# Patient Record
Sex: MALE | Race: WHITE | NOT HISPANIC OR LATINO | URBAN - METROPOLITAN AREA
[De-identification: names, ages, dates, MRNs, and addresses within clinical notes are randomized per-mention and may not be internally consistent; named-entity substitution may affect disease eponyms.]

---

## 2019-10-07 PROBLEM — Z00.00 ENCOUNTER FOR PREVENTIVE HEALTH EXAMINATION: Status: ACTIVE | Noted: 2019-10-07

## 2019-10-10 ENCOUNTER — OUTPATIENT (OUTPATIENT)
Dept: OUTPATIENT SERVICES | Facility: HOSPITAL | Age: 64
LOS: 1 days | Discharge: HOME | End: 2019-10-10
Payer: COMMERCIAL

## 2019-10-10 DIAGNOSIS — C34.90 MALIGNANT NEOPLASM OF UNSPECIFIED PART OF UNSPECIFIED BRONCHUS OR LUNG: ICD-10-CM

## 2019-10-15 ENCOUNTER — APPOINTMENT (OUTPATIENT)
Dept: HEMATOLOGY ONCOLOGY | Facility: CLINIC | Age: 64
End: 2019-10-15

## 2019-10-15 ENCOUNTER — APPOINTMENT (OUTPATIENT)
Dept: HEMATOLOGY ONCOLOGY | Facility: CLINIC | Age: 64
End: 2019-10-15
Payer: COMMERCIAL

## 2019-10-15 VITALS
SYSTOLIC BLOOD PRESSURE: 124 MMHG | HEIGHT: 72 IN | HEART RATE: 78 BPM | OXYGEN SATURATION: 98 % | TEMPERATURE: 98.2 F | WEIGHT: 203.93 LBS | BODY MASS INDEX: 27.62 KG/M2 | DIASTOLIC BLOOD PRESSURE: 76 MMHG | RESPIRATION RATE: 16 BRPM

## 2019-10-15 DIAGNOSIS — Z80.42 FAMILY HISTORY OF MALIGNANT NEOPLASM OF PROSTATE: ICD-10-CM

## 2019-10-15 DIAGNOSIS — Z80.3 FAMILY HISTORY OF MALIGNANT NEOPLASM OF BREAST: ICD-10-CM

## 2019-10-15 PROCEDURE — 99205 OFFICE O/P NEW HI 60 MIN: CPT

## 2019-10-16 ENCOUNTER — FORM ENCOUNTER (OUTPATIENT)
Age: 64
End: 2019-10-16

## 2019-10-17 ENCOUNTER — APPOINTMENT (OUTPATIENT)
Dept: NUCLEAR MEDICINE | Facility: IMAGING CENTER | Age: 64
End: 2019-10-17
Payer: COMMERCIAL

## 2019-10-17 ENCOUNTER — OUTPATIENT (OUTPATIENT)
Dept: OUTPATIENT SERVICES | Facility: HOSPITAL | Age: 64
LOS: 1 days | End: 2019-10-17

## 2019-10-17 DIAGNOSIS — C34.12 MALIGNANT NEOPLASM OF UPPER LOBE, LEFT BRONCHUS OR LUNG: ICD-10-CM

## 2019-10-17 PROCEDURE — 78815 PET IMAGE W/CT SKULL-THIGH: CPT | Mod: 26,PS

## 2019-10-19 ENCOUNTER — FORM ENCOUNTER (OUTPATIENT)
Age: 64
End: 2019-10-19

## 2019-10-20 ENCOUNTER — APPOINTMENT (OUTPATIENT)
Dept: MRI IMAGING | Facility: CLINIC | Age: 64
End: 2019-10-20
Payer: COMMERCIAL

## 2019-10-20 ENCOUNTER — OUTPATIENT (OUTPATIENT)
Dept: OUTPATIENT SERVICES | Facility: HOSPITAL | Age: 64
LOS: 1 days | End: 2019-10-20
Payer: COMMERCIAL

## 2019-10-20 DIAGNOSIS — Z00.8 ENCOUNTER FOR OTHER GENERAL EXAMINATION: ICD-10-CM

## 2019-10-20 PROCEDURE — A9585: CPT

## 2019-10-20 PROCEDURE — 70553 MRI BRAIN STEM W/O & W/DYE: CPT | Mod: 26

## 2019-10-20 PROCEDURE — 70553 MRI BRAIN STEM W/O & W/DYE: CPT

## 2019-10-22 ENCOUNTER — APPOINTMENT (OUTPATIENT)
Dept: HEMATOLOGY ONCOLOGY | Facility: CLINIC | Age: 64
End: 2019-10-22
Payer: COMMERCIAL

## 2019-10-22 VITALS
DIASTOLIC BLOOD PRESSURE: 80 MMHG | TEMPERATURE: 98.1 F | SYSTOLIC BLOOD PRESSURE: 138 MMHG | OXYGEN SATURATION: 98 % | WEIGHT: 205.03 LBS | BODY MASS INDEX: 27.81 KG/M2 | RESPIRATION RATE: 18 BRPM | HEART RATE: 67 BPM

## 2019-10-22 PROCEDURE — 99215 OFFICE O/P EST HI 40 MIN: CPT

## 2019-10-25 ENCOUNTER — LABORATORY RESULT (OUTPATIENT)
Age: 64
End: 2019-10-25

## 2019-10-25 ENCOUNTER — APPOINTMENT (OUTPATIENT)
Dept: INFUSION THERAPY | Facility: HOSPITAL | Age: 64
End: 2019-10-25

## 2019-10-25 ENCOUNTER — OTHER (OUTPATIENT)
Age: 64
End: 2019-10-25

## 2019-10-25 PROCEDURE — 93010 ELECTROCARDIOGRAM REPORT: CPT | Mod: 76

## 2019-10-28 DIAGNOSIS — Z51.11 ENCOUNTER FOR ANTINEOPLASTIC CHEMOTHERAPY: ICD-10-CM

## 2019-10-31 ENCOUNTER — APPOINTMENT (OUTPATIENT)
Dept: CARDIOLOGY | Facility: CLINIC | Age: 64
End: 2019-10-31
Payer: COMMERCIAL

## 2019-10-31 ENCOUNTER — TRANSCRIPTION ENCOUNTER (OUTPATIENT)
Age: 64
End: 2019-10-31

## 2019-10-31 VITALS — HEART RATE: 70 BPM | DIASTOLIC BLOOD PRESSURE: 66 MMHG | SYSTOLIC BLOOD PRESSURE: 128 MMHG

## 2019-10-31 PROCEDURE — 99204 OFFICE O/P NEW MOD 45 MIN: CPT

## 2019-10-31 NOTE — REVIEW OF SYSTEMS
[Fever] : no fever [Chills] : no chills [Shortness Of Breath] : no shortness of breath [Chest Pain] : no chest pain [Palpitations] : no palpitations [Cough] : no cough [Abdominal Pain] : no abdominal pain [Urinary Frequency] : no change in urinary frequency [Joint Pain] : no joint pain [Skin: A Rash] : no rash: [Dizziness] : no dizziness [Anxiety] : no anxiety

## 2019-10-31 NOTE — PHYSICAL EXAM
[General Appearance - Well Developed] : well developed [Normal Appearance] : normal appearance [Well Groomed] : well groomed [General Appearance - Well Nourished] : well nourished [No Deformities] : no deformities [General Appearance - In No Acute Distress] : no acute distress [Normal Conjunctiva] : the conjunctiva exhibited no abnormalities [Eyelids - No Xanthelasma] : the eyelids demonstrated no xanthelasmas [Normal Oral Mucosa] : normal oral mucosa [No Oral Pallor] : no oral pallor [No Oral Cyanosis] : no oral cyanosis [Normal Jugular Venous A Waves Present] : normal jugular venous A waves present [Normal Jugular Venous V Waves Present] : normal jugular venous V waves present [No Jugular Venous Martinez A Waves] : no jugular venous martinez A waves [Heart Rate And Rhythm] : heart rate and rhythm were normal [Heart Sounds] : normal S1 and S2 [Murmurs] : no murmurs present [Respiration, Rhythm And Depth] : normal respiratory rhythm and effort [Exaggerated Use Of Accessory Muscles For Inspiration] : no accessory muscle use [Auscultation Breath Sounds / Voice Sounds] : lungs were clear to auscultation bilaterally [Abdomen Soft] : soft [Abdomen Tenderness] : non-tender [Abdomen Mass (___ Cm)] : no abdominal mass palpated [Abnormal Walk] : normal gait [Gait - Sufficient For Exercise Testing] : the gait was sufficient for exercise testing [Nail Clubbing] : no clubbing of the fingernails [Cyanosis, Localized] : no localized cyanosis [Petechial Hemorrhages (___cm)] : no petechial hemorrhages [Skin Color & Pigmentation] : normal skin color and pigmentation [] : no rash [No Venous Stasis] : no venous stasis [Skin Lesions] : no skin lesions [No Skin Ulcers] : no skin ulcer [No Xanthoma] : no  xanthoma was observed [Oriented To Time, Place, And Person] : oriented to person, place, and time [Affect] : the affect was normal [Mood] : the mood was normal [No Anxiety] : not feeling anxious

## 2019-10-31 NOTE — HISTORY OF PRESENT ILLNESS
[FreeTextEntry1] : 64M with CAD s/p CABG 2008, CAD s/p PCI x 4 (3/20/19), smoker, HLD, metastatic lung Ca s/p chemo, now on immunotherapy who presents for new patient cardiac eval after moving from [place]. Previous cardiologist Dr. Patricio Bethea. \par \par Patient underwent bypass surgery in 2008.  Had felt well until March of 2019 where he noted worsening CP. Subsequently underwent cardiac cath and received a total of 4 stents to his LAD and Cx. Continued chest pain thereafter, CT chest revealed DAGO mass, bx revealing SCLC, stage 4 with bone and brain mets. S/p chemo with dramatic response, in remission as of June 2019, maintained on immunotherapy. He currently feels well without chest pain, shortness of breath, lightheadedness, palpitations. Tolerating dual antiplatelet therapy without bleeding complications.  He had previously been on enalapril but was stopped secondary to borderline hypotension while undergoing chemo. \par \par Former smoker. Retired . Accompanied by wife who works in medical billing.\par \par ECG: SR with RBBB, nonspecific ST-T wave changes\par TTE 3/2019: Normal LV function, paradoxical septal motion, moderate AS, mild MR, mod TR, mild DD\par Cath 3/2019: 80% pLAD s/p GORGE x2 ([facility]NER[facility]), 85% mCx s/p GORGE  x2 ([facility] [place])\par \par Carvedilol 3.125mg BID, Zetia 10mg daily, Asa 81mg, Atorvastatin 40mg, Clopidogrel 75mg

## 2019-11-04 ENCOUNTER — OUTPATIENT (OUTPATIENT)
Dept: OUTPATIENT SERVICES | Facility: HOSPITAL | Age: 64
LOS: 1 days | Discharge: HOME | End: 2019-11-04

## 2019-11-04 DIAGNOSIS — C34.90 MALIGNANT NEOPLASM OF UNSPECIFIED PART OF UNSPECIFIED BRONCHUS OR LUNG: ICD-10-CM

## 2019-11-15 ENCOUNTER — LABORATORY RESULT (OUTPATIENT)
Age: 64
End: 2019-11-15

## 2019-11-15 ENCOUNTER — APPOINTMENT (OUTPATIENT)
Dept: HEMATOLOGY ONCOLOGY | Facility: CLINIC | Age: 64
End: 2019-11-15
Payer: COMMERCIAL

## 2019-11-15 ENCOUNTER — APPOINTMENT (OUTPATIENT)
Dept: INFUSION THERAPY | Facility: HOSPITAL | Age: 64
End: 2019-11-15

## 2019-11-15 VITALS
TEMPERATURE: 98.2 F | OXYGEN SATURATION: 99 % | HEART RATE: 79 BPM | WEIGHT: 207.23 LBS | DIASTOLIC BLOOD PRESSURE: 70 MMHG | BODY MASS INDEX: 28.11 KG/M2 | RESPIRATION RATE: 14 BRPM | SYSTOLIC BLOOD PRESSURE: 130 MMHG

## 2019-11-15 PROCEDURE — 99213 OFFICE O/P EST LOW 20 MIN: CPT

## 2019-11-18 DIAGNOSIS — Z51.11 ENCOUNTER FOR ANTINEOPLASTIC CHEMOTHERAPY: ICD-10-CM

## 2019-11-30 ENCOUNTER — OUTPATIENT (OUTPATIENT)
Dept: OUTPATIENT SERVICES | Facility: HOSPITAL | Age: 64
LOS: 1 days | Discharge: HOME | End: 2019-11-30

## 2019-11-30 DIAGNOSIS — C34.90 MALIGNANT NEOPLASM OF UNSPECIFIED PART OF UNSPECIFIED BRONCHUS OR LUNG: ICD-10-CM

## 2019-12-06 ENCOUNTER — LABORATORY RESULT (OUTPATIENT)
Age: 64
End: 2019-12-06

## 2019-12-06 ENCOUNTER — RESULT REVIEW (OUTPATIENT)
Age: 64
End: 2019-12-06

## 2019-12-06 ENCOUNTER — APPOINTMENT (OUTPATIENT)
Dept: INFUSION THERAPY | Facility: HOSPITAL | Age: 64
End: 2019-12-06

## 2019-12-06 ENCOUNTER — APPOINTMENT (OUTPATIENT)
Dept: HEMATOLOGY ONCOLOGY | Facility: CLINIC | Age: 64
End: 2019-12-06
Payer: COMMERCIAL

## 2019-12-06 LAB
BASOPHILS # BLD AUTO: 0 K/UL — SIGNIFICANT CHANGE UP (ref 0–0.2)
BASOPHILS NFR BLD AUTO: 0.5 % — SIGNIFICANT CHANGE UP (ref 0–2)
EOSINOPHIL # BLD AUTO: 0.3 K/UL — SIGNIFICANT CHANGE UP (ref 0–0.5)
EOSINOPHIL NFR BLD AUTO: 4.5 % — SIGNIFICANT CHANGE UP (ref 0–6)
HCT VFR BLD CALC: 42.7 % — SIGNIFICANT CHANGE UP (ref 39–50)
HGB BLD-MCNC: 15.2 G/DL — SIGNIFICANT CHANGE UP (ref 13–17)
LYMPHOCYTES # BLD AUTO: 1.9 K/UL — SIGNIFICANT CHANGE UP (ref 1–3.3)
LYMPHOCYTES # BLD AUTO: 27.8 % — SIGNIFICANT CHANGE UP (ref 13–44)
MCHC RBC-ENTMCNC: 31.8 PG — SIGNIFICANT CHANGE UP (ref 27–34)
MCHC RBC-ENTMCNC: 35.6 G/DL — SIGNIFICANT CHANGE UP (ref 32–36)
MCV RBC AUTO: 89.6 FL — SIGNIFICANT CHANGE UP (ref 80–100)
MONOCYTES # BLD AUTO: 0.7 K/UL — SIGNIFICANT CHANGE UP (ref 0–0.9)
MONOCYTES NFR BLD AUTO: 10.8 % — SIGNIFICANT CHANGE UP (ref 2–14)
NEUTROPHILS # BLD AUTO: 3.9 K/UL — SIGNIFICANT CHANGE UP (ref 1.8–7.4)
NEUTROPHILS NFR BLD AUTO: 56.5 % — SIGNIFICANT CHANGE UP (ref 43–77)
PLATELET # BLD AUTO: 162 K/UL — SIGNIFICANT CHANGE UP (ref 150–400)
RBC # BLD: 4.76 M/UL — SIGNIFICANT CHANGE UP (ref 4.2–5.8)
RBC # FLD: 12.1 % — SIGNIFICANT CHANGE UP (ref 10.3–14.5)
WBC # BLD: 6.9 K/UL — SIGNIFICANT CHANGE UP (ref 3.8–10.5)
WBC # FLD AUTO: 6.9 K/UL — SIGNIFICANT CHANGE UP (ref 3.8–10.5)

## 2019-12-06 PROCEDURE — 99213 OFFICE O/P EST LOW 20 MIN: CPT

## 2019-12-09 DIAGNOSIS — Z51.11 ENCOUNTER FOR ANTINEOPLASTIC CHEMOTHERAPY: ICD-10-CM

## 2019-12-09 DIAGNOSIS — R11.2 NAUSEA WITH VOMITING, UNSPECIFIED: ICD-10-CM

## 2019-12-27 ENCOUNTER — LABORATORY RESULT (OUTPATIENT)
Age: 64
End: 2019-12-27

## 2019-12-27 ENCOUNTER — APPOINTMENT (OUTPATIENT)
Dept: HEMATOLOGY ONCOLOGY | Facility: CLINIC | Age: 64
End: 2019-12-27
Payer: COMMERCIAL

## 2019-12-27 ENCOUNTER — RESULT REVIEW (OUTPATIENT)
Age: 64
End: 2019-12-27

## 2019-12-27 ENCOUNTER — APPOINTMENT (OUTPATIENT)
Dept: INFUSION THERAPY | Facility: HOSPITAL | Age: 64
End: 2019-12-27

## 2019-12-27 LAB
BASOPHILS # BLD AUTO: 0 K/UL — SIGNIFICANT CHANGE UP (ref 0–0.2)
BASOPHILS NFR BLD AUTO: 0.7 % — SIGNIFICANT CHANGE UP (ref 0–2)
EOSINOPHIL # BLD AUTO: 0.2 K/UL — SIGNIFICANT CHANGE UP (ref 0–0.5)
EOSINOPHIL NFR BLD AUTO: 2.6 % — SIGNIFICANT CHANGE UP (ref 0–6)
HCT VFR BLD CALC: 39.8 % — SIGNIFICANT CHANGE UP (ref 39–50)
HGB BLD-MCNC: 13.9 G/DL — SIGNIFICANT CHANGE UP (ref 13–17)
LYMPHOCYTES # BLD AUTO: 1.8 K/UL — SIGNIFICANT CHANGE UP (ref 1–3.3)
LYMPHOCYTES # BLD AUTO: 28.2 % — SIGNIFICANT CHANGE UP (ref 13–44)
MCHC RBC-ENTMCNC: 31.2 PG — SIGNIFICANT CHANGE UP (ref 27–34)
MCHC RBC-ENTMCNC: 34.8 G/DL — SIGNIFICANT CHANGE UP (ref 32–36)
MCV RBC AUTO: 89.5 FL — SIGNIFICANT CHANGE UP (ref 80–100)
MONOCYTES # BLD AUTO: 0.9 K/UL — SIGNIFICANT CHANGE UP (ref 0–0.9)
MONOCYTES NFR BLD AUTO: 14 % — SIGNIFICANT CHANGE UP (ref 2–14)
NEUTROPHILS # BLD AUTO: 3.4 K/UL — SIGNIFICANT CHANGE UP (ref 1.8–7.4)
NEUTROPHILS NFR BLD AUTO: 54.5 % — SIGNIFICANT CHANGE UP (ref 43–77)
PLATELET # BLD AUTO: 161 K/UL — SIGNIFICANT CHANGE UP (ref 150–400)
RBC # BLD: 4.44 M/UL — SIGNIFICANT CHANGE UP (ref 4.2–5.8)
RBC # FLD: 11.9 % — SIGNIFICANT CHANGE UP (ref 10.3–14.5)
WBC # BLD: 6.2 K/UL — SIGNIFICANT CHANGE UP (ref 3.8–10.5)
WBC # FLD AUTO: 6.2 K/UL — SIGNIFICANT CHANGE UP (ref 3.8–10.5)

## 2019-12-27 PROCEDURE — 99213 OFFICE O/P EST LOW 20 MIN: CPT

## 2020-01-13 ENCOUNTER — OUTPATIENT (OUTPATIENT)
Dept: OUTPATIENT SERVICES | Facility: HOSPITAL | Age: 65
LOS: 1 days | Discharge: HOME | End: 2020-01-13

## 2020-01-13 DIAGNOSIS — C34.90 MALIGNANT NEOPLASM OF UNSPECIFIED PART OF UNSPECIFIED BRONCHUS OR LUNG: ICD-10-CM

## 2020-01-15 ENCOUNTER — OUTPATIENT (OUTPATIENT)
Dept: OUTPATIENT SERVICES | Facility: HOSPITAL | Age: 65
LOS: 1 days | End: 2020-01-15
Payer: COMMERCIAL

## 2020-01-15 ENCOUNTER — APPOINTMENT (OUTPATIENT)
Dept: NUCLEAR MEDICINE | Facility: IMAGING CENTER | Age: 65
End: 2020-01-15

## 2020-01-15 DIAGNOSIS — C34.12 MALIGNANT NEOPLASM OF UPPER LOBE, LEFT BRONCHUS OR LUNG: ICD-10-CM

## 2020-01-15 PROCEDURE — 78815 PET IMAGE W/CT SKULL-THIGH: CPT | Mod: 26,PS

## 2020-01-15 PROCEDURE — A9552: CPT

## 2020-01-15 PROCEDURE — 78815 PET IMAGE W/CT SKULL-THIGH: CPT

## 2020-01-17 ENCOUNTER — APPOINTMENT (OUTPATIENT)
Dept: HEMATOLOGY ONCOLOGY | Facility: CLINIC | Age: 65
End: 2020-01-17
Payer: COMMERCIAL

## 2020-01-17 ENCOUNTER — LABORATORY RESULT (OUTPATIENT)
Age: 65
End: 2020-01-17

## 2020-01-17 ENCOUNTER — RESULT REVIEW (OUTPATIENT)
Age: 65
End: 2020-01-17

## 2020-01-17 ENCOUNTER — APPOINTMENT (OUTPATIENT)
Dept: INFUSION THERAPY | Facility: HOSPITAL | Age: 65
End: 2020-01-17

## 2020-01-17 VITALS
HEART RATE: 67 BPM | TEMPERATURE: 97.7 F | WEIGHT: 210.32 LBS | BODY MASS INDEX: 28.52 KG/M2 | OXYGEN SATURATION: 97 % | SYSTOLIC BLOOD PRESSURE: 132 MMHG | DIASTOLIC BLOOD PRESSURE: 74 MMHG | RESPIRATION RATE: 14 BRPM

## 2020-01-17 LAB
BASOPHILS # BLD AUTO: 0.1 K/UL — SIGNIFICANT CHANGE UP (ref 0–0.2)
BASOPHILS NFR BLD AUTO: 1.2 % — SIGNIFICANT CHANGE UP (ref 0–2)
EOSINOPHIL # BLD AUTO: 0.2 K/UL — SIGNIFICANT CHANGE UP (ref 0–0.5)
EOSINOPHIL NFR BLD AUTO: 3 % — SIGNIFICANT CHANGE UP (ref 0–6)
HCT VFR BLD CALC: 40.2 % — SIGNIFICANT CHANGE UP (ref 39–50)
HGB BLD-MCNC: 13.8 G/DL — SIGNIFICANT CHANGE UP (ref 13–17)
LYMPHOCYTES # BLD AUTO: 1.6 K/UL — SIGNIFICANT CHANGE UP (ref 1–3.3)
LYMPHOCYTES # BLD AUTO: 27.4 % — SIGNIFICANT CHANGE UP (ref 13–44)
MCHC RBC-ENTMCNC: 30.7 PG — SIGNIFICANT CHANGE UP (ref 27–34)
MCHC RBC-ENTMCNC: 34.2 G/DL — SIGNIFICANT CHANGE UP (ref 32–36)
MCV RBC AUTO: 89.7 FL — SIGNIFICANT CHANGE UP (ref 80–100)
MONOCYTES # BLD AUTO: 0.7 K/UL — SIGNIFICANT CHANGE UP (ref 0–0.9)
MONOCYTES NFR BLD AUTO: 12.4 % — SIGNIFICANT CHANGE UP (ref 2–14)
NEUTROPHILS # BLD AUTO: 3.2 K/UL — SIGNIFICANT CHANGE UP (ref 1.8–7.4)
NEUTROPHILS NFR BLD AUTO: 56 % — SIGNIFICANT CHANGE UP (ref 43–77)
PLATELET # BLD AUTO: 185 K/UL — SIGNIFICANT CHANGE UP (ref 150–400)
RBC # BLD: 4.49 M/UL — SIGNIFICANT CHANGE UP (ref 4.2–5.8)
RBC # FLD: 11.9 % — SIGNIFICANT CHANGE UP (ref 10.3–14.5)
WBC # BLD: 5.8 K/UL — SIGNIFICANT CHANGE UP (ref 3.8–10.5)
WBC # FLD AUTO: 5.8 K/UL — SIGNIFICANT CHANGE UP (ref 3.8–10.5)

## 2020-01-17 PROCEDURE — 99214 OFFICE O/P EST MOD 30 MIN: CPT

## 2020-01-21 DIAGNOSIS — Z51.11 ENCOUNTER FOR ANTINEOPLASTIC CHEMOTHERAPY: ICD-10-CM

## 2020-02-07 ENCOUNTER — APPOINTMENT (OUTPATIENT)
Dept: INFUSION THERAPY | Facility: HOSPITAL | Age: 65
End: 2020-02-07

## 2020-02-07 ENCOUNTER — LABORATORY RESULT (OUTPATIENT)
Age: 65
End: 2020-02-07

## 2020-02-07 ENCOUNTER — RESULT REVIEW (OUTPATIENT)
Age: 65
End: 2020-02-07

## 2020-02-07 LAB
BASOPHILS # BLD AUTO: 0 K/UL — SIGNIFICANT CHANGE UP (ref 0–0.2)
BASOPHILS NFR BLD AUTO: 0.6 % — SIGNIFICANT CHANGE UP (ref 0–2)
EOSINOPHIL # BLD AUTO: 0.2 K/UL — SIGNIFICANT CHANGE UP (ref 0–0.5)
EOSINOPHIL NFR BLD AUTO: 2.7 % — SIGNIFICANT CHANGE UP (ref 0–6)
HCT VFR BLD CALC: 41.7 % — SIGNIFICANT CHANGE UP (ref 39–50)
HGB BLD-MCNC: 14.4 G/DL — SIGNIFICANT CHANGE UP (ref 13–17)
LYMPHOCYTES # BLD AUTO: 2 K/UL — SIGNIFICANT CHANGE UP (ref 1–3.3)
LYMPHOCYTES # BLD AUTO: 30.8 % — SIGNIFICANT CHANGE UP (ref 13–44)
MCHC RBC-ENTMCNC: 31.1 PG — SIGNIFICANT CHANGE UP (ref 27–34)
MCHC RBC-ENTMCNC: 34.6 G/DL — SIGNIFICANT CHANGE UP (ref 32–36)
MCV RBC AUTO: 89.9 FL — SIGNIFICANT CHANGE UP (ref 80–100)
MONOCYTES # BLD AUTO: 0.8 K/UL — SIGNIFICANT CHANGE UP (ref 0–0.9)
MONOCYTES NFR BLD AUTO: 11.8 % — SIGNIFICANT CHANGE UP (ref 2–14)
NEUTROPHILS # BLD AUTO: 3.5 K/UL — SIGNIFICANT CHANGE UP (ref 1.8–7.4)
NEUTROPHILS NFR BLD AUTO: 54.1 % — SIGNIFICANT CHANGE UP (ref 43–77)
PLATELET # BLD AUTO: 154 K/UL — SIGNIFICANT CHANGE UP (ref 150–400)
RBC # BLD: 4.63 M/UL — SIGNIFICANT CHANGE UP (ref 4.2–5.8)
RBC # FLD: 12 % — SIGNIFICANT CHANGE UP (ref 10.3–14.5)
WBC # BLD: 6.4 K/UL — SIGNIFICANT CHANGE UP (ref 3.8–10.5)
WBC # FLD AUTO: 6.4 K/UL — SIGNIFICANT CHANGE UP (ref 3.8–10.5)

## 2020-02-19 ENCOUNTER — APPOINTMENT (OUTPATIENT)
Dept: CT IMAGING | Facility: IMAGING CENTER | Age: 65
End: 2020-02-19
Payer: COMMERCIAL

## 2020-02-19 ENCOUNTER — OUTPATIENT (OUTPATIENT)
Dept: OUTPATIENT SERVICES | Facility: HOSPITAL | Age: 65
LOS: 1 days | Discharge: HOME | End: 2020-02-19

## 2020-02-19 ENCOUNTER — OUTPATIENT (OUTPATIENT)
Dept: OUTPATIENT SERVICES | Facility: HOSPITAL | Age: 65
LOS: 1 days | End: 2020-02-19
Payer: COMMERCIAL

## 2020-02-19 DIAGNOSIS — C34.12 MALIGNANT NEOPLASM OF UPPER LOBE, LEFT BRONCHUS OR LUNG: ICD-10-CM

## 2020-02-19 DIAGNOSIS — C34.90 MALIGNANT NEOPLASM OF UNSPECIFIED PART OF UNSPECIFIED BRONCHUS OR LUNG: ICD-10-CM

## 2020-02-19 PROCEDURE — 74177 CT ABD & PELVIS W/CONTRAST: CPT

## 2020-02-19 PROCEDURE — 74177 CT ABD & PELVIS W/CONTRAST: CPT | Mod: 26

## 2020-02-19 PROCEDURE — 82565 ASSAY OF CREATININE: CPT

## 2020-02-21 ENCOUNTER — APPOINTMENT (OUTPATIENT)
Dept: HEMATOLOGY ONCOLOGY | Facility: CLINIC | Age: 65
End: 2020-02-21
Payer: COMMERCIAL

## 2020-02-21 ENCOUNTER — RESULT REVIEW (OUTPATIENT)
Age: 65
End: 2020-02-21

## 2020-02-21 LAB
BASOPHILS # BLD AUTO: 0.1 K/UL — SIGNIFICANT CHANGE UP (ref 0–0.2)
BASOPHILS NFR BLD AUTO: 1.1 % — SIGNIFICANT CHANGE UP (ref 0–2)
EOSINOPHIL # BLD AUTO: 0.2 K/UL — SIGNIFICANT CHANGE UP (ref 0–0.5)
EOSINOPHIL NFR BLD AUTO: 2.4 % — SIGNIFICANT CHANGE UP (ref 0–6)
HCT VFR BLD CALC: 41.2 % — SIGNIFICANT CHANGE UP (ref 39–50)
HGB BLD-MCNC: 14.5 G/DL — SIGNIFICANT CHANGE UP (ref 13–17)
LYMPHOCYTES # BLD AUTO: 2.2 K/UL — SIGNIFICANT CHANGE UP (ref 1–3.3)
LYMPHOCYTES # BLD AUTO: 33.5 % — SIGNIFICANT CHANGE UP (ref 13–44)
MCHC RBC-ENTMCNC: 31.6 PG — SIGNIFICANT CHANGE UP (ref 27–34)
MCHC RBC-ENTMCNC: 35.1 G/DL — SIGNIFICANT CHANGE UP (ref 32–36)
MCV RBC AUTO: 90 FL — SIGNIFICANT CHANGE UP (ref 80–100)
MONOCYTES # BLD AUTO: 0.7 K/UL — SIGNIFICANT CHANGE UP (ref 0–0.9)
MONOCYTES NFR BLD AUTO: 11.4 % — SIGNIFICANT CHANGE UP (ref 2–14)
NEUTROPHILS # BLD AUTO: 3.4 K/UL — SIGNIFICANT CHANGE UP (ref 1.8–7.4)
NEUTROPHILS NFR BLD AUTO: 51.5 % — SIGNIFICANT CHANGE UP (ref 43–77)
PLATELET # BLD AUTO: 180 K/UL — SIGNIFICANT CHANGE UP (ref 150–400)
RBC # BLD: 4.58 M/UL — SIGNIFICANT CHANGE UP (ref 4.2–5.8)
RBC # FLD: 11.8 % — SIGNIFICANT CHANGE UP (ref 10.3–14.5)
WBC # BLD: 6.6 K/UL — SIGNIFICANT CHANGE UP (ref 3.8–10.5)
WBC # FLD AUTO: 6.6 K/UL — SIGNIFICANT CHANGE UP (ref 3.8–10.5)

## 2020-02-21 PROCEDURE — 99214 OFFICE O/P EST MOD 30 MIN: CPT

## 2020-02-27 ENCOUNTER — APPOINTMENT (OUTPATIENT)
Dept: CARDIOLOGY | Facility: CLINIC | Age: 65
End: 2020-02-27
Payer: COMMERCIAL

## 2020-02-27 VITALS — DIASTOLIC BLOOD PRESSURE: 57 MMHG | SYSTOLIC BLOOD PRESSURE: 132 MMHG | HEART RATE: 64 BPM

## 2020-02-27 PROCEDURE — 99214 OFFICE O/P EST MOD 30 MIN: CPT

## 2020-02-27 NOTE — DISCUSSION/SUMMARY
[FreeTextEntry1] : 64M with CAD s/p CABG and subsequent PCI, mod AS, metastatic lung Ca, HLD\par \par 1. CAD:  Currently feeling well without symptoms. Preserved LV function on last echo\par \par -Continue aspirin and plavix until at least March 2020. Stop plavix 3/20/20\par -Continue high dose statin, beta blocker\par -Repeat echo \par \par 2. Mod AS:  No gradients given on echo report from [place]. Check echo\par \par 3. HLD: Cont high dose statin. Lipids from 11/19 excellent\par \par 4. Onc: Continue regular follow up, on immunotherapy.  \par \par RV 4 months\par Echo\par Stop Plavix 3/20/20

## 2020-02-27 NOTE — PHYSICAL EXAM
[Normal Appearance] : normal appearance [General Appearance - Well Developed] : well developed [General Appearance - Well Nourished] : well nourished [Well Groomed] : well groomed [General Appearance - In No Acute Distress] : no acute distress [No Deformities] : no deformities [Normal Conjunctiva] : the conjunctiva exhibited no abnormalities [Normal Oral Mucosa] : normal oral mucosa [Eyelids - No Xanthelasma] : the eyelids demonstrated no xanthelasmas [No Oral Pallor] : no oral pallor [Normal Jugular Venous V Waves Present] : normal jugular venous V waves present [Normal Jugular Venous A Waves Present] : normal jugular venous A waves present [No Oral Cyanosis] : no oral cyanosis [Respiration, Rhythm And Depth] : normal respiratory rhythm and effort [No Jugular Venous Martinez A Waves] : no jugular venous martinez A waves [Auscultation Breath Sounds / Voice Sounds] : lungs were clear to auscultation bilaterally [Exaggerated Use Of Accessory Muscles For Inspiration] : no accessory muscle use [Heart Rate And Rhythm] : heart rate and rhythm were normal [Abdomen Soft] : soft [Murmurs] : no murmurs present [Heart Sounds] : normal S1 and S2 [Abdomen Tenderness] : non-tender [Gait - Sufficient For Exercise Testing] : the gait was sufficient for exercise testing [Abdomen Mass (___ Cm)] : no abdominal mass palpated [Abnormal Walk] : normal gait [Cyanosis, Localized] : no localized cyanosis [Nail Clubbing] : no clubbing of the fingernails [Petechial Hemorrhages (___cm)] : no petechial hemorrhages [Skin Color & Pigmentation] : normal skin color and pigmentation [No Venous Stasis] : no venous stasis [] : no rash [No Skin Ulcers] : no skin ulcer [Skin Lesions] : no skin lesions [No Xanthoma] : no  xanthoma was observed [Oriented To Time, Place, And Person] : oriented to person, place, and time [No Anxiety] : not feeling anxious [Mood] : the mood was normal [Affect] : the affect was normal

## 2020-02-27 NOTE — REVIEW OF SYSTEMS
[Fever] : no fever [Chills] : no chills [Chest Pain] : no chest pain [Shortness Of Breath] : no shortness of breath [Palpitations] : no palpitations [Cough] : no cough [Urinary Frequency] : no change in urinary frequency [Abdominal Pain] : no abdominal pain [Skin: A Rash] : no rash: [Joint Pain] : no joint pain [Anxiety] : no anxiety [Dizziness] : no dizziness

## 2020-02-27 NOTE — HISTORY OF PRESENT ILLNESS
[FreeTextEntry1] : 64M with CAD s/p CABG 2008, CAD s/p PCI x 4 (3/20/19), moderate AS, smoker, HLD, metastatic lung Ca s/p chemo, now on immunotherapy who presents for cardiac follow up.\par \par Feels well without CP, SOB, dizziness, lightheadedness, syncope\par Asking about coming off of plavix\par No bleeding\par Stable labs as checked by heme\par He had previously been on enalapril but was stopped secondary to borderline hypotension while undergoing chemo. \par \par HISTORY:\par \par Patient recently moved from [place]. Previous cardiologist Dr. Patricio Bethea. \par \par Patient underwent bypass surgery in 2008.  Had felt well until March of 2019 where he noted worsening CP. Subsequently underwent cardiac cath and received a total of 4 stents to his LAD and Cx. Continued chest pain thereafter, CT chest revealed DAGO mass, bx revealing SCLC, stage 4 with bone and brain mets. S/p chemo with dramatic response, in remission as of June 2019, maintained on immunotherapy. \par \par Former smoker. Retired . Accompanied by wife who works in medical billing.\par \par ECG: SR with RBBB, nonspecific ST-T wave changes\par TTE 3/2019: Normal LV function, paradoxical septal motion, moderate AS, mild MR, mod TR, mild DD\par Cath 3/2019: 80% pLAD s/p GORGE x2 ([facility]), 85% mCx s/p GORGE  x2 ([facility] [place])\par \par Carvedilol 3.125mg BID, Zetia 10mg daily, Asa 81mg, Atorvastatin 40mg, Clopidogrel 75mg

## 2020-02-28 ENCOUNTER — APPOINTMENT (OUTPATIENT)
Dept: INFUSION THERAPY | Facility: HOSPITAL | Age: 65
End: 2020-02-28

## 2020-02-28 ENCOUNTER — RESULT REVIEW (OUTPATIENT)
Age: 65
End: 2020-02-28

## 2020-02-28 ENCOUNTER — LABORATORY RESULT (OUTPATIENT)
Age: 65
End: 2020-02-28

## 2020-02-28 ENCOUNTER — APPOINTMENT (OUTPATIENT)
Dept: HEMATOLOGY ONCOLOGY | Facility: CLINIC | Age: 65
End: 2020-02-28
Payer: COMMERCIAL

## 2020-02-28 VITALS
BODY MASS INDEX: 28.7 KG/M2 | DIASTOLIC BLOOD PRESSURE: 77 MMHG | HEART RATE: 60 BPM | TEMPERATURE: 97.7 F | OXYGEN SATURATION: 99 % | RESPIRATION RATE: 18 BRPM | SYSTOLIC BLOOD PRESSURE: 154 MMHG | WEIGHT: 211.64 LBS

## 2020-02-28 LAB
BASOPHILS # BLD AUTO: 0 K/UL — SIGNIFICANT CHANGE UP (ref 0–0.2)
BASOPHILS NFR BLD AUTO: 0.6 % — SIGNIFICANT CHANGE UP (ref 0–2)
EOSINOPHIL # BLD AUTO: 0.2 K/UL — SIGNIFICANT CHANGE UP (ref 0–0.5)
EOSINOPHIL NFR BLD AUTO: 2.4 % — SIGNIFICANT CHANGE UP (ref 0–6)
HCT VFR BLD CALC: 40.3 % — SIGNIFICANT CHANGE UP (ref 39–50)
HGB BLD-MCNC: 14.2 G/DL — SIGNIFICANT CHANGE UP (ref 13–17)
LYMPHOCYTES # BLD AUTO: 1.8 K/UL — SIGNIFICANT CHANGE UP (ref 1–3.3)
LYMPHOCYTES # BLD AUTO: 28 % — SIGNIFICANT CHANGE UP (ref 13–44)
MCHC RBC-ENTMCNC: 31.8 PG — SIGNIFICANT CHANGE UP (ref 27–34)
MCHC RBC-ENTMCNC: 35.4 G/DL — SIGNIFICANT CHANGE UP (ref 32–36)
MCV RBC AUTO: 89.9 FL — SIGNIFICANT CHANGE UP (ref 80–100)
MONOCYTES # BLD AUTO: 0.7 K/UL — SIGNIFICANT CHANGE UP (ref 0–0.9)
MONOCYTES NFR BLD AUTO: 11.2 % — SIGNIFICANT CHANGE UP (ref 2–14)
NEUTROPHILS # BLD AUTO: 3.8 K/UL — SIGNIFICANT CHANGE UP (ref 1.8–7.4)
NEUTROPHILS NFR BLD AUTO: 57.8 % — SIGNIFICANT CHANGE UP (ref 43–77)
PLATELET # BLD AUTO: 165 K/UL — SIGNIFICANT CHANGE UP (ref 150–400)
RBC # BLD: 4.48 M/UL — SIGNIFICANT CHANGE UP (ref 4.2–5.8)
RBC # FLD: 11.8 % — SIGNIFICANT CHANGE UP (ref 10.3–14.5)
WBC # BLD: 6.6 K/UL — SIGNIFICANT CHANGE UP (ref 3.8–10.5)
WBC # FLD AUTO: 6.6 K/UL — SIGNIFICANT CHANGE UP (ref 3.8–10.5)

## 2020-02-28 PROCEDURE — 99213 OFFICE O/P EST LOW 20 MIN: CPT

## 2020-03-02 DIAGNOSIS — Z51.11 ENCOUNTER FOR ANTINEOPLASTIC CHEMOTHERAPY: ICD-10-CM

## 2020-03-02 LAB
ALBUMIN SERPL ELPH-MCNC: 4.4 G/DL
ALP BLD-CCNC: 83 U/L
ALT SERPL-CCNC: 17 U/L
ANION GAP SERPL CALC-SCNC: 11 MMOL/L
AST SERPL-CCNC: 15 U/L
BILIRUB SERPL-MCNC: 0.3 MG/DL
BUN SERPL-MCNC: 13 MG/DL
CALCIUM SERPL-MCNC: 9.8 MG/DL
CHLORIDE SERPL-SCNC: 105 MMOL/L
CO2 SERPL-SCNC: 26 MMOL/L
CREAT SERPL-MCNC: 0.74 MG/DL
GLUCOSE SERPL-MCNC: 107 MG/DL
LDH SERPL-CCNC: 147 U/L
MAGNESIUM SERPL-MCNC: 2.1 MG/DL
POTASSIUM SERPL-SCNC: 4.6 MMOL/L
PROT SERPL-MCNC: 6.7 G/DL
SODIUM SERPL-SCNC: 142 MMOL/L

## 2020-03-04 ENCOUNTER — FORM ENCOUNTER (OUTPATIENT)
Age: 65
End: 2020-03-04

## 2020-03-05 ENCOUNTER — APPOINTMENT (OUTPATIENT)
Dept: MRI IMAGING | Facility: IMAGING CENTER | Age: 65
End: 2020-03-05
Payer: COMMERCIAL

## 2020-03-05 ENCOUNTER — OUTPATIENT (OUTPATIENT)
Dept: OUTPATIENT SERVICES | Facility: HOSPITAL | Age: 65
LOS: 1 days | End: 2020-03-05
Payer: COMMERCIAL

## 2020-03-05 DIAGNOSIS — C34.12 MALIGNANT NEOPLASM OF UPPER LOBE, LEFT BRONCHUS OR LUNG: ICD-10-CM

## 2020-03-05 PROCEDURE — A9585: CPT

## 2020-03-05 PROCEDURE — 70553 MRI BRAIN STEM W/O & W/DYE: CPT

## 2020-03-05 PROCEDURE — 70553 MRI BRAIN STEM W/O & W/DYE: CPT | Mod: 26

## 2020-03-13 ENCOUNTER — APPOINTMENT (OUTPATIENT)
Dept: GASTROENTEROLOGY | Facility: CLINIC | Age: 65
End: 2020-03-13

## 2020-03-20 ENCOUNTER — RESULT REVIEW (OUTPATIENT)
Age: 65
End: 2020-03-20

## 2020-03-20 ENCOUNTER — LABORATORY RESULT (OUTPATIENT)
Age: 65
End: 2020-03-20

## 2020-03-20 ENCOUNTER — APPOINTMENT (OUTPATIENT)
Dept: INFUSION THERAPY | Facility: HOSPITAL | Age: 65
End: 2020-03-20

## 2020-03-20 LAB
BASOPHILS # BLD AUTO: 0.02 K/UL — SIGNIFICANT CHANGE UP (ref 0–0.2)
BASOPHILS NFR BLD AUTO: 0.4 % — SIGNIFICANT CHANGE UP (ref 0–2)
EOSINOPHIL # BLD AUTO: 0.14 K/UL — SIGNIFICANT CHANGE UP (ref 0–0.5)
EOSINOPHIL NFR BLD AUTO: 2.5 % — SIGNIFICANT CHANGE UP (ref 0–6)
HCT VFR BLD CALC: 40.8 % — SIGNIFICANT CHANGE UP (ref 39–50)
HGB BLD-MCNC: 13.8 G/DL — SIGNIFICANT CHANGE UP (ref 13–17)
IMM GRANULOCYTES NFR BLD AUTO: 0.7 % — SIGNIFICANT CHANGE UP (ref 0–1.5)
LYMPHOCYTES # BLD AUTO: 1.74 K/UL — SIGNIFICANT CHANGE UP (ref 1–3.3)
LYMPHOCYTES # BLD AUTO: 31.1 % — SIGNIFICANT CHANGE UP (ref 13–44)
MCHC RBC-ENTMCNC: 30.2 PG — SIGNIFICANT CHANGE UP (ref 27–34)
MCHC RBC-ENTMCNC: 33.8 GM/DL — SIGNIFICANT CHANGE UP (ref 32–36)
MCV RBC AUTO: 89.3 FL — SIGNIFICANT CHANGE UP (ref 80–100)
MONOCYTES # BLD AUTO: 0.66 K/UL — SIGNIFICANT CHANGE UP (ref 0–0.9)
MONOCYTES NFR BLD AUTO: 11.8 % — SIGNIFICANT CHANGE UP (ref 2–14)
NEUTROPHILS # BLD AUTO: 3 K/UL — SIGNIFICANT CHANGE UP (ref 1.8–7.4)
NEUTROPHILS NFR BLD AUTO: 53.5 % — SIGNIFICANT CHANGE UP (ref 43–77)
NRBC # BLD: 0 /100 WBCS — SIGNIFICANT CHANGE UP (ref 0–0)
PLATELET # BLD AUTO: 159 K/UL — SIGNIFICANT CHANGE UP (ref 150–400)
RBC # BLD: 4.57 M/UL — SIGNIFICANT CHANGE UP (ref 4.2–5.8)
RBC # FLD: 12.1 % — SIGNIFICANT CHANGE UP (ref 10.3–14.5)
WBC # BLD: 5.6 K/UL — SIGNIFICANT CHANGE UP (ref 3.8–10.5)
WBC # FLD AUTO: 5.6 K/UL — SIGNIFICANT CHANGE UP (ref 3.8–10.5)

## 2020-04-06 ENCOUNTER — OUTPATIENT (OUTPATIENT)
Dept: OUTPATIENT SERVICES | Facility: HOSPITAL | Age: 65
LOS: 1 days | Discharge: HOME | End: 2020-04-06

## 2020-04-06 DIAGNOSIS — C34.90 MALIGNANT NEOPLASM OF UNSPECIFIED PART OF UNSPECIFIED BRONCHUS OR LUNG: ICD-10-CM

## 2020-04-08 ENCOUNTER — RESULT REVIEW (OUTPATIENT)
Age: 65
End: 2020-04-08

## 2020-04-08 ENCOUNTER — OUTPATIENT (OUTPATIENT)
Dept: OUTPATIENT SERVICES | Facility: HOSPITAL | Age: 65
LOS: 1 days | End: 2020-04-08
Payer: COMMERCIAL

## 2020-04-08 ENCOUNTER — APPOINTMENT (OUTPATIENT)
Dept: CT IMAGING | Facility: IMAGING CENTER | Age: 65
End: 2020-04-08
Payer: COMMERCIAL

## 2020-04-08 DIAGNOSIS — C34.12 MALIGNANT NEOPLASM OF UPPER LOBE, LEFT BRONCHUS OR LUNG: ICD-10-CM

## 2020-04-08 PROCEDURE — 71260 CT THORAX DX C+: CPT

## 2020-04-08 PROCEDURE — 71260 CT THORAX DX C+: CPT | Mod: 26

## 2020-04-10 ENCOUNTER — LABORATORY RESULT (OUTPATIENT)
Age: 65
End: 2020-04-10

## 2020-04-10 ENCOUNTER — RESULT REVIEW (OUTPATIENT)
Age: 65
End: 2020-04-10

## 2020-04-10 ENCOUNTER — APPOINTMENT (OUTPATIENT)
Dept: HEMATOLOGY ONCOLOGY | Facility: CLINIC | Age: 65
End: 2020-04-10
Payer: COMMERCIAL

## 2020-04-10 ENCOUNTER — APPOINTMENT (OUTPATIENT)
Dept: INFUSION THERAPY | Facility: HOSPITAL | Age: 65
End: 2020-04-10

## 2020-04-10 LAB
BASOPHILS # BLD AUTO: 0.01 K/UL — SIGNIFICANT CHANGE UP (ref 0–0.2)
BASOPHILS NFR BLD AUTO: 0.2 % — SIGNIFICANT CHANGE UP (ref 0–2)
EOSINOPHIL # BLD AUTO: 0.12 K/UL — SIGNIFICANT CHANGE UP (ref 0–0.5)
EOSINOPHIL NFR BLD AUTO: 2 % — SIGNIFICANT CHANGE UP (ref 0–6)
HCT VFR BLD CALC: 40.4 % — SIGNIFICANT CHANGE UP (ref 39–50)
HGB BLD-MCNC: 13.9 G/DL — SIGNIFICANT CHANGE UP (ref 13–17)
IMM GRANULOCYTES NFR BLD AUTO: 0.3 % — SIGNIFICANT CHANGE UP (ref 0–1.5)
LYMPHOCYTES # BLD AUTO: 1.68 K/UL — SIGNIFICANT CHANGE UP (ref 1–3.3)
LYMPHOCYTES # BLD AUTO: 27.6 % — SIGNIFICANT CHANGE UP (ref 13–44)
MCHC RBC-ENTMCNC: 30.2 PG — SIGNIFICANT CHANGE UP (ref 27–34)
MCHC RBC-ENTMCNC: 34.4 GM/DL — SIGNIFICANT CHANGE UP (ref 32–36)
MCV RBC AUTO: 87.8 FL — SIGNIFICANT CHANGE UP (ref 80–100)
MONOCYTES # BLD AUTO: 0.75 K/UL — SIGNIFICANT CHANGE UP (ref 0–0.9)
MONOCYTES NFR BLD AUTO: 12.3 % — SIGNIFICANT CHANGE UP (ref 2–14)
NEUTROPHILS # BLD AUTO: 3.51 K/UL — SIGNIFICANT CHANGE UP (ref 1.8–7.4)
NEUTROPHILS NFR BLD AUTO: 57.6 % — SIGNIFICANT CHANGE UP (ref 43–77)
NRBC # BLD: 0 /100 WBCS — SIGNIFICANT CHANGE UP (ref 0–0)
PLATELET # BLD AUTO: 152 K/UL — SIGNIFICANT CHANGE UP (ref 150–400)
RBC # BLD: 4.6 M/UL — SIGNIFICANT CHANGE UP (ref 4.2–5.8)
RBC # FLD: 12.4 % — SIGNIFICANT CHANGE UP (ref 10.3–14.5)
WBC # BLD: 6.09 K/UL — SIGNIFICANT CHANGE UP (ref 3.8–10.5)
WBC # FLD AUTO: 6.09 K/UL — SIGNIFICANT CHANGE UP (ref 3.8–10.5)

## 2020-04-10 PROCEDURE — 99213 OFFICE O/P EST LOW 20 MIN: CPT

## 2020-04-13 DIAGNOSIS — Z51.11 ENCOUNTER FOR ANTINEOPLASTIC CHEMOTHERAPY: ICD-10-CM

## 2020-05-01 ENCOUNTER — APPOINTMENT (OUTPATIENT)
Dept: INFUSION THERAPY | Facility: HOSPITAL | Age: 65
End: 2020-05-01

## 2020-05-01 ENCOUNTER — LABORATORY RESULT (OUTPATIENT)
Age: 65
End: 2020-05-01

## 2020-05-01 ENCOUNTER — APPOINTMENT (OUTPATIENT)
Dept: HEMATOLOGY ONCOLOGY | Facility: CLINIC | Age: 65
End: 2020-05-01
Payer: COMMERCIAL

## 2020-05-01 ENCOUNTER — RESULT REVIEW (OUTPATIENT)
Age: 65
End: 2020-05-01

## 2020-05-01 LAB
BASOPHILS # BLD AUTO: 0.01 K/UL — SIGNIFICANT CHANGE UP (ref 0–0.2)
BASOPHILS NFR BLD AUTO: 0.2 % — SIGNIFICANT CHANGE UP (ref 0–2)
EOSINOPHIL # BLD AUTO: 0.1 K/UL — SIGNIFICANT CHANGE UP (ref 0–0.5)
EOSINOPHIL NFR BLD AUTO: 1.7 % — SIGNIFICANT CHANGE UP (ref 0–6)
HCT VFR BLD CALC: 39.8 % — SIGNIFICANT CHANGE UP (ref 39–50)
HGB BLD-MCNC: 14.1 G/DL — SIGNIFICANT CHANGE UP (ref 13–17)
IMM GRANULOCYTES NFR BLD AUTO: 0.3 % — SIGNIFICANT CHANGE UP (ref 0–1.5)
LYMPHOCYTES # BLD AUTO: 1.82 K/UL — SIGNIFICANT CHANGE UP (ref 1–3.3)
LYMPHOCYTES # BLD AUTO: 31.2 % — SIGNIFICANT CHANGE UP (ref 13–44)
MCHC RBC-ENTMCNC: 30.5 PG — SIGNIFICANT CHANGE UP (ref 27–34)
MCHC RBC-ENTMCNC: 35.4 GM/DL — SIGNIFICANT CHANGE UP (ref 32–36)
MCV RBC AUTO: 86 FL — SIGNIFICANT CHANGE UP (ref 80–100)
MONOCYTES # BLD AUTO: 0.71 K/UL — SIGNIFICANT CHANGE UP (ref 0–0.9)
MONOCYTES NFR BLD AUTO: 12.2 % — SIGNIFICANT CHANGE UP (ref 2–14)
NEUTROPHILS # BLD AUTO: 3.17 K/UL — SIGNIFICANT CHANGE UP (ref 1.8–7.4)
NEUTROPHILS NFR BLD AUTO: 54.4 % — SIGNIFICANT CHANGE UP (ref 43–77)
NRBC # BLD: 0 /100 WBCS — SIGNIFICANT CHANGE UP (ref 0–0)
PLATELET # BLD AUTO: 152 K/UL — SIGNIFICANT CHANGE UP (ref 150–400)
RBC # BLD: 4.63 M/UL — SIGNIFICANT CHANGE UP (ref 4.2–5.8)
RBC # FLD: 12.4 % — SIGNIFICANT CHANGE UP (ref 10.3–14.5)
WBC # BLD: 5.83 K/UL — SIGNIFICANT CHANGE UP (ref 3.8–10.5)
WBC # FLD AUTO: 5.83 K/UL — SIGNIFICANT CHANGE UP (ref 3.8–10.5)

## 2020-05-01 PROCEDURE — 99213 OFFICE O/P EST LOW 20 MIN: CPT

## 2020-05-14 ENCOUNTER — APPOINTMENT (OUTPATIENT)
Dept: INTERNAL MEDICINE | Facility: CLINIC | Age: 65
End: 2020-05-14
Payer: COMMERCIAL

## 2020-05-14 PROCEDURE — 93306 TTE W/DOPPLER COMPLETE: CPT

## 2020-05-14 PROCEDURE — 93880 EXTRACRANIAL BILAT STUDY: CPT

## 2020-05-19 ENCOUNTER — OUTPATIENT (OUTPATIENT)
Dept: OUTPATIENT SERVICES | Facility: HOSPITAL | Age: 65
LOS: 1 days | Discharge: HOME | End: 2020-05-19

## 2020-05-19 DIAGNOSIS — C34.90 MALIGNANT NEOPLASM OF UNSPECIFIED PART OF UNSPECIFIED BRONCHUS OR LUNG: ICD-10-CM

## 2020-05-22 ENCOUNTER — LABORATORY RESULT (OUTPATIENT)
Age: 65
End: 2020-05-22

## 2020-05-22 ENCOUNTER — RESULT REVIEW (OUTPATIENT)
Age: 65
End: 2020-05-22

## 2020-05-22 ENCOUNTER — APPOINTMENT (OUTPATIENT)
Dept: INFUSION THERAPY | Facility: HOSPITAL | Age: 65
End: 2020-05-22

## 2020-05-22 ENCOUNTER — APPOINTMENT (OUTPATIENT)
Dept: HEMATOLOGY ONCOLOGY | Facility: CLINIC | Age: 65
End: 2020-05-22
Payer: COMMERCIAL

## 2020-05-22 LAB
BASOPHILS # BLD AUTO: 0.01 K/UL — SIGNIFICANT CHANGE UP (ref 0–0.2)
BASOPHILS NFR BLD AUTO: 0.2 % — SIGNIFICANT CHANGE UP (ref 0–2)
EOSINOPHIL # BLD AUTO: 0.11 K/UL — SIGNIFICANT CHANGE UP (ref 0–0.5)
EOSINOPHIL NFR BLD AUTO: 1.9 % — SIGNIFICANT CHANGE UP (ref 0–6)
HCT VFR BLD CALC: 41.2 % — SIGNIFICANT CHANGE UP (ref 39–50)
HGB BLD-MCNC: 14.4 G/DL — SIGNIFICANT CHANGE UP (ref 13–17)
IMM GRANULOCYTES NFR BLD AUTO: 0.4 % — SIGNIFICANT CHANGE UP (ref 0–1.5)
LYMPHOCYTES # BLD AUTO: 1.81 K/UL — SIGNIFICANT CHANGE UP (ref 1–3.3)
LYMPHOCYTES # BLD AUTO: 31.9 % — SIGNIFICANT CHANGE UP (ref 13–44)
MCHC RBC-ENTMCNC: 30.1 PG — SIGNIFICANT CHANGE UP (ref 27–34)
MCHC RBC-ENTMCNC: 35 GM/DL — SIGNIFICANT CHANGE UP (ref 32–36)
MCV RBC AUTO: 86 FL — SIGNIFICANT CHANGE UP (ref 80–100)
MONOCYTES # BLD AUTO: 0.65 K/UL — SIGNIFICANT CHANGE UP (ref 0–0.9)
MONOCYTES NFR BLD AUTO: 11.4 % — SIGNIFICANT CHANGE UP (ref 2–14)
NEUTROPHILS # BLD AUTO: 3.08 K/UL — SIGNIFICANT CHANGE UP (ref 1.8–7.4)
NEUTROPHILS NFR BLD AUTO: 54.2 % — SIGNIFICANT CHANGE UP (ref 43–77)
NRBC # BLD: 0 /100 WBCS — SIGNIFICANT CHANGE UP (ref 0–0)
PLATELET # BLD AUTO: 152 K/UL — SIGNIFICANT CHANGE UP (ref 150–400)
RBC # BLD: 4.79 M/UL — SIGNIFICANT CHANGE UP (ref 4.2–5.8)
RBC # FLD: 12.4 % — SIGNIFICANT CHANGE UP (ref 10.3–14.5)
WBC # BLD: 5.68 K/UL — SIGNIFICANT CHANGE UP (ref 3.8–10.5)
WBC # FLD AUTO: 5.68 K/UL — SIGNIFICANT CHANGE UP (ref 3.8–10.5)

## 2020-05-22 PROCEDURE — 99213 OFFICE O/P EST LOW 20 MIN: CPT

## 2020-05-26 DIAGNOSIS — Z51.11 ENCOUNTER FOR ANTINEOPLASTIC CHEMOTHERAPY: ICD-10-CM

## 2020-06-12 ENCOUNTER — RESULT REVIEW (OUTPATIENT)
Age: 65
End: 2020-06-12

## 2020-06-12 ENCOUNTER — APPOINTMENT (OUTPATIENT)
Dept: INFUSION THERAPY | Facility: HOSPITAL | Age: 65
End: 2020-06-12

## 2020-06-12 ENCOUNTER — APPOINTMENT (OUTPATIENT)
Dept: HEMATOLOGY ONCOLOGY | Facility: CLINIC | Age: 65
End: 2020-06-12

## 2020-06-12 ENCOUNTER — LABORATORY RESULT (OUTPATIENT)
Age: 65
End: 2020-06-12

## 2020-06-12 LAB
BASOPHILS # BLD AUTO: 0.01 K/UL — SIGNIFICANT CHANGE UP (ref 0–0.2)
BASOPHILS NFR BLD AUTO: 0.2 % — SIGNIFICANT CHANGE UP (ref 0–2)
EOSINOPHIL # BLD AUTO: 0.08 K/UL — SIGNIFICANT CHANGE UP (ref 0–0.5)
EOSINOPHIL NFR BLD AUTO: 1.3 % — SIGNIFICANT CHANGE UP (ref 0–6)
HCT VFR BLD CALC: 40.6 % — SIGNIFICANT CHANGE UP (ref 39–50)
HGB BLD-MCNC: 14.1 G/DL — SIGNIFICANT CHANGE UP (ref 13–17)
IMM GRANULOCYTES NFR BLD AUTO: 0.3 % — SIGNIFICANT CHANGE UP (ref 0–1.5)
LYMPHOCYTES # BLD AUTO: 1.55 K/UL — SIGNIFICANT CHANGE UP (ref 1–3.3)
LYMPHOCYTES # BLD AUTO: 25.6 % — SIGNIFICANT CHANGE UP (ref 13–44)
MCHC RBC-ENTMCNC: 30.7 PG — SIGNIFICANT CHANGE UP (ref 27–34)
MCHC RBC-ENTMCNC: 34.7 GM/DL — SIGNIFICANT CHANGE UP (ref 32–36)
MCV RBC AUTO: 88.3 FL — SIGNIFICANT CHANGE UP (ref 80–100)
MONOCYTES # BLD AUTO: 0.61 K/UL — SIGNIFICANT CHANGE UP (ref 0–0.9)
MONOCYTES NFR BLD AUTO: 10.1 % — SIGNIFICANT CHANGE UP (ref 2–14)
NEUTROPHILS # BLD AUTO: 3.79 K/UL — SIGNIFICANT CHANGE UP (ref 1.8–7.4)
NEUTROPHILS NFR BLD AUTO: 62.5 % — SIGNIFICANT CHANGE UP (ref 43–77)
NRBC # BLD: 0 /100 WBCS — SIGNIFICANT CHANGE UP (ref 0–0)
PLATELET # BLD AUTO: 160 K/UL — SIGNIFICANT CHANGE UP (ref 150–400)
RBC # BLD: 4.6 M/UL — SIGNIFICANT CHANGE UP (ref 4.2–5.8)
RBC # FLD: 12.6 % — SIGNIFICANT CHANGE UP (ref 10.3–14.5)
WBC # BLD: 6.06 K/UL — SIGNIFICANT CHANGE UP (ref 3.8–10.5)
WBC # FLD AUTO: 6.06 K/UL — SIGNIFICANT CHANGE UP (ref 3.8–10.5)

## 2020-06-16 ENCOUNTER — RX RENEWAL (OUTPATIENT)
Age: 65
End: 2020-06-16

## 2020-06-25 ENCOUNTER — APPOINTMENT (OUTPATIENT)
Dept: NUCLEAR MEDICINE | Facility: IMAGING CENTER | Age: 65
End: 2020-06-25
Payer: COMMERCIAL

## 2020-06-25 ENCOUNTER — OUTPATIENT (OUTPATIENT)
Dept: OUTPATIENT SERVICES | Facility: HOSPITAL | Age: 65
LOS: 1 days | End: 2020-06-25
Payer: COMMERCIAL

## 2020-06-25 ENCOUNTER — RESULT REVIEW (OUTPATIENT)
Age: 65
End: 2020-06-25

## 2020-06-25 DIAGNOSIS — C34.12 MALIGNANT NEOPLASM OF UPPER LOBE, LEFT BRONCHUS OR LUNG: ICD-10-CM

## 2020-06-25 PROCEDURE — 78815 PET IMAGE W/CT SKULL-THIGH: CPT | Mod: 26,PS

## 2020-06-25 PROCEDURE — A9552: CPT

## 2020-06-25 PROCEDURE — 78815 PET IMAGE W/CT SKULL-THIGH: CPT

## 2020-06-26 ENCOUNTER — OUTPATIENT (OUTPATIENT)
Dept: OUTPATIENT SERVICES | Facility: HOSPITAL | Age: 65
LOS: 1 days | Discharge: HOME | End: 2020-06-26

## 2020-06-26 DIAGNOSIS — C34.90 MALIGNANT NEOPLASM OF UNSPECIFIED PART OF UNSPECIFIED BRONCHUS OR LUNG: ICD-10-CM

## 2020-06-29 ENCOUNTER — APPOINTMENT (OUTPATIENT)
Dept: INFUSION THERAPY | Facility: HOSPITAL | Age: 65
End: 2020-06-29

## 2020-07-01 LAB — SARS-COV-2 N GENE NPH QL NAA+PROBE: NOT DETECTED

## 2020-07-02 ENCOUNTER — LABORATORY RESULT (OUTPATIENT)
Age: 65
End: 2020-07-02

## 2020-07-02 ENCOUNTER — RESULT REVIEW (OUTPATIENT)
Age: 65
End: 2020-07-02

## 2020-07-02 ENCOUNTER — APPOINTMENT (OUTPATIENT)
Dept: INFUSION THERAPY | Facility: HOSPITAL | Age: 65
End: 2020-07-02

## 2020-07-02 ENCOUNTER — APPOINTMENT (OUTPATIENT)
Dept: HEMATOLOGY ONCOLOGY | Facility: CLINIC | Age: 65
End: 2020-07-02
Payer: COMMERCIAL

## 2020-07-02 VITALS
SYSTOLIC BLOOD PRESSURE: 130 MMHG | WEIGHT: 213.85 LBS | TEMPERATURE: 98 F | DIASTOLIC BLOOD PRESSURE: 70 MMHG | HEART RATE: 66 BPM | RESPIRATION RATE: 18 BRPM | BODY MASS INDEX: 29 KG/M2 | OXYGEN SATURATION: 98 %

## 2020-07-02 LAB
BASOPHILS # BLD AUTO: 0.01 K/UL — SIGNIFICANT CHANGE UP (ref 0–0.2)
BASOPHILS NFR BLD AUTO: 0.2 % — SIGNIFICANT CHANGE UP (ref 0–2)
EOSINOPHIL # BLD AUTO: 0.13 K/UL — SIGNIFICANT CHANGE UP (ref 0–0.5)
EOSINOPHIL NFR BLD AUTO: 2.1 % — SIGNIFICANT CHANGE UP (ref 0–6)
HCT VFR BLD CALC: 39.8 % — SIGNIFICANT CHANGE UP (ref 39–50)
HGB BLD-MCNC: 13.9 G/DL — SIGNIFICANT CHANGE UP (ref 13–17)
IMM GRANULOCYTES NFR BLD AUTO: 0.3 % — SIGNIFICANT CHANGE UP (ref 0–1.5)
LYMPHOCYTES # BLD AUTO: 1.91 K/UL — SIGNIFICANT CHANGE UP (ref 1–3.3)
LYMPHOCYTES # BLD AUTO: 31.2 % — SIGNIFICANT CHANGE UP (ref 13–44)
MCHC RBC-ENTMCNC: 30.7 PG — SIGNIFICANT CHANGE UP (ref 27–34)
MCHC RBC-ENTMCNC: 34.9 GM/DL — SIGNIFICANT CHANGE UP (ref 32–36)
MCV RBC AUTO: 87.9 FL — SIGNIFICANT CHANGE UP (ref 80–100)
MONOCYTES # BLD AUTO: 0.7 K/UL — SIGNIFICANT CHANGE UP (ref 0–0.9)
MONOCYTES NFR BLD AUTO: 11.4 % — SIGNIFICANT CHANGE UP (ref 2–14)
NEUTROPHILS # BLD AUTO: 3.36 K/UL — SIGNIFICANT CHANGE UP (ref 1.8–7.4)
NEUTROPHILS NFR BLD AUTO: 54.8 % — SIGNIFICANT CHANGE UP (ref 43–77)
NRBC # BLD: 0 /100 WBCS — SIGNIFICANT CHANGE UP (ref 0–0)
PLATELET # BLD AUTO: 156 K/UL — SIGNIFICANT CHANGE UP (ref 150–400)
RBC # BLD: 4.53 M/UL — SIGNIFICANT CHANGE UP (ref 4.2–5.8)
RBC # FLD: 12.4 % — SIGNIFICANT CHANGE UP (ref 10.3–14.5)
WBC # BLD: 6.13 K/UL — SIGNIFICANT CHANGE UP (ref 3.8–10.5)
WBC # FLD AUTO: 6.13 K/UL — SIGNIFICANT CHANGE UP (ref 3.8–10.5)

## 2020-07-02 PROCEDURE — 99214 OFFICE O/P EST MOD 30 MIN: CPT

## 2020-07-06 DIAGNOSIS — R11.2 NAUSEA WITH VOMITING, UNSPECIFIED: ICD-10-CM

## 2020-07-06 DIAGNOSIS — Z51.11 ENCOUNTER FOR ANTINEOPLASTIC CHEMOTHERAPY: ICD-10-CM

## 2020-07-13 ENCOUNTER — APPOINTMENT (OUTPATIENT)
Dept: CARDIOLOGY | Facility: CLINIC | Age: 65
End: 2020-07-13
Payer: COMMERCIAL

## 2020-07-13 ENCOUNTER — NON-APPOINTMENT (OUTPATIENT)
Age: 65
End: 2020-07-13

## 2020-07-13 VITALS — SYSTOLIC BLOOD PRESSURE: 132 MMHG | DIASTOLIC BLOOD PRESSURE: 70 MMHG

## 2020-07-13 VITALS — SYSTOLIC BLOOD PRESSURE: 150 MMHG | DIASTOLIC BLOOD PRESSURE: 80 MMHG

## 2020-07-13 VITALS — HEART RATE: 73 BPM | HEIGHT: 72 IN | WEIGHT: 213 LBS | BODY MASS INDEX: 28.85 KG/M2 | OXYGEN SATURATION: 94 %

## 2020-07-13 DIAGNOSIS — Z87.891 PERSONAL HISTORY OF NICOTINE DEPENDENCE: ICD-10-CM

## 2020-07-13 DIAGNOSIS — Z80.3 FAMILY HISTORY OF MALIGNANT NEOPLASM OF BREAST: ICD-10-CM

## 2020-07-13 DIAGNOSIS — Z82.49 FAMILY HISTORY OF ISCHEMIC HEART DISEASE AND OTHER DISEASES OF THE CIRCULATORY SYSTEM: ICD-10-CM

## 2020-07-13 PROCEDURE — 93000 ELECTROCARDIOGRAM COMPLETE: CPT

## 2020-07-13 PROCEDURE — 99215 OFFICE O/P EST HI 40 MIN: CPT

## 2020-07-13 RX ORDER — CARVEDILOL 3.12 MG/1
3.12 TABLET, FILM COATED ORAL TWICE DAILY
Qty: 180 | Refills: 1 | Status: DISCONTINUED | COMMUNITY
Start: 2020-06-16 | End: 2020-07-13

## 2020-07-13 RX ORDER — CLOPIDOGREL BISULFATE 75 MG/1
75 TABLET, FILM COATED ORAL DAILY
Qty: 90 | Refills: 1 | Status: DISCONTINUED | COMMUNITY
End: 2020-07-13

## 2020-07-13 RX ORDER — DIPHENOXYLATE HYDROCHLORIDE AND ATROPINE SULFATE 2.5; .025 MG/1; MG/1
2.5-0.025 TABLET ORAL
Qty: 30 | Refills: 3 | Status: DISCONTINUED | COMMUNITY
Start: 2020-02-18 | End: 2020-07-13

## 2020-07-13 RX ORDER — ATORVASTATIN CALCIUM 40 MG/1
40 TABLET, FILM COATED ORAL
Qty: 90 | Refills: 1 | Status: DISCONTINUED | COMMUNITY
Start: 2020-06-16 | End: 2020-07-13

## 2020-07-13 NOTE — PHYSICAL EXAM
[Normal Appearance] : normal appearance [General Appearance - Well Developed] : well developed [No Deformities] : no deformities [Well Groomed] : well groomed [General Appearance - Well Nourished] : well nourished [Normal Conjunctiva] : the conjunctiva exhibited no abnormalities [General Appearance - In No Acute Distress] : no acute distress [No Oral Pallor] : no oral pallor [Normal Oral Mucosa] : normal oral mucosa [Eyelids - No Xanthelasma] : the eyelids demonstrated no xanthelasmas [Normal Jugular Venous A Waves Present] : normal jugular venous A waves present [No Oral Cyanosis] : no oral cyanosis [Normal Jugular Venous V Waves Present] : normal jugular venous V waves present [No Jugular Venous Martinez A Waves] : no jugular venous martinez A waves [Heart Rate And Rhythm] : heart rate and rhythm were normal [Murmurs] : no murmurs present [Heart Sounds] : normal S1 and S2 [Respiration, Rhythm And Depth] : normal respiratory rhythm and effort [Auscultation Breath Sounds / Voice Sounds] : lungs were clear to auscultation bilaterally [Exaggerated Use Of Accessory Muscles For Inspiration] : no accessory muscle use [Abdomen Soft] : soft [Abdomen Tenderness] : non-tender [Abnormal Walk] : normal gait [Abdomen Mass (___ Cm)] : no abdominal mass palpated [Gait - Sufficient For Exercise Testing] : the gait was sufficient for exercise testing [Cyanosis, Localized] : no localized cyanosis [Petechial Hemorrhages (___cm)] : no petechial hemorrhages [Nail Clubbing] : no clubbing of the fingernails [Skin Color & Pigmentation] : normal skin color and pigmentation [] : no rash [No Venous Stasis] : no venous stasis [Skin Lesions] : no skin lesions [No Skin Ulcers] : no skin ulcer [Oriented To Time, Place, And Person] : oriented to person, place, and time [No Xanthoma] : no  xanthoma was observed [Affect] : the affect was normal [No Anxiety] : not feeling anxious [Mood] : the mood was normal

## 2020-07-13 NOTE — REASON FOR VISIT
[Initial Evaluation] : an initial evaluation of [CABG Follow-up] : bypass graft [Coronary Artery Disease] : coronary artery disease [Hyperlipidemia] : hyperlipidemia [Prior Myocardial Infarction] : a prior myocardial infarction

## 2020-07-18 NOTE — DISCUSSION/SUMMARY
[FreeTextEntry1] : In order to streamline medications I made some changes.  When he is finished with the present supply of carvedilol, I told him to start metoprolol succinate 25 mg at bedtime, I also added lisinopril 5 mg daily for its atherosclerotic effects besides other beneficial effects, and based on the blood pressure I will titrate the dose further.  Given his extensive vascular disease I would like his LDL cholesterol to be below 50.  When his present supply of atorvastatin is finished, I will switch him to rosuvastatin 20 mg at bedtime and then repeat lipid profile.  Ideally triglycerides should be less than 150. I told him to watch  carbohydrate intake and I  expect that to lower his triglycerides further.  There is no need for any other medication for triglycerides at this time.\par \par Once he is switched to the new medications I will repeat lipid profile in 6 weeks.\par \par I will try get copies of carotid Doppler meanwhile.

## 2020-07-18 NOTE — HISTORY OF PRESENT ILLNESS
[FreeTextEntry1] : 65-year-old [place] gentleman came for cardiology evaluation.  He used to be in [place] and had extensive cardiac background as noted below.  His primary care physician is Dr. Ken Saab.\par \par In 2007 he started getting chest pain and had acute MI.  At that time he had a bare-metal stent inserted.  Subsequently the stent closed and he  started getting chest pains again in March 2008.  At that time he had triple-vessel coronary bypass surgery with LIMA to LAD and DONTRELL to ramus intermedius and SVG graft to posterolateral branch of RCA.\par \par Subsequently he did well until March of 2019 when he started having chest pain again.  He then went to [facility] and subsequently he had 2 drug-eluting stents in the circumflex, 1 each in the ramus and LAD.\par \par 2 days later he had left-sided chest pain.  A CAT scan revealed presence of small cell lung cancer on the left side. it was  7 cm in diameter and had metastasized to liver and bone.  He had chemotherapy for 4 sessions followed by immunotherapy with marked improvement. He is now on immunotherapy every 3 weeks at [facility] in [place].\par \par When he walks at a regular pace he is able to walk about 10 blocks.  If he were to walk quickly he starts getting short of breath after 2 blocks.  If he climbs more than 10 steps he starts getting short of breath which is relieved by rest.  There is no wheezing or coughing.\par \par He used to smoke 1 pack of cigarettes per day for 30 years and quit 10 years ago.  He did not have a diagnosis of COPD.  There is no history of hypertension or diabetes.\par \par In the past, besides other medications,  he was on enalapril 2.5 mg daily.  While getting chemotherapy he was feeling lightheaded so Enalapril was discontinued. His wife wanted to know if he can go back on enalapril.\par \par He had an echocardiogram on May 14.  LVEF was estimated to be 65 to 70%.  Septal motion was consistent with prior cardiac surgery.  Right ventricular size and systolic function were normal.  There was mild mitral regurgitation and moderate pulmonary regurgitation.  Mild aortic root calcification was also present.\par \par He also had carotid Doppler.  Apparently there was mild left internal carotid artery stenosis.  The official results awaited

## 2020-07-18 NOTE — ASSESSMENT
[FreeTextEntry1] : Cardiac wise and hemodynamically he is stable.  He has extensive vascular disease as noted above.  He does not have any symptoms of intermittent claudication.  He sometimes gets  pain in the right knee on walking.  He has mild exertional dyspnea which could be from a combination of possible underlying COPD and and poor physical conditioning.  He does not have any symptoms of angina.  There is  no weight loss but in fact he has gained weight. PET/CT scan has been stable and essentially unchanged.  Echocardiogram shows excellent left ventricular ejection fraction in spite of previous infarct.\par \par Blood test done on 2 July showed hemoglobin of 13.9, triglycerides 160, total cholesterol 136 with HDL 48 and LDL 56.  Blood sugar was 91, BUN 17 and creatinine 0.76 with estimated GFR of 94.  TSH was 2.8 and COVID PCR test was negative on 29 June 2020.

## 2020-07-21 ENCOUNTER — APPOINTMENT (OUTPATIENT)
Dept: INFUSION THERAPY | Facility: HOSPITAL | Age: 65
End: 2020-07-21

## 2020-07-22 LAB — SARS-COV-2 N GENE NPH QL NAA+PROBE: NOT DETECTED

## 2020-07-24 ENCOUNTER — APPOINTMENT (OUTPATIENT)
Dept: INFUSION THERAPY | Facility: HOSPITAL | Age: 65
End: 2020-07-24

## 2020-07-24 ENCOUNTER — LABORATORY RESULT (OUTPATIENT)
Age: 65
End: 2020-07-24

## 2020-07-24 ENCOUNTER — RESULT REVIEW (OUTPATIENT)
Age: 65
End: 2020-07-24

## 2020-07-24 LAB
BASOPHILS # BLD AUTO: 0.01 K/UL — SIGNIFICANT CHANGE UP (ref 0–0.2)
BASOPHILS NFR BLD AUTO: 0.2 % — SIGNIFICANT CHANGE UP (ref 0–2)
EOSINOPHIL # BLD AUTO: 0.17 K/UL — SIGNIFICANT CHANGE UP (ref 0–0.5)
EOSINOPHIL NFR BLD AUTO: 2.6 % — SIGNIFICANT CHANGE UP (ref 0–6)
HCT VFR BLD CALC: 41.4 % — SIGNIFICANT CHANGE UP (ref 39–50)
HGB BLD-MCNC: 14.2 G/DL — SIGNIFICANT CHANGE UP (ref 13–17)
IMM GRANULOCYTES NFR BLD AUTO: 0.5 % — SIGNIFICANT CHANGE UP (ref 0–1.5)
LYMPHOCYTES # BLD AUTO: 1.91 K/UL — SIGNIFICANT CHANGE UP (ref 1–3.3)
LYMPHOCYTES # BLD AUTO: 29.7 % — SIGNIFICANT CHANGE UP (ref 13–44)
MCHC RBC-ENTMCNC: 30.9 PG — SIGNIFICANT CHANGE UP (ref 27–34)
MCHC RBC-ENTMCNC: 34.3 GM/DL — SIGNIFICANT CHANGE UP (ref 32–36)
MCV RBC AUTO: 90.2 FL — SIGNIFICANT CHANGE UP (ref 80–100)
MONOCYTES # BLD AUTO: 0.76 K/UL — SIGNIFICANT CHANGE UP (ref 0–0.9)
MONOCYTES NFR BLD AUTO: 11.8 % — SIGNIFICANT CHANGE UP (ref 2–14)
NEUTROPHILS # BLD AUTO: 3.55 K/UL — SIGNIFICANT CHANGE UP (ref 1.8–7.4)
NEUTROPHILS NFR BLD AUTO: 55.2 % — SIGNIFICANT CHANGE UP (ref 43–77)
NRBC # BLD: 0 /100 WBCS — SIGNIFICANT CHANGE UP (ref 0–0)
PLATELET # BLD AUTO: 155 K/UL — SIGNIFICANT CHANGE UP (ref 150–400)
RBC # BLD: 4.59 M/UL — SIGNIFICANT CHANGE UP (ref 4.2–5.8)
RBC # FLD: 12.3 % — SIGNIFICANT CHANGE UP (ref 10.3–14.5)
WBC # BLD: 6.43 K/UL — SIGNIFICANT CHANGE UP (ref 3.8–10.5)
WBC # FLD AUTO: 6.43 K/UL — SIGNIFICANT CHANGE UP (ref 3.8–10.5)

## 2020-07-30 ENCOUNTER — APPOINTMENT (OUTPATIENT)
Dept: CARDIOLOGY | Facility: CLINIC | Age: 65
End: 2020-07-30

## 2020-08-10 ENCOUNTER — OUTPATIENT (OUTPATIENT)
Dept: OUTPATIENT SERVICES | Facility: HOSPITAL | Age: 65
LOS: 1 days | Discharge: HOME | End: 2020-08-10

## 2020-08-10 DIAGNOSIS — C34.90 MALIGNANT NEOPLASM OF UNSPECIFIED PART OF UNSPECIFIED BRONCHUS OR LUNG: ICD-10-CM

## 2020-08-11 ENCOUNTER — APPOINTMENT (OUTPATIENT)
Dept: INFUSION THERAPY | Facility: HOSPITAL | Age: 65
End: 2020-08-11

## 2020-08-12 ENCOUNTER — APPOINTMENT (OUTPATIENT)
Dept: HEMATOLOGY ONCOLOGY | Facility: CLINIC | Age: 65
End: 2020-08-12

## 2020-08-12 LAB — SARS-COV-2 N GENE NPH QL NAA+PROBE: NOT DETECTED

## 2020-08-14 ENCOUNTER — RESULT REVIEW (OUTPATIENT)
Age: 65
End: 2020-08-14

## 2020-08-14 ENCOUNTER — LABORATORY RESULT (OUTPATIENT)
Age: 65
End: 2020-08-14

## 2020-08-14 ENCOUNTER — APPOINTMENT (OUTPATIENT)
Dept: HEMATOLOGY ONCOLOGY | Facility: CLINIC | Age: 65
End: 2020-08-14
Payer: COMMERCIAL

## 2020-08-14 ENCOUNTER — APPOINTMENT (OUTPATIENT)
Dept: INFUSION THERAPY | Facility: HOSPITAL | Age: 65
End: 2020-08-14

## 2020-08-14 VITALS
HEART RATE: 16 BPM | SYSTOLIC BLOOD PRESSURE: 135 MMHG | DIASTOLIC BLOOD PRESSURE: 80 MMHG | BODY MASS INDEX: 29.59 KG/M2 | HEIGHT: 71.65 IN | RESPIRATION RATE: 17 BRPM | TEMPERATURE: 98 F | WEIGHT: 216.05 LBS | OXYGEN SATURATION: 96 %

## 2020-08-14 LAB
BASOPHILS # BLD AUTO: 0.01 K/UL — SIGNIFICANT CHANGE UP (ref 0–0.2)
BASOPHILS NFR BLD AUTO: 0.1 % — SIGNIFICANT CHANGE UP (ref 0–2)
EOSINOPHIL # BLD AUTO: 0.14 K/UL — SIGNIFICANT CHANGE UP (ref 0–0.5)
EOSINOPHIL NFR BLD AUTO: 2 % — SIGNIFICANT CHANGE UP (ref 0–6)
HCT VFR BLD CALC: 42.1 % — SIGNIFICANT CHANGE UP (ref 39–50)
HGB BLD-MCNC: 14.4 G/DL — SIGNIFICANT CHANGE UP (ref 13–17)
IMM GRANULOCYTES NFR BLD AUTO: 0.4 % — SIGNIFICANT CHANGE UP (ref 0–1.5)
LYMPHOCYTES # BLD AUTO: 1.89 K/UL — SIGNIFICANT CHANGE UP (ref 1–3.3)
LYMPHOCYTES # BLD AUTO: 27.2 % — SIGNIFICANT CHANGE UP (ref 13–44)
MCHC RBC-ENTMCNC: 30.8 PG — SIGNIFICANT CHANGE UP (ref 27–34)
MCHC RBC-ENTMCNC: 34.2 GM/DL — SIGNIFICANT CHANGE UP (ref 32–36)
MCV RBC AUTO: 90 FL — SIGNIFICANT CHANGE UP (ref 80–100)
MONOCYTES # BLD AUTO: 0.72 K/UL — SIGNIFICANT CHANGE UP (ref 0–0.9)
MONOCYTES NFR BLD AUTO: 10.4 % — SIGNIFICANT CHANGE UP (ref 2–14)
NEUTROPHILS # BLD AUTO: 4.16 K/UL — SIGNIFICANT CHANGE UP (ref 1.8–7.4)
NEUTROPHILS NFR BLD AUTO: 59.9 % — SIGNIFICANT CHANGE UP (ref 43–77)
NRBC # BLD: 0 /100 WBCS — SIGNIFICANT CHANGE UP (ref 0–0)
PLATELET # BLD AUTO: 149 K/UL — LOW (ref 150–400)
RBC # BLD: 4.68 M/UL — SIGNIFICANT CHANGE UP (ref 4.2–5.8)
RBC # FLD: 12 % — SIGNIFICANT CHANGE UP (ref 10.3–14.5)
WBC # BLD: 6.95 K/UL — SIGNIFICANT CHANGE UP (ref 3.8–10.5)
WBC # FLD AUTO: 6.95 K/UL — SIGNIFICANT CHANGE UP (ref 3.8–10.5)

## 2020-08-14 PROCEDURE — 99214 OFFICE O/P EST MOD 30 MIN: CPT

## 2020-08-17 DIAGNOSIS — Z51.11 ENCOUNTER FOR ANTINEOPLASTIC CHEMOTHERAPY: ICD-10-CM

## 2020-08-17 DIAGNOSIS — R11.2 NAUSEA WITH VOMITING, UNSPECIFIED: ICD-10-CM

## 2020-08-24 ENCOUNTER — APPOINTMENT (OUTPATIENT)
Dept: CARDIOLOGY | Facility: CLINIC | Age: 65
End: 2020-08-24
Payer: COMMERCIAL

## 2020-08-24 VITALS — BODY MASS INDEX: 30.24 KG/M2 | HEIGHT: 71 IN | OXYGEN SATURATION: 97 % | WEIGHT: 216 LBS | HEART RATE: 71 BPM

## 2020-08-24 VITALS — DIASTOLIC BLOOD PRESSURE: 60 MMHG | SYSTOLIC BLOOD PRESSURE: 126 MMHG

## 2020-08-24 PROCEDURE — 99214 OFFICE O/P EST MOD 30 MIN: CPT

## 2020-08-24 NOTE — DISCUSSION/SUMMARY
[FreeTextEntry1] : For further cardiac protection, I increase the dose of metoprolol to 50 mg once a day.  No other changes were made at this time.  In subsequent visits if blood pressure permits I will increase the dose of lisinopril to 10 mg once a day.\par \par Echocardiogram done on 14 May 2020 showed left ventricular ejection fraction of 65 to 70% with septal motion consistent with prior open heart surgery.  Right ventricular size and systolic function are normal.  LA size was normal.  There is mild mitral regurgitation and moderate pulmonary regurgitation.  Mild aortic root calcification was noted.\par \par Carotid Doppler was done at the same time.  However that report has not been scanned into the EMR.  I will try and call their office and get copy of the report.

## 2020-08-24 NOTE — HISTORY OF PRESENT ILLNESS
[FreeTextEntry1] : He denies any chest pain, palpitation, shortness of breath or dizziness.  He has gained 10 pounds over the last 1 year.  He goes for a walk for hour to hour and a half and because a distance of about 2 miles without any symptoms.\par \par In May he had carotid Doppler done at [facility] on [address].  Apparently there was some blockage on the left side.  Results are not available currently.

## 2020-08-24 NOTE — ASSESSMENT
[FreeTextEntry1] : He is hemodynamically stable on the current regimen.  Blood test needs to be repeated to see the effect of rosuvastatin 20 mg at bedtime.

## 2020-08-24 NOTE — REASON FOR VISIT
[Follow-Up - Clinic] : a clinic follow-up of [Coronary Artery Disease] : coronary artery disease [Hypertension] : hypertension [CABG Follow-up] : bypass graft [FreeTextEntry1] : BPH, lung cancer with mets

## 2020-08-24 NOTE — PHYSICAL EXAM
[General Appearance - Well Developed] : well developed [Normal Appearance] : normal appearance [Well Groomed] : well groomed [General Appearance - Well Nourished] : well nourished [No Deformities] : no deformities [General Appearance - In No Acute Distress] : no acute distress [Normal Conjunctiva] : the conjunctiva exhibited no abnormalities [No Oral Pallor] : no oral pallor [Eyelids - No Xanthelasma] : the eyelids demonstrated no xanthelasmas [Normal Oral Mucosa] : normal oral mucosa [Normal Jugular Venous V Waves Present] : normal jugular venous V waves present [Normal Jugular Venous A Waves Present] : normal jugular venous A waves present [No Oral Cyanosis] : no oral cyanosis [No Jugular Venous Martinez A Waves] : no jugular venous martinez A waves [Respiration, Rhythm And Depth] : normal respiratory rhythm and effort [Auscultation Breath Sounds / Voice Sounds] : lungs were clear to auscultation bilaterally [Exaggerated Use Of Accessory Muscles For Inspiration] : no accessory muscle use [Heart Rate And Rhythm] : heart rate and rhythm were normal [Murmurs] : no murmurs present [Heart Sounds] : normal S1 and S2 [Abdomen Soft] : soft [Abdomen Tenderness] : non-tender [Abdomen Mass (___ Cm)] : no abdominal mass palpated [Abnormal Walk] : normal gait [Gait - Sufficient For Exercise Testing] : the gait was sufficient for exercise testing [Petechial Hemorrhages (___cm)] : no petechial hemorrhages [Nail Clubbing] : no clubbing of the fingernails [Cyanosis, Localized] : no localized cyanosis [Skin Color & Pigmentation] : normal skin color and pigmentation [No Venous Stasis] : no venous stasis [] : no ischemic changes [Skin Lesions] : no skin lesions [No Xanthoma] : no  xanthoma was observed [No Skin Ulcers] : no skin ulcer [Oriented To Time, Place, And Person] : oriented to person, place, and time [Mood] : the mood was normal [Affect] : the affect was normal [No Anxiety] : not feeling anxious [FreeTextEntry1] : Obese.

## 2020-09-01 ENCOUNTER — APPOINTMENT (OUTPATIENT)
Dept: NUCLEAR MEDICINE | Facility: IMAGING CENTER | Age: 65
End: 2020-09-01
Payer: COMMERCIAL

## 2020-09-01 ENCOUNTER — OUTPATIENT (OUTPATIENT)
Dept: OUTPATIENT SERVICES | Facility: HOSPITAL | Age: 65
LOS: 1 days | End: 2020-09-01
Payer: COMMERCIAL

## 2020-09-01 DIAGNOSIS — C34.12 MALIGNANT NEOPLASM OF UPPER LOBE, LEFT BRONCHUS OR LUNG: ICD-10-CM

## 2020-09-01 DIAGNOSIS — C79.51 SECONDARY MALIGNANT NEOPLASM OF BONE: ICD-10-CM

## 2020-09-01 PROCEDURE — 78816 PET IMAGE W/CT FULL BODY: CPT

## 2020-09-01 PROCEDURE — A9552: CPT

## 2020-09-01 PROCEDURE — 78816 PET IMAGE W/CT FULL BODY: CPT | Mod: 26,PS

## 2020-09-04 ENCOUNTER — RESULT REVIEW (OUTPATIENT)
Age: 65
End: 2020-09-04

## 2020-09-04 ENCOUNTER — APPOINTMENT (OUTPATIENT)
Dept: HEMATOLOGY ONCOLOGY | Facility: CLINIC | Age: 65
End: 2020-09-04

## 2020-09-04 ENCOUNTER — APPOINTMENT (OUTPATIENT)
Dept: INFUSION THERAPY | Facility: HOSPITAL | Age: 65
End: 2020-09-04

## 2020-09-04 ENCOUNTER — LABORATORY RESULT (OUTPATIENT)
Age: 65
End: 2020-09-04

## 2020-09-04 ENCOUNTER — APPOINTMENT (OUTPATIENT)
Dept: HEMATOLOGY ONCOLOGY | Facility: CLINIC | Age: 65
End: 2020-09-04
Payer: COMMERCIAL

## 2020-09-04 VITALS
HEART RATE: 66 BPM | BODY MASS INDEX: 29.83 KG/M2 | WEIGHT: 213.85 LBS | SYSTOLIC BLOOD PRESSURE: 125 MMHG | TEMPERATURE: 98 F | RESPIRATION RATE: 18 BRPM | OXYGEN SATURATION: 98 % | DIASTOLIC BLOOD PRESSURE: 80 MMHG

## 2020-09-04 LAB
BASOPHILS # BLD AUTO: 0.01 K/UL — SIGNIFICANT CHANGE UP (ref 0–0.2)
BASOPHILS NFR BLD AUTO: 0.2 % — SIGNIFICANT CHANGE UP (ref 0–2)
EOSINOPHIL # BLD AUTO: 0.16 K/UL — SIGNIFICANT CHANGE UP (ref 0–0.5)
EOSINOPHIL NFR BLD AUTO: 3 % — SIGNIFICANT CHANGE UP (ref 0–6)
HCT VFR BLD CALC: 40.6 % — SIGNIFICANT CHANGE UP (ref 39–50)
HGB BLD-MCNC: 14 G/DL — SIGNIFICANT CHANGE UP (ref 13–17)
IMM GRANULOCYTES NFR BLD AUTO: 0.4 % — SIGNIFICANT CHANGE UP (ref 0–1.5)
LYMPHOCYTES # BLD AUTO: 2.09 K/UL — SIGNIFICANT CHANGE UP (ref 1–3.3)
LYMPHOCYTES # BLD AUTO: 39.5 % — SIGNIFICANT CHANGE UP (ref 13–44)
MCHC RBC-ENTMCNC: 30.8 PG — SIGNIFICANT CHANGE UP (ref 27–34)
MCHC RBC-ENTMCNC: 34.5 GM/DL — SIGNIFICANT CHANGE UP (ref 32–36)
MCV RBC AUTO: 89.4 FL — SIGNIFICANT CHANGE UP (ref 80–100)
MONOCYTES # BLD AUTO: 0.56 K/UL — SIGNIFICANT CHANGE UP (ref 0–0.9)
MONOCYTES NFR BLD AUTO: 10.6 % — SIGNIFICANT CHANGE UP (ref 2–14)
NEUTROPHILS # BLD AUTO: 2.45 K/UL — SIGNIFICANT CHANGE UP (ref 1.8–7.4)
NEUTROPHILS NFR BLD AUTO: 46.3 % — SIGNIFICANT CHANGE UP (ref 43–77)
NRBC # BLD: 0 /100 WBCS — SIGNIFICANT CHANGE UP (ref 0–0)
PLATELET # BLD AUTO: 154 K/UL — SIGNIFICANT CHANGE UP (ref 150–400)
RBC # BLD: 4.54 M/UL — SIGNIFICANT CHANGE UP (ref 4.2–5.8)
RBC # FLD: 11.9 % — SIGNIFICANT CHANGE UP (ref 10.3–14.5)
WBC # BLD: 5.29 K/UL — SIGNIFICANT CHANGE UP (ref 3.8–10.5)
WBC # FLD AUTO: 5.29 K/UL — SIGNIFICANT CHANGE UP (ref 3.8–10.5)

## 2020-09-04 PROCEDURE — 99214 OFFICE O/P EST MOD 30 MIN: CPT

## 2020-09-17 ENCOUNTER — APPOINTMENT (OUTPATIENT)
Dept: CARDIOLOGY | Facility: CLINIC | Age: 65
End: 2020-09-17
Payer: COMMERCIAL

## 2020-09-17 PROCEDURE — 90662 IIV NO PRSV INCREASED AG IM: CPT

## 2020-09-17 PROCEDURE — G0008: CPT

## 2020-09-19 ENCOUNTER — OUTPATIENT (OUTPATIENT)
Dept: OUTPATIENT SERVICES | Facility: HOSPITAL | Age: 65
LOS: 1 days | Discharge: HOME | End: 2020-09-19

## 2020-09-19 DIAGNOSIS — C34.90 MALIGNANT NEOPLASM OF UNSPECIFIED PART OF UNSPECIFIED BRONCHUS OR LUNG: ICD-10-CM

## 2020-09-23 ENCOUNTER — APPOINTMENT (OUTPATIENT)
Dept: HEMATOLOGY ONCOLOGY | Facility: CLINIC | Age: 65
End: 2020-09-23

## 2020-09-25 ENCOUNTER — LABORATORY RESULT (OUTPATIENT)
Age: 65
End: 2020-09-25

## 2020-09-25 ENCOUNTER — APPOINTMENT (OUTPATIENT)
Dept: INFUSION THERAPY | Facility: HOSPITAL | Age: 65
End: 2020-09-25

## 2020-09-25 ENCOUNTER — APPOINTMENT (OUTPATIENT)
Dept: HEMATOLOGY ONCOLOGY | Facility: CLINIC | Age: 65
End: 2020-09-25
Payer: COMMERCIAL

## 2020-09-25 ENCOUNTER — RESULT REVIEW (OUTPATIENT)
Age: 65
End: 2020-09-25

## 2020-09-25 VITALS
RESPIRATION RATE: 14 BRPM | TEMPERATURE: 98 F | BODY MASS INDEX: 29.83 KG/M2 | SYSTOLIC BLOOD PRESSURE: 136 MMHG | OXYGEN SATURATION: 99 % | WEIGHT: 213.85 LBS | HEART RATE: 60 BPM | DIASTOLIC BLOOD PRESSURE: 77 MMHG

## 2020-09-25 LAB
BASOPHILS # BLD AUTO: 0.02 K/UL — SIGNIFICANT CHANGE UP (ref 0–0.2)
BASOPHILS NFR BLD AUTO: 0.3 % — SIGNIFICANT CHANGE UP (ref 0–2)
EOSINOPHIL # BLD AUTO: 0.15 K/UL — SIGNIFICANT CHANGE UP (ref 0–0.5)
EOSINOPHIL NFR BLD AUTO: 2.6 % — SIGNIFICANT CHANGE UP (ref 0–6)
HCT VFR BLD CALC: 40.6 % — SIGNIFICANT CHANGE UP (ref 39–50)
HGB BLD-MCNC: 14.2 G/DL — SIGNIFICANT CHANGE UP (ref 13–17)
IMM GRANULOCYTES NFR BLD AUTO: 0.3 % — SIGNIFICANT CHANGE UP (ref 0–1.5)
LYMPHOCYTES # BLD AUTO: 1.77 K/UL — SIGNIFICANT CHANGE UP (ref 1–3.3)
LYMPHOCYTES # BLD AUTO: 30.4 % — SIGNIFICANT CHANGE UP (ref 13–44)
MCHC RBC-ENTMCNC: 30.7 PG — SIGNIFICANT CHANGE UP (ref 27–34)
MCHC RBC-ENTMCNC: 35 G/DL — SIGNIFICANT CHANGE UP (ref 32–36)
MCV RBC AUTO: 87.9 FL — SIGNIFICANT CHANGE UP (ref 80–100)
MONOCYTES # BLD AUTO: 0.64 K/UL — SIGNIFICANT CHANGE UP (ref 0–0.9)
MONOCYTES NFR BLD AUTO: 11 % — SIGNIFICANT CHANGE UP (ref 2–14)
NEUTROPHILS # BLD AUTO: 3.23 K/UL — SIGNIFICANT CHANGE UP (ref 1.8–7.4)
NEUTROPHILS NFR BLD AUTO: 55.4 % — SIGNIFICANT CHANGE UP (ref 43–77)
NRBC # BLD: 0 /100 WBCS — SIGNIFICANT CHANGE UP (ref 0–0)
PLATELET # BLD AUTO: 142 K/UL — LOW (ref 150–400)
RBC # BLD: 4.62 M/UL — SIGNIFICANT CHANGE UP (ref 4.2–5.8)
RBC # FLD: 11.9 % — SIGNIFICANT CHANGE UP (ref 10.3–14.5)
WBC # BLD: 5.83 K/UL — SIGNIFICANT CHANGE UP (ref 3.8–10.5)
WBC # FLD AUTO: 5.83 K/UL — SIGNIFICANT CHANGE UP (ref 3.8–10.5)

## 2020-09-25 PROCEDURE — 99213 OFFICE O/P EST LOW 20 MIN: CPT

## 2020-09-28 DIAGNOSIS — Z51.11 ENCOUNTER FOR ANTINEOPLASTIC CHEMOTHERAPY: ICD-10-CM

## 2020-09-28 DIAGNOSIS — R11.2 NAUSEA WITH VOMITING, UNSPECIFIED: ICD-10-CM

## 2020-10-16 ENCOUNTER — RESULT REVIEW (OUTPATIENT)
Age: 65
End: 2020-10-16

## 2020-10-16 ENCOUNTER — LABORATORY RESULT (OUTPATIENT)
Age: 65
End: 2020-10-16

## 2020-10-16 ENCOUNTER — APPOINTMENT (OUTPATIENT)
Dept: INFUSION THERAPY | Facility: HOSPITAL | Age: 65
End: 2020-10-16

## 2020-10-16 LAB
BASOPHILS # BLD AUTO: 0.02 K/UL — SIGNIFICANT CHANGE UP (ref 0–0.2)
BASOPHILS NFR BLD AUTO: 0.3 % — SIGNIFICANT CHANGE UP (ref 0–2)
EOSINOPHIL # BLD AUTO: 0.22 K/UL — SIGNIFICANT CHANGE UP (ref 0–0.5)
EOSINOPHIL NFR BLD AUTO: 3.4 % — SIGNIFICANT CHANGE UP (ref 0–6)
HCT VFR BLD CALC: 40.4 % — SIGNIFICANT CHANGE UP (ref 39–50)
HGB BLD-MCNC: 14 G/DL — SIGNIFICANT CHANGE UP (ref 13–17)
IMM GRANULOCYTES NFR BLD AUTO: 0.5 % — SIGNIFICANT CHANGE UP (ref 0–1.5)
LYMPHOCYTES # BLD AUTO: 1.91 K/UL — SIGNIFICANT CHANGE UP (ref 1–3.3)
LYMPHOCYTES # BLD AUTO: 29.8 % — SIGNIFICANT CHANGE UP (ref 13–44)
MCHC RBC-ENTMCNC: 30.9 PG — SIGNIFICANT CHANGE UP (ref 27–34)
MCHC RBC-ENTMCNC: 34.7 G/DL — SIGNIFICANT CHANGE UP (ref 32–36)
MCV RBC AUTO: 89.2 FL — SIGNIFICANT CHANGE UP (ref 80–100)
MONOCYTES # BLD AUTO: 0.69 K/UL — SIGNIFICANT CHANGE UP (ref 0–0.9)
MONOCYTES NFR BLD AUTO: 10.7 % — SIGNIFICANT CHANGE UP (ref 2–14)
NEUTROPHILS # BLD AUTO: 3.55 K/UL — SIGNIFICANT CHANGE UP (ref 1.8–7.4)
NEUTROPHILS NFR BLD AUTO: 55.3 % — SIGNIFICANT CHANGE UP (ref 43–77)
NRBC # BLD: 0 /100 WBCS — SIGNIFICANT CHANGE UP (ref 0–0)
PLATELET # BLD AUTO: 157 K/UL — SIGNIFICANT CHANGE UP (ref 150–400)
RBC # BLD: 4.53 M/UL — SIGNIFICANT CHANGE UP (ref 4.2–5.8)
RBC # FLD: 12.3 % — SIGNIFICANT CHANGE UP (ref 10.3–14.5)
WBC # BLD: 6.42 K/UL — SIGNIFICANT CHANGE UP (ref 3.8–10.5)
WBC # FLD AUTO: 6.42 K/UL — SIGNIFICANT CHANGE UP (ref 3.8–10.5)

## 2020-10-28 ENCOUNTER — OUTPATIENT (OUTPATIENT)
Dept: OUTPATIENT SERVICES | Facility: HOSPITAL | Age: 65
LOS: 1 days | Discharge: HOME | End: 2020-10-28

## 2020-10-28 DIAGNOSIS — C34.90 MALIGNANT NEOPLASM OF UNSPECIFIED PART OF UNSPECIFIED BRONCHUS OR LUNG: ICD-10-CM

## 2020-11-04 ENCOUNTER — APPOINTMENT (OUTPATIENT)
Dept: HEMATOLOGY ONCOLOGY | Facility: CLINIC | Age: 65
End: 2020-11-04

## 2020-11-06 ENCOUNTER — APPOINTMENT (OUTPATIENT)
Dept: HEMATOLOGY ONCOLOGY | Facility: CLINIC | Age: 65
End: 2020-11-06
Payer: COMMERCIAL

## 2020-11-06 ENCOUNTER — LABORATORY RESULT (OUTPATIENT)
Age: 65
End: 2020-11-06

## 2020-11-06 ENCOUNTER — RESULT REVIEW (OUTPATIENT)
Age: 65
End: 2020-11-06

## 2020-11-06 ENCOUNTER — APPOINTMENT (OUTPATIENT)
Dept: INFUSION THERAPY | Facility: HOSPITAL | Age: 65
End: 2020-11-06

## 2020-11-06 VITALS
TEMPERATURE: 98 F | WEIGHT: 213.85 LBS | OXYGEN SATURATION: 99 % | RESPIRATION RATE: 14 BRPM | SYSTOLIC BLOOD PRESSURE: 144 MMHG | HEART RATE: 64 BPM | DIASTOLIC BLOOD PRESSURE: 82 MMHG | BODY MASS INDEX: 29.83 KG/M2

## 2020-11-06 DIAGNOSIS — Z51.11 ENCOUNTER FOR ANTINEOPLASTIC CHEMOTHERAPY: ICD-10-CM

## 2020-11-06 DIAGNOSIS — R11.2 NAUSEA WITH VOMITING, UNSPECIFIED: ICD-10-CM

## 2020-11-06 LAB
BASOPHILS # BLD AUTO: 0.02 K/UL — SIGNIFICANT CHANGE UP (ref 0–0.2)
BASOPHILS NFR BLD AUTO: 0.3 % — SIGNIFICANT CHANGE UP (ref 0–2)
EOSINOPHIL # BLD AUTO: 0.14 K/UL — SIGNIFICANT CHANGE UP (ref 0–0.5)
EOSINOPHIL NFR BLD AUTO: 2.2 % — SIGNIFICANT CHANGE UP (ref 0–6)
HCT VFR BLD CALC: 39.9 % — SIGNIFICANT CHANGE UP (ref 39–50)
HGB BLD-MCNC: 14 G/DL — SIGNIFICANT CHANGE UP (ref 13–17)
IMM GRANULOCYTES NFR BLD AUTO: 0.5 % — SIGNIFICANT CHANGE UP (ref 0–1.5)
LYMPHOCYTES # BLD AUTO: 1.98 K/UL — SIGNIFICANT CHANGE UP (ref 1–3.3)
LYMPHOCYTES # BLD AUTO: 30.9 % — SIGNIFICANT CHANGE UP (ref 13–44)
MCHC RBC-ENTMCNC: 30.6 PG — SIGNIFICANT CHANGE UP (ref 27–34)
MCHC RBC-ENTMCNC: 35.1 G/DL — SIGNIFICANT CHANGE UP (ref 32–36)
MCV RBC AUTO: 87.3 FL — SIGNIFICANT CHANGE UP (ref 80–100)
MONOCYTES # BLD AUTO: 0.68 K/UL — SIGNIFICANT CHANGE UP (ref 0–0.9)
MONOCYTES NFR BLD AUTO: 10.6 % — SIGNIFICANT CHANGE UP (ref 2–14)
NEUTROPHILS # BLD AUTO: 3.55 K/UL — SIGNIFICANT CHANGE UP (ref 1.8–7.4)
NEUTROPHILS NFR BLD AUTO: 55.5 % — SIGNIFICANT CHANGE UP (ref 43–77)
NRBC # BLD: 0 /100 WBCS — SIGNIFICANT CHANGE UP (ref 0–0)
PLATELET # BLD AUTO: 156 K/UL — SIGNIFICANT CHANGE UP (ref 150–400)
RBC # BLD: 4.57 M/UL — SIGNIFICANT CHANGE UP (ref 4.2–5.8)
RBC # FLD: 12.1 % — SIGNIFICANT CHANGE UP (ref 10.3–14.5)
WBC # BLD: 6.4 K/UL — SIGNIFICANT CHANGE UP (ref 3.8–10.5)
WBC # FLD AUTO: 6.4 K/UL — SIGNIFICANT CHANGE UP (ref 3.8–10.5)

## 2020-11-06 PROCEDURE — 99072 ADDL SUPL MATRL&STAF TM PHE: CPT

## 2020-11-06 PROCEDURE — 99213 OFFICE O/P EST LOW 20 MIN: CPT

## 2020-11-16 ENCOUNTER — NON-APPOINTMENT (OUTPATIENT)
Age: 65
End: 2020-11-16

## 2020-11-16 ENCOUNTER — APPOINTMENT (OUTPATIENT)
Dept: CARDIOLOGY | Facility: CLINIC | Age: 65
End: 2020-11-16
Payer: COMMERCIAL

## 2020-11-16 VITALS — DIASTOLIC BLOOD PRESSURE: 70 MMHG | SYSTOLIC BLOOD PRESSURE: 130 MMHG

## 2020-11-16 PROCEDURE — 93000 ELECTROCARDIOGRAM COMPLETE: CPT

## 2020-11-16 PROCEDURE — 99072 ADDL SUPL MATRL&STAF TM PHE: CPT

## 2020-11-16 PROCEDURE — 99214 OFFICE O/P EST MOD 30 MIN: CPT

## 2020-11-16 NOTE — PHYSICAL EXAM
[General Appearance - Well Developed] : well developed [Normal Appearance] : normal appearance [Well Groomed] : well groomed [General Appearance - Well Nourished] : well nourished [No Deformities] : no deformities [General Appearance - In No Acute Distress] : no acute distress [Normal Conjunctiva] : the conjunctiva exhibited no abnormalities [Eyelids - No Xanthelasma] : the eyelids demonstrated no xanthelasmas [Normal Oral Mucosa] : normal oral mucosa [No Oral Pallor] : no oral pallor [No Oral Cyanosis] : no oral cyanosis [Normal Jugular Venous A Waves Present] : normal jugular venous A waves present [Normal Jugular Venous V Waves Present] : normal jugular venous V waves present [No Jugular Venous Martinez A Waves] : no jugular venous martinez A waves [Respiration, Rhythm And Depth] : normal respiratory rhythm and effort [Exaggerated Use Of Accessory Muscles For Inspiration] : no accessory muscle use [Auscultation Breath Sounds / Voice Sounds] : lungs were clear to auscultation bilaterally [Heart Rate And Rhythm] : heart rate and rhythm were normal [Heart Sounds] : normal S1 and S2 [Murmurs] : no murmurs present [Abdomen Soft] : soft [Abdomen Tenderness] : non-tender [Abdomen Mass (___ Cm)] : no abdominal mass palpated [Abnormal Walk] : normal gait [Gait - Sufficient For Exercise Testing] : the gait was sufficient for exercise testing [Nail Clubbing] : no clubbing of the fingernails [Cyanosis, Localized] : no localized cyanosis [Petechial Hemorrhages (___cm)] : no petechial hemorrhages [Skin Color & Pigmentation] : normal skin color and pigmentation [] : no rash [No Venous Stasis] : no venous stasis [Skin Lesions] : no skin lesions [No Skin Ulcers] : no skin ulcer [No Xanthoma] : no  xanthoma was observed [Oriented To Time, Place, And Person] : oriented to person, place, and time [Affect] : the affect was normal [Mood] : the mood was normal [No Anxiety] : not feeling anxious [FreeTextEntry1] : Obese.

## 2020-11-16 NOTE — HISTORY OF PRESENT ILLNESS
[FreeTextEntry1] : He is doing well from cardiac point of view.  He denies any chest pain, palpitation, shortness of breath or dizziness.  Blood pressure for the most part is fairly well controlled.  He exercises regularly and walks 1 hour every day without any cardiac symptoms.\par \par On October 6 CBC showed hemoglobin of 14 and hematocrit of 39.9 with platelets 156.  PSA, TSH and CMP were normal.  Lipid profile in September showed triglycerides 125, total cholesterol 145, HDL 51 and LDL 69.\par

## 2020-11-16 NOTE — DISCUSSION/SUMMARY
[FreeTextEntry1] : he is doing well from cardiac point of view.  In order to lower LDL cholesterol below 50 I increase rosuvastatin to 40 mg at bedtime.  Liver and lipid profile will be repeated in 6 weeks.  I did not make any other changes.

## 2020-11-16 NOTE — REASON FOR VISIT
[Follow-Up - Clinic] : a clinic follow-up of [Coronary Artery Disease] : coronary artery disease [CABG Follow-up] : bypass graft [Hyperlipidemia] : hyperlipidemia [Hypertension] : hypertension [FreeTextEntry1] : PCI, lung cancer with mets in clinical remission on immunotherapy

## 2020-11-27 ENCOUNTER — RESULT REVIEW (OUTPATIENT)
Age: 65
End: 2020-11-27

## 2020-11-27 ENCOUNTER — LABORATORY RESULT (OUTPATIENT)
Age: 65
End: 2020-11-27

## 2020-11-27 ENCOUNTER — APPOINTMENT (OUTPATIENT)
Dept: INFUSION THERAPY | Facility: HOSPITAL | Age: 65
End: 2020-11-27

## 2020-11-27 LAB
BASOPHILS # BLD AUTO: 0.01 K/UL — SIGNIFICANT CHANGE UP (ref 0–0.2)
BASOPHILS NFR BLD AUTO: 0.2 % — SIGNIFICANT CHANGE UP (ref 0–2)
EOSINOPHIL # BLD AUTO: 0.15 K/UL — SIGNIFICANT CHANGE UP (ref 0–0.5)
EOSINOPHIL NFR BLD AUTO: 2.7 % — SIGNIFICANT CHANGE UP (ref 0–6)
HCT VFR BLD CALC: 40.2 % — SIGNIFICANT CHANGE UP (ref 39–50)
HGB BLD-MCNC: 13.8 G/DL — SIGNIFICANT CHANGE UP (ref 13–17)
IMM GRANULOCYTES NFR BLD AUTO: 0.7 % — SIGNIFICANT CHANGE UP (ref 0–1.5)
LYMPHOCYTES # BLD AUTO: 1.69 K/UL — SIGNIFICANT CHANGE UP (ref 1–3.3)
LYMPHOCYTES # BLD AUTO: 30.8 % — SIGNIFICANT CHANGE UP (ref 13–44)
MCHC RBC-ENTMCNC: 30.4 PG — SIGNIFICANT CHANGE UP (ref 27–34)
MCHC RBC-ENTMCNC: 34.3 G/DL — SIGNIFICANT CHANGE UP (ref 32–36)
MCV RBC AUTO: 88.5 FL — SIGNIFICANT CHANGE UP (ref 80–100)
MONOCYTES # BLD AUTO: 0.48 K/UL — SIGNIFICANT CHANGE UP (ref 0–0.9)
MONOCYTES NFR BLD AUTO: 8.8 % — SIGNIFICANT CHANGE UP (ref 2–14)
NEUTROPHILS # BLD AUTO: 3.11 K/UL — SIGNIFICANT CHANGE UP (ref 1.8–7.4)
NEUTROPHILS NFR BLD AUTO: 56.8 % — SIGNIFICANT CHANGE UP (ref 43–77)
NRBC # BLD: 0 /100 WBCS — SIGNIFICANT CHANGE UP (ref 0–0)
PLATELET # BLD AUTO: 150 K/UL — SIGNIFICANT CHANGE UP (ref 150–400)
RBC # BLD: 4.54 M/UL — SIGNIFICANT CHANGE UP (ref 4.2–5.8)
RBC # FLD: 12.1 % — SIGNIFICANT CHANGE UP (ref 10.3–14.5)
WBC # BLD: 5.48 K/UL — SIGNIFICANT CHANGE UP (ref 3.8–10.5)
WBC # FLD AUTO: 5.48 K/UL — SIGNIFICANT CHANGE UP (ref 3.8–10.5)

## 2020-12-01 ENCOUNTER — NON-APPOINTMENT (OUTPATIENT)
Age: 65
End: 2020-12-01

## 2020-12-03 ENCOUNTER — NON-APPOINTMENT (OUTPATIENT)
Age: 65
End: 2020-12-03

## 2020-12-04 ENCOUNTER — NON-APPOINTMENT (OUTPATIENT)
Age: 65
End: 2020-12-04

## 2020-12-14 ENCOUNTER — OUTPATIENT (OUTPATIENT)
Dept: OUTPATIENT SERVICES | Facility: HOSPITAL | Age: 65
LOS: 1 days | End: 2020-12-14
Payer: COMMERCIAL

## 2020-12-14 ENCOUNTER — APPOINTMENT (OUTPATIENT)
Dept: NUCLEAR MEDICINE | Facility: IMAGING CENTER | Age: 65
End: 2020-12-14
Payer: COMMERCIAL

## 2020-12-14 ENCOUNTER — APPOINTMENT (OUTPATIENT)
Dept: MRI IMAGING | Facility: IMAGING CENTER | Age: 65
End: 2020-12-14
Payer: COMMERCIAL

## 2020-12-14 ENCOUNTER — OUTPATIENT (OUTPATIENT)
Dept: OUTPATIENT SERVICES | Facility: HOSPITAL | Age: 65
LOS: 1 days | Discharge: HOME | End: 2020-12-14

## 2020-12-14 DIAGNOSIS — C34.90 MALIGNANT NEOPLASM OF UNSPECIFIED PART OF UNSPECIFIED BRONCHUS OR LUNG: ICD-10-CM

## 2020-12-14 DIAGNOSIS — Z00.8 ENCOUNTER FOR OTHER GENERAL EXAMINATION: ICD-10-CM

## 2020-12-14 DIAGNOSIS — C34.12 MALIGNANT NEOPLASM OF UPPER LOBE, LEFT BRONCHUS OR LUNG: ICD-10-CM

## 2020-12-14 PROCEDURE — 70553 MRI BRAIN STEM W/O & W/DYE: CPT | Mod: 26

## 2020-12-14 PROCEDURE — A9585: CPT

## 2020-12-14 PROCEDURE — A9552: CPT

## 2020-12-14 PROCEDURE — 78815 PET IMAGE W/CT SKULL-THIGH: CPT | Mod: 26,PS

## 2020-12-14 PROCEDURE — 78815 PET IMAGE W/CT SKULL-THIGH: CPT

## 2020-12-14 PROCEDURE — 70553 MRI BRAIN STEM W/O & W/DYE: CPT

## 2020-12-15 ENCOUNTER — APPOINTMENT (OUTPATIENT)
Dept: INFUSION THERAPY | Facility: HOSPITAL | Age: 65
End: 2020-12-15

## 2020-12-15 ENCOUNTER — LABORATORY RESULT (OUTPATIENT)
Age: 65
End: 2020-12-15

## 2020-12-16 ENCOUNTER — APPOINTMENT (OUTPATIENT)
Dept: HEMATOLOGY ONCOLOGY | Facility: CLINIC | Age: 65
End: 2020-12-16

## 2020-12-18 ENCOUNTER — APPOINTMENT (OUTPATIENT)
Dept: HEMATOLOGY ONCOLOGY | Facility: CLINIC | Age: 65
End: 2020-12-18
Payer: COMMERCIAL

## 2020-12-18 ENCOUNTER — RESULT REVIEW (OUTPATIENT)
Age: 65
End: 2020-12-18

## 2020-12-18 ENCOUNTER — APPOINTMENT (OUTPATIENT)
Dept: INFUSION THERAPY | Facility: HOSPITAL | Age: 65
End: 2020-12-18

## 2020-12-18 ENCOUNTER — LABORATORY RESULT (OUTPATIENT)
Age: 65
End: 2020-12-18

## 2020-12-18 VITALS
TEMPERATURE: 97 F | BODY MASS INDEX: 29.91 KG/M2 | RESPIRATION RATE: 16 BRPM | DIASTOLIC BLOOD PRESSURE: 77 MMHG | SYSTOLIC BLOOD PRESSURE: 144 MMHG | HEIGHT: 71.26 IN | WEIGHT: 216.05 LBS | HEART RATE: 59 BPM | OXYGEN SATURATION: 98 %

## 2020-12-18 LAB
BASOPHILS # BLD AUTO: 0.01 K/UL — SIGNIFICANT CHANGE UP (ref 0–0.2)
BASOPHILS NFR BLD AUTO: 0.2 % — SIGNIFICANT CHANGE UP (ref 0–2)
EOSINOPHIL # BLD AUTO: 0.15 K/UL — SIGNIFICANT CHANGE UP (ref 0–0.5)
EOSINOPHIL NFR BLD AUTO: 2.4 % — SIGNIFICANT CHANGE UP (ref 0–6)
HCT VFR BLD CALC: 41.8 % — SIGNIFICANT CHANGE UP (ref 39–50)
HGB BLD-MCNC: 14.2 G/DL — SIGNIFICANT CHANGE UP (ref 13–17)
IMM GRANULOCYTES NFR BLD AUTO: 0.3 % — SIGNIFICANT CHANGE UP (ref 0–1.5)
LYMPHOCYTES # BLD AUTO: 1.51 K/UL — SIGNIFICANT CHANGE UP (ref 1–3.3)
LYMPHOCYTES # BLD AUTO: 24 % — SIGNIFICANT CHANGE UP (ref 13–44)
MCHC RBC-ENTMCNC: 30.4 PG — SIGNIFICANT CHANGE UP (ref 27–34)
MCHC RBC-ENTMCNC: 34 G/DL — SIGNIFICANT CHANGE UP (ref 32–36)
MCV RBC AUTO: 89.5 FL — SIGNIFICANT CHANGE UP (ref 80–100)
MONOCYTES # BLD AUTO: 0.73 K/UL — SIGNIFICANT CHANGE UP (ref 0–0.9)
MONOCYTES NFR BLD AUTO: 11.6 % — SIGNIFICANT CHANGE UP (ref 2–14)
NEUTROPHILS # BLD AUTO: 3.87 K/UL — SIGNIFICANT CHANGE UP (ref 1.8–7.4)
NEUTROPHILS NFR BLD AUTO: 61.5 % — SIGNIFICANT CHANGE UP (ref 43–77)
NRBC # BLD: 0 /100 WBCS — SIGNIFICANT CHANGE UP (ref 0–0)
PLATELET # BLD AUTO: 159 K/UL — SIGNIFICANT CHANGE UP (ref 150–400)
RBC # BLD: 4.67 M/UL — SIGNIFICANT CHANGE UP (ref 4.2–5.8)
RBC # FLD: 12.2 % — SIGNIFICANT CHANGE UP (ref 10.3–14.5)
WBC # BLD: 6.29 K/UL — SIGNIFICANT CHANGE UP (ref 3.8–10.5)
WBC # FLD AUTO: 6.29 K/UL — SIGNIFICANT CHANGE UP (ref 3.8–10.5)

## 2020-12-18 PROCEDURE — 99214 OFFICE O/P EST MOD 30 MIN: CPT

## 2020-12-18 PROCEDURE — 99072 ADDL SUPL MATRL&STAF TM PHE: CPT

## 2020-12-21 DIAGNOSIS — Z51.11 ENCOUNTER FOR ANTINEOPLASTIC CHEMOTHERAPY: ICD-10-CM

## 2020-12-21 DIAGNOSIS — R11.2 NAUSEA WITH VOMITING, UNSPECIFIED: ICD-10-CM

## 2021-01-08 ENCOUNTER — APPOINTMENT (OUTPATIENT)
Dept: INFUSION THERAPY | Facility: HOSPITAL | Age: 66
End: 2021-01-08

## 2021-01-08 ENCOUNTER — LABORATORY RESULT (OUTPATIENT)
Age: 66
End: 2021-01-08

## 2021-01-08 ENCOUNTER — RESULT REVIEW (OUTPATIENT)
Age: 66
End: 2021-01-08

## 2021-01-08 LAB
BASOPHILS # BLD AUTO: 0.01 K/UL — SIGNIFICANT CHANGE UP (ref 0–0.2)
BASOPHILS NFR BLD AUTO: 0.2 % — SIGNIFICANT CHANGE UP (ref 0–2)
EOSINOPHIL # BLD AUTO: 0.15 K/UL — SIGNIFICANT CHANGE UP (ref 0–0.5)
EOSINOPHIL NFR BLD AUTO: 2.4 % — SIGNIFICANT CHANGE UP (ref 0–6)
HCT VFR BLD CALC: 40.1 % — SIGNIFICANT CHANGE UP (ref 39–50)
HGB BLD-MCNC: 14.2 G/DL — SIGNIFICANT CHANGE UP (ref 13–17)
IMM GRANULOCYTES NFR BLD AUTO: 0.3 % — SIGNIFICANT CHANGE UP (ref 0–1.5)
LYMPHOCYTES # BLD AUTO: 1.54 K/UL — SIGNIFICANT CHANGE UP (ref 1–3.3)
LYMPHOCYTES # BLD AUTO: 25.1 % — SIGNIFICANT CHANGE UP (ref 13–44)
MCHC RBC-ENTMCNC: 31.1 PG — SIGNIFICANT CHANGE UP (ref 27–34)
MCHC RBC-ENTMCNC: 35.4 G/DL — SIGNIFICANT CHANGE UP (ref 32–36)
MCV RBC AUTO: 87.7 FL — SIGNIFICANT CHANGE UP (ref 80–100)
MONOCYTES # BLD AUTO: 0.57 K/UL — SIGNIFICANT CHANGE UP (ref 0–0.9)
MONOCYTES NFR BLD AUTO: 9.3 % — SIGNIFICANT CHANGE UP (ref 2–14)
NEUTROPHILS # BLD AUTO: 3.84 K/UL — SIGNIFICANT CHANGE UP (ref 1.8–7.4)
NEUTROPHILS NFR BLD AUTO: 62.7 % — SIGNIFICANT CHANGE UP (ref 43–77)
NRBC # BLD: 0 /100 WBCS — SIGNIFICANT CHANGE UP (ref 0–0)
PLATELET # BLD AUTO: 138 K/UL — LOW (ref 150–400)
RBC # BLD: 4.57 M/UL — SIGNIFICANT CHANGE UP (ref 4.2–5.8)
RBC # FLD: 12 % — SIGNIFICANT CHANGE UP (ref 10.3–14.5)
WBC # BLD: 6.13 K/UL — SIGNIFICANT CHANGE UP (ref 3.8–10.5)
WBC # FLD AUTO: 6.13 K/UL — SIGNIFICANT CHANGE UP (ref 3.8–10.5)

## 2021-01-11 ENCOUNTER — NON-APPOINTMENT (OUTPATIENT)
Age: 66
End: 2021-01-11

## 2021-01-25 ENCOUNTER — OUTPATIENT (OUTPATIENT)
Dept: OUTPATIENT SERVICES | Facility: HOSPITAL | Age: 66
LOS: 1 days | Discharge: HOME | End: 2021-01-25

## 2021-01-25 DIAGNOSIS — C34.90 MALIGNANT NEOPLASM OF UNSPECIFIED PART OF UNSPECIFIED BRONCHUS OR LUNG: ICD-10-CM

## 2021-01-27 ENCOUNTER — APPOINTMENT (OUTPATIENT)
Dept: INFUSION THERAPY | Facility: HOSPITAL | Age: 66
End: 2021-01-27

## 2021-01-27 ENCOUNTER — LABORATORY RESULT (OUTPATIENT)
Age: 66
End: 2021-01-27

## 2021-01-29 ENCOUNTER — LABORATORY RESULT (OUTPATIENT)
Age: 66
End: 2021-01-29

## 2021-01-29 ENCOUNTER — RESULT REVIEW (OUTPATIENT)
Age: 66
End: 2021-01-29

## 2021-01-29 ENCOUNTER — APPOINTMENT (OUTPATIENT)
Dept: HEMATOLOGY ONCOLOGY | Facility: CLINIC | Age: 66
End: 2021-01-29
Payer: COMMERCIAL

## 2021-01-29 ENCOUNTER — APPOINTMENT (OUTPATIENT)
Dept: INFUSION THERAPY | Facility: HOSPITAL | Age: 66
End: 2021-01-29

## 2021-01-29 VITALS
DIASTOLIC BLOOD PRESSURE: 70 MMHG | OXYGEN SATURATION: 98 % | RESPIRATION RATE: 14 BRPM | SYSTOLIC BLOOD PRESSURE: 130 MMHG | HEART RATE: 62 BPM | WEIGHT: 216.05 LBS | TEMPERATURE: 98 F | BODY MASS INDEX: 29.91 KG/M2

## 2021-01-29 LAB
BASOPHILS # BLD AUTO: 0.02 K/UL — SIGNIFICANT CHANGE UP (ref 0–0.2)
BASOPHILS NFR BLD AUTO: 0.3 % — SIGNIFICANT CHANGE UP (ref 0–2)
EOSINOPHIL # BLD AUTO: 0.14 K/UL — SIGNIFICANT CHANGE UP (ref 0–0.5)
EOSINOPHIL NFR BLD AUTO: 2.3 % — SIGNIFICANT CHANGE UP (ref 0–6)
HCT VFR BLD CALC: 40.1 % — SIGNIFICANT CHANGE UP (ref 39–50)
HGB BLD-MCNC: 14.1 G/DL — SIGNIFICANT CHANGE UP (ref 13–17)
IMM GRANULOCYTES NFR BLD AUTO: 0.3 % — SIGNIFICANT CHANGE UP (ref 0–1.5)
LYMPHOCYTES # BLD AUTO: 1.25 K/UL — SIGNIFICANT CHANGE UP (ref 1–3.3)
LYMPHOCYTES # BLD AUTO: 20.3 % — SIGNIFICANT CHANGE UP (ref 13–44)
MCHC RBC-ENTMCNC: 30.4 PG — SIGNIFICANT CHANGE UP (ref 27–34)
MCHC RBC-ENTMCNC: 35.2 G/DL — SIGNIFICANT CHANGE UP (ref 32–36)
MCV RBC AUTO: 86.4 FL — SIGNIFICANT CHANGE UP (ref 80–100)
MONOCYTES # BLD AUTO: 0.66 K/UL — SIGNIFICANT CHANGE UP (ref 0–0.9)
MONOCYTES NFR BLD AUTO: 10.7 % — SIGNIFICANT CHANGE UP (ref 2–14)
NEUTROPHILS # BLD AUTO: 4.08 K/UL — SIGNIFICANT CHANGE UP (ref 1.8–7.4)
NEUTROPHILS NFR BLD AUTO: 66.1 % — SIGNIFICANT CHANGE UP (ref 43–77)
NRBC # BLD: 0 /100 WBCS — SIGNIFICANT CHANGE UP (ref 0–0)
PLATELET # BLD AUTO: 151 K/UL — SIGNIFICANT CHANGE UP (ref 150–400)
RBC # BLD: 4.64 M/UL — SIGNIFICANT CHANGE UP (ref 4.2–5.8)
RBC # FLD: 11.9 % — SIGNIFICANT CHANGE UP (ref 10.3–14.5)
WBC # BLD: 6.17 K/UL — SIGNIFICANT CHANGE UP (ref 3.8–10.5)
WBC # FLD AUTO: 6.17 K/UL — SIGNIFICANT CHANGE UP (ref 3.8–10.5)

## 2021-01-29 PROCEDURE — 99072 ADDL SUPL MATRL&STAF TM PHE: CPT

## 2021-01-29 PROCEDURE — 99213 OFFICE O/P EST LOW 20 MIN: CPT

## 2021-02-01 DIAGNOSIS — Z51.11 ENCOUNTER FOR ANTINEOPLASTIC CHEMOTHERAPY: ICD-10-CM

## 2021-02-01 DIAGNOSIS — R11.2 NAUSEA WITH VOMITING, UNSPECIFIED: ICD-10-CM

## 2021-02-08 ENCOUNTER — NON-APPOINTMENT (OUTPATIENT)
Age: 66
End: 2021-02-08

## 2021-02-08 ENCOUNTER — APPOINTMENT (OUTPATIENT)
Dept: CARDIOLOGY | Facility: CLINIC | Age: 66
End: 2021-02-08
Payer: COMMERCIAL

## 2021-02-08 VITALS — DIASTOLIC BLOOD PRESSURE: 60 MMHG | SYSTOLIC BLOOD PRESSURE: 116 MMHG

## 2021-02-08 VITALS — OXYGEN SATURATION: 98 % | HEART RATE: 64 BPM | WEIGHT: 215 LBS | BODY MASS INDEX: 29.77 KG/M2

## 2021-02-08 PROCEDURE — 99214 OFFICE O/P EST MOD 30 MIN: CPT

## 2021-02-08 PROCEDURE — 99072 ADDL SUPL MATRL&STAF TM PHE: CPT

## 2021-02-08 PROCEDURE — 93000 ELECTROCARDIOGRAM COMPLETE: CPT

## 2021-02-08 NOTE — ASSESSMENT
[FreeTextEntry1] : His blood pressure is well controlled.  He has no symptoms of ACS or fluid overload.  Lipid profile is excellent.  He got a good report from the oncologist.

## 2021-02-08 NOTE — HISTORY OF PRESENT ILLNESS
[FreeTextEntry1] : He denies any chest pain, palpitation, shortness of breath or dizziness.  Lately he has been getting a little bit of epistaxis when he blows his nose.\par \par Blood test done on 29 January showed hemoglobin of 14.1 and platelet count of 151.  Triglycerides of 174, total cholesterol 119, HDL 42 and LDL 42.  Non-HDL was 77 CMP was normal except for CO2 of 21 and blood sugar of 145.  Estimated GFR was 95.  It was not a fasting specimen.

## 2021-02-08 NOTE — DISCUSSION/SUMMARY
[FreeTextEntry1] : At this time I did not make any changes in his treatment.  I recommended that he use a humidifier  at home and also to use saline spray about 2-3 times daily to avoid epistaxis.  Tamsulosin was renewed as a favor at his request.  I told him that he should follow-up with urologist.  He has not seen one for a long time.

## 2021-02-08 NOTE — REASON FOR VISIT
[Follow-Up - Clinic] : a clinic follow-up of [Coronary Artery Disease] : coronary artery disease [CABG Follow-up] : bypass graft [Hyperlipidemia] : hyperlipidemia [Hypertension] : hypertension [FreeTextEntry1] : lung ca with mets.etc

## 2021-02-19 ENCOUNTER — RESULT REVIEW (OUTPATIENT)
Age: 66
End: 2021-02-19

## 2021-02-19 ENCOUNTER — APPOINTMENT (OUTPATIENT)
Dept: INFUSION THERAPY | Facility: HOSPITAL | Age: 66
End: 2021-02-19

## 2021-02-19 ENCOUNTER — LABORATORY RESULT (OUTPATIENT)
Age: 66
End: 2021-02-19

## 2021-02-19 LAB
BASOPHILS # BLD AUTO: 0.01 K/UL — SIGNIFICANT CHANGE UP (ref 0–0.2)
BASOPHILS # BLD AUTO: 0.01 K/UL — SIGNIFICANT CHANGE UP (ref 0–0.2)
BASOPHILS NFR BLD AUTO: 0.1 % — SIGNIFICANT CHANGE UP (ref 0–2)
BASOPHILS NFR BLD AUTO: 0.2 % — SIGNIFICANT CHANGE UP (ref 0–2)
EOSINOPHIL # BLD AUTO: 0.15 K/UL — SIGNIFICANT CHANGE UP (ref 0–0.5)
EOSINOPHIL # BLD AUTO: 0.19 K/UL — SIGNIFICANT CHANGE UP (ref 0–0.5)
EOSINOPHIL NFR BLD AUTO: 2.3 % — SIGNIFICANT CHANGE UP (ref 0–6)
EOSINOPHIL NFR BLD AUTO: 2.6 % — SIGNIFICANT CHANGE UP (ref 0–6)
HCT VFR BLD CALC: 41 % — SIGNIFICANT CHANGE UP (ref 39–50)
HCT VFR BLD CALC: 41.7 % — SIGNIFICANT CHANGE UP (ref 39–50)
HGB BLD-MCNC: 14.3 G/DL — SIGNIFICANT CHANGE UP (ref 13–17)
HGB BLD-MCNC: 14.8 G/DL — SIGNIFICANT CHANGE UP (ref 13–17)
IMM GRANULOCYTES NFR BLD AUTO: 0.3 % — SIGNIFICANT CHANGE UP (ref 0–1.5)
IMM GRANULOCYTES NFR BLD AUTO: 0.5 % — SIGNIFICANT CHANGE UP (ref 0–1.5)
LYMPHOCYTES # BLD AUTO: 1.67 K/UL — SIGNIFICANT CHANGE UP (ref 1–3.3)
LYMPHOCYTES # BLD AUTO: 1.95 K/UL — SIGNIFICANT CHANGE UP (ref 1–3.3)
LYMPHOCYTES # BLD AUTO: 25.3 % — SIGNIFICANT CHANGE UP (ref 13–44)
LYMPHOCYTES # BLD AUTO: 26.3 % — SIGNIFICANT CHANGE UP (ref 13–44)
MCHC RBC-ENTMCNC: 30.3 PG — SIGNIFICANT CHANGE UP (ref 27–34)
MCHC RBC-ENTMCNC: 30.4 PG — SIGNIFICANT CHANGE UP (ref 27–34)
MCHC RBC-ENTMCNC: 34.9 G/DL — SIGNIFICANT CHANGE UP (ref 32–36)
MCHC RBC-ENTMCNC: 35.5 G/DL — SIGNIFICANT CHANGE UP (ref 32–36)
MCV RBC AUTO: 85.6 FL — SIGNIFICANT CHANGE UP (ref 80–100)
MCV RBC AUTO: 86.9 FL — SIGNIFICANT CHANGE UP (ref 80–100)
MONOCYTES # BLD AUTO: 0.93 K/UL — HIGH (ref 0–0.9)
MONOCYTES # BLD AUTO: 1.06 K/UL — HIGH (ref 0–0.9)
MONOCYTES NFR BLD AUTO: 14.1 % — HIGH (ref 2–14)
MONOCYTES NFR BLD AUTO: 14.3 % — HIGH (ref 2–14)
NEUTROPHILS # BLD AUTO: 3.81 K/UL — SIGNIFICANT CHANGE UP (ref 1.8–7.4)
NEUTROPHILS # BLD AUTO: 4.18 K/UL — SIGNIFICANT CHANGE UP (ref 1.8–7.4)
NEUTROPHILS NFR BLD AUTO: 56.4 % — SIGNIFICANT CHANGE UP (ref 43–77)
NEUTROPHILS NFR BLD AUTO: 57.6 % — SIGNIFICANT CHANGE UP (ref 43–77)
NRBC # BLD: 0 /100 WBCS — SIGNIFICANT CHANGE UP (ref 0–0)
NRBC # BLD: 0 /100 WBCS — SIGNIFICANT CHANGE UP (ref 0–0)
PLATELET # BLD AUTO: 155 K/UL — SIGNIFICANT CHANGE UP (ref 150–400)
PLATELET # BLD AUTO: 163 K/UL — SIGNIFICANT CHANGE UP (ref 150–400)
RBC # BLD: 4.72 M/UL — SIGNIFICANT CHANGE UP (ref 4.2–5.8)
RBC # BLD: 4.87 M/UL — SIGNIFICANT CHANGE UP (ref 4.2–5.8)
RBC # FLD: 11.9 % — SIGNIFICANT CHANGE UP (ref 10.3–14.5)
RBC # FLD: 11.9 % — SIGNIFICANT CHANGE UP (ref 10.3–14.5)
WBC # BLD: 6.6 K/UL — SIGNIFICANT CHANGE UP (ref 3.8–10.5)
WBC # BLD: 7.41 K/UL — SIGNIFICANT CHANGE UP (ref 3.8–10.5)
WBC # FLD AUTO: 6.6 K/UL — SIGNIFICANT CHANGE UP (ref 3.8–10.5)
WBC # FLD AUTO: 7.41 K/UL — SIGNIFICANT CHANGE UP (ref 3.8–10.5)

## 2021-02-22 ENCOUNTER — NON-APPOINTMENT (OUTPATIENT)
Age: 66
End: 2021-02-22

## 2021-03-05 ENCOUNTER — RESULT REVIEW (OUTPATIENT)
Age: 66
End: 2021-03-05

## 2021-03-05 ENCOUNTER — APPOINTMENT (OUTPATIENT)
Dept: NUCLEAR MEDICINE | Facility: IMAGING CENTER | Age: 66
End: 2021-03-05
Payer: COMMERCIAL

## 2021-03-05 ENCOUNTER — OUTPATIENT (OUTPATIENT)
Dept: OUTPATIENT SERVICES | Facility: HOSPITAL | Age: 66
LOS: 1 days | End: 2021-03-05
Payer: COMMERCIAL

## 2021-03-05 DIAGNOSIS — Z00.8 ENCOUNTER FOR OTHER GENERAL EXAMINATION: ICD-10-CM

## 2021-03-05 DIAGNOSIS — C34.12 MALIGNANT NEOPLASM OF UPPER LOBE, LEFT BRONCHUS OR LUNG: ICD-10-CM

## 2021-03-05 PROCEDURE — 78815 PET IMAGE W/CT SKULL-THIGH: CPT

## 2021-03-05 PROCEDURE — A9552: CPT

## 2021-03-05 PROCEDURE — 78815 PET IMAGE W/CT SKULL-THIGH: CPT | Mod: 26,PS

## 2021-03-09 ENCOUNTER — OUTPATIENT (OUTPATIENT)
Dept: OUTPATIENT SERVICES | Facility: HOSPITAL | Age: 66
LOS: 1 days | Discharge: HOME | End: 2021-03-09

## 2021-03-09 ENCOUNTER — APPOINTMENT (OUTPATIENT)
Dept: OTOLARYNGOLOGY | Facility: CLINIC | Age: 66
End: 2021-03-09

## 2021-03-09 DIAGNOSIS — C34.90 MALIGNANT NEOPLASM OF UNSPECIFIED PART OF UNSPECIFIED BRONCHUS OR LUNG: ICD-10-CM

## 2021-03-10 ENCOUNTER — LABORATORY RESULT (OUTPATIENT)
Age: 66
End: 2021-03-10

## 2021-03-10 ENCOUNTER — APPOINTMENT (OUTPATIENT)
Dept: HEMATOLOGY ONCOLOGY | Facility: CLINIC | Age: 66
End: 2021-03-10

## 2021-03-12 ENCOUNTER — LABORATORY RESULT (OUTPATIENT)
Age: 66
End: 2021-03-12

## 2021-03-12 ENCOUNTER — RESULT REVIEW (OUTPATIENT)
Age: 66
End: 2021-03-12

## 2021-03-12 ENCOUNTER — APPOINTMENT (OUTPATIENT)
Dept: HEMATOLOGY ONCOLOGY | Facility: CLINIC | Age: 66
End: 2021-03-12
Payer: COMMERCIAL

## 2021-03-12 ENCOUNTER — APPOINTMENT (OUTPATIENT)
Dept: INFUSION THERAPY | Facility: HOSPITAL | Age: 66
End: 2021-03-12

## 2021-03-12 VITALS
TEMPERATURE: 96.8 F | DIASTOLIC BLOOD PRESSURE: 69 MMHG | RESPIRATION RATE: 15 BRPM | BODY MASS INDEX: 28.69 KG/M2 | OXYGEN SATURATION: 97 % | WEIGHT: 207.23 LBS | HEART RATE: 79 BPM | HEIGHT: 71.26 IN | SYSTOLIC BLOOD PRESSURE: 120 MMHG

## 2021-03-12 LAB
BASOPHILS # BLD AUTO: 0.01 K/UL — SIGNIFICANT CHANGE UP (ref 0–0.2)
BASOPHILS NFR BLD AUTO: 0.1 % — SIGNIFICANT CHANGE UP (ref 0–2)
EOSINOPHIL # BLD AUTO: 0.14 K/UL — SIGNIFICANT CHANGE UP (ref 0–0.5)
EOSINOPHIL NFR BLD AUTO: 1.9 % — SIGNIFICANT CHANGE UP (ref 0–6)
HCT VFR BLD CALC: 41 % — SIGNIFICANT CHANGE UP (ref 39–50)
HGB BLD-MCNC: 14.2 G/DL — SIGNIFICANT CHANGE UP (ref 13–17)
IMM GRANULOCYTES NFR BLD AUTO: 0.3 % — SIGNIFICANT CHANGE UP (ref 0–1.5)
LYMPHOCYTES # BLD AUTO: 1.54 K/UL — SIGNIFICANT CHANGE UP (ref 1–3.3)
LYMPHOCYTES # BLD AUTO: 20.6 % — SIGNIFICANT CHANGE UP (ref 13–44)
MCHC RBC-ENTMCNC: 30.4 PG — SIGNIFICANT CHANGE UP (ref 27–34)
MCHC RBC-ENTMCNC: 34.6 G/DL — SIGNIFICANT CHANGE UP (ref 32–36)
MCV RBC AUTO: 87.8 FL — SIGNIFICANT CHANGE UP (ref 80–100)
MONOCYTES # BLD AUTO: 0.85 K/UL — SIGNIFICANT CHANGE UP (ref 0–0.9)
MONOCYTES NFR BLD AUTO: 11.4 % — SIGNIFICANT CHANGE UP (ref 2–14)
NEUTROPHILS # BLD AUTO: 4.91 K/UL — SIGNIFICANT CHANGE UP (ref 1.8–7.4)
NEUTROPHILS NFR BLD AUTO: 65.7 % — SIGNIFICANT CHANGE UP (ref 43–77)
NRBC # BLD: 0 /100 WBCS — SIGNIFICANT CHANGE UP (ref 0–0)
PLATELET # BLD AUTO: 165 K/UL — SIGNIFICANT CHANGE UP (ref 150–400)
RBC # BLD: 4.67 M/UL — SIGNIFICANT CHANGE UP (ref 4.2–5.8)
RBC # FLD: 12.3 % — SIGNIFICANT CHANGE UP (ref 10.3–14.5)
WBC # BLD: 7.47 K/UL — SIGNIFICANT CHANGE UP (ref 3.8–10.5)
WBC # FLD AUTO: 7.47 K/UL — SIGNIFICANT CHANGE UP (ref 3.8–10.5)

## 2021-03-12 PROCEDURE — 99214 OFFICE O/P EST MOD 30 MIN: CPT

## 2021-03-12 PROCEDURE — 99072 ADDL SUPL MATRL&STAF TM PHE: CPT

## 2021-03-15 ENCOUNTER — NON-APPOINTMENT (OUTPATIENT)
Age: 66
End: 2021-03-15

## 2021-03-15 DIAGNOSIS — R11.2 NAUSEA WITH VOMITING, UNSPECIFIED: ICD-10-CM

## 2021-03-15 DIAGNOSIS — Z51.11 ENCOUNTER FOR ANTINEOPLASTIC CHEMOTHERAPY: ICD-10-CM

## 2021-03-23 ENCOUNTER — OUTPATIENT (OUTPATIENT)
Dept: OUTPATIENT SERVICES | Facility: HOSPITAL | Age: 66
LOS: 1 days | End: 2021-03-23
Payer: COMMERCIAL

## 2021-03-23 ENCOUNTER — APPOINTMENT (OUTPATIENT)
Dept: RADIOLOGY | Facility: HOSPITAL | Age: 66
End: 2021-03-23
Payer: COMMERCIAL

## 2021-03-23 DIAGNOSIS — R13.10 DYSPHAGIA, UNSPECIFIED: ICD-10-CM

## 2021-03-23 DIAGNOSIS — Z00.00 ENCOUNTER FOR GENERAL ADULT MEDICAL EXAMINATION WITHOUT ABNORMAL FINDINGS: ICD-10-CM

## 2021-03-23 PROCEDURE — 74221 X-RAY XM ESOPHAGUS 2CNTRST: CPT

## 2021-03-23 PROCEDURE — 74221 X-RAY XM ESOPHAGUS 2CNTRST: CPT | Mod: 26

## 2021-03-26 ENCOUNTER — NON-APPOINTMENT (OUTPATIENT)
Age: 66
End: 2021-03-26

## 2021-04-01 ENCOUNTER — NON-APPOINTMENT (OUTPATIENT)
Age: 66
End: 2021-04-01

## 2021-04-02 ENCOUNTER — RESULT REVIEW (OUTPATIENT)
Age: 66
End: 2021-04-02

## 2021-04-02 ENCOUNTER — APPOINTMENT (OUTPATIENT)
Dept: INFUSION THERAPY | Facility: HOSPITAL | Age: 66
End: 2021-04-02

## 2021-04-02 ENCOUNTER — LABORATORY RESULT (OUTPATIENT)
Age: 66
End: 2021-04-02

## 2021-04-02 LAB
BASOPHILS # BLD AUTO: 0.01 K/UL — SIGNIFICANT CHANGE UP (ref 0–0.2)
BASOPHILS NFR BLD AUTO: 0.2 % — SIGNIFICANT CHANGE UP (ref 0–2)
EOSINOPHIL # BLD AUTO: 0.13 K/UL — SIGNIFICANT CHANGE UP (ref 0–0.5)
EOSINOPHIL NFR BLD AUTO: 2.2 % — SIGNIFICANT CHANGE UP (ref 0–6)
HCT VFR BLD CALC: 41.1 % — SIGNIFICANT CHANGE UP (ref 39–50)
HGB BLD-MCNC: 14.2 G/DL — SIGNIFICANT CHANGE UP (ref 13–17)
IMM GRANULOCYTES NFR BLD AUTO: 0.3 % — SIGNIFICANT CHANGE UP (ref 0–1.5)
LYMPHOCYTES # BLD AUTO: 1.24 K/UL — SIGNIFICANT CHANGE UP (ref 1–3.3)
LYMPHOCYTES # BLD AUTO: 20.5 % — SIGNIFICANT CHANGE UP (ref 13–44)
MCHC RBC-ENTMCNC: 30 PG — SIGNIFICANT CHANGE UP (ref 27–34)
MCHC RBC-ENTMCNC: 34.5 G/DL — SIGNIFICANT CHANGE UP (ref 32–36)
MCV RBC AUTO: 86.7 FL — SIGNIFICANT CHANGE UP (ref 80–100)
MONOCYTES # BLD AUTO: 0.61 K/UL — SIGNIFICANT CHANGE UP (ref 0–0.9)
MONOCYTES NFR BLD AUTO: 10.1 % — SIGNIFICANT CHANGE UP (ref 2–14)
NEUTROPHILS # BLD AUTO: 4.03 K/UL — SIGNIFICANT CHANGE UP (ref 1.8–7.4)
NEUTROPHILS NFR BLD AUTO: 66.7 % — SIGNIFICANT CHANGE UP (ref 43–77)
NRBC # BLD: 0 /100 WBCS — SIGNIFICANT CHANGE UP (ref 0–0)
PLATELET # BLD AUTO: SIGNIFICANT CHANGE UP K/UL (ref 150–400)
RBC # BLD: 4.74 M/UL — SIGNIFICANT CHANGE UP (ref 4.2–5.8)
RBC # FLD: 12.1 % — SIGNIFICANT CHANGE UP (ref 10.3–14.5)
WBC # BLD: 6.04 K/UL — SIGNIFICANT CHANGE UP (ref 3.8–10.5)
WBC # FLD AUTO: 6.04 K/UL — SIGNIFICANT CHANGE UP (ref 3.8–10.5)

## 2021-04-05 ENCOUNTER — APPOINTMENT (OUTPATIENT)
Dept: GASTROENTEROLOGY | Facility: CLINIC | Age: 66
End: 2021-04-05
Payer: COMMERCIAL

## 2021-04-05 VITALS
DIASTOLIC BLOOD PRESSURE: 78 MMHG | HEART RATE: 64 BPM | TEMPERATURE: 96.8 F | BODY MASS INDEX: 28.28 KG/M2 | WEIGHT: 202 LBS | SYSTOLIC BLOOD PRESSURE: 125 MMHG | OXYGEN SATURATION: 98 % | HEIGHT: 71 IN

## 2021-04-05 PROCEDURE — 99204 OFFICE O/P NEW MOD 45 MIN: CPT

## 2021-04-05 PROCEDURE — 99072 ADDL SUPL MATRL&STAF TM PHE: CPT

## 2021-04-05 NOTE — PHYSICAL EXAM
[General Appearance - Alert] : alert [General Appearance - In No Acute Distress] : in no acute distress [Sclera] : the sclera and conjunctiva were normal [PERRL With Normal Accommodation] : pupils were equal in size, round, and reactive to light [Extraocular Movements] : extraocular movements were intact [Outer Ear] : the ears and nose were normal in appearance [Oropharynx] : the oropharynx was normal [Neck Appearance] : the appearance of the neck was normal [Neck Cervical Mass (___cm)] : no neck mass was observed [Jugular Venous Distention Increased] : there was no jugular-venous distention [Thyroid Diffuse Enlargement] : the thyroid was not enlarged [Thyroid Nodule] : there were no palpable thyroid nodules [Auscultation Breath Sounds / Voice Sounds] : lungs were clear to auscultation bilaterally [Heart Rate And Rhythm] : heart rate was normal and rhythm regular [Heart Sounds] : normal S1 and S2 [Heart Sounds Gallop] : no gallops [Murmurs] : no murmurs [Heart Sounds Pericardial Friction Rub] : no pericardial rub [Bowel Sounds] : normal bowel sounds [Abdomen Tenderness] : non-tender [Abdomen Soft] : soft [] : no hepato-splenomegaly [Abdomen Mass (___ Cm)] : no abdominal mass palpated [Cervical Lymph Nodes Enlarged Posterior Bilaterally] : posterior cervical [Cervical Lymph Nodes Enlarged Anterior Bilaterally] : anterior cervical [Supraclavicular Lymph Nodes Enlarged Bilaterally] : supraclavicular [No CVA Tenderness] : no ~M costovertebral angle tenderness [No Spinal Tenderness] : no spinal tenderness [Abnormal Walk] : normal gait [Nail Clubbing] : no clubbing  or cyanosis of the fingernails [Musculoskeletal - Swelling] : no joint swelling seen [Motor Tone] : muscle strength and tone were normal [Deep Tendon Reflexes (DTR)] : deep tendon reflexes were 2+ and symmetric [Sensation] : the sensory exam was normal to light touch and pinprick [No Focal Deficits] : no focal deficits [Oriented To Time, Place, And Person] : oriented to person, place, and time [Impaired Insight] : insight and judgment were intact [Affect] : the affect was normal

## 2021-04-05 NOTE — HISTORY OF PRESENT ILLNESS
[Heartburn] : denies heartburn [Nausea] : denies nausea [Vomiting] : denies vomiting [Diarrhea] : denies diarrhea [Constipation] : denies constipation [Yellow Skin Or Eyes (Jaundice)] : denies jaundice [Abdominal Pain] : denies abdominal pain [Abdominal Swelling] : denies abdominal swelling [Rectal Pain] : denies rectal pain [Wt Loss ___ Lbs] : recent [unfilled] ~Upound(s) weight loss [GERD] : no gastroesophageal reflux disease [Wt Gain ___ Lbs] : no recent weight gain [Hiatus Hernia] : no hiatus hernia [Peptic Ulcer Disease] : no peptic ulcer disease [Pancreatitis] : no pancreatitis [Cholelithiasis] : no cholelithiasis [Kidney Stone] : no kidney stone [Inflammatory Bowel Disease] : no inflammatory bowel disease [Irritable Bowel Syndrome] : no irritable bowel syndrome [Diverticulitis] : no diverticulitis [Alcohol Abuse] : no alcohol abuse [Malignancy] : no malignancy [Abdominal Surgery] : no abdominal surgery [Appendectomy] : no appendectomy [Cholecystectomy] : no cholecystectomy [de-identified] : 65 year old man with metastatic lung cancer complains of 2 months of odynophagia. It is difficult for him to eat and he has  lost 10 lbs. He saw an otolaryngologist and the exam was unremarkable according to the patient. Recent Barium Swallow showed a hiatus hernia and reflux and he was started on Omeprazole 20 mg OD. He has noted some improvement. PET Scan  shows increased uptake in the mid and distal esophagus. He is s/p CABG

## 2021-04-05 NOTE — ASSESSMENT
[FreeTextEntry1] : Odynopgahai and abnormal PET scan- Will schedule EGD. Increase Omeprazole to 20 mg BID.\par CAD S/P CABG- cardiac clearance for EGD.

## 2021-04-09 ENCOUNTER — APPOINTMENT (OUTPATIENT)
Dept: GASTROENTEROLOGY | Facility: CLINIC | Age: 66
End: 2021-04-09

## 2021-04-09 ENCOUNTER — APPOINTMENT (OUTPATIENT)
Dept: CARDIOLOGY | Facility: CLINIC | Age: 66
End: 2021-04-09
Payer: COMMERCIAL

## 2021-04-09 VITALS
WEIGHT: 200 LBS | BODY MASS INDEX: 28 KG/M2 | HEIGHT: 71 IN | HEART RATE: 62 BPM | SYSTOLIC BLOOD PRESSURE: 106 MMHG | DIASTOLIC BLOOD PRESSURE: 57 MMHG

## 2021-04-09 PROCEDURE — 99214 OFFICE O/P EST MOD 30 MIN: CPT

## 2021-04-09 PROCEDURE — 99072 ADDL SUPL MATRL&STAF TM PHE: CPT

## 2021-04-09 NOTE — DISCUSSION/SUMMARY
[FreeTextEntry1] : 65M with CAD s/p CABG and subsequent PCI, mod AS, metastatic lung Ca, HLD\par \par 1. CAD:  Currently feeling well without symptoms. Preserved LV function on last echo\par \par -Cont asa, statin, BB. Off plavix\par \par 2. AS: Last echo showing aortic sclerosis without significant aortic stenosis. Serial echos\par \par 3. HLD: Cont high dose statin. Lipids from 1/21 excellent\par \par 4. Onc: Continue regular follow up, on immunotherapy.  \par \par This patient is able to perform >4 METS of activity without limitation. No evidence of ongoing ischemia, arrhythmia, valvular heart disease or decompensated heart failure.  This patient is optimized from a cardiac standpoint for this low risk surgery.  No further cardiac testing is necessary prior to surgery.  Cont asa, beta blocker periprocedurally\par \par RV 3 months with myself/Dr. Casas

## 2021-04-09 NOTE — HISTORY OF PRESENT ILLNESS
[FreeTextEntry1] : 65M with CAD s/p CABG 2008, CAD s/p PCI x 4 (3/20/19), moderate AS, smoker, HLD, metastatic lung Ca s/p chemo, now on immunotherapy, cardiac evaluation prior to EGD\par \par Having trouble swallowing, pending EGD with Dr. Oleary\par No CP, no SOB, no dizziness, no lightheadedness, no palpitations \par Tolerating BB/ACEi, borderline BP \par Able to climb 2 flights of stairs without difficulty \par \par HISTORY:\par \par Patient recently moved from [place]. Previous cardiologist Dr. Patricio Bethea. \par \par Patient underwent bypass surgery in 2008.  Had felt well until March of 2019 where he noted worsening CP. Subsequently underwent cardiac cath and received a total of 4 stents to his LAD and Cx. Continued chest pain thereafter, CT chest revealed DAGO mass, bx revealing SCLC, stage 4 with bone and brain mets. S/p chemo with dramatic response, in remission as of June 2019, maintained on immunotherapy. \par \par Former smoker. Retired . Accompanied by wife who works in medical billing.\par \par ECG: SR with RBBB, nonspecific ST-T wave changes\par TTE 5/2020: Normal LV function, paradoxical septal motion, mod PI, aortic sclerosis\par Cath 3/2019: 80% pLAD s/p GORGE x2 ([facility]), 85% mCx s/p GORGE  x2 ([facility])\par \par Carvedilol 3.125mg BID, Zetia 10mg daily, Asa 81mg, Atorvastatin 40mg, Lisinopril 5mg

## 2021-04-12 LAB — SARS-COV-2 N GENE NPH QL NAA+PROBE: NOT DETECTED

## 2021-04-14 ENCOUNTER — APPOINTMENT (OUTPATIENT)
Dept: GASTROENTEROLOGY | Facility: AMBULATORY MEDICAL SERVICES | Age: 66
End: 2021-04-14
Payer: COMMERCIAL

## 2021-04-14 PROCEDURE — 43239 EGD BIOPSY SINGLE/MULTIPLE: CPT

## 2021-04-21 ENCOUNTER — OUTPATIENT (OUTPATIENT)
Dept: OUTPATIENT SERVICES | Facility: HOSPITAL | Age: 66
LOS: 1 days | Discharge: HOME | End: 2021-04-21

## 2021-04-21 DIAGNOSIS — C34.90 MALIGNANT NEOPLASM OF UNSPECIFIED PART OF UNSPECIFIED BRONCHUS OR LUNG: ICD-10-CM

## 2021-04-23 ENCOUNTER — RESULT REVIEW (OUTPATIENT)
Age: 66
End: 2021-04-23

## 2021-04-23 ENCOUNTER — LABORATORY RESULT (OUTPATIENT)
Age: 66
End: 2021-04-23

## 2021-04-23 ENCOUNTER — APPOINTMENT (OUTPATIENT)
Dept: HEMATOLOGY ONCOLOGY | Facility: CLINIC | Age: 66
End: 2021-04-23
Payer: COMMERCIAL

## 2021-04-23 ENCOUNTER — APPOINTMENT (OUTPATIENT)
Dept: INFUSION THERAPY | Facility: HOSPITAL | Age: 66
End: 2021-04-23

## 2021-04-23 VITALS
WEIGHT: 196.65 LBS | RESPIRATION RATE: 16 BRPM | HEART RATE: 73 BPM | DIASTOLIC BLOOD PRESSURE: 75 MMHG | OXYGEN SATURATION: 96 % | BODY MASS INDEX: 27.43 KG/M2 | TEMPERATURE: 96.8 F | SYSTOLIC BLOOD PRESSURE: 133 MMHG

## 2021-04-23 LAB
BASOPHILS # BLD AUTO: 0.01 K/UL — SIGNIFICANT CHANGE UP (ref 0–0.2)
BASOPHILS NFR BLD AUTO: 0.1 % — SIGNIFICANT CHANGE UP (ref 0–2)
EOSINOPHIL # BLD AUTO: 0.15 K/UL — SIGNIFICANT CHANGE UP (ref 0–0.5)
EOSINOPHIL NFR BLD AUTO: 2.2 % — SIGNIFICANT CHANGE UP (ref 0–6)
HCT VFR BLD CALC: 41 % — SIGNIFICANT CHANGE UP (ref 39–50)
HGB BLD-MCNC: 14.1 G/DL — SIGNIFICANT CHANGE UP (ref 13–17)
IMM GRANULOCYTES NFR BLD AUTO: 0.4 % — SIGNIFICANT CHANGE UP (ref 0–1.5)
LYMPHOCYTES # BLD AUTO: 1.65 K/UL — SIGNIFICANT CHANGE UP (ref 1–3.3)
LYMPHOCYTES # BLD AUTO: 24.7 % — SIGNIFICANT CHANGE UP (ref 13–44)
MCHC RBC-ENTMCNC: 29.9 PG — SIGNIFICANT CHANGE UP (ref 27–34)
MCHC RBC-ENTMCNC: 34.4 G/DL — SIGNIFICANT CHANGE UP (ref 32–36)
MCV RBC AUTO: 87 FL — SIGNIFICANT CHANGE UP (ref 80–100)
MONOCYTES # BLD AUTO: 0.87 K/UL — SIGNIFICANT CHANGE UP (ref 0–0.9)
MONOCYTES NFR BLD AUTO: 13 % — SIGNIFICANT CHANGE UP (ref 2–14)
NEUTROPHILS # BLD AUTO: 3.96 K/UL — SIGNIFICANT CHANGE UP (ref 1.8–7.4)
NEUTROPHILS NFR BLD AUTO: 59.6 % — SIGNIFICANT CHANGE UP (ref 43–77)
NRBC # BLD: 0 /100 WBCS — SIGNIFICANT CHANGE UP (ref 0–0)
PLATELET # BLD AUTO: 167 K/UL — SIGNIFICANT CHANGE UP (ref 150–400)
RBC # BLD: 4.71 M/UL — SIGNIFICANT CHANGE UP (ref 4.2–5.8)
RBC # FLD: 12.4 % — SIGNIFICANT CHANGE UP (ref 10.3–14.5)
WBC # BLD: 6.67 K/UL — SIGNIFICANT CHANGE UP (ref 3.8–10.5)
WBC # FLD AUTO: 6.67 K/UL — SIGNIFICANT CHANGE UP (ref 3.8–10.5)

## 2021-04-23 PROCEDURE — 99214 OFFICE O/P EST MOD 30 MIN: CPT

## 2021-04-23 PROCEDURE — 99072 ADDL SUPL MATRL&STAF TM PHE: CPT

## 2021-04-23 RX ORDER — TRIAMCINOLONE ACETONIDE 0.25 MG/G
0.03 CREAM TOPICAL
Qty: 1 | Refills: 0 | Status: ACTIVE | COMMUNITY
Start: 2021-04-23 | End: 1900-01-01

## 2021-04-26 DIAGNOSIS — R11.2 NAUSEA WITH VOMITING, UNSPECIFIED: ICD-10-CM

## 2021-04-26 DIAGNOSIS — Z51.11 ENCOUNTER FOR ANTINEOPLASTIC CHEMOTHERAPY: ICD-10-CM

## 2021-04-27 ENCOUNTER — APPOINTMENT (OUTPATIENT)
Dept: GASTROENTEROLOGY | Facility: CLINIC | Age: 66
End: 2021-04-27
Payer: COMMERCIAL

## 2021-04-27 ENCOUNTER — APPOINTMENT (OUTPATIENT)
Dept: OTOLARYNGOLOGY | Facility: CLINIC | Age: 66
End: 2021-04-27
Payer: COMMERCIAL

## 2021-04-27 VITALS — WEIGHT: 196 LBS | HEIGHT: 71 IN | BODY MASS INDEX: 27.44 KG/M2

## 2021-04-27 VITALS
BODY MASS INDEX: 27.44 KG/M2 | SYSTOLIC BLOOD PRESSURE: 130 MMHG | TEMPERATURE: 97.6 F | HEART RATE: 82 BPM | HEIGHT: 71 IN | WEIGHT: 196 LBS | OXYGEN SATURATION: 98 % | DIASTOLIC BLOOD PRESSURE: 70 MMHG

## 2021-04-27 DIAGNOSIS — R07.0 PAIN IN THROAT: ICD-10-CM

## 2021-04-27 PROCEDURE — 99214 OFFICE O/P EST MOD 30 MIN: CPT

## 2021-04-27 PROCEDURE — 31575 DIAGNOSTIC LARYNGOSCOPY: CPT

## 2021-04-27 PROCEDURE — 99205 OFFICE O/P NEW HI 60 MIN: CPT | Mod: 25

## 2021-04-27 PROCEDURE — 99072 ADDL SUPL MATRL&STAF TM PHE: CPT

## 2021-04-27 PROCEDURE — 69210 REMOVE IMPACTED EAR WAX UNI: CPT | Mod: RT

## 2021-04-27 RX ORDER — OMEPRAZOLE 20 MG/1
20 CAPSULE, DELAYED RELEASE ORAL DAILY
Qty: 60 | Refills: 3 | Status: DISCONTINUED | COMMUNITY
Start: 2021-03-26 | End: 2021-04-27

## 2021-04-27 NOTE — CONSULT LETTER
[Dear  ___] : Dear  [unfilled], [Please see my note below.] : Please see my note below. [Consult Closing:] : Thank you very much for allowing me to participate in the care of this patient.  If you have any questions, please do not hesitate to contact me. [Sincerely,] : Sincerely, [Consult Letter:] : I had the pleasure of evaluating your patient, [unfilled]. [FreeTextEntry2] : Ken Saab MD [FreeTextEntry3] : Raymond Boyd MD, FACS\par Chief of Otolaryngology at [facility]\par \par Dept. of Otolaryngology\par [facility]  [DrKassy  ___] : Dr. RIVERA [DrKassy ___] : Dr. RIVERA

## 2021-04-27 NOTE — PHYSICAL EXAM
[General Appearance - Alert] : alert [General Appearance - In No Acute Distress] : in no acute distress [Neck Appearance] : the appearance of the neck was normal [Neck Cervical Mass (___cm)] : no neck mass was observed [Jugular Venous Distention Increased] : there was no jugular-venous distention [Thyroid Diffuse Enlargement] : the thyroid was not enlarged [Thyroid Nodule] : there were no palpable thyroid nodules [Auscultation Breath Sounds / Voice Sounds] : lungs were clear to auscultation bilaterally [Heart Rate And Rhythm] : heart rate was normal and rhythm regular [Heart Sounds] : normal S1 and S2 [Heart Sounds Gallop] : no gallops [Murmurs] : no murmurs [Heart Sounds Pericardial Friction Rub] : no pericardial rub [Bowel Sounds] : normal bowel sounds [Abdomen Soft] : soft [Abdomen Tenderness] : non-tender [] : no hepato-splenomegaly [Abdomen Mass (___ Cm)] : no abdominal mass palpated [Cervical Lymph Nodes Enlarged Posterior Bilaterally] : posterior cervical [Cervical Lymph Nodes Enlarged Anterior Bilaterally] : anterior cervical [Supraclavicular Lymph Nodes Enlarged Bilaterally] : supraclavicular [Axillary Lymph Nodes Enlarged Bilaterally] : axillary [No CVA Tenderness] : no ~M costovertebral angle tenderness [No Spinal Tenderness] : no spinal tenderness

## 2021-04-27 NOTE — PHYSICAL EXAM
[de-identified] : R EAC full of wax and moldy debris.  Debrided. [de-identified] : DARON alcantara.   [Midline] : trachea located in midline position [Laryngoscopy Performed] : laryngoscopy was performed, see procedure section for findings [Normal] : no rashes

## 2021-04-27 NOTE — ASSESSMENT
[FreeTextEntry1] : Odynophagia- No clear etiology. after treatment for oral candidiasis.  Found to have h. pylori. Will treat with triple therapy. Patient is going to be reevaluated by ENT.

## 2021-04-27 NOTE — PROCEDURE
[Unable to Cooperate with Mirror] : patient unable to cooperate with mirror [Dysphagia] : dysphagia not clearly evaluated by indirect laryngoscopy [Topical Lidocaine] : topical lidocaine [Oxymetazoline HCl] : oxymetazoline HCl [Flexible Endoscope] : examined with the flexible endoscope [True Vocal Cords Paralysis] : no true vocal cord paralysis [True Vocal Cords Erythematous] : no true vocal cord edema [True Vocal Cords Rubio's Nodules] : no true vocal cord nodules [Glottis Arytenoid Cartilages] : no arytenoid granulomas [Glottis Arytenoid Cartilages Erythema] : no arytenoid erythema [Normal] : posterior cricoid area had healthy pink mucosa in the interarytenoid area and the esophageal inlet

## 2021-04-27 NOTE — HISTORY OF PRESENT ILLNESS
[de-identified] : 65 year old male initial visit for dysphagia, symptom present for the past 2 months.  Reports having excessive saliva, difficulty swallowing both solids and liquids.  Reports throat pain, no medication used, mouthwash used with no relief.  Pt had a recent EGD and esophageal candida was suspected, but he was treated with Diflucan and his symptoms are persistent. His esophageal biopsies did not show fungal elements but were positive for H. pylori.  Denies dyspnea, hemoptysis, mucus production and recent fevers.  Recently prescribed Amoxicillin and Clarithromycin for H.Pylori, which he has not yet started.  He has had a 15 lb weight loss.  Pt has a h/o lung Ca an is currently on Tecentriq.\par \par \par Also reports ear issues, having bilateral itching, swelling and yellow otorrhea, occurring for about two and half months.  Prescribed Ofloxacin and Acetic Acid drops with no relief, symptoms worse than before. Denies changes with hearing.

## 2021-04-27 NOTE — ASSESSMENT
[FreeTextEntry1] : Pt's throat symptoms may also be a side effect of Tecentriq.  If he does not respond to the H. pylori Abx he will f/u with his Oncologist to discuss this possibility.  \par \par Pt was encouraged to increase his po intake despite the discomfort.  If he continues to lose weight he may need to consider feeding tube placement.

## 2021-04-27 NOTE — REASON FOR VISIT
[Initial Evaluation] : an initial evaluation for [Spouse] : spouse [FreeTextEntry2] : initial visit for dysphagia

## 2021-04-27 NOTE — HISTORY OF PRESENT ILLNESS
[de-identified] : 65 year old man with odynophagia. He felt better for a few days after completing treatment with Diflucan but now his odynophagia has returned. Gastric biopsies are positive for  h. pylori/

## 2021-05-13 RX ORDER — ATORVASTATIN CALCIUM 80 MG/1
TABLET, FILM COATED ORAL
Refills: 0 | Status: DISCONTINUED | COMMUNITY
End: 2021-05-13

## 2021-05-14 ENCOUNTER — LABORATORY RESULT (OUTPATIENT)
Age: 66
End: 2021-05-14

## 2021-05-14 ENCOUNTER — RESULT REVIEW (OUTPATIENT)
Age: 66
End: 2021-05-14

## 2021-05-14 ENCOUNTER — RX RENEWAL (OUTPATIENT)
Age: 66
End: 2021-05-14

## 2021-05-14 ENCOUNTER — APPOINTMENT (OUTPATIENT)
Dept: INFUSION THERAPY | Facility: HOSPITAL | Age: 66
End: 2021-05-14

## 2021-05-14 LAB
BASOPHILS # BLD AUTO: 0.01 K/UL — SIGNIFICANT CHANGE UP (ref 0–0.2)
BASOPHILS NFR BLD AUTO: 0.2 % — SIGNIFICANT CHANGE UP (ref 0–2)
EOSINOPHIL # BLD AUTO: 0.11 K/UL — SIGNIFICANT CHANGE UP (ref 0–0.5)
EOSINOPHIL NFR BLD AUTO: 2 % — SIGNIFICANT CHANGE UP (ref 0–6)
HCT VFR BLD CALC: 40.2 % — SIGNIFICANT CHANGE UP (ref 39–50)
HGB BLD-MCNC: 14.1 G/DL — SIGNIFICANT CHANGE UP (ref 13–17)
IMM GRANULOCYTES NFR BLD AUTO: 0.4 % — SIGNIFICANT CHANGE UP (ref 0–1.5)
LYMPHOCYTES # BLD AUTO: 1.17 K/UL — SIGNIFICANT CHANGE UP (ref 1–3.3)
LYMPHOCYTES # BLD AUTO: 20.9 % — SIGNIFICANT CHANGE UP (ref 13–44)
MCHC RBC-ENTMCNC: 30.8 PG — SIGNIFICANT CHANGE UP (ref 27–34)
MCHC RBC-ENTMCNC: 35.1 G/DL — SIGNIFICANT CHANGE UP (ref 32–36)
MCV RBC AUTO: 87.8 FL — SIGNIFICANT CHANGE UP (ref 80–100)
MONOCYTES # BLD AUTO: 0.58 K/UL — SIGNIFICANT CHANGE UP (ref 0–0.9)
MONOCYTES NFR BLD AUTO: 10.3 % — SIGNIFICANT CHANGE UP (ref 2–14)
NEUTROPHILS # BLD AUTO: 3.72 K/UL — SIGNIFICANT CHANGE UP (ref 1.8–7.4)
NEUTROPHILS NFR BLD AUTO: 66.2 % — SIGNIFICANT CHANGE UP (ref 43–77)
NRBC # BLD: 0 /100 WBCS — SIGNIFICANT CHANGE UP (ref 0–0)
PLATELET # BLD AUTO: 154 K/UL — SIGNIFICANT CHANGE UP (ref 150–400)
RBC # BLD: 4.58 M/UL — SIGNIFICANT CHANGE UP (ref 4.2–5.8)
RBC # FLD: 12.7 % — SIGNIFICANT CHANGE UP (ref 10.3–14.5)
WBC # BLD: 5.61 K/UL — SIGNIFICANT CHANGE UP (ref 3.8–10.5)
WBC # FLD AUTO: 5.61 K/UL — SIGNIFICANT CHANGE UP (ref 3.8–10.5)

## 2021-05-19 ENCOUNTER — NON-APPOINTMENT (OUTPATIENT)
Age: 66
End: 2021-05-19

## 2021-06-04 ENCOUNTER — OUTPATIENT (OUTPATIENT)
Dept: OUTPATIENT SERVICES | Facility: HOSPITAL | Age: 66
LOS: 1 days | Discharge: HOME | End: 2021-06-04

## 2021-06-04 ENCOUNTER — RESULT REVIEW (OUTPATIENT)
Age: 66
End: 2021-06-04

## 2021-06-04 ENCOUNTER — LABORATORY RESULT (OUTPATIENT)
Age: 66
End: 2021-06-04

## 2021-06-04 ENCOUNTER — APPOINTMENT (OUTPATIENT)
Dept: INFUSION THERAPY | Facility: HOSPITAL | Age: 66
End: 2021-06-04

## 2021-06-04 ENCOUNTER — APPOINTMENT (OUTPATIENT)
Dept: HEMATOLOGY ONCOLOGY | Facility: CLINIC | Age: 66
End: 2021-06-04
Payer: COMMERCIAL

## 2021-06-04 DIAGNOSIS — C34.90 MALIGNANT NEOPLASM OF UNSPECIFIED PART OF UNSPECIFIED BRONCHUS OR LUNG: ICD-10-CM

## 2021-06-04 LAB
BASOPHILS # BLD AUTO: 0.01 K/UL — SIGNIFICANT CHANGE UP (ref 0–0.2)
BASOPHILS NFR BLD AUTO: 0.2 % — SIGNIFICANT CHANGE UP (ref 0–2)
EOSINOPHIL # BLD AUTO: 0.11 K/UL — SIGNIFICANT CHANGE UP (ref 0–0.5)
EOSINOPHIL NFR BLD AUTO: 2.3 % — SIGNIFICANT CHANGE UP (ref 0–6)
HCT VFR BLD CALC: 39.5 % — SIGNIFICANT CHANGE UP (ref 39–50)
HGB BLD-MCNC: 13.6 G/DL — SIGNIFICANT CHANGE UP (ref 13–17)
IMM GRANULOCYTES NFR BLD AUTO: 0.2 % — SIGNIFICANT CHANGE UP (ref 0–1.5)
LYMPHOCYTES # BLD AUTO: 1.11 K/UL — SIGNIFICANT CHANGE UP (ref 1–3.3)
LYMPHOCYTES # BLD AUTO: 23.2 % — SIGNIFICANT CHANGE UP (ref 13–44)
MCHC RBC-ENTMCNC: 30.3 PG — SIGNIFICANT CHANGE UP (ref 27–34)
MCHC RBC-ENTMCNC: 34.4 G/DL — SIGNIFICANT CHANGE UP (ref 32–36)
MCV RBC AUTO: 88 FL — SIGNIFICANT CHANGE UP (ref 80–100)
MONOCYTES # BLD AUTO: 0.57 K/UL — SIGNIFICANT CHANGE UP (ref 0–0.9)
MONOCYTES NFR BLD AUTO: 11.9 % — SIGNIFICANT CHANGE UP (ref 2–14)
NEUTROPHILS # BLD AUTO: 2.97 K/UL — SIGNIFICANT CHANGE UP (ref 1.8–7.4)
NEUTROPHILS NFR BLD AUTO: 62.2 % — SIGNIFICANT CHANGE UP (ref 43–77)
NRBC # BLD: 0 /100 WBCS — SIGNIFICANT CHANGE UP (ref 0–0)
PLATELET # BLD AUTO: 156 K/UL — SIGNIFICANT CHANGE UP (ref 150–400)
RBC # BLD: 4.49 M/UL — SIGNIFICANT CHANGE UP (ref 4.2–5.8)
RBC # FLD: 12.6 % — SIGNIFICANT CHANGE UP (ref 10.3–14.5)
WBC # BLD: 4.78 K/UL — SIGNIFICANT CHANGE UP (ref 3.8–10.5)
WBC # FLD AUTO: 4.78 K/UL — SIGNIFICANT CHANGE UP (ref 3.8–10.5)

## 2021-06-04 PROCEDURE — 99215 OFFICE O/P EST HI 40 MIN: CPT

## 2021-06-07 ENCOUNTER — NON-APPOINTMENT (OUTPATIENT)
Age: 66
End: 2021-06-07

## 2021-06-07 DIAGNOSIS — E86.0 DEHYDRATION: ICD-10-CM

## 2021-06-17 ENCOUNTER — NON-APPOINTMENT (OUTPATIENT)
Age: 66
End: 2021-06-17

## 2021-06-17 LAB — H PYLORI AG STL QL: NOT DETECTED

## 2021-06-18 ENCOUNTER — NON-APPOINTMENT (OUTPATIENT)
Age: 66
End: 2021-06-18

## 2021-06-20 ENCOUNTER — APPOINTMENT (OUTPATIENT)
Dept: NUCLEAR MEDICINE | Facility: IMAGING CENTER | Age: 66
End: 2021-06-20
Payer: COMMERCIAL

## 2021-06-20 ENCOUNTER — OUTPATIENT (OUTPATIENT)
Dept: OUTPATIENT SERVICES | Facility: HOSPITAL | Age: 66
LOS: 1 days | End: 2021-06-20
Payer: COMMERCIAL

## 2021-06-20 DIAGNOSIS — C34.12 MALIGNANT NEOPLASM OF UPPER LOBE, LEFT BRONCHUS OR LUNG: ICD-10-CM

## 2021-06-20 PROCEDURE — A9552: CPT

## 2021-06-20 PROCEDURE — 78815 PET IMAGE W/CT SKULL-THIGH: CPT | Mod: 26,PS

## 2021-06-20 PROCEDURE — 78815 PET IMAGE W/CT SKULL-THIGH: CPT

## 2021-06-21 ENCOUNTER — APPOINTMENT (OUTPATIENT)
Dept: MRI IMAGING | Facility: IMAGING CENTER | Age: 66
End: 2021-06-21
Payer: COMMERCIAL

## 2021-06-21 ENCOUNTER — OUTPATIENT (OUTPATIENT)
Dept: OUTPATIENT SERVICES | Facility: HOSPITAL | Age: 66
LOS: 1 days | End: 2021-06-21
Payer: COMMERCIAL

## 2021-06-21 DIAGNOSIS — C34.12 MALIGNANT NEOPLASM OF UPPER LOBE, LEFT BRONCHUS OR LUNG: ICD-10-CM

## 2021-06-21 PROCEDURE — 70553 MRI BRAIN STEM W/O & W/DYE: CPT | Mod: 26

## 2021-06-21 PROCEDURE — 70553 MRI BRAIN STEM W/O & W/DYE: CPT

## 2021-06-21 PROCEDURE — A9585: CPT

## 2021-06-23 ENCOUNTER — APPOINTMENT (OUTPATIENT)
Dept: HEMATOLOGY ONCOLOGY | Facility: CLINIC | Age: 66
End: 2021-06-23
Payer: COMMERCIAL

## 2021-06-23 VITALS
HEART RATE: 68 BPM | TEMPERATURE: 97.2 F | BODY MASS INDEX: 25.62 KG/M2 | RESPIRATION RATE: 17 BRPM | HEIGHT: 70.98 IN | OXYGEN SATURATION: 98 % | SYSTOLIC BLOOD PRESSURE: 120 MMHG | WEIGHT: 182.98 LBS | DIASTOLIC BLOOD PRESSURE: 75 MMHG

## 2021-06-23 PROCEDURE — 99072 ADDL SUPL MATRL&STAF TM PHE: CPT

## 2021-06-23 PROCEDURE — 99214 OFFICE O/P EST MOD 30 MIN: CPT

## 2021-06-23 RX ORDER — CLARITHROMYCIN 500 MG/1
500 TABLET, FILM COATED ORAL TWICE DAILY
Qty: 20 | Refills: 1 | Status: DISCONTINUED | COMMUNITY
Start: 2021-04-27 | End: 2021-06-23

## 2021-06-23 RX ORDER — CLOTRIMAZOLE 10 MG/1
10 LOZENGE ORAL DAILY
Qty: 35 | Refills: 2 | Status: DISCONTINUED | COMMUNITY
Start: 2021-04-23 | End: 2021-06-23

## 2021-06-23 RX ORDER — AMOXICILLIN 500 MG/1
500 TABLET, FILM COATED ORAL TWICE DAILY
Qty: 40 | Refills: 1 | Status: DISCONTINUED | COMMUNITY
Start: 2021-04-27 | End: 2021-06-23

## 2021-06-23 RX ORDER — SUCRALFATE 1 G/1
1 TABLET ORAL
Qty: 60 | Refills: 0 | Status: DISCONTINUED | COMMUNITY
Start: 2021-06-23 | End: 2021-06-23

## 2021-06-23 NOTE — HISTORY OF PRESENT ILLNESS
2018    RE: Ebony Jo,  1985    To Whom It May Concern:    Ebony Jo is under our care and was seen at our office on 2018.    Due to her medical condition and complications in her pregnancy, I recommend that she limit her work to light duty at this time and avoid excessive pushing, pulling, bending, straining, or lifting over 25 pounds. Her due date is 2019.     I hope this information is sufficient for your needs.  If you have any additional questions regarding this matter please contact our office.  Thank you.      Sincerely,        Fredy Leal MD                 
[FreeTextEntry1] : 66yoM with small cell lung cancer presents for initial palliative care visit, referred by Oncology team.\par PMH also significant for CAD s/p CABG, hypothyroidism, hyperlipidemia, former smoker. \par \par CT scan of chest in 3/2019 revealed a DAGO mass with evidence of metastatic disease. 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 CT scan revealed evidence of extensive bony and liver metastases. MRI of his brain was negative for brain metastases however there was extensive calvarial and T-spine metastases with possible evidence of epidural involvement. He was treated in [place] by an outside medical oncologist and started on carboplatin, etoposide and atezolizumab in early April 2019. He received 4 cycles of triplet combination therapy followed by immunotherapy maintenance with good response on imaging. Remains on maintenance Atezolizumab.\par \par Patient has been dealing with dysphagia that began in February 2021. He has undergone workup, including EGD, laryngoscopy.  He is s/p treatment for H. pylori and oral candidiasis. He reports that at the initiation of these therapies he found that he had some relief for 1-2 days, but the symptoms recurred soon after. \par \par Patient reports that there is significant pain when he tries to swallow anything, including water. He has been eating small amounts of food at a time, as tolerated. He has used oxycodone for the pain, helps minimally.  Takes Ibuprofen 400mg once to twice daily, isn't helping.  \par Topical agents such as magic mouthwash have been ineffective.Patient and wife asking about medicinal cannabis as an option. \par \par ROS:\par +30lb weight loss in the last 4 months\par +constipation when he uses oxycodone, otherwise is regular\par +dysgeusia\par +has sensation of swollen mouth and lips\par Denies trouble sleeping\par \par Patient is , lives with his spouse. \par \par [employer]-Stop Ref#: [id number]

## 2021-06-23 NOTE — ASSESSMENT
[FreeTextEntry1] : 66yoM with:\par \par 1. Small Cell Lung Cancer - On maintenance Atezolizumab. \par \par 2. Dysphagia -  Pt has been treated for H. pylori infection, oral candidiasis but remains with persistent dysphagia.\par Etiology of patient's dysphagia not yet clear however patient with non-specific FDG avidity of mid-distal esophagus on PET/CT, could be representative of inflammation, potentially related to Atezolizumab. This will be discussed with Oncology at appointment later this week, steroids may be a consideration.\par Trial of sucralfate prescribed. \par Medical cannabis certification completed today. Provided cannabis education, overview of state program, and discussed adverse effects in detail. Counseled that vaporized cannabis is not the preferred route of administration due to the fact that both short-term and long-term risks associated with vaporizing oils are not yet fully understood. Recommend starting with 1:1 THC:CBD formulation at low dose of THC (~2mg/dose).\par \par 3. Weight loss - 2/2 dysphagia - will work on dysphagia management, as above.\par \par 4. Encounter for Palliative Care - Emotional support provided.\par \par Follow up in 1 month, call sooner with questions or issues.

## 2021-06-23 NOTE — DATA REVIEWED
[FreeTextEntry1] : PET/CT (6/20/21): \par IMPRESSION: Compared to FDG-PET/CT scan dated 3/5/2021:\par 1. Interval resolution of a few mildly FDG avid left axillary lymph nodes and small mildly FDG avid foci in bilateral hilar regions.\par 2. Nonspecific FDG avidity in mid to distal esophagus, gastric body and pylorus, unchanged. Please correlate clinically or with endoscopy as indicated.\par 3. Unchanged nonspecific non-FDG avid groundglass opacity in the anterior right upper lobe.\par 4. Non-FDG avid low-attenuation density in the pretracheal region, decreased in size.

## 2021-06-25 ENCOUNTER — LABORATORY RESULT (OUTPATIENT)
Age: 66
End: 2021-06-25

## 2021-06-25 ENCOUNTER — APPOINTMENT (OUTPATIENT)
Dept: INFUSION THERAPY | Facility: HOSPITAL | Age: 66
End: 2021-06-25

## 2021-06-25 ENCOUNTER — APPOINTMENT (OUTPATIENT)
Dept: HEMATOLOGY ONCOLOGY | Facility: CLINIC | Age: 66
End: 2021-06-25
Payer: COMMERCIAL

## 2021-06-25 VITALS
DIASTOLIC BLOOD PRESSURE: 74 MMHG | RESPIRATION RATE: 16 BRPM | TEMPERATURE: 98 F | OXYGEN SATURATION: 99 % | HEART RATE: 78 BPM | SYSTOLIC BLOOD PRESSURE: 111 MMHG | BODY MASS INDEX: 25.52 KG/M2 | WEIGHT: 182.9 LBS

## 2021-06-25 PROCEDURE — 99214 OFFICE O/P EST MOD 30 MIN: CPT

## 2021-06-25 PROCEDURE — 99072 ADDL SUPL MATRL&STAF TM PHE: CPT

## 2021-06-28 ENCOUNTER — APPOINTMENT (OUTPATIENT)
Dept: GASTROENTEROLOGY | Facility: CLINIC | Age: 66
End: 2021-06-28

## 2021-07-01 ENCOUNTER — APPOINTMENT (OUTPATIENT)
Dept: GASTROENTEROLOGY | Facility: CLINIC | Age: 66
End: 2021-07-01
Payer: COMMERCIAL

## 2021-07-01 DIAGNOSIS — R13.12 DYSPHAGIA, OROPHARYNGEAL PHASE: ICD-10-CM

## 2021-07-01 DIAGNOSIS — Z86.19 PERSONAL HISTORY OF OTHER INFECTIOUS AND PARASITIC DISEASES: ICD-10-CM

## 2021-07-01 PROCEDURE — 99215 OFFICE O/P EST HI 40 MIN: CPT | Mod: 95

## 2021-07-01 PROCEDURE — 99205 OFFICE O/P NEW HI 60 MIN: CPT | Mod: 95

## 2021-07-02 PROBLEM — Z86.19 HISTORY OF HELICOBACTER INFECTION: Status: RESOLVED | Noted: 2021-07-02 | Resolved: 2021-07-02

## 2021-07-02 PROBLEM — R13.12 TRANSFER DYSPHAGIA: Status: ACTIVE | Noted: 2021-07-01

## 2021-07-02 NOTE — PHYSICAL EXAM
[General Appearance - Alert] : alert [General Appearance - Well Nourished] : well nourished [Sclera] : the sclera and conjunctiva were normal [Extraocular Movements] : extraocular movements were intact [Hearing Threshold Finger Rub Not [place]] : hearing was normal [] : no respiratory distress [Respiration, Rhythm And Depth] : normal respiratory rhythm and effort [Exaggerated Use Of Accessory Muscles For Inspiration] : no accessory muscle use [No Focal Deficits] : no focal deficits [Impaired Insight] : insight and judgment were intact [Affect] : the affect was normal [FreeTextEntry1] : aox3

## 2021-07-02 NOTE — ASSESSMENT
[FreeTextEntry1] : 65 yo M pmh CAD/MI s/p GORGE, Metastatic SCC s/p chemo and then PDL1 immunotherapy presents for second opinion regarding dysphagia.  Appears to be more two separate problems - a transfer dysphagia and an esophageal odynophagia associated with inflammation on a CT scan.  The transfer dysphagia will require a MBS as next step which patient and wife are amenable to.  \par \par Regarding the odynophagia/chest pain - the most likely scenario is an undertreated candidiasis.  However unclear why would be improving on steroids.  Will discuss with oncology as PDL1 esophagitis does not appear to be a common etiology in the literature. Most likely needs a repeat Endoscopy with biopsy.  The constellation of symptoms do not appear to be related a pseudoachalasia and had normal barium 3/2021 after onset of symptoms.\par \par Impression:\par 1) Transfer Dysphagia\par 2) Odynophagia, Chest Pain\par 3) Recent Candidiasis\par 4) Recent HP infection s/p resection\par 5) Metastatic SCC of lung on PDL1 therapy\par 6) Weight loss\par 7) Loss of appetite\par \par Plan:\par 1) MBS\par 2) Discuss case with Oncology team - but most likely needs EGD with bx\par 3) Will defer steroids to oncology pending endoscopy\par 4) Rest of plan pending above.  Would hold on manometry at this time\par 5) RV pending above\par 6) Extensive discussion with  and wife.  Extensive records reviewed at length as above.  All questions answered

## 2021-07-02 NOTE — HISTORY OF PRESENT ILLNESS
[FreeTextEntry1] : Telehealth visit:\par Seen via telehealth while patient and wife in [place] at home.  MD at office in [place] as well\par Full discussion re: limitations of telehealth discussed and opted to proceed.\par ------------------------------------------------------------------------------\par \par 65 yo M pmh CAD/MI s/p GORGE, Metastatic SCC s/p chemo and then PDL1 immunotherapy presents for second opinion regarding dysphagia.\par \par Dysphagia:\par Started with new onset dysphagia to 5 months\par Never had previously\par Had difficulty with solids and liquids\par Has been on PDL-1 x 2 years with no problems\par Has issues with transfer\par Feels start getting stuck in the neck\par He has modified his swallowing\par Will choke on some foods\par There is odynophagia vs. chest pain with swallowing\par \par No regurgitation\par No chest pain when not swallowing\par No heartburn\par No dysphagia requiring food impaction or bringing out\par \par + 30 lb weight loss\par No true n/v\par No true early satiety\par No voice changes\par \par \par Previous Trials:\par Nexium 40 mg x 2 weeks - no change\par No other meds tried\par \par Candida -> Improved slightly but then stopped - only 5 day course\par HP+ -> 10 days of treatment (triple therapy) and confirmed negative stool test\par \par \par \par ------------------------------------------------------------------------------------------\par Prior Workup:\par \par Testing:\par [facility] 3/2021\par FINDINGS:\par \par Preliminary  radiograph of the chest is notable for sternotomy wires, port in the right chest wall, and cardiovascular stents..\par \par A 13 mm barium tablet was swallowed and had moderate delay at the midesophagus and gastroesophageal junction to passing into the stomach with swallows of contrast.\par The patient swallowed barium without difficulty.\par The esophagus is approximately tortuous but otherwise demonstrates normal distensibility and course. There is esophagoesophageal reflux.\par Contrast passes freely into the stomach.\par There is a small hiatal hernia. No gastroesophageal reflux demonstrated.\par \par IMPRESSION:\par \par Small hiatal hernia without demonstrated gastroesophageal reflux. There is esophago-esophageal reflux.\par \par \par \par EGD 4/2021\par Diffuse candidiasis in cricopharyngeus\par DIffuse erythema at GEJ\par Single gastric nodule\par HP bx\par \par \par Path:\par Duodenum - normal\par HP +\par Chronic active gastritis\par \par \par IMAGING\par \par Pet/CT 6/20/21: Compared to FDG-PET/CT scan dated 3/5/2021:\par 1. Interval resolution of a few mildly FDG avid left axillary lymph nodes and small mildly FDG avid foci in bilateral hilar regions.\par 2. Nonspecific FDG avidity in mid to distal esophagus, gastric boy and pylorus, unchanged. Please correlate clinically or with endoscopy as indicated.\par 3. Unchanged nonspecific non-FDG avid groundglass opacity in the anterior right upper lobe.\par 4. Non-FDG avid low-attenuation density in the pretracheal region, decreased in size.\par \par MRI Brain 6/21/21: There is no intraparenchymal hematoma, mass effect or midline shift. No evidence of intracranial metastatic disease. 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 Treated with immunotherapy maintenance from June 2019 through May 2021 with sustained response. \par -Restaging PET/CT and Brain MRI June 2021 with overall sustained response. \par Patient with esophagitis not attributable to alternate cause; this is evident on PET/CT. Patient was effectively treated for H. pylori with continued symptoms. 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 Recommend stopping Atezolizumab and starting steroids: Prednisone 1mg/kg/day\par -Continue symptom management under care of Palliative Care Dr. Moreno\par -Continue levothyroxine; monitor periodic TFT's \par -Avoided addition of bone modifying agent given the dramatic response\par -Mediport in place \par -F/U in 2 weeks or sooner should problems arise. Will repeat blood work with Port flush that day\par -Patient being referred to different GI doctor for esophageal dysmotility study; depending on response to steroids, may be able to hold off\par -Check out form given to patient with instructions for making appointments. \par \par \par Last note Pall Care 6/2021\par 66yoM with:\par \par 1. Small Cell Lung Cancer - On maintenance Atezolizumab. \par \par 2. Dysphagia - Pt has been treated for H. pylori infection, oral candidiasis but remains with persistent dysphagia.\par Etiology of patient's dysphagia not yet clear however patient with non-specific FDG avidity of mid-distal esophagus on PET/CT, could be representative of inflammation, potentially related to Atezolizumab. This will be discussed with Oncology at appointment later this week, steroids may be a consideration.\par Trial of sucralfate prescribed. \par Medical cannabis certification completed today. Provided cannabis education, overview of state program, and discussed adverse effects in detail. Counseled that vaporized cannabis is not the preferred route of administration due to the fact that both short-term and long-term risks associated with vaporizing oils are not yet fully understood. Recommend starting with 1:1 THC:CBD formulation at low dose of THC (~2mg/dose).\par \par 3. Weight loss - 2/2 dysphagia - will work on dysphagia management, as above.\par \par 4. Encounter for Palliative Care - Emotional support provided.\par \par Follow up in 1 month, call sooner with questions or issues. \par \par \par \par GI Note 4/27/2021 - Mamta NGUYỄN\par Helicobacter pylori gastritis (535.10,041.86) (K29.70,B96.81)\par Candida, oral (112.0) (B37.0)\par Malignant neoplasm of upper lobe of left lung (162.3) (C34.12)\par Odynophagia (787.20) (R13.10)\par \par Odynophagia- No clear etiology. after treatment for oral candidiasis. Found to have h. pylori. Will treat with triple therapy. Patient is going to be reevaluated by ENT. \par \par \par ENT 4/2021\par Ear itching (698.9) (L29.9)\par Dysphagia (787.20) (R13.10)\par Throat pain (784.1) (R07.0)\par Otomycosis of right ear (111.8,380.15) (B36.9,H62.41)\par  · Pt to start Acetasol HC. He will f/u for additional debridement if the ear becomes\par     occluded again.\par History of candidiasis of mouth (V12.09) (Z86.19)\par  · Pt has no evidence of oral or pharyngitis.\par Esophagitis (530.10) (K20.90)\par  · Pt's esophagitis is likely related to his H. pylori. He was encouraged to start the Abx that\par     were prescribed.\par \par Pt's throat symptoms may also be a side effect of Tecentriq. If he does not respond to the H. pylori Abx he will f/u with his Oncologist to discuss this possibility. \par \par Pt was encouraged to increase his po intake despite the discomfort. If he continues to lose weight he may need to consider feeding tube placement.

## 2021-07-06 ENCOUNTER — APPOINTMENT (OUTPATIENT)
Dept: INTERNAL MEDICINE | Facility: CLINIC | Age: 66
End: 2021-07-06

## 2021-07-06 ENCOUNTER — NON-APPOINTMENT (OUTPATIENT)
Age: 66
End: 2021-07-06

## 2021-07-06 ENCOUNTER — APPOINTMENT (OUTPATIENT)
Dept: INTERNAL MEDICINE | Facility: CLINIC | Age: 66
End: 2021-07-06
Payer: COMMERCIAL

## 2021-07-06 VITALS
BODY MASS INDEX: 25.2 KG/M2 | WEIGHT: 176 LBS | OXYGEN SATURATION: 97 % | TEMPERATURE: 98.3 F | SYSTOLIC BLOOD PRESSURE: 120 MMHG | HEIGHT: 70 IN | RESPIRATION RATE: 17 BRPM | DIASTOLIC BLOOD PRESSURE: 76 MMHG | HEART RATE: 72 BPM

## 2021-07-06 PROCEDURE — 99214 OFFICE O/P EST MOD 30 MIN: CPT

## 2021-07-06 PROCEDURE — 99072 ADDL SUPL MATRL&STAF TM PHE: CPT

## 2021-07-07 ENCOUNTER — OUTPATIENT (OUTPATIENT)
Dept: OUTPATIENT SERVICES | Facility: HOSPITAL | Age: 66
LOS: 1 days | Discharge: HOME | End: 2021-07-07

## 2021-07-07 DIAGNOSIS — C34.90 MALIGNANT NEOPLASM OF UNSPECIFIED PART OF UNSPECIFIED BRONCHUS OR LUNG: ICD-10-CM

## 2021-07-07 NOTE — HISTORY OF PRESENT ILLNESS
[FreeTextEntry8] : 65 y/o male presents with concerns for diarrhea over the past several days. Patient is currently on immunotherapy for lung cancer. He feels otherwise well and offers no other acute complaints or concerns.

## 2021-07-07 NOTE — REVIEW OF SYSTEMS
[Diarrhea] : diarrhea [Fever] : no fever [Chills] : no chills [Recent Change In Weight] : ~T no recent weight change [Vision Problems] : no vision problems [Earache] : no earache [Nasal Discharge] : no nasal discharge [Sore Throat] : no sore throat [Chest Pain] : no chest pain [Palpitations] : no palpitations [Shortness Of Breath] : no shortness of breath [Wheezing] : no wheezing [Abdominal Pain] : no abdominal pain [Nausea] : no nausea [Vomiting] : no vomiting [Dysuria] : no dysuria [Hesitancy] : no hesitancy [Hematuria] : no hematuria [Frequency] : no frequency [Joint Pain] : no joint pain [Joint Swelling] : no joint swelling [Skin Rash] : no skin rash [Headache] : no headache [Dizziness] : no dizziness [Anxiety] : no anxiety [Depression] : no depression [Swollen Glands] : no swollen glands

## 2021-07-08 ENCOUNTER — APPOINTMENT (OUTPATIENT)
Dept: SPEECH THERAPY | Facility: HOSPITAL | Age: 66
End: 2021-07-08

## 2021-07-09 ENCOUNTER — RESULT REVIEW (OUTPATIENT)
Age: 66
End: 2021-07-09

## 2021-07-09 ENCOUNTER — APPOINTMENT (OUTPATIENT)
Dept: INFUSION THERAPY | Facility: HOSPITAL | Age: 66
End: 2021-07-09

## 2021-07-09 ENCOUNTER — LABORATORY RESULT (OUTPATIENT)
Age: 66
End: 2021-07-09

## 2021-07-09 ENCOUNTER — APPOINTMENT (OUTPATIENT)
Dept: HEMATOLOGY ONCOLOGY | Facility: CLINIC | Age: 66
End: 2021-07-09
Payer: COMMERCIAL

## 2021-07-09 ENCOUNTER — APPOINTMENT (OUTPATIENT)
Dept: HEMATOLOGY ONCOLOGY | Facility: CLINIC | Age: 66
End: 2021-07-09

## 2021-07-09 VITALS
HEART RATE: 73 BPM | RESPIRATION RATE: 14 BRPM | WEIGHT: 176.81 LBS | BODY MASS INDEX: 25.37 KG/M2 | TEMPERATURE: 98 F | DIASTOLIC BLOOD PRESSURE: 67 MMHG | OXYGEN SATURATION: 98 % | SYSTOLIC BLOOD PRESSURE: 108 MMHG

## 2021-07-09 LAB
BASOPHILS # BLD AUTO: 0.02 K/UL — SIGNIFICANT CHANGE UP (ref 0–0.2)
BASOPHILS NFR BLD AUTO: 0.2 % — SIGNIFICANT CHANGE UP (ref 0–2)
EOSINOPHIL # BLD AUTO: 0.09 K/UL — SIGNIFICANT CHANGE UP (ref 0–0.5)
EOSINOPHIL NFR BLD AUTO: 0.8 % — SIGNIFICANT CHANGE UP (ref 0–6)
HCT VFR BLD CALC: 41.5 % — SIGNIFICANT CHANGE UP (ref 39–50)
HGB BLD-MCNC: 14.4 G/DL — SIGNIFICANT CHANGE UP (ref 13–17)
IMM GRANULOCYTES NFR BLD AUTO: 0.9 % — SIGNIFICANT CHANGE UP (ref 0–1.5)
LYMPHOCYTES # BLD AUTO: 2.7 K/UL — SIGNIFICANT CHANGE UP (ref 1–3.3)
LYMPHOCYTES # BLD AUTO: 23.2 % — SIGNIFICANT CHANGE UP (ref 13–44)
MCHC RBC-ENTMCNC: 30.3 PG — SIGNIFICANT CHANGE UP (ref 27–34)
MCHC RBC-ENTMCNC: 34.7 G/DL — SIGNIFICANT CHANGE UP (ref 32–36)
MCV RBC AUTO: 87.2 FL — SIGNIFICANT CHANGE UP (ref 80–100)
MONOCYTES # BLD AUTO: 1.17 K/UL — HIGH (ref 0–0.9)
MONOCYTES NFR BLD AUTO: 10.1 % — SIGNIFICANT CHANGE UP (ref 2–14)
NEUTROPHILS # BLD AUTO: 7.53 K/UL — HIGH (ref 1.8–7.4)
NEUTROPHILS NFR BLD AUTO: 64.8 % — SIGNIFICANT CHANGE UP (ref 43–77)
NRBC # BLD: 0 /100 WBCS — SIGNIFICANT CHANGE UP (ref 0–0)
PLATELET # BLD AUTO: 151 K/UL — SIGNIFICANT CHANGE UP (ref 150–400)
RBC # BLD: 4.76 M/UL — SIGNIFICANT CHANGE UP (ref 4.2–5.8)
RBC # FLD: 12.6 % — SIGNIFICANT CHANGE UP (ref 10.3–14.5)
WBC # BLD: 11.62 K/UL — HIGH (ref 3.8–10.5)
WBC # FLD AUTO: 11.62 K/UL — HIGH (ref 3.8–10.5)

## 2021-07-09 PROCEDURE — 99214 OFFICE O/P EST MOD 30 MIN: CPT

## 2021-07-09 PROCEDURE — 99072 ADDL SUPL MATRL&STAF TM PHE: CPT

## 2021-07-14 ENCOUNTER — APPOINTMENT (OUTPATIENT)
Dept: GASTROENTEROLOGY | Facility: HOSPITAL | Age: 66
End: 2021-07-14

## 2021-07-16 ENCOUNTER — APPOINTMENT (OUTPATIENT)
Dept: INFUSION THERAPY | Facility: HOSPITAL | Age: 66
End: 2021-07-16

## 2021-07-20 NOTE — REASON FOR VISIT
[Follow-Up] : a follow-up visit [Spouse] : spouse [Home] : at home, [unfilled] , at the time of the visit. [Medical Office: ([facility])___] : at the medical office located in  [Verbal consent obtained from patient] : the patient, [unfilled]

## 2021-07-21 ENCOUNTER — APPOINTMENT (OUTPATIENT)
Dept: HEMATOLOGY ONCOLOGY | Facility: CLINIC | Age: 66
End: 2021-07-21
Payer: COMMERCIAL

## 2021-07-21 DIAGNOSIS — Z51.5 ENCOUNTER FOR PALLIATIVE CARE: ICD-10-CM

## 2021-07-21 PROCEDURE — 99213 OFFICE O/P EST LOW 20 MIN: CPT | Mod: 95

## 2021-07-21 NOTE — HISTORY OF PRESENT ILLNESS
[FreeTextEntry1] : 66yoM with small cell lung cancer presents for follow-up palliative care visit. \par PMH also significant for CAD s/p CABG, hypothyroidism, hyperlipidemia, former smoker. \par \par CT scan of chest in 3/2019 revealed a DAGO mass with evidence of metastatic disease. 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 CT scan revealed evidence of extensive bony and liver metastases. MRI of his brain was negative for brain metastases however there was extensive calvarial and T-spine metastases with possible evidence of epidural involvement. He was treated in [place] by an outside medical oncologist and started on carboplatin, etoposide and atezolizumab in early April 2019. He received 4 cycles of triplet combination therapy followed by immunotherapy maintenance with good response on imaging. Remains on maintenance Atezolizumab.\par \par Patient has been dealing with dysphagia that began in February 2021. He has undergone workup, including EGD, laryngoscopy.  He is s/p treatment for H. pylori and oral candidiasis. He reports that at the initiation of these therapies he found that he had some relief for 1-2 days, but the symptoms recurred soon after. \par \par Patient reports that there is significant pain when he tries to swallow anything, including water. He has been eating small amounts of food at a time, as tolerated. He has used oxycodone for the pain, helps minimally.  Takes Ibuprofen 400mg once to twice daily, isn't helping.  \par Topical agents such as magic mouthwash have been ineffective.Patient and wife asking about medicinal cannabis as an option. \par \par Interval Hx (7/21/21):\par Patient was started on prednisone last month, he experienced resolution of his odynophagia. He is currently using prednisone 5mg QOD. \par He obtained medical cannabis tincture but felt it contributed to diarrhea. \par \par ROS:\par +30lb weight loss in the last 4 months\par +dysgeusia\par +has sensation of swollen mouth and lips\par +some trouble sleeping\par \par Patient is , lives with his spouse. \par \par [employer] Ref#: [id number]

## 2021-07-21 NOTE — ASSESSMENT
[FreeTextEntry1] : 66yoM with:\par \par 1. Small Cell Lung Cancer - On maintenance Atezolizumab. \par \par 2. Dysphagia/odynophagia 2/2 esophagitis/gastritis from immunotherapy - improved now s/p prednisone therapy.  \par \par 3. Encounter for Palliative Care - Emotional support provided.\par \par Follow up as needed

## 2021-08-04 ENCOUNTER — APPOINTMENT (OUTPATIENT)
Dept: NEUROLOGY | Facility: CLINIC | Age: 66
End: 2021-08-04

## 2021-08-06 ENCOUNTER — LABORATORY RESULT (OUTPATIENT)
Age: 66
End: 2021-08-06

## 2021-08-06 ENCOUNTER — APPOINTMENT (OUTPATIENT)
Dept: INFUSION THERAPY | Facility: HOSPITAL | Age: 66
End: 2021-08-06

## 2021-08-06 ENCOUNTER — APPOINTMENT (OUTPATIENT)
Dept: HEMATOLOGY ONCOLOGY | Facility: CLINIC | Age: 66
End: 2021-08-06
Payer: COMMERCIAL

## 2021-08-06 VITALS
HEIGHT: 70 IN | SYSTOLIC BLOOD PRESSURE: 107 MMHG | RESPIRATION RATE: 16 BRPM | WEIGHT: 177.69 LBS | DIASTOLIC BLOOD PRESSURE: 62 MMHG | BODY MASS INDEX: 25.44 KG/M2 | TEMPERATURE: 97.3 F | HEART RATE: 87 BPM | OXYGEN SATURATION: 97 %

## 2021-08-06 PROCEDURE — 99214 OFFICE O/P EST MOD 30 MIN: CPT

## 2021-08-09 ENCOUNTER — APPOINTMENT (OUTPATIENT)
Dept: CARDIOLOGY | Facility: CLINIC | Age: 66
End: 2021-08-09
Payer: COMMERCIAL

## 2021-08-09 ENCOUNTER — NON-APPOINTMENT (OUTPATIENT)
Age: 66
End: 2021-08-09

## 2021-08-09 VITALS — OXYGEN SATURATION: 98 % | WEIGHT: 176 LBS | BODY MASS INDEX: 25.25 KG/M2 | HEART RATE: 80 BPM

## 2021-08-09 VITALS — SYSTOLIC BLOOD PRESSURE: 106 MMHG | DIASTOLIC BLOOD PRESSURE: 70 MMHG

## 2021-08-09 PROCEDURE — 99214 OFFICE O/P EST MOD 30 MIN: CPT

## 2021-08-09 PROCEDURE — 36415 COLL VENOUS BLD VENIPUNCTURE: CPT

## 2021-08-09 PROCEDURE — 93000 ELECTROCARDIOGRAM COMPLETE: CPT

## 2021-08-09 RX ORDER — LISINOPRIL 5 MG/1
5 TABLET ORAL
Qty: 90 | Refills: 1 | Status: DISCONTINUED | COMMUNITY
Start: 2020-07-13 | End: 2021-08-09

## 2021-08-09 NOTE — DISCUSSION/SUMMARY
[FreeTextEntry1] : Because of blood pressure being on the low side and the postural dizziness I told him to temporarily discontinue lisinopril.  Based upon his response further adjustments will follow.  Blood test showed normal liver profile and kidney function.  Blood sugar was 162.  Lipid profile in July was excellent.

## 2021-08-09 NOTE — ASSESSMENT
[FreeTextEntry1] : He is stable from cardiac point of view.  Postural dizziness may be because of blood pressure being towards the lower side.  Last echocardiogram showed ejection fraction of 65 to 70%.

## 2021-08-09 NOTE — HISTORY OF PRESENT ILLNESS
Detail Level: Zone
Detail Level: Detailed
[FreeTextEntry1] : He denies any chest pain palpitation or shortness of breath.  He gets postural dizziness especially in the mornings.  If he takes his time getting up he feels okay.  There is no leg edema

## 2021-08-16 ENCOUNTER — NON-APPOINTMENT (OUTPATIENT)
Age: 66
End: 2021-08-16

## 2021-08-17 ENCOUNTER — APPOINTMENT (OUTPATIENT)
Dept: NEUROLOGY | Facility: CLINIC | Age: 66
End: 2021-08-17

## 2021-08-17 ENCOUNTER — APPOINTMENT (OUTPATIENT)
Dept: RADIOLOGY | Facility: HOSPITAL | Age: 66
End: 2021-08-17

## 2021-08-18 ENCOUNTER — NON-APPOINTMENT (OUTPATIENT)
Age: 66
End: 2021-08-18

## 2021-08-25 ENCOUNTER — NON-APPOINTMENT (OUTPATIENT)
Age: 66
End: 2021-08-25

## 2021-08-26 ENCOUNTER — OUTPATIENT (OUTPATIENT)
Dept: OUTPATIENT SERVICES | Facility: HOSPITAL | Age: 66
LOS: 1 days | Discharge: HOME | End: 2021-08-26

## 2021-08-26 DIAGNOSIS — C34.90 MALIGNANT NEOPLASM OF UNSPECIFIED PART OF UNSPECIFIED BRONCHUS OR LUNG: ICD-10-CM

## 2021-08-27 ENCOUNTER — RESULT REVIEW (OUTPATIENT)
Age: 66
End: 2021-08-27

## 2021-08-27 ENCOUNTER — LABORATORY RESULT (OUTPATIENT)
Age: 66
End: 2021-08-27

## 2021-08-27 ENCOUNTER — APPOINTMENT (OUTPATIENT)
Dept: HEMATOLOGY ONCOLOGY | Facility: CLINIC | Age: 66
End: 2021-08-27
Payer: COMMERCIAL

## 2021-08-27 ENCOUNTER — APPOINTMENT (OUTPATIENT)
Dept: INFUSION THERAPY | Facility: HOSPITAL | Age: 66
End: 2021-08-27

## 2021-08-27 VITALS
HEART RATE: 64 BPM | DIASTOLIC BLOOD PRESSURE: 73 MMHG | RESPIRATION RATE: 18 BRPM | TEMPERATURE: 97.8 F | OXYGEN SATURATION: 97 % | WEIGHT: 180.78 LBS | BODY MASS INDEX: 25.88 KG/M2 | SYSTOLIC BLOOD PRESSURE: 116 MMHG | HEIGHT: 70.04 IN

## 2021-08-27 LAB
BASOPHILS # BLD AUTO: 0.01 K/UL — SIGNIFICANT CHANGE UP (ref 0–0.2)
BASOPHILS NFR BLD AUTO: 0.2 % — SIGNIFICANT CHANGE UP (ref 0–2)
EOSINOPHIL # BLD AUTO: 0.01 K/UL — SIGNIFICANT CHANGE UP (ref 0–0.5)
EOSINOPHIL NFR BLD AUTO: 0.2 % — SIGNIFICANT CHANGE UP (ref 0–6)
HCT VFR BLD CALC: 37.8 % — LOW (ref 39–50)
HGB BLD-MCNC: 12.5 G/DL — LOW (ref 13–17)
IMM GRANULOCYTES NFR BLD AUTO: 0.6 % — SIGNIFICANT CHANGE UP (ref 0–1.5)
LYMPHOCYTES # BLD AUTO: 0.9 K/UL — LOW (ref 1–3.3)
LYMPHOCYTES # BLD AUTO: 17.4 % — SIGNIFICANT CHANGE UP (ref 13–44)
MCHC RBC-ENTMCNC: 30.3 PG — SIGNIFICANT CHANGE UP (ref 27–34)
MCHC RBC-ENTMCNC: 33.1 G/DL — SIGNIFICANT CHANGE UP (ref 32–36)
MCV RBC AUTO: 91.7 FL — SIGNIFICANT CHANGE UP (ref 80–100)
MONOCYTES # BLD AUTO: 0.23 K/UL — SIGNIFICANT CHANGE UP (ref 0–0.9)
MONOCYTES NFR BLD AUTO: 4.4 % — SIGNIFICANT CHANGE UP (ref 2–14)
NEUTROPHILS # BLD AUTO: 4 K/UL — SIGNIFICANT CHANGE UP (ref 1.8–7.4)
NEUTROPHILS NFR BLD AUTO: 77.2 % — HIGH (ref 43–77)
NRBC # BLD: 0 /100 WBCS — SIGNIFICANT CHANGE UP (ref 0–0)
PLATELET # BLD AUTO: 205 K/UL — SIGNIFICANT CHANGE UP (ref 150–400)
RBC # BLD: 4.12 M/UL — LOW (ref 4.2–5.8)
RBC # FLD: 13.1 % — SIGNIFICANT CHANGE UP (ref 10.3–14.5)
WBC # BLD: 5.18 K/UL — SIGNIFICANT CHANGE UP (ref 3.8–10.5)
WBC # FLD AUTO: 5.18 K/UL — SIGNIFICANT CHANGE UP (ref 3.8–10.5)

## 2021-08-27 PROCEDURE — 99214 OFFICE O/P EST MOD 30 MIN: CPT

## 2021-09-02 ENCOUNTER — NON-APPOINTMENT (OUTPATIENT)
Age: 66
End: 2021-09-02

## 2021-09-13 ENCOUNTER — OUTPATIENT (OUTPATIENT)
Dept: OUTPATIENT SERVICES | Facility: HOSPITAL | Age: 66
LOS: 1 days | End: 2021-09-13
Payer: COMMERCIAL

## 2021-09-13 ENCOUNTER — APPOINTMENT (OUTPATIENT)
Dept: NUCLEAR MEDICINE | Facility: IMAGING CENTER | Age: 66
End: 2021-09-13
Payer: COMMERCIAL

## 2021-09-13 DIAGNOSIS — Z00.8 ENCOUNTER FOR OTHER GENERAL EXAMINATION: ICD-10-CM

## 2021-09-13 DIAGNOSIS — C34.12 MALIGNANT NEOPLASM OF UPPER LOBE, LEFT BRONCHUS OR LUNG: ICD-10-CM

## 2021-09-13 PROCEDURE — 78815 PET IMAGE W/CT SKULL-THIGH: CPT | Mod: 26,PS

## 2021-09-13 PROCEDURE — 78815 PET IMAGE W/CT SKULL-THIGH: CPT

## 2021-09-13 PROCEDURE — A9552: CPT

## 2021-09-17 ENCOUNTER — LABORATORY RESULT (OUTPATIENT)
Age: 66
End: 2021-09-17

## 2021-09-17 ENCOUNTER — RESULT REVIEW (OUTPATIENT)
Age: 66
End: 2021-09-17

## 2021-09-17 ENCOUNTER — APPOINTMENT (OUTPATIENT)
Dept: HEMATOLOGY ONCOLOGY | Facility: CLINIC | Age: 66
End: 2021-09-17
Payer: COMMERCIAL

## 2021-09-17 ENCOUNTER — APPOINTMENT (OUTPATIENT)
Dept: INFUSION THERAPY | Facility: HOSPITAL | Age: 66
End: 2021-09-17

## 2021-09-17 VITALS
SYSTOLIC BLOOD PRESSURE: 108 MMHG | TEMPERATURE: 97.9 F | BODY MASS INDEX: 26.7 KG/M2 | WEIGHT: 186.51 LBS | HEIGHT: 70.04 IN | HEART RATE: 64 BPM | DIASTOLIC BLOOD PRESSURE: 61 MMHG | RESPIRATION RATE: 18 BRPM | OXYGEN SATURATION: 97 %

## 2021-09-17 LAB
BASOPHILS # BLD AUTO: 0.01 K/UL — SIGNIFICANT CHANGE UP (ref 0–0.2)
BASOPHILS NFR BLD AUTO: 0.2 % — SIGNIFICANT CHANGE UP (ref 0–2)
EOSINOPHIL # BLD AUTO: 0 K/UL — SIGNIFICANT CHANGE UP (ref 0–0.5)
EOSINOPHIL NFR BLD AUTO: 0 % — SIGNIFICANT CHANGE UP (ref 0–6)
HCT VFR BLD CALC: 41.8 % — SIGNIFICANT CHANGE UP (ref 39–50)
HGB BLD-MCNC: 14 G/DL — SIGNIFICANT CHANGE UP (ref 13–17)
IMM GRANULOCYTES NFR BLD AUTO: 1.2 % — SIGNIFICANT CHANGE UP (ref 0–1.5)
LYMPHOCYTES # BLD AUTO: 0.65 K/UL — LOW (ref 1–3.3)
LYMPHOCYTES # BLD AUTO: 11.4 % — LOW (ref 13–44)
MCHC RBC-ENTMCNC: 30.9 PG — SIGNIFICANT CHANGE UP (ref 27–34)
MCHC RBC-ENTMCNC: 33.5 G/DL — SIGNIFICANT CHANGE UP (ref 32–36)
MCV RBC AUTO: 92.3 FL — SIGNIFICANT CHANGE UP (ref 80–100)
MONOCYTES # BLD AUTO: 0.25 K/UL — SIGNIFICANT CHANGE UP (ref 0–0.9)
MONOCYTES NFR BLD AUTO: 4.4 % — SIGNIFICANT CHANGE UP (ref 2–14)
NEUTROPHILS # BLD AUTO: 4.71 K/UL — SIGNIFICANT CHANGE UP (ref 1.8–7.4)
NEUTROPHILS NFR BLD AUTO: 82.8 % — HIGH (ref 43–77)
NRBC # BLD: 0 /100 WBCS — SIGNIFICANT CHANGE UP (ref 0–0)
PLATELET # BLD AUTO: 163 K/UL — SIGNIFICANT CHANGE UP (ref 150–400)
RBC # BLD: 4.53 M/UL — SIGNIFICANT CHANGE UP (ref 4.2–5.8)
RBC # FLD: 13.6 % — SIGNIFICANT CHANGE UP (ref 10.3–14.5)
WBC # BLD: 5.69 K/UL — SIGNIFICANT CHANGE UP (ref 3.8–10.5)
WBC # FLD AUTO: 5.69 K/UL — SIGNIFICANT CHANGE UP (ref 3.8–10.5)

## 2021-09-17 PROCEDURE — 99214 OFFICE O/P EST MOD 30 MIN: CPT

## 2021-09-20 ENCOUNTER — NON-APPOINTMENT (OUTPATIENT)
Age: 66
End: 2021-09-20

## 2021-09-20 ENCOUNTER — APPOINTMENT (OUTPATIENT)
Dept: CARDIOLOGY | Facility: CLINIC | Age: 66
End: 2021-09-20
Payer: COMMERCIAL

## 2021-09-20 VITALS — DIASTOLIC BLOOD PRESSURE: 60 MMHG | SYSTOLIC BLOOD PRESSURE: 110 MMHG

## 2021-09-20 VITALS — HEART RATE: 65 BPM | OXYGEN SATURATION: 98 %

## 2021-09-20 PROCEDURE — 93000 ELECTROCARDIOGRAM COMPLETE: CPT

## 2021-09-20 PROCEDURE — 99214 OFFICE O/P EST MOD 30 MIN: CPT

## 2021-09-20 NOTE — ASSESSMENT
[FreeTextEntry1] : The reason for  the chest pain could be multifactorial. It could be from GI causes however new coronary disease should also be excluded.

## 2021-09-20 NOTE — HISTORY OF PRESENT ILLNESS
[FreeTextEntry1] : In June he started having the dysphagia. He saw few ENT surgeons and then saw 3 gastroenterologist. Eventually he had upper endoscopy and was noted to have H. pylori as well as esophagitis due to fungus. He was treated for these conditions. He had a long course of steroids with improvement. When the steroids were discontinued dysphagia came back and he was put back on steroids. He has lost about 30 pounds and he has gained it back now. Currently he is on 30 mg of prednisone daily. Initially was on 80 mg once a day\par \par For the last 1 month 3 of 4 occasions while at rest he had anterior chest discomfort lasting about 10 minutes. It was unrelated to any respiration or any specific movement. There was no tenderness. There was no sweating, nausea or vomiting or dizziness or shortness of breath. It wasn't unrelated to intake of food or body position. He woke up and then he felt pain on one occasion. He has history of reflux.

## 2021-09-20 NOTE — DISCUSSION/SUMMARY
[FreeTextEntry1] : For further evaluation of recurrence of chest pain, given his extensive coronary background and prior open heart surgery I recommended a stress test. It is quite possible that he may have new coronary disease. His coronary bypass surgery was in 2008. Since bypass he had stent on 4 done in a staged manner in 2019. Based on the results of the stress test further recommendations will follow. If new ischemia is ruled out, then GI causes will have to be pursued.

## 2021-10-07 ENCOUNTER — OUTPATIENT (OUTPATIENT)
Dept: OUTPATIENT SERVICES | Facility: HOSPITAL | Age: 66
LOS: 1 days | End: 2021-10-07

## 2021-10-07 ENCOUNTER — APPOINTMENT (OUTPATIENT)
Dept: CV DIAGNOSTICS | Facility: HOSPITAL | Age: 66
End: 2021-10-07
Payer: COMMERCIAL

## 2021-10-07 DIAGNOSIS — C34.12 MALIGNANT NEOPLASM OF UPPER LOBE, LEFT BRONCHUS OR LUNG: ICD-10-CM

## 2021-10-07 PROCEDURE — 93018 CV STRESS TEST I&R ONLY: CPT | Mod: GC

## 2021-10-07 PROCEDURE — 78452 HT MUSCLE IMAGE SPECT MULT: CPT | Mod: 26,MH

## 2021-10-07 PROCEDURE — 93016 CV STRESS TEST SUPVJ ONLY: CPT | Mod: GC

## 2021-10-14 ENCOUNTER — APPOINTMENT (OUTPATIENT)
Dept: INTERNAL MEDICINE | Facility: CLINIC | Age: 66
End: 2021-10-14
Payer: COMMERCIAL

## 2021-10-14 PROCEDURE — 90662 IIV NO PRSV INCREASED AG IM: CPT

## 2021-10-14 PROCEDURE — G0008: CPT

## 2021-10-21 ENCOUNTER — OUTPATIENT (OUTPATIENT)
Dept: OUTPATIENT SERVICES | Facility: HOSPITAL | Age: 66
LOS: 1 days | Discharge: HOME | End: 2021-10-21

## 2021-10-21 DIAGNOSIS — C34.90 MALIGNANT NEOPLASM OF UNSPECIFIED PART OF UNSPECIFIED BRONCHUS OR LUNG: ICD-10-CM

## 2021-10-22 ENCOUNTER — APPOINTMENT (OUTPATIENT)
Dept: HEMATOLOGY ONCOLOGY | Facility: CLINIC | Age: 66
End: 2021-10-22
Payer: COMMERCIAL

## 2021-10-22 ENCOUNTER — RESULT REVIEW (OUTPATIENT)
Age: 66
End: 2021-10-22

## 2021-10-22 ENCOUNTER — APPOINTMENT (OUTPATIENT)
Dept: INFUSION THERAPY | Facility: HOSPITAL | Age: 66
End: 2021-10-22

## 2021-10-22 ENCOUNTER — LABORATORY RESULT (OUTPATIENT)
Age: 66
End: 2021-10-22

## 2021-10-22 VITALS
SYSTOLIC BLOOD PRESSURE: 131 MMHG | OXYGEN SATURATION: 98 % | BODY MASS INDEX: 28.44 KG/M2 | WEIGHT: 198.42 LBS | TEMPERATURE: 98 F | DIASTOLIC BLOOD PRESSURE: 74 MMHG | RESPIRATION RATE: 16 BRPM | HEART RATE: 61 BPM

## 2021-10-22 LAB
BASOPHILS # BLD AUTO: 0.02 K/UL — SIGNIFICANT CHANGE UP (ref 0–0.2)
BASOPHILS NFR BLD AUTO: 0.4 % — SIGNIFICANT CHANGE UP (ref 0–2)
EOSINOPHIL # BLD AUTO: 0 K/UL — SIGNIFICANT CHANGE UP (ref 0–0.5)
EOSINOPHIL NFR BLD AUTO: 0 % — SIGNIFICANT CHANGE UP (ref 0–6)
HCT VFR BLD CALC: 40.6 % — SIGNIFICANT CHANGE UP (ref 39–50)
HGB BLD-MCNC: 13.8 G/DL — SIGNIFICANT CHANGE UP (ref 13–17)
IMM GRANULOCYTES NFR BLD AUTO: 1 % — SIGNIFICANT CHANGE UP (ref 0–1.5)
LYMPHOCYTES # BLD AUTO: 0.97 K/UL — LOW (ref 1–3.3)
LYMPHOCYTES # BLD AUTO: 19.1 % — SIGNIFICANT CHANGE UP (ref 13–44)
MCHC RBC-ENTMCNC: 31.3 PG — SIGNIFICANT CHANGE UP (ref 27–34)
MCHC RBC-ENTMCNC: 34 G/DL — SIGNIFICANT CHANGE UP (ref 32–36)
MCV RBC AUTO: 92.1 FL — SIGNIFICANT CHANGE UP (ref 80–100)
MONOCYTES # BLD AUTO: 0.5 K/UL — SIGNIFICANT CHANGE UP (ref 0–0.9)
MONOCYTES NFR BLD AUTO: 9.9 % — SIGNIFICANT CHANGE UP (ref 2–14)
NEUTROPHILS # BLD AUTO: 3.53 K/UL — SIGNIFICANT CHANGE UP (ref 1.8–7.4)
NEUTROPHILS NFR BLD AUTO: 69.6 % — SIGNIFICANT CHANGE UP (ref 43–77)
NRBC # BLD: 0 /100 WBCS — SIGNIFICANT CHANGE UP (ref 0–0)
PLATELET # BLD AUTO: 160 K/UL — SIGNIFICANT CHANGE UP (ref 150–400)
RBC # BLD: 4.41 M/UL — SIGNIFICANT CHANGE UP (ref 4.2–5.8)
RBC # FLD: 13 % — SIGNIFICANT CHANGE UP (ref 10.3–14.5)
WBC # BLD: 5.07 K/UL — SIGNIFICANT CHANGE UP (ref 3.8–10.5)
WBC # FLD AUTO: 5.07 K/UL — SIGNIFICANT CHANGE UP (ref 3.8–10.5)

## 2021-10-22 PROCEDURE — 99214 OFFICE O/P EST MOD 30 MIN: CPT

## 2021-11-04 ENCOUNTER — NON-APPOINTMENT (OUTPATIENT)
Age: 66
End: 2021-11-04

## 2021-11-12 ENCOUNTER — TRANSCRIPTION ENCOUNTER (OUTPATIENT)
Age: 66
End: 2021-11-12

## 2021-11-15 ENCOUNTER — APPOINTMENT (OUTPATIENT)
Dept: CARDIOLOGY | Facility: CLINIC | Age: 66
End: 2021-11-15

## 2021-11-18 ENCOUNTER — NON-APPOINTMENT (OUTPATIENT)
Age: 66
End: 2021-11-18

## 2021-11-29 ENCOUNTER — NON-APPOINTMENT (OUTPATIENT)
Age: 66
End: 2021-11-29

## 2021-12-01 ENCOUNTER — OUTPATIENT (OUTPATIENT)
Dept: OUTPATIENT SERVICES | Facility: HOSPITAL | Age: 66
LOS: 1 days | End: 2021-12-01
Payer: COMMERCIAL

## 2021-12-01 ENCOUNTER — APPOINTMENT (OUTPATIENT)
Dept: MRI IMAGING | Facility: IMAGING CENTER | Age: 66
End: 2021-12-01
Payer: COMMERCIAL

## 2021-12-01 DIAGNOSIS — Z00.8 ENCOUNTER FOR OTHER GENERAL EXAMINATION: ICD-10-CM

## 2021-12-01 DIAGNOSIS — C34.12 MALIGNANT NEOPLASM OF UPPER LOBE, LEFT BRONCHUS OR LUNG: ICD-10-CM

## 2021-12-01 PROCEDURE — 70553 MRI BRAIN STEM W/O & W/DYE: CPT

## 2021-12-01 PROCEDURE — A9585: CPT

## 2021-12-01 PROCEDURE — 70553 MRI BRAIN STEM W/O & W/DYE: CPT | Mod: 26

## 2021-12-06 ENCOUNTER — APPOINTMENT (OUTPATIENT)
Dept: NUCLEAR MEDICINE | Facility: IMAGING CENTER | Age: 66
End: 2021-12-06
Payer: COMMERCIAL

## 2021-12-06 ENCOUNTER — OUTPATIENT (OUTPATIENT)
Dept: OUTPATIENT SERVICES | Facility: HOSPITAL | Age: 66
LOS: 1 days | End: 2021-12-06
Payer: COMMERCIAL

## 2021-12-06 DIAGNOSIS — Z00.8 ENCOUNTER FOR OTHER GENERAL EXAMINATION: ICD-10-CM

## 2021-12-06 DIAGNOSIS — C34.12 MALIGNANT NEOPLASM OF UPPER LOBE, LEFT BRONCHUS OR LUNG: ICD-10-CM

## 2021-12-06 PROCEDURE — 78815 PET IMAGE W/CT SKULL-THIGH: CPT | Mod: 26,PS

## 2021-12-06 PROCEDURE — A9552: CPT

## 2021-12-06 PROCEDURE — 78815 PET IMAGE W/CT SKULL-THIGH: CPT

## 2021-12-08 ENCOUNTER — OUTPATIENT (OUTPATIENT)
Dept: OUTPATIENT SERVICES | Facility: HOSPITAL | Age: 66
LOS: 1 days | Discharge: HOME | End: 2021-12-08

## 2021-12-08 DIAGNOSIS — C34.90 MALIGNANT NEOPLASM OF UNSPECIFIED PART OF UNSPECIFIED BRONCHUS OR LUNG: ICD-10-CM

## 2021-12-10 ENCOUNTER — RESULT REVIEW (OUTPATIENT)
Age: 66
End: 2021-12-10

## 2021-12-10 ENCOUNTER — APPOINTMENT (OUTPATIENT)
Dept: INFUSION THERAPY | Facility: HOSPITAL | Age: 66
End: 2021-12-10

## 2021-12-10 ENCOUNTER — APPOINTMENT (OUTPATIENT)
Dept: HEMATOLOGY ONCOLOGY | Facility: CLINIC | Age: 66
End: 2021-12-10
Payer: COMMERCIAL

## 2021-12-10 VITALS
HEIGHT: 70.04 IN | OXYGEN SATURATION: 97 % | SYSTOLIC BLOOD PRESSURE: 135 MMHG | TEMPERATURE: 97.7 F | BODY MASS INDEX: 29.76 KG/M2 | DIASTOLIC BLOOD PRESSURE: 83 MMHG | WEIGHT: 207.87 LBS | RESPIRATION RATE: 18 BRPM | HEART RATE: 68 BPM

## 2021-12-10 LAB
BASOPHILS # BLD AUTO: 0.02 K/UL — SIGNIFICANT CHANGE UP (ref 0–0.2)
BASOPHILS NFR BLD AUTO: 0.3 % — SIGNIFICANT CHANGE UP (ref 0–2)
EOSINOPHIL # BLD AUTO: 0.03 K/UL — SIGNIFICANT CHANGE UP (ref 0–0.5)
EOSINOPHIL NFR BLD AUTO: 0.5 % — SIGNIFICANT CHANGE UP (ref 0–6)
HCT VFR BLD CALC: 41 % — SIGNIFICANT CHANGE UP (ref 39–50)
HGB BLD-MCNC: 14.2 G/DL — SIGNIFICANT CHANGE UP (ref 13–17)
IMM GRANULOCYTES NFR BLD AUTO: 0.8 % — SIGNIFICANT CHANGE UP (ref 0–1.5)
LYMPHOCYTES # BLD AUTO: 1.17 K/UL — SIGNIFICANT CHANGE UP (ref 1–3.3)
LYMPHOCYTES # BLD AUTO: 18 % — SIGNIFICANT CHANGE UP (ref 13–44)
MCHC RBC-ENTMCNC: 31.3 PG — SIGNIFICANT CHANGE UP (ref 27–34)
MCHC RBC-ENTMCNC: 34.6 G/DL — SIGNIFICANT CHANGE UP (ref 32–36)
MCV RBC AUTO: 90.3 FL — SIGNIFICANT CHANGE UP (ref 80–100)
MONOCYTES # BLD AUTO: 0.81 K/UL — SIGNIFICANT CHANGE UP (ref 0–0.9)
MONOCYTES NFR BLD AUTO: 12.5 % — SIGNIFICANT CHANGE UP (ref 2–14)
NEUTROPHILS # BLD AUTO: 4.42 K/UL — SIGNIFICANT CHANGE UP (ref 1.8–7.4)
NEUTROPHILS NFR BLD AUTO: 67.9 % — SIGNIFICANT CHANGE UP (ref 43–77)
NRBC # BLD: 0 /100 WBCS — SIGNIFICANT CHANGE UP (ref 0–0)
PLATELET # BLD AUTO: 182 K/UL — SIGNIFICANT CHANGE UP (ref 150–400)
RBC # BLD: 4.54 M/UL — SIGNIFICANT CHANGE UP (ref 4.2–5.8)
RBC # FLD: 12.1 % — SIGNIFICANT CHANGE UP (ref 10.3–14.5)
WBC # BLD: 6.5 K/UL — SIGNIFICANT CHANGE UP (ref 3.8–10.5)
WBC # FLD AUTO: 6.5 K/UL — SIGNIFICANT CHANGE UP (ref 3.8–10.5)

## 2021-12-10 PROCEDURE — 99214 OFFICE O/P EST MOD 30 MIN: CPT

## 2021-12-11 LAB
ALBUMIN SERPL ELPH-MCNC: 4.4 G/DL — SIGNIFICANT CHANGE UP (ref 3.3–5)
ALP SERPL-CCNC: 65 U/L — SIGNIFICANT CHANGE UP (ref 40–120)
ALT FLD-CCNC: 16 U/L — SIGNIFICANT CHANGE UP (ref 10–45)
ANION GAP SERPL CALC-SCNC: 16 MMOL/L — SIGNIFICANT CHANGE UP (ref 5–17)
AST SERPL-CCNC: 13 U/L — SIGNIFICANT CHANGE UP (ref 10–40)
BILIRUB SERPL-MCNC: 0.3 MG/DL — SIGNIFICANT CHANGE UP (ref 0.2–1.2)
BUN SERPL-MCNC: 17 MG/DL — SIGNIFICANT CHANGE UP (ref 7–23)
CALCIUM SERPL-MCNC: 9.9 MG/DL — SIGNIFICANT CHANGE UP (ref 8.4–10.5)
CHLORIDE SERPL-SCNC: 101 MMOL/L — SIGNIFICANT CHANGE UP (ref 96–108)
CO2 SERPL-SCNC: 24 MMOL/L — SIGNIFICANT CHANGE UP (ref 22–31)
CREAT SERPL-MCNC: 0.68 MG/DL — SIGNIFICANT CHANGE UP (ref 0.5–1.3)
GLUCOSE SERPL-MCNC: 55 MG/DL — LOW (ref 70–99)
POTASSIUM SERPL-MCNC: 4.4 MMOL/L — SIGNIFICANT CHANGE UP (ref 3.5–5.3)
POTASSIUM SERPL-SCNC: 4.4 MMOL/L — SIGNIFICANT CHANGE UP (ref 3.5–5.3)
PROT SERPL-MCNC: 6.6 G/DL — SIGNIFICANT CHANGE UP (ref 6–8.3)
SODIUM SERPL-SCNC: 141 MMOL/L — SIGNIFICANT CHANGE UP (ref 135–145)
T4 FREE SERPL-MCNC: 1 NG/DL — SIGNIFICANT CHANGE UP (ref 0.9–1.8)
TSH SERPL-MCNC: 1.46 UIU/ML — SIGNIFICANT CHANGE UP (ref 0.27–4.2)

## 2022-01-17 ENCOUNTER — OUTPATIENT (OUTPATIENT)
Dept: OUTPATIENT SERVICES | Facility: HOSPITAL | Age: 67
LOS: 1 days | Discharge: HOME | End: 2022-01-17

## 2022-01-17 DIAGNOSIS — C34.90 MALIGNANT NEOPLASM OF UNSPECIFIED PART OF UNSPECIFIED BRONCHUS OR LUNG: ICD-10-CM

## 2022-01-21 ENCOUNTER — APPOINTMENT (OUTPATIENT)
Dept: INFUSION THERAPY | Facility: HOSPITAL | Age: 67
End: 2022-01-21

## 2022-01-21 ENCOUNTER — RESULT REVIEW (OUTPATIENT)
Age: 67
End: 2022-01-21

## 2022-01-21 ENCOUNTER — APPOINTMENT (OUTPATIENT)
Dept: HEMATOLOGY ONCOLOGY | Facility: CLINIC | Age: 67
End: 2022-01-21
Payer: COMMERCIAL

## 2022-01-21 VITALS
WEIGHT: 214.95 LBS | OXYGEN SATURATION: 97 % | RESPIRATION RATE: 16 BRPM | BODY MASS INDEX: 30.81 KG/M2 | HEART RATE: 74 BPM | SYSTOLIC BLOOD PRESSURE: 120 MMHG | TEMPERATURE: 96.3 F | DIASTOLIC BLOOD PRESSURE: 71 MMHG

## 2022-01-21 LAB
ALBUMIN SERPL ELPH-MCNC: 4.8 G/DL — SIGNIFICANT CHANGE UP (ref 3.3–5)
ALP SERPL-CCNC: 61 U/L — SIGNIFICANT CHANGE UP (ref 40–120)
ALT FLD-CCNC: 18 U/L — SIGNIFICANT CHANGE UP (ref 10–45)
ANION GAP SERPL CALC-SCNC: 13 MMOL/L — SIGNIFICANT CHANGE UP (ref 5–17)
AST SERPL-CCNC: 16 U/L — SIGNIFICANT CHANGE UP (ref 10–40)
BASOPHILS # BLD AUTO: 0.01 K/UL — SIGNIFICANT CHANGE UP (ref 0–0.2)
BASOPHILS NFR BLD AUTO: 0.2 % — SIGNIFICANT CHANGE UP (ref 0–2)
BILIRUB SERPL-MCNC: 0.4 MG/DL — SIGNIFICANT CHANGE UP (ref 0.2–1.2)
BUN SERPL-MCNC: 17 MG/DL — SIGNIFICANT CHANGE UP (ref 7–23)
CALCIUM SERPL-MCNC: 10.2 MG/DL — SIGNIFICANT CHANGE UP (ref 8.4–10.5)
CHLORIDE SERPL-SCNC: 102 MMOL/L — SIGNIFICANT CHANGE UP (ref 96–108)
CO2 SERPL-SCNC: 26 MMOL/L — SIGNIFICANT CHANGE UP (ref 22–31)
CREAT SERPL-MCNC: 1.05 MG/DL — SIGNIFICANT CHANGE UP (ref 0.5–1.3)
EOSINOPHIL # BLD AUTO: 0.01 K/UL — SIGNIFICANT CHANGE UP (ref 0–0.5)
EOSINOPHIL NFR BLD AUTO: 0.2 % — SIGNIFICANT CHANGE UP (ref 0–6)
GLUCOSE SERPL-MCNC: 90 MG/DL — SIGNIFICANT CHANGE UP (ref 70–99)
HCT VFR BLD CALC: 43.5 % — SIGNIFICANT CHANGE UP (ref 39–50)
HGB BLD-MCNC: 15.1 G/DL — SIGNIFICANT CHANGE UP (ref 13–17)
IMM GRANULOCYTES NFR BLD AUTO: 0.6 % — SIGNIFICANT CHANGE UP (ref 0–1.5)
LDH SERPL L TO P-CCNC: 174 U/L — SIGNIFICANT CHANGE UP (ref 50–242)
LYMPHOCYTES # BLD AUTO: 1.1 K/UL — SIGNIFICANT CHANGE UP (ref 1–3.3)
LYMPHOCYTES # BLD AUTO: 16.9 % — SIGNIFICANT CHANGE UP (ref 13–44)
MCHC RBC-ENTMCNC: 31.5 PG — SIGNIFICANT CHANGE UP (ref 27–34)
MCHC RBC-ENTMCNC: 34.7 G/DL — SIGNIFICANT CHANGE UP (ref 32–36)
MCV RBC AUTO: 90.6 FL — SIGNIFICANT CHANGE UP (ref 80–100)
MONOCYTES # BLD AUTO: 0.41 K/UL — SIGNIFICANT CHANGE UP (ref 0–0.9)
MONOCYTES NFR BLD AUTO: 6.3 % — SIGNIFICANT CHANGE UP (ref 2–14)
NEUTROPHILS # BLD AUTO: 4.93 K/UL — SIGNIFICANT CHANGE UP (ref 1.8–7.4)
NEUTROPHILS NFR BLD AUTO: 75.8 % — SIGNIFICANT CHANGE UP (ref 43–77)
NRBC # BLD: 0 /100 WBCS — SIGNIFICANT CHANGE UP (ref 0–0)
PLATELET # BLD AUTO: 147 K/UL — LOW (ref 150–400)
POTASSIUM SERPL-MCNC: 4.7 MMOL/L — SIGNIFICANT CHANGE UP (ref 3.5–5.3)
POTASSIUM SERPL-SCNC: 4.7 MMOL/L — SIGNIFICANT CHANGE UP (ref 3.5–5.3)
PROT SERPL-MCNC: 7.1 G/DL — SIGNIFICANT CHANGE UP (ref 6–8.3)
RBC # BLD: 4.8 M/UL — SIGNIFICANT CHANGE UP (ref 4.2–5.8)
RBC # FLD: 12.1 % — SIGNIFICANT CHANGE UP (ref 10.3–14.5)
SODIUM SERPL-SCNC: 141 MMOL/L — SIGNIFICANT CHANGE UP (ref 135–145)
WBC # BLD: 6.5 K/UL — SIGNIFICANT CHANGE UP (ref 3.8–10.5)
WBC # FLD AUTO: 6.5 K/UL — SIGNIFICANT CHANGE UP (ref 3.8–10.5)

## 2022-01-21 PROCEDURE — 99214 OFFICE O/P EST MOD 30 MIN: CPT

## 2022-02-18 ENCOUNTER — RX RENEWAL (OUTPATIENT)
Age: 67
End: 2022-02-18

## 2022-03-07 ENCOUNTER — APPOINTMENT (OUTPATIENT)
Dept: NUCLEAR MEDICINE | Facility: IMAGING CENTER | Age: 67
End: 2022-03-07
Payer: COMMERCIAL

## 2022-03-07 ENCOUNTER — OUTPATIENT (OUTPATIENT)
Dept: OUTPATIENT SERVICES | Facility: HOSPITAL | Age: 67
LOS: 1 days | End: 2022-03-07
Payer: COMMERCIAL

## 2022-03-07 DIAGNOSIS — C34.12 MALIGNANT NEOPLASM OF UPPER LOBE, LEFT BRONCHUS OR LUNG: ICD-10-CM

## 2022-03-07 PROCEDURE — 78815 PET IMAGE W/CT SKULL-THIGH: CPT | Mod: 26,PS

## 2022-03-07 PROCEDURE — 78815 PET IMAGE W/CT SKULL-THIGH: CPT

## 2022-03-07 PROCEDURE — A9552: CPT

## 2022-03-09 ENCOUNTER — OUTPATIENT (OUTPATIENT)
Dept: OUTPATIENT SERVICES | Facility: HOSPITAL | Age: 67
LOS: 1 days | Discharge: HOME | End: 2022-03-09

## 2022-03-09 ENCOUNTER — NON-APPOINTMENT (OUTPATIENT)
Age: 67
End: 2022-03-09

## 2022-03-09 DIAGNOSIS — C34.90 MALIGNANT NEOPLASM OF UNSPECIFIED PART OF UNSPECIFIED BRONCHUS OR LUNG: ICD-10-CM

## 2022-03-11 ENCOUNTER — RESULT REVIEW (OUTPATIENT)
Age: 67
End: 2022-03-11

## 2022-03-11 ENCOUNTER — APPOINTMENT (OUTPATIENT)
Dept: INFUSION THERAPY | Facility: HOSPITAL | Age: 67
End: 2022-03-11

## 2022-03-11 ENCOUNTER — APPOINTMENT (OUTPATIENT)
Dept: HEMATOLOGY ONCOLOGY | Facility: CLINIC | Age: 67
End: 2022-03-11
Payer: COMMERCIAL

## 2022-03-11 VITALS
SYSTOLIC BLOOD PRESSURE: 151 MMHG | HEART RATE: 71 BPM | TEMPERATURE: 97.7 F | RESPIRATION RATE: 18 BRPM | OXYGEN SATURATION: 96 % | DIASTOLIC BLOOD PRESSURE: 81 MMHG | HEIGHT: 70 IN | BODY MASS INDEX: 31.56 KG/M2 | WEIGHT: 220.46 LBS

## 2022-03-11 LAB
ALBUMIN SERPL ELPH-MCNC: 4.6 G/DL — SIGNIFICANT CHANGE UP (ref 3.3–5)
ALP SERPL-CCNC: 63 U/L — SIGNIFICANT CHANGE UP (ref 40–120)
ALT FLD-CCNC: 15 U/L — SIGNIFICANT CHANGE UP (ref 10–45)
ANION GAP SERPL CALC-SCNC: 12 MMOL/L — SIGNIFICANT CHANGE UP (ref 5–17)
AST SERPL-CCNC: 13 U/L — SIGNIFICANT CHANGE UP (ref 10–40)
BASOPHILS # BLD AUTO: 0.01 K/UL — SIGNIFICANT CHANGE UP (ref 0–0.2)
BASOPHILS NFR BLD AUTO: 0.2 % — SIGNIFICANT CHANGE UP (ref 0–2)
BILIRUB SERPL-MCNC: 0.3 MG/DL — SIGNIFICANT CHANGE UP (ref 0.2–1.2)
BUN SERPL-MCNC: 16 MG/DL — SIGNIFICANT CHANGE UP (ref 7–23)
CALCIUM SERPL-MCNC: 10.3 MG/DL — SIGNIFICANT CHANGE UP (ref 8.4–10.5)
CHLORIDE SERPL-SCNC: 103 MMOL/L — SIGNIFICANT CHANGE UP (ref 96–108)
CO2 SERPL-SCNC: 25 MMOL/L — SIGNIFICANT CHANGE UP (ref 22–31)
CREAT SERPL-MCNC: 1.01 MG/DL — SIGNIFICANT CHANGE UP (ref 0.5–1.3)
EGFR: 81 ML/MIN/1.73M2 — SIGNIFICANT CHANGE UP
EOSINOPHIL # BLD AUTO: 0.03 K/UL — SIGNIFICANT CHANGE UP (ref 0–0.5)
EOSINOPHIL NFR BLD AUTO: 0.5 % — SIGNIFICANT CHANGE UP (ref 0–6)
GLUCOSE SERPL-MCNC: 77 MG/DL — SIGNIFICANT CHANGE UP (ref 70–99)
HCT VFR BLD CALC: 41.1 % — SIGNIFICANT CHANGE UP (ref 39–50)
HGB BLD-MCNC: 14 G/DL — SIGNIFICANT CHANGE UP (ref 13–17)
IMM GRANULOCYTES NFR BLD AUTO: 0.5 % — SIGNIFICANT CHANGE UP (ref 0–1.5)
LDH SERPL L TO P-CCNC: 172 U/L — SIGNIFICANT CHANGE UP (ref 50–242)
LYMPHOCYTES # BLD AUTO: 1.35 K/UL — SIGNIFICANT CHANGE UP (ref 1–3.3)
LYMPHOCYTES # BLD AUTO: 22.9 % — SIGNIFICANT CHANGE UP (ref 13–44)
MCHC RBC-ENTMCNC: 30.6 PG — SIGNIFICANT CHANGE UP (ref 27–34)
MCHC RBC-ENTMCNC: 34.1 G/DL — SIGNIFICANT CHANGE UP (ref 32–36)
MCV RBC AUTO: 89.7 FL — SIGNIFICANT CHANGE UP (ref 80–100)
MONOCYTES # BLD AUTO: 0.77 K/UL — SIGNIFICANT CHANGE UP (ref 0–0.9)
MONOCYTES NFR BLD AUTO: 13.1 % — SIGNIFICANT CHANGE UP (ref 2–14)
NEUTROPHILS # BLD AUTO: 3.71 K/UL — SIGNIFICANT CHANGE UP (ref 1.8–7.4)
NEUTROPHILS NFR BLD AUTO: 62.8 % — SIGNIFICANT CHANGE UP (ref 43–77)
NRBC # BLD: 0 /100 WBCS — SIGNIFICANT CHANGE UP (ref 0–0)
PLATELET # BLD AUTO: 188 K/UL — SIGNIFICANT CHANGE UP (ref 150–400)
POTASSIUM SERPL-MCNC: 4.7 MMOL/L — SIGNIFICANT CHANGE UP (ref 3.5–5.3)
POTASSIUM SERPL-SCNC: 4.7 MMOL/L — SIGNIFICANT CHANGE UP (ref 3.5–5.3)
PROT SERPL-MCNC: 6.9 G/DL — SIGNIFICANT CHANGE UP (ref 6–8.3)
RBC # BLD: 4.58 M/UL — SIGNIFICANT CHANGE UP (ref 4.2–5.8)
RBC # FLD: 12.1 % — SIGNIFICANT CHANGE UP (ref 10.3–14.5)
SODIUM SERPL-SCNC: 141 MMOL/L — SIGNIFICANT CHANGE UP (ref 135–145)
T4 FREE SERPL-MCNC: 1 NG/DL — SIGNIFICANT CHANGE UP (ref 0.9–1.8)
TSH SERPL-MCNC: 1.57 UIU/ML — SIGNIFICANT CHANGE UP (ref 0.27–4.2)
WBC # BLD: 5.9 K/UL — SIGNIFICANT CHANGE UP (ref 3.8–10.5)
WBC # FLD AUTO: 5.9 K/UL — SIGNIFICANT CHANGE UP (ref 3.8–10.5)

## 2022-03-11 PROCEDURE — 99215 OFFICE O/P EST HI 40 MIN: CPT

## 2022-03-11 NOTE — RESULTS/DATA
[FreeTextEntry1] : -CT chest 3/25/19: Infiltrative left hilar mass extending to the left upper lobe and left mediastinum compatible with neoplasm with encasement of the pulmonary artery and left upper lobe bronchial structures with collapse of the left upper lobe. Lytic lesions in the upper thoracic spine. Innumerable hypodense masses throughout the liver compatible with metastases. Small left pleural effusion.\par \par -CT C/A/P 4/5/19:  Large mass in the left hilum extending into the left upper lobe consistent with bronchogenic malignancy with possible metastatic lymphadenopathy. Significant compression of the left lobe pulmonary artery with obstruction of the left upper lobe bronchus with resultant atelectasis. Innumerable lesions throughout the liver. Upper abdominal lymphadenopathy. Bony metastases of the spine, pelvis and bilateral hips.\par \par -MRI brain/T-spine in 3/28/19: Negative for metastatic disease involving the brain. Of the central canal at T9-T10 concerning for epidural metastasis.\par \par -Nuclear medicine bone scan 4/5/19: Focal uptake in one of the right lower lateral ribs, possibly the 10th rib, likely represents a small fracture. Metastatic disease is less likely. No additional areas of abnormal uptake are noted.\par \par -PET/CT 6/26/19: Markedly improved positive treatment response. The previously seen a large left-sided upper lobe lung mass with hilar extension is markedly reduced in size with mild or residual ill-defined soft tissue density at the left hilar region now demonstrating only partial invasion of the left upper lobe bronchus. No separate mediastinal lymphadenopathy is noted. Markedly improved appearance of both the numerous metastatic liver lesions and hepatomegaly. Interval resolution of previously seen upper abdominal lymphadenopathy. No residual active osseous metastatic disease.\par \par -PET/CT 10/17/19:  FDG-PET/CT scan demonstrates: \par 1. Minimally FDG-avid, small, difficult to delineate left upper lobe/perihilar soft tissue, unchanged as compared to prior study dated 6/25/2019. Small focus of residual disease is not excluded. \par 2. Non-FDG-avid low-attenuation density in pretracheal cricoid region, unchanged. \par 3. Remainder of study is unremarkable, demonstrating no evidence of FDG-avid disease. \par \par -MRI Brain 10/20/19: No evidence of metastasis\par \par -PET/CT 1/15/20:  FDG-PET/CT scan demonstrates: \par 1. New, indeterminate linear focus of mild FDG activity in left suprahilar/paramediastinal region (SUV 4.1). \par 2. Minimally FDG-avid, small, difficult to delineate left upper lobe/perihilar soft tissue, unchanged as compared to prior study dated 6/25/2019. Small focus of residual disease is not excluded. \par 3. Remainder of study is unchanged, demonstrating no evidence of FDG-avid disease. \par \par -CT Abdomen/Pelvis 2/19/20:  No evidence of focal bowel wall thickening or distention.  Right inguinal hernia \par \par -CT Chest 4/8/20: Stable disease\par \par -MRI Brain 3/5/20: New opacification involving the right mastoid and middle ear region as described above, otherwise no significant change when allowing for differences in technique. \par \par -PET/CT 6/25/20:  Compared to FDG-PET/CT scan dated 1/15/2020: \par 1. Focus of mild FDG activity in left upper lobe paramediastinal region is unchanged. This may reflect posttreatment change. \par 2. Non-FDG-avid 7 mm nodular density within posterior left upper lobe, also unchanged. \par 3. Nonspecific hypermetabolism in distal esophagus and fundus/body of stomach, mildly decreased. Correlate clinically for esophagitis/gastritis and with endoscopy, as indicated. \par 4. Remainder of study is unchanged, demonstrating no evidence of FDG-avid recurrent or metastatic disease. \par \par -PET/CT 9/1/20:  Compared to FDG-PET/CT scan dated 6/25/2020: \par 1. Resolution of focus of mild FDG activity in left upper lobe paramediastinal region. \par 2. Non-FDG-avid 7 mm nodular density within posterior left upper lobe, unchanged. \par 3. Nonspecific hypermetabolism in distal esophagus and fundus/body, unchanged. \par 4. No evidence of FDG-avid recurrent or metastatic disease. \par \par -MRI Brain 12/14/20:  No evidence acute hemorrhage, mass mass effect or abnormal enhancement seen.  Improved aeration of the right mastoid and middle ear region.\par \par -PET/CT 12/14/20:  Compared to FDG-PET/CT scan on 9/1/2020:\par 1. Nonspecific minimal increased FDG avidity in the gastric fundus/body. Please correlate clinically or with endoscopy as indicated.\par 2. Non-FDG avid 7 mm nodular density within the posterior left upper lobe, unchanged.\par 3. New focal cutaneous FDG avidity along the left lower aspect of the face, probably focal inflammation. Please correlate with physical examination.\par 4. Nonspecific new focal mild hypermetabolism in the anterior aspect of the hyoid bone just to the right of the midline without CT correlate. Unchanged non-FDG avid low-attenuation density in the precricoid region. Please correlate clinically.\par \par -PET/CT 3/5/21:  Compared to FDG-PET/CT scan dated 12/14/2020:\par 1. Few new FDG avid left axillary lymph nodes are nonspecific. Suggest correlation with ultrasound and possible percutaneous FNA biopsy as indicated.\par 2. Nonspecific FDG avidity in mid to distal esophagus, gastric fundus/body, slightly more prominent, with new FDG avidity in the pylorus. Please correlate clinically or with endoscopy as indicated.\par 3. Nonspecific non-FDG avid groundglass opacity in the anterior right upper lobe, more conspicuous.\par 4. Interval resolution of focal mild hypermetabolism in the anterior aspect of the hyoid bone and focal cutaneous FDG avidity in the left lower face. Unchanged non-FDG avid low-attenuation density in the cricoarytenoid region.\par \par -Esophagram: Small hiatal hernia without demonstrated gastroesophageal reflux. There is esophagoesophageal reflux.\par \par -Pet/CT 6/20/21:  Compared to FDG-PET/CT scan dated 3/5/2021:\par 1. Interval resolution of a few mildly FDG avid left axillary lymph nodes and small mildly FDG avid foci in bilateral hilar regions.\par 2. Nonspecific FDG avidity in mid to distal esophagus, gastric boy and pylorus, unchanged. Please correlate clinically or with endoscopy as indicated.\par 3. Unchanged nonspecific non-FDG avid groundglass opacity in the anterior right upper lobe.\par 4. Non-FDG avid low-attenuation density in the pretracheal region, decreased in size.\par \par -MRI Brain 6/21/21:  There is no intraparenchymal hematoma, mass effect or midline shift.  No evidence of intracranial metastatic disease. Slight interval increase in density within the right mastoid and middle ear regions when compared most recent prior examination.\par \par -PET/CT 9/13/21:  Compared to FDG-PET/CT scan dated 6/20/2021:\par 1. New cluster of FDG-avid nodules in left upper lobe. Primary consideration is infection/mucoid impacted distal airways. Please correlate clinically and follow-up with dedicated CT of chest in 1 month.\par 2. Nonspecific FDG avidity in mid and distal esophagus, unchanged. Resolution of gastric hypermetabolism.\par 3. Nonspecific, non-FDG avid right upper lobe groundglass opacity, unchanged.\par 4. Non-FDG avid low-attenuation density in the pretracheal region, unchanged.\par \par -MRI Brain 12/1/21:  No significant change when allowing for differences in technique.\par \par -PET/CT 12/6/21:  Compared to FDG-PET/CT scan dated 6/20/2021:\par 1. Mildly FDG-avid cluster of nodules in left upper lobe, unchanged on CT, and decreased in metabolism. An infectious/inflammatory etiology is favored. Please correlate clinically. A dedicated CT of chest may be obtained for further characterization.\par 2. Nonspecific FDG avidity in mid and distal esophagus, unchanged.\par 3. Nonspecific, non-FDG avid right upper lobe groundglass opacity, unchanged.\par 4. Non-FDG avid low-attenuation density in the pretracheal region, unchanged.\par \par Images Reviewed/Interpreted:\par \par -PET/CT 3/7/22:  Compared to FDG-PET/CT scan dated 12/6/2021:\par 1. FDG-avid, centrally photopenic left upper lobe lung mass and adjacent FDG-avid nodules, markedly increased in size and metabolism as compared to prior study, are compatible with recurrent disease.\par 2. Nonspecific FDG avidity in mid and distal esophagus, unchanged.\par 3. Nonspecific, non-FDG avid right upper lobe groundglass opacity, unchanged.\par 4. Non-FDG avid low-attenuation density in the pretracheal region, unchanged.\par \par

## 2022-03-11 NOTE — PHYSICAL EXAM
[Restricted in physically strenuous activity but ambulatory and able to carry out work of a light or sedentary nature] : Status 1- Restricted in physically strenuous activity but ambulatory and able to carry out work of a light or sedentary nature, e.g., light house work, office work [Normal] : affect appropriate [de-identified] : No icterus  [de-identified] : MMM O/P Clear  [de-identified] : Supple No LAD  [de-identified] : S1 S2  [de-identified] : Clear  [de-identified] : No edema  [de-identified] : No spine/CVA tenderness [de-identified] : Mediport in place

## 2022-03-11 NOTE — HISTORY OF PRESENT ILLNESS
[Disease: _____________________] : Disease: [unfilled] [AJCC Stage: ____] : AJCC Stage: [unfilled] [de-identified] : The patient is a former smoker with a history of CAD who developed chest pain in March 2019 and had a cardiac stent placed. When his chest pain persisted, he was sent for a CT scan of his chest in late March 2019 that revealed a left upper lobe lung mass with evidence of metastatic disease. 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 CT scan revealed evidence of extensive bony and liver metastases. MRI of his brain was negative for brain metastases however there was extensive calvarial and T-spine metastases with possible evidence of epidural involvement. He was treated in [place] by an outside medical oncologist and started on carboplatin, etoposide and atezolizumab in early April 2019. He received 4 cycles of triplet combination therapy followed by immunotherapy maintenance. A restaging PET/CT scan in June 2019 revealed a dramatic response. He moved from [place] and transferred his care to [facility].  \par Treated with immunotherapy maintenance from June 2019 through May 2021 with sustained response.  \par Developed severe dysphagia and odynophagia.  He was treated for H-pylori and symptoms continued; H Pylori stool Ag was negative.  PET/CT with findings consistent with esophagitis/gastritis.  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   [de-identified] : -Lung, left hilar FNA 3/29/19: Positive for malignant cells. Poorly differentiated neuroendocrine carcinoma. IHC is positive for MCK, TTF-1 and synaptophysin while negative for desmoglein3, P40, NapsinA, CD20, PAX5, CD3.  Ki-67 is markedly high. [de-identified] : *Wife provided translation where needed\par Patient has been monitored off systemic therapy since May 2021.  \par Patient was put on steroids for immunotherapy related esophagitis/gastritis in June 2021; he has titrated down to alternating 5/10mg daily.  Lower doses lead to return of symptoms.  He has CASH and dry cough.  \par \par Restaging PET/CT with disease progression, involving the DAGO primary.

## 2022-03-11 NOTE — ASSESSMENT
[FreeTextEntry1] : Extensive stage small cell lung cancer diagnosed late March 2019 who was treated with chemotherapy plus immunotherapy from April-June 2019; achieved CR. Treated with immunotherapy maintenance from June 2019 through May 2021 with sustained response.  Developed immunotherapy related esophagitis; started on steroids in June 2021 with clinical response.  \par Restaging PET/CT March 2022 with disease progression.  \par Recommend: \par –Retreatment with initial platinum doublet chemotherapy with Carboplatin AUC 5-day 1 plus Etoposide 80 mg/m² Days 1–3 administered IV every 3 weeks.  Carboplatin will need to be administered as a retreat protocol.  Patient is on chronic steroids and therefore will avoid the additional dexamethasone dosing the evening before.\par – Risks, benefits and side effects of systemic therapy were discussed with the patient who agreed to proceed and signed the consent form; drug info sheets provided.\par – The appropriate antiemetic and prophylactic medications were prescribed\par – Recommend Pegfilgrastim/Onpro or biosimilar equivalent for prophylaxis of chemotherapy induced neutropenic complications; consent obtained for this as well\par –Depending on his response to chemotherapy, we will plan to treat him for up to 4–6 cycles\par –Continue on current steroid dosing with prednisone for his immunotherapy related esophagitis; currently alternating 10/5mg daily.  He has previously declined to utilize famotidine or PPI prophylaxis due to constipation.\par –If patient is benefiting and tolerating chemotherapy, discussed the option of potentially including durvalumab with one of the latter cycles of his chemotherapy regimen, provided he agrees to this and understands the risk of further or worsening autoimmune toxicity.  Incorporation of this agent would enable him to continue on this in a maintenance fashion.\par -Continue symptom management under care of Palliative Care Dr. Moreno\par -Continue levothyroxine; monitor periodic TFT's \par -Avoided addition of bone modifying agent given the dramatic response in the bones\par -Mediport in place; Port flush with labs today in preparation for systemic therpay\par - Brain MRI December 2021 from Dec 2021 was negative; recommend repeating this now given his disease progression\par -Check out form given to patient with instructions for making appointments for his mid-cycle f/u visit.  Will obtain labs at mid-cycle via Port flush.  \par -Will plan to obtain a restaging scan following 3 cycles to assess response\par -All questions answered to their apparent satisfaction

## 2022-03-15 ENCOUNTER — APPOINTMENT (OUTPATIENT)
Dept: INFUSION THERAPY | Facility: HOSPITAL | Age: 67
End: 2022-03-15

## 2022-03-15 DIAGNOSIS — R11.2 NAUSEA WITH VOMITING, UNSPECIFIED: ICD-10-CM

## 2022-03-15 DIAGNOSIS — Z51.11 ENCOUNTER FOR ANTINEOPLASTIC CHEMOTHERAPY: ICD-10-CM

## 2022-03-16 ENCOUNTER — APPOINTMENT (OUTPATIENT)
Dept: INFUSION THERAPY | Facility: HOSPITAL | Age: 67
End: 2022-03-16

## 2022-03-16 ENCOUNTER — RESULT REVIEW (OUTPATIENT)
Age: 67
End: 2022-03-16

## 2022-03-16 ENCOUNTER — NON-APPOINTMENT (OUTPATIENT)
Age: 67
End: 2022-03-16

## 2022-03-16 LAB
ANION GAP SERPL CALC-SCNC: 16 MMOL/L — SIGNIFICANT CHANGE UP (ref 5–17)
BASOPHILS # BLD AUTO: 0.01 K/UL — SIGNIFICANT CHANGE UP (ref 0–0.2)
BASOPHILS NFR BLD AUTO: 0.1 % — SIGNIFICANT CHANGE UP (ref 0–2)
BUN SERPL-MCNC: 19 MG/DL — SIGNIFICANT CHANGE UP (ref 7–23)
CALCIUM SERPL-MCNC: 9.8 MG/DL — SIGNIFICANT CHANGE UP (ref 8.4–10.5)
CHLORIDE SERPL-SCNC: 103 MMOL/L — SIGNIFICANT CHANGE UP (ref 96–108)
CO2 SERPL-SCNC: 23 MMOL/L — SIGNIFICANT CHANGE UP (ref 22–31)
CREAT SERPL-MCNC: 0.88 MG/DL — SIGNIFICANT CHANGE UP (ref 0.5–1.3)
EGFR: 94 ML/MIN/1.73M2 — SIGNIFICANT CHANGE UP
EOSINOPHIL # BLD AUTO: 0 K/UL — SIGNIFICANT CHANGE UP (ref 0–0.5)
EOSINOPHIL NFR BLD AUTO: 0 % — SIGNIFICANT CHANGE UP (ref 0–6)
GLUCOSE SERPL-MCNC: 105 MG/DL — HIGH (ref 70–99)
HCT VFR BLD CALC: 39.1 % — SIGNIFICANT CHANGE UP (ref 39–50)
HGB BLD-MCNC: 13.5 G/DL — SIGNIFICANT CHANGE UP (ref 13–17)
IMM GRANULOCYTES NFR BLD AUTO: 0.4 % — SIGNIFICANT CHANGE UP (ref 0–1.5)
LDH SERPL L TO P-CCNC: 180 U/L — SIGNIFICANT CHANGE UP (ref 50–242)
LYMPHOCYTES # BLD AUTO: 1.04 K/UL — SIGNIFICANT CHANGE UP (ref 1–3.3)
LYMPHOCYTES # BLD AUTO: 8.8 % — LOW (ref 13–44)
MCHC RBC-ENTMCNC: 30.6 PG — SIGNIFICANT CHANGE UP (ref 27–34)
MCHC RBC-ENTMCNC: 34.5 G/DL — SIGNIFICANT CHANGE UP (ref 32–36)
MCV RBC AUTO: 88.7 FL — SIGNIFICANT CHANGE UP (ref 80–100)
MONOCYTES # BLD AUTO: 1.18 K/UL — HIGH (ref 0–0.9)
MONOCYTES NFR BLD AUTO: 9.9 % — SIGNIFICANT CHANGE UP (ref 2–14)
NEUTROPHILS # BLD AUTO: 9.6 K/UL — HIGH (ref 1.8–7.4)
NEUTROPHILS NFR BLD AUTO: 80.8 % — HIGH (ref 43–77)
NRBC # BLD: 0 /100 WBCS — SIGNIFICANT CHANGE UP (ref 0–0)
PHOSPHATE SERPL-MCNC: 3.3 MG/DL — SIGNIFICANT CHANGE UP (ref 2.5–4.5)
PLATELET # BLD AUTO: 198 K/UL — SIGNIFICANT CHANGE UP (ref 150–400)
POTASSIUM SERPL-MCNC: 4.4 MMOL/L — SIGNIFICANT CHANGE UP (ref 3.5–5.3)
POTASSIUM SERPL-SCNC: 4.4 MMOL/L — SIGNIFICANT CHANGE UP (ref 3.5–5.3)
RBC # BLD: 4.41 M/UL — SIGNIFICANT CHANGE UP (ref 4.2–5.8)
RBC # FLD: 12.2 % — SIGNIFICANT CHANGE UP (ref 10.3–14.5)
SODIUM SERPL-SCNC: 143 MMOL/L — SIGNIFICANT CHANGE UP (ref 135–145)
URATE SERPL-MCNC: 4.6 MG/DL — SIGNIFICANT CHANGE UP (ref 3.4–8.8)
WBC # BLD: 11.88 K/UL — HIGH (ref 3.8–10.5)
WBC # FLD AUTO: 11.88 K/UL — HIGH (ref 3.8–10.5)

## 2022-03-17 ENCOUNTER — RESULT REVIEW (OUTPATIENT)
Age: 67
End: 2022-03-17

## 2022-03-17 ENCOUNTER — APPOINTMENT (OUTPATIENT)
Dept: INFUSION THERAPY | Facility: HOSPITAL | Age: 67
End: 2022-03-17

## 2022-03-17 DIAGNOSIS — Z51.89 ENCOUNTER FOR OTHER SPECIFIED AFTERCARE: ICD-10-CM

## 2022-03-17 LAB
ANION GAP SERPL CALC-SCNC: 12 MMOL/L — SIGNIFICANT CHANGE UP (ref 5–17)
BASOPHILS # BLD AUTO: 0.01 K/UL — SIGNIFICANT CHANGE UP (ref 0–0.2)
BASOPHILS NFR BLD AUTO: 0.1 % — SIGNIFICANT CHANGE UP (ref 0–2)
BUN SERPL-MCNC: 22 MG/DL — SIGNIFICANT CHANGE UP (ref 7–23)
CALCIUM SERPL-MCNC: 9.7 MG/DL — SIGNIFICANT CHANGE UP (ref 8.4–10.5)
CHLORIDE SERPL-SCNC: 104 MMOL/L — SIGNIFICANT CHANGE UP (ref 96–108)
CO2 SERPL-SCNC: 26 MMOL/L — SIGNIFICANT CHANGE UP (ref 22–31)
CREAT SERPL-MCNC: 0.98 MG/DL — SIGNIFICANT CHANGE UP (ref 0.5–1.3)
EGFR: 84 ML/MIN/1.73M2 — SIGNIFICANT CHANGE UP
EOSINOPHIL # BLD AUTO: 0.01 K/UL — SIGNIFICANT CHANGE UP (ref 0–0.5)
EOSINOPHIL NFR BLD AUTO: 0.1 % — SIGNIFICANT CHANGE UP (ref 0–6)
GLUCOSE SERPL-MCNC: 106 MG/DL — HIGH (ref 70–99)
HCT VFR BLD CALC: 39.8 % — SIGNIFICANT CHANGE UP (ref 39–50)
HGB BLD-MCNC: 13.8 G/DL — SIGNIFICANT CHANGE UP (ref 13–17)
IMM GRANULOCYTES NFR BLD AUTO: 0.6 % — SIGNIFICANT CHANGE UP (ref 0–1.5)
LDH SERPL L TO P-CCNC: 185 U/L — SIGNIFICANT CHANGE UP (ref 50–242)
LYMPHOCYTES # BLD AUTO: 1.7 K/UL — SIGNIFICANT CHANGE UP (ref 1–3.3)
LYMPHOCYTES # BLD AUTO: 15.8 % — SIGNIFICANT CHANGE UP (ref 13–44)
MCHC RBC-ENTMCNC: 30.2 PG — SIGNIFICANT CHANGE UP (ref 27–34)
MCHC RBC-ENTMCNC: 34.7 G/DL — SIGNIFICANT CHANGE UP (ref 32–36)
MCV RBC AUTO: 87.1 FL — SIGNIFICANT CHANGE UP (ref 80–100)
MONOCYTES # BLD AUTO: 0.97 K/UL — HIGH (ref 0–0.9)
MONOCYTES NFR BLD AUTO: 9 % — SIGNIFICANT CHANGE UP (ref 2–14)
NEUTROPHILS # BLD AUTO: 8.03 K/UL — HIGH (ref 1.8–7.4)
NEUTROPHILS NFR BLD AUTO: 74.4 % — SIGNIFICANT CHANGE UP (ref 43–77)
NRBC # BLD: 0 /100 WBCS — SIGNIFICANT CHANGE UP (ref 0–0)
PHOSPHATE SERPL-MCNC: 3.2 MG/DL — SIGNIFICANT CHANGE UP (ref 2.5–4.5)
PLATELET # BLD AUTO: 174 K/UL — SIGNIFICANT CHANGE UP (ref 150–400)
POTASSIUM SERPL-MCNC: 4.4 MMOL/L — SIGNIFICANT CHANGE UP (ref 3.5–5.3)
POTASSIUM SERPL-SCNC: 4.4 MMOL/L — SIGNIFICANT CHANGE UP (ref 3.5–5.3)
RBC # BLD: 4.57 M/UL — SIGNIFICANT CHANGE UP (ref 4.2–5.8)
RBC # FLD: 12.4 % — SIGNIFICANT CHANGE UP (ref 10.3–14.5)
SODIUM SERPL-SCNC: 141 MMOL/L — SIGNIFICANT CHANGE UP (ref 135–145)
URATE SERPL-MCNC: 4.4 MG/DL — SIGNIFICANT CHANGE UP (ref 3.4–8.8)
WBC # BLD: 10.79 K/UL — HIGH (ref 3.8–10.5)
WBC # FLD AUTO: 10.79 K/UL — HIGH (ref 3.8–10.5)

## 2022-03-30 ENCOUNTER — RESULT REVIEW (OUTPATIENT)
Age: 67
End: 2022-03-30

## 2022-03-30 ENCOUNTER — APPOINTMENT (OUTPATIENT)
Dept: INFUSION THERAPY | Facility: HOSPITAL | Age: 67
End: 2022-03-30

## 2022-03-30 ENCOUNTER — APPOINTMENT (OUTPATIENT)
Dept: HEMATOLOGY ONCOLOGY | Facility: CLINIC | Age: 67
End: 2022-03-30
Payer: COMMERCIAL

## 2022-03-30 ENCOUNTER — OUTPATIENT (OUTPATIENT)
Dept: OUTPATIENT SERVICES | Facility: HOSPITAL | Age: 67
LOS: 1 days | End: 2022-03-30
Payer: COMMERCIAL

## 2022-03-30 ENCOUNTER — APPOINTMENT (OUTPATIENT)
Dept: MRI IMAGING | Facility: IMAGING CENTER | Age: 67
End: 2022-03-30
Payer: COMMERCIAL

## 2022-03-30 VITALS
BODY MASS INDEX: 31.73 KG/M2 | WEIGHT: 221.12 LBS | RESPIRATION RATE: 16 BRPM | SYSTOLIC BLOOD PRESSURE: 131 MMHG | DIASTOLIC BLOOD PRESSURE: 53 MMHG | OXYGEN SATURATION: 98 % | HEART RATE: 78 BPM | TEMPERATURE: 96.4 F

## 2022-03-30 DIAGNOSIS — Z00.8 ENCOUNTER FOR OTHER GENERAL EXAMINATION: ICD-10-CM

## 2022-03-30 DIAGNOSIS — C34.12 MALIGNANT NEOPLASM OF UPPER LOBE, LEFT BRONCHUS OR LUNG: ICD-10-CM

## 2022-03-30 LAB
BASOPHILS # BLD AUTO: 0.04 K/UL — SIGNIFICANT CHANGE UP (ref 0–0.2)
BASOPHILS NFR BLD AUTO: 0.5 % — SIGNIFICANT CHANGE UP (ref 0–2)
EOSINOPHIL # BLD AUTO: 0.02 K/UL — SIGNIFICANT CHANGE UP (ref 0–0.5)
EOSINOPHIL NFR BLD AUTO: 0.2 % — SIGNIFICANT CHANGE UP (ref 0–6)
HCT VFR BLD CALC: 39.2 % — SIGNIFICANT CHANGE UP (ref 39–50)
HGB BLD-MCNC: 13 G/DL — SIGNIFICANT CHANGE UP (ref 13–17)
IMM GRANULOCYTES NFR BLD AUTO: 2.4 % — HIGH (ref 0–1.5)
LYMPHOCYTES # BLD AUTO: 1.71 K/UL — SIGNIFICANT CHANGE UP (ref 1–3.3)
LYMPHOCYTES # BLD AUTO: 20.5 % — SIGNIFICANT CHANGE UP (ref 13–44)
MCHC RBC-ENTMCNC: 30 PG — SIGNIFICANT CHANGE UP (ref 27–34)
MCHC RBC-ENTMCNC: 33.2 G/DL — SIGNIFICANT CHANGE UP (ref 32–36)
MCV RBC AUTO: 90.3 FL — SIGNIFICANT CHANGE UP (ref 80–100)
MONOCYTES # BLD AUTO: 0.82 K/UL — SIGNIFICANT CHANGE UP (ref 0–0.9)
MONOCYTES NFR BLD AUTO: 9.8 % — SIGNIFICANT CHANGE UP (ref 2–14)
NEUTROPHILS # BLD AUTO: 5.55 K/UL — SIGNIFICANT CHANGE UP (ref 1.8–7.4)
NEUTROPHILS NFR BLD AUTO: 66.6 % — SIGNIFICANT CHANGE UP (ref 43–77)
NRBC # BLD: 0 /100 WBCS — SIGNIFICANT CHANGE UP (ref 0–0)
PLATELET # BLD AUTO: 123 K/UL — LOW (ref 150–400)
RBC # BLD: 4.34 M/UL — SIGNIFICANT CHANGE UP (ref 4.2–5.8)
RBC # FLD: 12.7 % — SIGNIFICANT CHANGE UP (ref 10.3–14.5)
WBC # BLD: 8.34 K/UL — SIGNIFICANT CHANGE UP (ref 3.8–10.5)
WBC # FLD AUTO: 8.34 K/UL — SIGNIFICANT CHANGE UP (ref 3.8–10.5)

## 2022-03-30 PROCEDURE — 99214 OFFICE O/P EST MOD 30 MIN: CPT

## 2022-03-30 PROCEDURE — 70553 MRI BRAIN STEM W/O & W/DYE: CPT | Mod: 26

## 2022-03-30 PROCEDURE — 70553 MRI BRAIN STEM W/O & W/DYE: CPT

## 2022-03-30 PROCEDURE — A9585: CPT

## 2022-03-31 LAB
ALBUMIN SERPL ELPH-MCNC: 4.5 G/DL — SIGNIFICANT CHANGE UP (ref 3.3–5)
ALP SERPL-CCNC: 84 U/L — SIGNIFICANT CHANGE UP (ref 40–120)
ALT FLD-CCNC: 16 U/L — SIGNIFICANT CHANGE UP (ref 10–45)
ANION GAP SERPL CALC-SCNC: 15 MMOL/L — SIGNIFICANT CHANGE UP (ref 5–17)
AST SERPL-CCNC: 12 U/L — SIGNIFICANT CHANGE UP (ref 10–40)
BILIRUB SERPL-MCNC: 0.2 MG/DL — SIGNIFICANT CHANGE UP (ref 0.2–1.2)
BUN SERPL-MCNC: 17 MG/DL — SIGNIFICANT CHANGE UP (ref 7–23)
CALCIUM SERPL-MCNC: 9.6 MG/DL — SIGNIFICANT CHANGE UP (ref 8.4–10.5)
CHLORIDE SERPL-SCNC: 104 MMOL/L — SIGNIFICANT CHANGE UP (ref 96–108)
CO2 SERPL-SCNC: 23 MMOL/L — SIGNIFICANT CHANGE UP (ref 22–31)
CREAT SERPL-MCNC: 1.11 MG/DL — SIGNIFICANT CHANGE UP (ref 0.5–1.3)
EGFR: 72 ML/MIN/1.73M2 — SIGNIFICANT CHANGE UP
GLUCOSE SERPL-MCNC: 85 MG/DL — SIGNIFICANT CHANGE UP (ref 70–99)
LDH SERPL L TO P-CCNC: 219 U/L — SIGNIFICANT CHANGE UP (ref 50–242)
POTASSIUM SERPL-MCNC: 4.7 MMOL/L — SIGNIFICANT CHANGE UP (ref 3.5–5.3)
POTASSIUM SERPL-SCNC: 4.7 MMOL/L — SIGNIFICANT CHANGE UP (ref 3.5–5.3)
PROT SERPL-MCNC: 6.8 G/DL — SIGNIFICANT CHANGE UP (ref 6–8.3)
SODIUM SERPL-SCNC: 142 MMOL/L — SIGNIFICANT CHANGE UP (ref 135–145)

## 2022-04-01 NOTE — RESULTS/DATA
[FreeTextEntry1] : -CT chest 3/25/19: Infiltrative left hilar mass extending to the left upper lobe and left mediastinum compatible with neoplasm with encasement of the pulmonary artery and left upper lobe bronchial structures with collapse of the left upper lobe. Lytic lesions in the upper thoracic spine. Innumerable hypodense masses throughout the liver compatible with metastases. Small left pleural effusion.\par \par -CT C/A/P 4/5/19:  Large mass in the left hilum extending into the left upper lobe consistent with bronchogenic malignancy with possible metastatic lymphadenopathy. Significant compression of the left lobe pulmonary artery with obstruction of the left upper lobe bronchus with resultant atelectasis. Innumerable lesions throughout the liver. Upper abdominal lymphadenopathy. Bony metastases of the spine, pelvis and bilateral hips.\par \par -MRI brain/T-spine in 3/28/19: Negative for metastatic disease involving the brain. Of the central canal at T9-T10 concerning for epidural metastasis.\par \par -Nuclear medicine bone scan 4/5/19: Focal uptake in one of the right lower lateral ribs, possibly the 10th rib, likely represents a small fracture. Metastatic disease is less likely. No additional areas of abnormal uptake are noted.\par \par -PET/CT 6/26/19: Markedly improved positive treatment response. The previously seen a large left-sided upper lobe lung mass with hilar extension is markedly reduced in size with mild or residual ill-defined soft tissue density at the left hilar region now demonstrating only partial invasion of the left upper lobe bronchus. No separate mediastinal lymphadenopathy is noted. Markedly improved appearance of both the numerous metastatic liver lesions and hepatomegaly. Interval resolution of previously seen upper abdominal lymphadenopathy. No residual active osseous metastatic disease.\par \par -PET/CT 10/17/19:  FDG-PET/CT scan demonstrates: \par 1. Minimally FDG-avid, small, difficult to delineate left upper lobe/perihilar soft tissue, unchanged as compared to prior study dated 6/25/2019. Small focus of residual disease is not excluded. \par 2. Non-FDG-avid low-attenuation density in pretracheal cricoid region, unchanged. \par 3. Remainder of study is unremarkable, demonstrating no evidence of FDG-avid disease. \par \par -MRI Brain 10/20/19: No evidence of metastasis\par \par -PET/CT 1/15/20:  FDG-PET/CT scan demonstrates: \par 1. New, indeterminate linear focus of mild FDG activity in left suprahilar/paramediastinal region (SUV 4.1). \par 2. Minimally FDG-avid, small, difficult to delineate left upper lobe/perihilar soft tissue, unchanged as compared to prior study dated 6/25/2019. Small focus of residual disease is not excluded. \par 3. Remainder of study is unchanged, demonstrating no evidence of FDG-avid disease. \par \par -CT Abdomen/Pelvis 2/19/20:  No evidence of focal bowel wall thickening or distention.  Right inguinal hernia \par \par -CT Chest 4/8/20: Stable disease\par \par -MRI Brain 3/5/20: New opacification involving the right mastoid and middle ear region as described above, otherwise no significant change when allowing for differences in technique. \par \par -PET/CT 6/25/20:  Compared to FDG-PET/CT scan dated 1/15/2020: \par 1. Focus of mild FDG activity in left upper lobe paramediastinal region is unchanged. This may reflect posttreatment change. \par 2. Non-FDG-avid 7 mm nodular density within posterior left upper lobe, also unchanged. \par 3. Nonspecific hypermetabolism in distal esophagus and fundus/body of stomach, mildly decreased. Correlate clinically for esophagitis/gastritis and with endoscopy, as indicated. \par 4. Remainder of study is unchanged, demonstrating no evidence of FDG-avid recurrent or metastatic disease. \par \par -PET/CT 9/1/20:  Compared to FDG-PET/CT scan dated 6/25/2020: \par 1. Resolution of focus of mild FDG activity in left upper lobe paramediastinal region. \par 2. Non-FDG-avid 7 mm nodular density within posterior left upper lobe, unchanged. \par 3. Nonspecific hypermetabolism in distal esophagus and fundus/body, unchanged. \par 4. No evidence of FDG-avid recurrent or metastatic disease. \par \par -MRI Brain 12/14/20:  No evidence acute hemorrhage, mass mass effect or abnormal enhancement seen.  Improved aeration of the right mastoid and middle ear region.\par \par -PET/CT 12/14/20:  Compared to FDG-PET/CT scan on 9/1/2020:\par 1. Nonspecific minimal increased FDG avidity in the gastric fundus/body. Please correlate clinically or with endoscopy as indicated.\par 2. Non-FDG avid 7 mm nodular density within the posterior left upper lobe, unchanged.\par 3. New focal cutaneous FDG avidity along the left lower aspect of the face, probably focal inflammation. Please correlate with physical examination.\par 4. Nonspecific new focal mild hypermetabolism in the anterior aspect of the hyoid bone just to the right of the midline without CT correlate. Unchanged non-FDG avid low-attenuation density in the precricoid region. Please correlate clinically.\par \par -PET/CT 3/5/21:  Compared to FDG-PET/CT scan dated 12/14/2020:\par 1. Few new FDG avid left axillary lymph nodes are nonspecific. Suggest correlation with ultrasound and possible percutaneous FNA biopsy as indicated.\par 2. Nonspecific FDG avidity in mid to distal esophagus, gastric fundus/body, slightly more prominent, with new FDG avidity in the pylorus. Please correlate clinically or with endoscopy as indicated.\par 3. Nonspecific non-FDG avid groundglass opacity in the anterior right upper lobe, more conspicuous.\par 4. Interval resolution of focal mild hypermetabolism in the anterior aspect of the hyoid bone and focal cutaneous FDG avidity in the left lower face. Unchanged non-FDG avid low-attenuation density in the cricoarytenoid region.\par \par -Esophagram: Small hiatal hernia without demonstrated gastroesophageal reflux. There is esophagoesophageal reflux.\par \par -Pet/CT 6/20/21:  Compared to FDG-PET/CT scan dated 3/5/2021:\par 1. Interval resolution of a few mildly FDG avid left axillary lymph nodes and small mildly FDG avid foci in bilateral hilar regions.\par 2. Nonspecific FDG avidity in mid to distal esophagus, gastric boy and pylorus, unchanged. Please correlate clinically or with endoscopy as indicated.\par 3. Unchanged nonspecific non-FDG avid groundglass opacity in the anterior right upper lobe.\par 4. Non-FDG avid low-attenuation density in the pretracheal region, decreased in size.\par \par -MRI Brain 6/21/21:  There is no intraparenchymal hematoma, mass effect or midline shift.  No evidence of intracranial metastatic disease. Slight interval increase in density within the right mastoid and middle ear regions when compared most recent prior examination.\par \par -PET/CT 9/13/21:  Compared to FDG-PET/CT scan dated 6/20/2021:\par 1. New cluster of FDG-avid nodules in left upper lobe. Primary consideration is infection/mucoid impacted distal airways. Please correlate clinically and follow-up with dedicated CT of chest in 1 month.\par 2. Nonspecific FDG avidity in mid and distal esophagus, unchanged. Resolution of gastric hypermetabolism.\par 3. Nonspecific, non-FDG avid right upper lobe groundglass opacity, unchanged.\par 4. Non-FDG avid low-attenuation density in the pretracheal region, unchanged.\par \par -MRI Brain 12/1/21:  No significant change when allowing for differences in technique.\par \par -PET/CT 12/6/21:  Compared to FDG-PET/CT scan dated 6/20/2021:\par 1. Mildly FDG-avid cluster of nodules in left upper lobe, unchanged on CT, and decreased in metabolism. An infectious/inflammatory etiology is favored. Please correlate clinically. A dedicated CT of chest may be obtained for further characterization.\par 2. Nonspecific FDG avidity in mid and distal esophagus, unchanged.\par 3. Nonspecific, non-FDG avid right upper lobe groundglass opacity, unchanged.\par 4. Non-FDG avid low-attenuation density in the pretracheal region, unchanged.\par \par -PET/CT 3/7/22:  Compared to FDG-PET/CT scan dated 12/6/2021:\par 1. FDG-avid, centrally photopenic left upper lobe lung mass and adjacent FDG-avid nodules, markedly increased in size and metabolism as compared to prior study, are compatible with recurrent disease.\par 2. Nonspecific FDG avidity in mid and distal esophagus, unchanged.\par 3. Nonspecific, non-FDG avid right upper lobe groundglass opacity, unchanged.\par 4. Non-FDG avid low-attenuation density in the pretracheal region, unchanged.\par \par

## 2022-04-01 NOTE — ASSESSMENT
[FreeTextEntry1] : Extensive stage small cell lung cancer diagnosed late March 2019 who was treated with chemotherapy plus immunotherapy from April-June 2019; achieved CR. Treated with immunotherapy maintenance from June 2019 through May 2021 with sustained response.  Developed immunotherapy related esophagitis; started on steroids in June 2021 with clinical response.  Restaging PET/CT March 2022 with disease progression.  Started re-treatment with Carboplatin/Etoposide chemotherapy March 2022.  \par Now s/p C1 on 3/15-3/17/22.  \par Recommend: \par -Port flush with labs today \par –Continue chemotherapy with Carboplatin and Etoposide as scheduled; for C2 next week.  Carboplatin administered as a retreat protocol.  Patient is on chronic steroids and therefore avoiding the additional dexamethasone dosing the evening before.\par – Continue Pegfilgrastim/Onpro or biosimilar equivalent for prophylaxis of chemotherapy induced neutropenic complications\par –Depending on his response to chemotherapy, we will plan to treat him for up to 4–6 cycles\par –Continue on current steroid dosing with prednisone for his immunotherapy related esophagitis; currently on 5mg daily.  He has previously declined to utilize famotidine or PPI prophylaxis due to constipation.\par –If patient is benefiting and tolerating chemotherapy, previously discussed the option of potentially including durvalumab with one of the latter cycles of his chemotherapy regimen.  Incorporation of this agent would enable him to continue on this in a maintenance fashion.  Decision regarding this TBD. \par -Continue symptom management under care of Palliative Care Dr. Moreno\par -Continue levothyroxine; monitor periodic TFT's \par -Avoided addition of bone modifying agent given the dramatic response in the bones\par -Mediport in place\par - F/U Brain MRI results; can call him with this \par -Check out form given to patient with instructions for making appointments for his mid-cycle f/u visit.  Will obtain labs at mid-cycle via Port flush.  \par -Restaging CT Chest following C3 to assess response ordered at today’s visit.  This scan encompasses his known areas of disease on his most recent PET/CT\par -F/U following C2 mid-cycle or sooner should problems arise

## 2022-04-01 NOTE — HISTORY OF PRESENT ILLNESS
[Disease: _____________________] : Disease: [unfilled] [AJCC Stage: ____] : AJCC Stage: [unfilled] [de-identified] : The patient is a former smoker with a history of CAD who developed chest pain in March 2019 and had a cardiac stent placed. When his chest pain persisted, he was sent for a CT scan of his chest in late March 2019 that revealed a left upper lobe lung mass with evidence of metastatic disease. 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 CT scan revealed evidence of extensive bony and liver metastases. MRI of his brain was negative for brain metastases however there was extensive calvarial and T-spine metastases with possible evidence of epidural involvement. He was treated in [place] by an outside medical oncologist and started on carboplatin, etoposide and atezolizumab in early April 2019. He received 4 cycles of triplet combination therapy followed by immunotherapy maintenance. A restaging PET/CT scan in June 2019 revealed a dramatic response. He moved from [place] and transferred his care to [facility].  \par Treated with immunotherapy maintenance from June 2019 through May 2021 with sustained response.  \par Developed severe dysphagia and odynophagia.  He was treated for H-pylori and symptoms continued; H Pylori stool Ag was negative.  PET/CT with findings consistent with esophagitis/gastritis.  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  Restaging PET/CT March 2022 with disease progression.  Started chemotherapy with Carboplatin/Etoposide in March 2022.   [de-identified] : -Lung, left hilar FNA 3/29/19: Positive for malignant cells. Poorly differentiated neuroendocrine carcinoma. IHC is positive for MCK, TTF-1 and synaptophysin while negative for desmoglein3, P40, NapsinA, CD20, PAX5, CD3.  Ki-67 is markedly high. [de-identified] : *Wife provided translation where needed\par Restaging PET/CT this month with disease progression, involving the DAGO primary.   \par Started re-treatment with Carboplatin/Etoposide chemotherapy; now s/p C1 on 3/15-3/17.  Overall tolerated therapy fairly well.  There was no evidence of TLS.  \par He reports fatigue.  No F/C/N/V/D.  He has CASH and dry cough.  Esophagitis Sx improved somewhat; he is taking Prednisone 5mg daily only.  \par \par Went for Brain MRI earlier today; results pending.

## 2022-04-01 NOTE — PHYSICAL EXAM
[Restricted in physically strenuous activity but ambulatory and able to carry out work of a light or sedentary nature] : Status 1- Restricted in physically strenuous activity but ambulatory and able to carry out work of a light or sedentary nature, e.g., light house work, office work [Normal] : affect appropriate [de-identified] : No icterus  [de-identified] : MMM O/P Clear  [de-identified] : Clear  [de-identified] : Supple No LAD  [de-identified] : S1 S2  [de-identified] : No edema  [de-identified] : No spine/CVA tenderness [de-identified] : Mediport in place

## 2022-04-05 ENCOUNTER — RESULT REVIEW (OUTPATIENT)
Age: 67
End: 2022-04-05

## 2022-04-05 ENCOUNTER — APPOINTMENT (OUTPATIENT)
Dept: INFUSION THERAPY | Facility: HOSPITAL | Age: 67
End: 2022-04-05

## 2022-04-05 LAB
BASOPHILS # BLD AUTO: 0.03 K/UL — SIGNIFICANT CHANGE UP (ref 0–0.2)
BASOPHILS NFR BLD AUTO: 0.4 % — SIGNIFICANT CHANGE UP (ref 0–2)
EOSINOPHIL # BLD AUTO: 0.06 K/UL — SIGNIFICANT CHANGE UP (ref 0–0.5)
EOSINOPHIL NFR BLD AUTO: 0.9 % — SIGNIFICANT CHANGE UP (ref 0–6)
HCT VFR BLD CALC: 39.1 % — SIGNIFICANT CHANGE UP (ref 39–50)
HGB BLD-MCNC: 13.4 G/DL — SIGNIFICANT CHANGE UP (ref 13–17)
IMM GRANULOCYTES NFR BLD AUTO: 1 % — SIGNIFICANT CHANGE UP (ref 0–1.5)
LYMPHOCYTES # BLD AUTO: 1.93 K/UL — SIGNIFICANT CHANGE UP (ref 1–3.3)
LYMPHOCYTES # BLD AUTO: 27.8 % — SIGNIFICANT CHANGE UP (ref 13–44)
MCHC RBC-ENTMCNC: 30.4 PG — SIGNIFICANT CHANGE UP (ref 27–34)
MCHC RBC-ENTMCNC: 34.3 G/DL — SIGNIFICANT CHANGE UP (ref 32–36)
MCV RBC AUTO: 88.7 FL — SIGNIFICANT CHANGE UP (ref 80–100)
MONOCYTES # BLD AUTO: 0.77 K/UL — SIGNIFICANT CHANGE UP (ref 0–0.9)
MONOCYTES NFR BLD AUTO: 11.1 % — SIGNIFICANT CHANGE UP (ref 2–14)
NEUTROPHILS # BLD AUTO: 4.09 K/UL — SIGNIFICANT CHANGE UP (ref 1.8–7.4)
NEUTROPHILS NFR BLD AUTO: 58.8 % — SIGNIFICANT CHANGE UP (ref 43–77)
NRBC # BLD: 0 /100 WBCS — SIGNIFICANT CHANGE UP (ref 0–0)
PLATELET # BLD AUTO: 208 K/UL — SIGNIFICANT CHANGE UP (ref 150–400)
RBC # BLD: 4.41 M/UL — SIGNIFICANT CHANGE UP (ref 4.2–5.8)
RBC # FLD: 12.9 % — SIGNIFICANT CHANGE UP (ref 10.3–14.5)
WBC # BLD: 6.95 K/UL — SIGNIFICANT CHANGE UP (ref 3.8–10.5)
WBC # FLD AUTO: 6.95 K/UL — SIGNIFICANT CHANGE UP (ref 3.8–10.5)

## 2022-04-06 ENCOUNTER — APPOINTMENT (OUTPATIENT)
Dept: INFUSION THERAPY | Facility: HOSPITAL | Age: 67
End: 2022-04-06

## 2022-04-07 ENCOUNTER — APPOINTMENT (OUTPATIENT)
Dept: INFUSION THERAPY | Facility: HOSPITAL | Age: 67
End: 2022-04-07

## 2022-04-14 ENCOUNTER — OUTPATIENT (OUTPATIENT)
Dept: OUTPATIENT SERVICES | Facility: HOSPITAL | Age: 67
LOS: 1 days | Discharge: HOME | End: 2022-04-14

## 2022-04-14 DIAGNOSIS — C34.90 MALIGNANT NEOPLASM OF UNSPECIFIED PART OF UNSPECIFIED BRONCHUS OR LUNG: ICD-10-CM

## 2022-04-20 ENCOUNTER — RESULT REVIEW (OUTPATIENT)
Age: 67
End: 2022-04-20

## 2022-04-20 ENCOUNTER — APPOINTMENT (OUTPATIENT)
Dept: HEMATOLOGY ONCOLOGY | Facility: CLINIC | Age: 67
End: 2022-04-20
Payer: COMMERCIAL

## 2022-04-20 ENCOUNTER — APPOINTMENT (OUTPATIENT)
Dept: INFUSION THERAPY | Facility: HOSPITAL | Age: 67
End: 2022-04-20

## 2022-04-20 VITALS
WEIGHT: 222.64 LBS | OXYGEN SATURATION: 98 % | SYSTOLIC BLOOD PRESSURE: 146 MMHG | BODY MASS INDEX: 31.95 KG/M2 | HEART RATE: 75 BPM | TEMPERATURE: 96.8 F | DIASTOLIC BLOOD PRESSURE: 74 MMHG | RESPIRATION RATE: 16 BRPM

## 2022-04-20 LAB
BASOPHILS # BLD AUTO: 0.03 K/UL — SIGNIFICANT CHANGE UP (ref 0–0.2)
BASOPHILS NFR BLD AUTO: 0.4 % — SIGNIFICANT CHANGE UP (ref 0–2)
EOSINOPHIL # BLD AUTO: 0.05 K/UL — SIGNIFICANT CHANGE UP (ref 0–0.5)
EOSINOPHIL NFR BLD AUTO: 0.7 % — SIGNIFICANT CHANGE UP (ref 0–6)
HCT VFR BLD CALC: 36.2 % — LOW (ref 39–50)
HGB BLD-MCNC: 11.9 G/DL — LOW (ref 13–17)
IMM GRANULOCYTES NFR BLD AUTO: 2.1 % — HIGH (ref 0–1.5)
LYMPHOCYTES # BLD AUTO: 2.03 K/UL — SIGNIFICANT CHANGE UP (ref 1–3.3)
LYMPHOCYTES # BLD AUTO: 26.6 % — SIGNIFICANT CHANGE UP (ref 13–44)
MCHC RBC-ENTMCNC: 29.8 PG — SIGNIFICANT CHANGE UP (ref 27–34)
MCHC RBC-ENTMCNC: 32.9 G/DL — SIGNIFICANT CHANGE UP (ref 32–36)
MCV RBC AUTO: 90.7 FL — SIGNIFICANT CHANGE UP (ref 80–100)
MONOCYTES # BLD AUTO: 0.91 K/UL — HIGH (ref 0–0.9)
MONOCYTES NFR BLD AUTO: 11.9 % — SIGNIFICANT CHANGE UP (ref 2–14)
NEUTROPHILS # BLD AUTO: 4.44 K/UL — SIGNIFICANT CHANGE UP (ref 1.8–7.4)
NEUTROPHILS NFR BLD AUTO: 58.3 % — SIGNIFICANT CHANGE UP (ref 43–77)
NRBC # BLD: 0 /100 WBCS — SIGNIFICANT CHANGE UP (ref 0–0)
PLATELET # BLD AUTO: 81 K/UL — LOW (ref 150–400)
RBC # BLD: 3.99 M/UL — LOW (ref 4.2–5.8)
RBC # FLD: 13.6 % — SIGNIFICANT CHANGE UP (ref 10.3–14.5)
WBC # BLD: 7.62 K/UL — SIGNIFICANT CHANGE UP (ref 3.8–10.5)
WBC # FLD AUTO: 7.62 K/UL — SIGNIFICANT CHANGE UP (ref 3.8–10.5)

## 2022-04-20 PROCEDURE — 99214 OFFICE O/P EST MOD 30 MIN: CPT

## 2022-04-21 LAB
ALBUMIN SERPL ELPH-MCNC: 4.2 G/DL — SIGNIFICANT CHANGE UP (ref 3.3–5)
ALP SERPL-CCNC: 86 U/L — SIGNIFICANT CHANGE UP (ref 40–120)
ALT FLD-CCNC: 17 U/L — SIGNIFICANT CHANGE UP (ref 10–45)
ANION GAP SERPL CALC-SCNC: 13 MMOL/L — SIGNIFICANT CHANGE UP (ref 5–17)
AST SERPL-CCNC: 14 U/L — SIGNIFICANT CHANGE UP (ref 10–40)
BILIRUB SERPL-MCNC: 0.3 MG/DL — SIGNIFICANT CHANGE UP (ref 0.2–1.2)
BUN SERPL-MCNC: 12 MG/DL — SIGNIFICANT CHANGE UP (ref 7–23)
CALCIUM SERPL-MCNC: 9.2 MG/DL — SIGNIFICANT CHANGE UP (ref 8.4–10.5)
CHLORIDE SERPL-SCNC: 106 MMOL/L — SIGNIFICANT CHANGE UP (ref 96–108)
CO2 SERPL-SCNC: 23 MMOL/L — SIGNIFICANT CHANGE UP (ref 22–31)
CREAT SERPL-MCNC: 0.67 MG/DL — SIGNIFICANT CHANGE UP (ref 0.5–1.3)
EGFR: 102 ML/MIN/1.73M2 — SIGNIFICANT CHANGE UP
GLUCOSE SERPL-MCNC: 94 MG/DL — SIGNIFICANT CHANGE UP (ref 70–99)
LDH SERPL L TO P-CCNC: 198 U/L — SIGNIFICANT CHANGE UP (ref 50–242)
POTASSIUM SERPL-MCNC: 4.1 MMOL/L — SIGNIFICANT CHANGE UP (ref 3.5–5.3)
POTASSIUM SERPL-SCNC: 4.1 MMOL/L — SIGNIFICANT CHANGE UP (ref 3.5–5.3)
PROT SERPL-MCNC: 6.5 G/DL — SIGNIFICANT CHANGE UP (ref 6–8.3)
SODIUM SERPL-SCNC: 142 MMOL/L — SIGNIFICANT CHANGE UP (ref 135–145)

## 2022-04-24 NOTE — RESULTS/DATA
[FreeTextEntry1] : -CT chest 3/25/19: Infiltrative left hilar mass extending to the left upper lobe and left mediastinum compatible with neoplasm with encasement of the pulmonary artery and left upper lobe bronchial structures with collapse of the left upper lobe. Lytic lesions in the upper thoracic spine. Innumerable hypodense masses throughout the liver compatible with metastases. Small left pleural effusion.\par \par -CT C/A/P 4/5/19:  Large mass in the left hilum extending into the left upper lobe consistent with bronchogenic malignancy with possible metastatic lymphadenopathy. Significant compression of the left lobe pulmonary artery with obstruction of the left upper lobe bronchus with resultant atelectasis. Innumerable lesions throughout the liver. Upper abdominal lymphadenopathy. Bony metastases of the spine, pelvis and bilateral hips.\par \par -MRI brain/T-spine in 3/28/19: Negative for metastatic disease involving the brain. Of the central canal at T9-T10 concerning for epidural metastasis.\par \par -Nuclear medicine bone scan 4/5/19: Focal uptake in one of the right lower lateral ribs, possibly the 10th rib, likely represents a small fracture. Metastatic disease is less likely. No additional areas of abnormal uptake are noted.\par \par -PET/CT 6/26/19: Markedly improved positive treatment response. The previously seen a large left-sided upper lobe lung mass with hilar extension is markedly reduced in size with mild or residual ill-defined soft tissue density at the left hilar region now demonstrating only partial invasion of the left upper lobe bronchus. No separate mediastinal lymphadenopathy is noted. Markedly improved appearance of both the numerous metastatic liver lesions and hepatomegaly. Interval resolution of previously seen upper abdominal lymphadenopathy. No residual active osseous metastatic disease.\par \par -PET/CT 10/17/19:  FDG-PET/CT scan demonstrates: \par 1. Minimally FDG-avid, small, difficult to delineate left upper lobe/perihilar soft tissue, unchanged as compared to prior study dated 6/25/2019. Small focus of residual disease is not excluded. \par 2. Non-FDG-avid low-attenuation density in pretracheal cricoid region, unchanged. \par 3. Remainder of study is unremarkable, demonstrating no evidence of FDG-avid disease. \par \par -MRI Brain 10/20/19: No evidence of metastasis\par \par -PET/CT 1/15/20:  FDG-PET/CT scan demonstrates: \par 1. New, indeterminate linear focus of mild FDG activity in left suprahilar/paramediastinal region (SUV 4.1). \par 2. Minimally FDG-avid, small, difficult to delineate left upper lobe/perihilar soft tissue, unchanged as compared to prior study dated 6/25/2019. Small focus of residual disease is not excluded. \par 3. Remainder of study is unchanged, demonstrating no evidence of FDG-avid disease. \par \par -CT Abdomen/Pelvis 2/19/20:  No evidence of focal bowel wall thickening or distention.  Right inguinal hernia \par \par -CT Chest 4/8/20: Stable disease\par \par -MRI Brain 3/5/20: New opacification involving the right mastoid and middle ear region as described above, otherwise no significant change when allowing for differences in technique. \par \par -PET/CT 6/25/20:  Compared to FDG-PET/CT scan dated 1/15/2020: \par 1. Focus of mild FDG activity in left upper lobe paramediastinal region is unchanged. This may reflect posttreatment change. \par 2. Non-FDG-avid 7 mm nodular density within posterior left upper lobe, also unchanged. \par 3. Nonspecific hypermetabolism in distal esophagus and fundus/body of stomach, mildly decreased. Correlate clinically for esophagitis/gastritis and with endoscopy, as indicated. \par 4. Remainder of study is unchanged, demonstrating no evidence of FDG-avid recurrent or metastatic disease. \par \par -PET/CT 9/1/20:  Compared to FDG-PET/CT scan dated 6/25/2020: \par 1. Resolution of focus of mild FDG activity in left upper lobe paramediastinal region. \par 2. Non-FDG-avid 7 mm nodular density within posterior left upper lobe, unchanged. \par 3. Nonspecific hypermetabolism in distal esophagus and fundus/body, unchanged. \par 4. No evidence of FDG-avid recurrent or metastatic disease. \par \par -MRI Brain 12/14/20:  No evidence acute hemorrhage, mass mass effect or abnormal enhancement seen.  Improved aeration of the right mastoid and middle ear region.\par \par -PET/CT 12/14/20:  Compared to FDG-PET/CT scan on 9/1/2020:\par 1. Nonspecific minimal increased FDG avidity in the gastric fundus/body. Please correlate clinically or with endoscopy as indicated.\par 2. Non-FDG avid 7 mm nodular density within the posterior left upper lobe, unchanged.\par 3. New focal cutaneous FDG avidity along the left lower aspect of the face, probably focal inflammation. Please correlate with physical examination.\par 4. Nonspecific new focal mild hypermetabolism in the anterior aspect of the hyoid bone just to the right of the midline without CT correlate. Unchanged non-FDG avid low-attenuation density in the precricoid region. Please correlate clinically.\par \par -PET/CT 3/5/21:  Compared to FDG-PET/CT scan dated 12/14/2020:\par 1. Few new FDG avid left axillary lymph nodes are nonspecific. Suggest correlation with ultrasound and possible percutaneous FNA biopsy as indicated.\par 2. Nonspecific FDG avidity in mid to distal esophagus, gastric fundus/body, slightly more prominent, with new FDG avidity in the pylorus. Please correlate clinically or with endoscopy as indicated.\par 3. Nonspecific non-FDG avid groundglass opacity in the anterior right upper lobe, more conspicuous.\par 4. Interval resolution of focal mild hypermetabolism in the anterior aspect of the hyoid bone and focal cutaneous FDG avidity in the left lower face. Unchanged non-FDG avid low-attenuation density in the cricoarytenoid region.\par \par -Esophagram: Small hiatal hernia without demonstrated gastroesophageal reflux. There is esophagoesophageal reflux.\par \par -Pet/CT 6/20/21:  Compared to FDG-PET/CT scan dated 3/5/2021:\par 1. Interval resolution of a few mildly FDG avid left axillary lymph nodes and small mildly FDG avid foci in bilateral hilar regions.\par 2. Nonspecific FDG avidity in mid to distal esophagus, gastric boy and pylorus, unchanged. Please correlate clinically or with endoscopy as indicated.\par 3. Unchanged nonspecific non-FDG avid groundglass opacity in the anterior right upper lobe.\par 4. Non-FDG avid low-attenuation density in the pretracheal region, decreased in size.\par \par -MRI Brain 6/21/21:  There is no intraparenchymal hematoma, mass effect or midline shift.  No evidence of intracranial metastatic disease. Slight interval increase in density within the right mastoid and middle ear regions when compared most recent prior examination.\par \par -PET/CT 9/13/21:  Compared to FDG-PET/CT scan dated 6/20/2021:\par 1. New cluster of FDG-avid nodules in left upper lobe. Primary consideration is infection/mucoid impacted distal airways. Please correlate clinically and follow-up with dedicated CT of chest in 1 month.\par 2. Nonspecific FDG avidity in mid and distal esophagus, unchanged. Resolution of gastric hypermetabolism.\par 3. Nonspecific, non-FDG avid right upper lobe groundglass opacity, unchanged.\par 4. Non-FDG avid low-attenuation density in the pretracheal region, unchanged.\par \par -MRI Brain 12/1/21:  No significant change when allowing for differences in technique.\par \par -PET/CT 12/6/21:  Compared to FDG-PET/CT scan dated 6/20/2021:\par 1. Mildly FDG-avid cluster of nodules in left upper lobe, unchanged on CT, and decreased in metabolism. An infectious/inflammatory etiology is favored. Please correlate clinically. A dedicated CT of chest may be obtained for further characterization.\par 2. Nonspecific FDG avidity in mid and distal esophagus, unchanged.\par 3. Nonspecific, non-FDG avid right upper lobe groundglass opacity, unchanged.\par 4. Non-FDG avid low-attenuation density in the pretracheal region, unchanged.\par \par -PET/CT 3/7/22:  Compared to FDG-PET/CT scan dated 12/6/2021:\par 1. FDG-avid, centrally photopenic left upper lobe lung mass and adjacent FDG-avid nodules, markedly increased in size and metabolism as compared to prior study, are compatible with recurrent disease.\par 2. Nonspecific FDG avidity in mid and distal esophagus, unchanged.\par 3. Nonspecific, non-FDG avid right upper lobe groundglass opacity, unchanged.\par 4. Non-FDG avid low-attenuation density in the pretracheal region, unchanged.\par \par -Brain MRI 3/30/22: \par 1. Age appropriate involutional changes.\par 2. No abnormal intraparenchymal or leptomeningeal enhancement. No evidence of intracranial metastases or leptomeningeal carcinomatosis.\par 3. Presence of fluid again appreciated within the right-sided mastoid air cells, slightly increased compared with prior. Correlate clinically for mastoiditis.\par \par

## 2022-04-24 NOTE — HISTORY OF PRESENT ILLNESS
[Disease: _____________________] : Disease: [unfilled] [AJCC Stage: ____] : AJCC Stage: [unfilled] [de-identified] : The patient is a former smoker with a history of CAD who developed chest pain in March 2019 and had a cardiac stent placed. When his chest pain persisted, he was sent for a CT scan of his chest in late March 2019 that revealed a left upper lobe lung mass with evidence of metastatic disease. 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 CT scan revealed evidence of extensive bony and liver metastases. MRI of his brain was negative for brain metastases however there was extensive calvarial and T-spine metastases with possible evidence of epidural involvement. He was treated in [place] by an outside medical oncologist and started on carboplatin, etoposide and atezolizumab in early April 2019. He received 4 cycles of triplet combination therapy followed by immunotherapy maintenance. A restaging PET/CT scan in June 2019 revealed a dramatic response. He moved from [place] and transferred his care to [facility].  \par Treated with immunotherapy maintenance from June 2019 through May 2021 with sustained response.  \par Developed severe dysphagia and odynophagia.  He was treated for H-pylori and symptoms continued; H Pylori stool Ag was negative.  PET/CT with findings consistent with esophagitis/gastritis.  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  Restaging PET/CT March 2022 with disease progression.  Started chemotherapy with Carboplatin/Etoposide in March 2022.   [de-identified] : -Lung, left hilar FNA 3/29/19: Positive for malignant cells. Poorly differentiated neuroendocrine carcinoma. IHC is positive for MCK, TTF-1 and synaptophysin while negative for desmoglein3, P40, NapsinA, CD20, PAX5, CD3.  Ki-67 is markedly high. [de-identified] : *Wife provided translation where needed\par Restaging PET/CT this month with disease progression, involving the DAGO primary.   \par Started re-treatment with Carboplatin/Etoposide chemotherapy; now s/p C2 on 4/5-4/7.  Overall tolerated therapy fairly well.  He stopped Prednisone 5mg daily 5 days ago with no return of esophagitis symptoms. Has had 2 episodes of " a few drops of blood" from his right nostril that were only on a tissue without recurrence. His energy has remained intact and reports that he swims for ~1hr daily. His appetite is intact and has gained ~11lbs. \par No F/C/N/V/D.  He has CASH and dry cough.\par \par Brain MRI 3/30/22 1. Age appropriate involutional changes.\par 2. No abnormal intraparenchymal or leptomeningeal enhancement. No evidence of intracranial metastases or leptomeningeal carcinomatosis.\par 3. Presence of fluid again appreciated within the right-sided mastoid air cells, slightly increased compared with prior. Correlate clinically for mastoiditis.

## 2022-04-24 NOTE — ASSESSMENT
[FreeTextEntry1] : Extensive stage small cell lung cancer diagnosed late March 2019 who was treated with chemotherapy plus immunotherapy from April-June 2019; achieved CR. Treated with immunotherapy maintenance from June 2019 through May 2021 with sustained response.  Developed immunotherapy related esophagitis; started on steroids in June 2021 with clinical response.  Restaging PET/CT March 2022 with disease progression.  Started re-treatment with Carboplatin/Etoposide chemotherapy March 2022. Brain MRI from late March with TAL.\par Now s/p C2 on 4/5-4/7  \par Recommend: \par -Port flush with labs today \par –Continue chemotherapy with Carboplatin and Etoposide as scheduled; for C3 next week.  Carboplatin administered as a retreat protocol. Patient no longer on Prednisone so will resume giving steroids with next cycle\par – Continue Pegfilgrastim/Onpro or biosimilar equivalent for prophylaxis of chemotherapy induced neutropenic complications\par –Depending on his response to chemotherapy, we will plan to treat him for up to 4–6 cycles\par –Esophagitis resolved: stopped taking Prednisone 5mg daily on 4/15. He has previously declined to utilize famotidine or PPI prophylaxis due to constipation.\par –If patient is benefiting and tolerating chemotherapy, previously discussed the option of potentially including durvalumab with one of the latter cycles of his chemotherapy regimen.  Incorporation of this agent would enable him to continue on this in a maintenance fashion.  Decision regarding this TBD. \par -Continue symptom management under care of Palliative Care Dr. Moreno\par -Continue levothyroxine; monitor periodic TFT's \par -Avoided addition of bone modifying agent given the dramatic response in the bones\par -Mediport in place\par -Check out form given to patient with instructions for making appointments for his mid-cycle f/u visit.  Will obtain labs at mid-cycle via Port flush.  \par -Restaging CT Chest following C3 is scheduled for 5/9.  This scan encompasses his known areas of disease on his most recent PET/CT\par -F/U following C3 mid-cycle or sooner should problems arise

## 2022-04-24 NOTE — PHYSICAL EXAM
[Restricted in physically strenuous activity but ambulatory and able to carry out work of a light or sedentary nature] : Status 1- Restricted in physically strenuous activity but ambulatory and able to carry out work of a light or sedentary nature, e.g., light house work, office work [Normal] : affect appropriate [de-identified] : No icterus  [de-identified] : MMM O/P Clear  [de-identified] : Supple No LAD  [de-identified] : Clear  [de-identified] : S1 S2  [de-identified] : No edema  [de-identified] : No spine/CVA tenderness [de-identified] : Mediport in place

## 2022-04-26 ENCOUNTER — APPOINTMENT (OUTPATIENT)
Dept: HEMATOLOGY ONCOLOGY | Facility: CLINIC | Age: 67
End: 2022-04-26

## 2022-04-26 ENCOUNTER — APPOINTMENT (OUTPATIENT)
Dept: INFUSION THERAPY | Facility: HOSPITAL | Age: 67
End: 2022-04-26

## 2022-04-26 ENCOUNTER — RESULT REVIEW (OUTPATIENT)
Age: 67
End: 2022-04-26

## 2022-04-26 LAB
BASOPHILS # BLD AUTO: 0.02 K/UL — SIGNIFICANT CHANGE UP (ref 0–0.2)
BASOPHILS NFR BLD AUTO: 0.4 % — SIGNIFICANT CHANGE UP (ref 0–2)
EOSINOPHIL # BLD AUTO: 0.06 K/UL — SIGNIFICANT CHANGE UP (ref 0–0.5)
EOSINOPHIL NFR BLD AUTO: 1.1 % — SIGNIFICANT CHANGE UP (ref 0–6)
HCT VFR BLD CALC: 37 % — LOW (ref 39–50)
HGB BLD-MCNC: 12.4 G/DL — LOW (ref 13–17)
IMM GRANULOCYTES NFR BLD AUTO: 0.7 % — SIGNIFICANT CHANGE UP (ref 0–1.5)
LYMPHOCYTES # BLD AUTO: 1.29 K/UL — SIGNIFICANT CHANGE UP (ref 1–3.3)
LYMPHOCYTES # BLD AUTO: 23.6 % — SIGNIFICANT CHANGE UP (ref 13–44)
MCHC RBC-ENTMCNC: 30.1 PG — SIGNIFICANT CHANGE UP (ref 27–34)
MCHC RBC-ENTMCNC: 33.5 G/DL — SIGNIFICANT CHANGE UP (ref 32–36)
MCV RBC AUTO: 89.8 FL — SIGNIFICANT CHANGE UP (ref 80–100)
MONOCYTES # BLD AUTO: 0.6 K/UL — SIGNIFICANT CHANGE UP (ref 0–0.9)
MONOCYTES NFR BLD AUTO: 11 % — SIGNIFICANT CHANGE UP (ref 2–14)
NEUTROPHILS # BLD AUTO: 3.46 K/UL — SIGNIFICANT CHANGE UP (ref 1.8–7.4)
NEUTROPHILS NFR BLD AUTO: 63.2 % — SIGNIFICANT CHANGE UP (ref 43–77)
NRBC # BLD: 0 /100 WBCS — SIGNIFICANT CHANGE UP (ref 0–0)
PLATELET # BLD AUTO: 189 K/UL — SIGNIFICANT CHANGE UP (ref 150–400)
RBC # BLD: 4.12 M/UL — LOW (ref 4.2–5.8)
RBC # FLD: 14.3 % — SIGNIFICANT CHANGE UP (ref 10.3–14.5)
WBC # BLD: 5.47 K/UL — SIGNIFICANT CHANGE UP (ref 3.8–10.5)
WBC # FLD AUTO: 5.47 K/UL — SIGNIFICANT CHANGE UP (ref 3.8–10.5)

## 2022-04-27 ENCOUNTER — APPOINTMENT (OUTPATIENT)
Dept: INFUSION THERAPY | Facility: HOSPITAL | Age: 67
End: 2022-04-27

## 2022-04-27 ENCOUNTER — NON-APPOINTMENT (OUTPATIENT)
Age: 67
End: 2022-04-27

## 2022-04-27 DIAGNOSIS — Z51.11 ENCOUNTER FOR ANTINEOPLASTIC CHEMOTHERAPY: ICD-10-CM

## 2022-04-27 DIAGNOSIS — R11.2 NAUSEA WITH VOMITING, UNSPECIFIED: ICD-10-CM

## 2022-04-28 ENCOUNTER — APPOINTMENT (OUTPATIENT)
Dept: INFUSION THERAPY | Facility: HOSPITAL | Age: 67
End: 2022-04-28

## 2022-04-29 ENCOUNTER — NON-APPOINTMENT (OUTPATIENT)
Age: 67
End: 2022-04-29

## 2022-04-29 DIAGNOSIS — Z51.89 ENCOUNTER FOR OTHER SPECIFIED AFTERCARE: ICD-10-CM

## 2022-05-09 ENCOUNTER — APPOINTMENT (OUTPATIENT)
Dept: CT IMAGING | Facility: IMAGING CENTER | Age: 67
End: 2022-05-09
Payer: COMMERCIAL

## 2022-05-09 ENCOUNTER — OUTPATIENT (OUTPATIENT)
Dept: OUTPATIENT SERVICES | Facility: HOSPITAL | Age: 67
LOS: 1 days | End: 2022-05-09
Payer: COMMERCIAL

## 2022-05-09 DIAGNOSIS — Z00.8 ENCOUNTER FOR OTHER GENERAL EXAMINATION: ICD-10-CM

## 2022-05-09 DIAGNOSIS — C34.12 MALIGNANT NEOPLASM OF UPPER LOBE, LEFT BRONCHUS OR LUNG: ICD-10-CM

## 2022-05-09 PROCEDURE — 71260 CT THORAX DX C+: CPT | Mod: 26

## 2022-05-09 PROCEDURE — 71260 CT THORAX DX C+: CPT

## 2022-05-11 ENCOUNTER — APPOINTMENT (OUTPATIENT)
Dept: HEMATOLOGY ONCOLOGY | Facility: CLINIC | Age: 67
End: 2022-05-11
Payer: COMMERCIAL

## 2022-05-11 ENCOUNTER — RESULT REVIEW (OUTPATIENT)
Age: 67
End: 2022-05-11

## 2022-05-11 ENCOUNTER — APPOINTMENT (OUTPATIENT)
Dept: INFUSION THERAPY | Facility: HOSPITAL | Age: 67
End: 2022-05-11

## 2022-05-11 VITALS
OXYGEN SATURATION: 98 % | WEIGHT: 222.23 LBS | TEMPERATURE: 96.8 F | BODY MASS INDEX: 31.81 KG/M2 | DIASTOLIC BLOOD PRESSURE: 65 MMHG | SYSTOLIC BLOOD PRESSURE: 105 MMHG | RESPIRATION RATE: 18 BRPM | HEART RATE: 82 BPM | HEIGHT: 70 IN

## 2022-05-11 LAB
ALBUMIN SERPL ELPH-MCNC: 4.5 G/DL — SIGNIFICANT CHANGE UP (ref 3.3–5)
ALP SERPL-CCNC: 88 U/L — SIGNIFICANT CHANGE UP (ref 40–120)
ALT FLD-CCNC: 13 U/L — SIGNIFICANT CHANGE UP (ref 10–45)
ANION GAP SERPL CALC-SCNC: 12 MMOL/L — SIGNIFICANT CHANGE UP (ref 5–17)
AST SERPL-CCNC: 15 U/L — SIGNIFICANT CHANGE UP (ref 10–40)
BASOPHILS # BLD AUTO: 0.02 K/UL — SIGNIFICANT CHANGE UP (ref 0–0.2)
BASOPHILS NFR BLD AUTO: 0.3 % — SIGNIFICANT CHANGE UP (ref 0–2)
BILIRUB SERPL-MCNC: 0.3 MG/DL — SIGNIFICANT CHANGE UP (ref 0.2–1.2)
BUN SERPL-MCNC: 14 MG/DL — SIGNIFICANT CHANGE UP (ref 7–23)
CALCIUM SERPL-MCNC: 9.6 MG/DL — SIGNIFICANT CHANGE UP (ref 8.4–10.5)
CHLORIDE SERPL-SCNC: 107 MMOL/L — SIGNIFICANT CHANGE UP (ref 96–108)
CO2 SERPL-SCNC: 25 MMOL/L — SIGNIFICANT CHANGE UP (ref 22–31)
CREAT SERPL-MCNC: 0.69 MG/DL — SIGNIFICANT CHANGE UP (ref 0.5–1.3)
EGFR: 101 ML/MIN/1.73M2 — SIGNIFICANT CHANGE UP
EOSINOPHIL # BLD AUTO: 0.06 K/UL — SIGNIFICANT CHANGE UP (ref 0–0.5)
EOSINOPHIL NFR BLD AUTO: 0.9 % — SIGNIFICANT CHANGE UP (ref 0–6)
GLUCOSE SERPL-MCNC: 104 MG/DL — HIGH (ref 70–99)
HCT VFR BLD CALC: 34.8 % — LOW (ref 39–50)
HGB BLD-MCNC: 11.6 G/DL — LOW (ref 13–17)
IMM GRANULOCYTES NFR BLD AUTO: 1.1 % — SIGNIFICANT CHANGE UP (ref 0–1.5)
LDH SERPL L TO P-CCNC: 229 U/L — SIGNIFICANT CHANGE UP (ref 50–242)
LYMPHOCYTES # BLD AUTO: 1.86 K/UL — SIGNIFICANT CHANGE UP (ref 1–3.3)
LYMPHOCYTES # BLD AUTO: 26.7 % — SIGNIFICANT CHANGE UP (ref 13–44)
MCHC RBC-ENTMCNC: 30.8 PG — SIGNIFICANT CHANGE UP (ref 27–34)
MCHC RBC-ENTMCNC: 33.3 G/DL — SIGNIFICANT CHANGE UP (ref 32–36)
MCV RBC AUTO: 92.3 FL — SIGNIFICANT CHANGE UP (ref 80–100)
MONOCYTES # BLD AUTO: 0.74 K/UL — SIGNIFICANT CHANGE UP (ref 0–0.9)
MONOCYTES NFR BLD AUTO: 10.6 % — SIGNIFICANT CHANGE UP (ref 2–14)
NEUTROPHILS # BLD AUTO: 4.2 K/UL — SIGNIFICANT CHANGE UP (ref 1.8–7.4)
NEUTROPHILS NFR BLD AUTO: 60.4 % — SIGNIFICANT CHANGE UP (ref 43–77)
NRBC # BLD: 0 /100 WBCS — SIGNIFICANT CHANGE UP (ref 0–0)
PLATELET # BLD AUTO: 102 K/UL — LOW (ref 150–400)
POTASSIUM SERPL-MCNC: 4.2 MMOL/L — SIGNIFICANT CHANGE UP (ref 3.5–5.3)
POTASSIUM SERPL-SCNC: 4.2 MMOL/L — SIGNIFICANT CHANGE UP (ref 3.5–5.3)
PROT SERPL-MCNC: 6.8 G/DL — SIGNIFICANT CHANGE UP (ref 6–8.3)
RBC # BLD: 3.77 M/UL — LOW (ref 4.2–5.8)
RBC # FLD: 15.1 % — HIGH (ref 10.3–14.5)
SODIUM SERPL-SCNC: 144 MMOL/L — SIGNIFICANT CHANGE UP (ref 135–145)
WBC # BLD: 6.96 K/UL — SIGNIFICANT CHANGE UP (ref 3.8–10.5)
WBC # FLD AUTO: 6.96 K/UL — SIGNIFICANT CHANGE UP (ref 3.8–10.5)

## 2022-05-11 PROCEDURE — 99214 OFFICE O/P EST MOD 30 MIN: CPT

## 2022-05-12 ENCOUNTER — OUTPATIENT (OUTPATIENT)
Dept: OUTPATIENT SERVICES | Facility: HOSPITAL | Age: 67
LOS: 1 days | Discharge: HOME | End: 2022-05-12

## 2022-05-12 DIAGNOSIS — C34.90 MALIGNANT NEOPLASM OF UNSPECIFIED PART OF UNSPECIFIED BRONCHUS OR LUNG: ICD-10-CM

## 2022-05-12 RX ORDER — PREDNISONE 5 MG/1
5 TABLET ORAL
Qty: 120 | Refills: 3 | Status: DISCONTINUED | COMMUNITY
Start: 2021-10-22 | End: 2022-05-12

## 2022-05-12 RX ORDER — PREDNISONE 10 MG/1
10 TABLET ORAL
Qty: 240 | Refills: 1 | Status: DISCONTINUED | COMMUNITY
Start: 2021-06-25 | End: 2022-05-12

## 2022-05-12 RX ORDER — DIPHENHYDRAMINE HYDROCHLORIDE AND LIDOCAINE HYDROCHLORIDE AND ALUMINUM HYDROXIDE AND MAGNESIUM HYDRO
KIT
Qty: 1 | Refills: 5 | Status: DISCONTINUED | COMMUNITY
Start: 2021-04-23 | End: 2022-05-12

## 2022-05-12 RX ORDER — FLUCONAZOLE 100 MG/1
100 TABLET ORAL DAILY
Qty: 30 | Refills: 3 | Status: DISCONTINUED | COMMUNITY
Start: 2021-04-14 | End: 2022-05-12

## 2022-05-12 RX ORDER — METRONIDAZOLE 500 MG/1
500 TABLET ORAL
Qty: 14 | Refills: 0 | Status: DISCONTINUED | COMMUNITY
Start: 2021-07-06 | End: 2022-05-12

## 2022-05-12 RX ORDER — DIPHENOXYLATE HYDROCHLORIDE AND ATROPINE SULFATE 2.5; .025 MG/1; MG/1
2.5-0.025 TABLET ORAL
Qty: 30 | Refills: 3 | Status: DISCONTINUED | COMMUNITY
Start: 2021-11-18 | End: 2022-05-12

## 2022-05-12 RX ORDER — CIPROFLOXACIN HYDROCHLORIDE 500 MG/1
500 TABLET, FILM COATED ORAL
Qty: 14 | Refills: 0 | Status: DISCONTINUED | COMMUNITY
Start: 2021-07-06 | End: 2022-05-12

## 2022-05-13 NOTE — PHYSICAL EXAM
[Restricted in physically strenuous activity but ambulatory and able to carry out work of a light or sedentary nature] : Status 1- Restricted in physically strenuous activity but ambulatory and able to carry out work of a light or sedentary nature, e.g., light house work, office work [Normal] : affect appropriate [de-identified] : No icterus  [de-identified] : MMM O/P Clear  [de-identified] : Supple No LAD  [de-identified] : Clear  [de-identified] : S1 S2  [de-identified] : No edema  [de-identified] : No spine/CVA tenderness [de-identified] : Mediport in place

## 2022-05-13 NOTE — RESULTS/DATA
[FreeTextEntry1] : -CT chest 3/25/19: Infiltrative left hilar mass extending to the left upper lobe and left mediastinum compatible with neoplasm with encasement of the pulmonary artery and left upper lobe bronchial structures with collapse of the left upper lobe. Lytic lesions in the upper thoracic spine. Innumerable hypodense masses throughout the liver compatible with metastases. Small left pleural effusion.\par \par -CT C/A/P 4/5/19:  Large mass in the left hilum extending into the left upper lobe consistent with bronchogenic malignancy with possible metastatic lymphadenopathy. Significant compression of the left lobe pulmonary artery with obstruction of the left upper lobe bronchus with resultant atelectasis. Innumerable lesions throughout the liver. Upper abdominal lymphadenopathy. Bony metastases of the spine, pelvis and bilateral hips.\par \par -MRI brain/T-spine in 3/28/19: Negative for metastatic disease involving the brain. Of the central canal at T9-T10 concerning for epidural metastasis.\par \par -Nuclear medicine bone scan 4/5/19: Focal uptake in one of the right lower lateral ribs, possibly the 10th rib, likely represents a small fracture. Metastatic disease is less likely. No additional areas of abnormal uptake are noted.\par \par -PET/CT 6/26/19: Markedly improved positive treatment response. The previously seen a large left-sided upper lobe lung mass with hilar extension is markedly reduced in size with mild or residual ill-defined soft tissue density at the left hilar region now demonstrating only partial invasion of the left upper lobe bronchus. No separate mediastinal lymphadenopathy is noted. Markedly improved appearance of both the numerous metastatic liver lesions and hepatomegaly. Interval resolution of previously seen upper abdominal lymphadenopathy. No residual active osseous metastatic disease.\par \par -PET/CT 10/17/19:  FDG-PET/CT scan demonstrates: \par 1. Minimally FDG-avid, small, difficult to delineate left upper lobe/perihilar soft tissue, unchanged as compared to prior study dated 6/25/2019. Small focus of residual disease is not excluded. \par 2. Non-FDG-avid low-attenuation density in pretracheal cricoid region, unchanged. \par 3. Remainder of study is unremarkable, demonstrating no evidence of FDG-avid disease. \par \par -MRI Brain 10/20/19: No evidence of metastasis\par \par -PET/CT 1/15/20:  FDG-PET/CT scan demonstrates: \par 1. New, indeterminate linear focus of mild FDG activity in left suprahilar/paramediastinal region (SUV 4.1). \par 2. Minimally FDG-avid, small, difficult to delineate left upper lobe/perihilar soft tissue, unchanged as compared to prior study dated 6/25/2019. Small focus of residual disease is not excluded. \par 3. Remainder of study is unchanged, demonstrating no evidence of FDG-avid disease. \par \par -CT Abdomen/Pelvis 2/19/20:  No evidence of focal bowel wall thickening or distention.  Right inguinal hernia \par \par -CT Chest 4/8/20: Stable disease\par \par -MRI Brain 3/5/20: New opacification involving the right mastoid and middle ear region as described above, otherwise no significant change when allowing for differences in technique. \par \par -PET/CT 6/25/20:  Compared to FDG-PET/CT scan dated 1/15/2020: \par 1. Focus of mild FDG activity in left upper lobe paramediastinal region is unchanged. This may reflect posttreatment change. \par 2. Non-FDG-avid 7 mm nodular density within posterior left upper lobe, also unchanged. \par 3. Nonspecific hypermetabolism in distal esophagus and fundus/body of stomach, mildly decreased. Correlate clinically for esophagitis/gastritis and with endoscopy, as indicated. \par 4. Remainder of study is unchanged, demonstrating no evidence of FDG-avid recurrent or metastatic disease. \par \par -PET/CT 9/1/20:  Compared to FDG-PET/CT scan dated 6/25/2020: \par 1. Resolution of focus of mild FDG activity in left upper lobe paramediastinal region. \par 2. Non-FDG-avid 7 mm nodular density within posterior left upper lobe, unchanged. \par 3. Nonspecific hypermetabolism in distal esophagus and fundus/body, unchanged. \par 4. No evidence of FDG-avid recurrent or metastatic disease. \par \par -MRI Brain 12/14/20:  No evidence acute hemorrhage, mass mass effect or abnormal enhancement seen.  Improved aeration of the right mastoid and middle ear region.\par \par -PET/CT 12/14/20:  Compared to FDG-PET/CT scan on 9/1/2020:\par 1. Nonspecific minimal increased FDG avidity in the gastric fundus/body. Please correlate clinically or with endoscopy as indicated.\par 2. Non-FDG avid 7 mm nodular density within the posterior left upper lobe, unchanged.\par 3. New focal cutaneous FDG avidity along the left lower aspect of the face, probably focal inflammation. Please correlate with physical examination.\par 4. Nonspecific new focal mild hypermetabolism in the anterior aspect of the hyoid bone just to the right of the midline without CT correlate. Unchanged non-FDG avid low-attenuation density in the precricoid region. Please correlate clinically.\par \par -PET/CT 3/5/21:  Compared to FDG-PET/CT scan dated 12/14/2020:\par 1. Few new FDG avid left axillary lymph nodes are nonspecific. Suggest correlation with ultrasound and possible percutaneous FNA biopsy as indicated.\par 2. Nonspecific FDG avidity in mid to distal esophagus, gastric fundus/body, slightly more prominent, with new FDG avidity in the pylorus. Please correlate clinically or with endoscopy as indicated.\par 3. Nonspecific non-FDG avid groundglass opacity in the anterior right upper lobe, more conspicuous.\par 4. Interval resolution of focal mild hypermetabolism in the anterior aspect of the hyoid bone and focal cutaneous FDG avidity in the left lower face. Unchanged non-FDG avid low-attenuation density in the cricoarytenoid region.\par \par -Esophagram: Small hiatal hernia without demonstrated gastroesophageal reflux. There is esophagoesophageal reflux.\par \par -Pet/CT 6/20/21:  Compared to FDG-PET/CT scan dated 3/5/2021:\par 1. Interval resolution of a few mildly FDG avid left axillary lymph nodes and small mildly FDG avid foci in bilateral hilar regions.\par 2. Nonspecific FDG avidity in mid to distal esophagus, gastric boy and pylorus, unchanged. Please correlate clinically or with endoscopy as indicated.\par 3. Unchanged nonspecific non-FDG avid groundglass opacity in the anterior right upper lobe.\par 4. Non-FDG avid low-attenuation density in the pretracheal region, decreased in size.\par \par -MRI Brain 6/21/21:  There is no intraparenchymal hematoma, mass effect or midline shift.  No evidence of intracranial metastatic disease. Slight interval increase in density within the right mastoid and middle ear regions when compared most recent prior examination.\par \par -PET/CT 9/13/21:  Compared to FDG-PET/CT scan dated 6/20/2021:\par 1. New cluster of FDG-avid nodules in left upper lobe. Primary consideration is infection/mucoid impacted distal airways. Please correlate clinically and follow-up with dedicated CT of chest in 1 month.\par 2. Nonspecific FDG avidity in mid and distal esophagus, unchanged. Resolution of gastric hypermetabolism.\par 3. Nonspecific, non-FDG avid right upper lobe groundglass opacity, unchanged.\par 4. Non-FDG avid low-attenuation density in the pretracheal region, unchanged.\par \par -MRI Brain 12/1/21:  No significant change when allowing for differences in technique.\par \par -PET/CT 12/6/21:  Compared to FDG-PET/CT scan dated 6/20/2021:\par 1. Mildly FDG-avid cluster of nodules in left upper lobe, unchanged on CT, and decreased in metabolism. An infectious/inflammatory etiology is favored. Please correlate clinically. A dedicated CT of chest may be obtained for further characterization.\par 2. Nonspecific FDG avidity in mid and distal esophagus, unchanged.\par 3. Nonspecific, non-FDG avid right upper lobe groundglass opacity, unchanged.\par 4. Non-FDG avid low-attenuation density in the pretracheal region, unchanged.\par \par -PET/CT 3/7/22:  Compared to FDG-PET/CT scan dated 12/6/2021:\par 1. FDG-avid, centrally photopenic left upper lobe lung mass and adjacent FDG-avid nodules, markedly increased in size and metabolism as compared to prior study, are compatible with recurrent disease.\par 2. Nonspecific FDG avidity in mid and distal esophagus, unchanged.\par 3. Nonspecific, non-FDG avid right upper lobe groundglass opacity, unchanged.\par 4. Non-FDG avid low-attenuation density in the pretracheal region, unchanged.\par \par -Brain MRI 3/30/22: \par 1. Age appropriate involutional changes.\par 2. No abnormal intraparenchymal or leptomeningeal enhancement. No evidence of intracranial metastases or leptomeningeal carcinomatosis.\par 3. Presence of fluid again appreciated within the right-sided mastoid air cells, slightly increased compared with prior. Correlate clinically for mastoiditis.\par \par Images Reviewed/Interpreted:\par \par –CT Chest 5/9/22:  In comparison with 3/7/2022, interval decrease of left upper lobe mass. No new or enlarging nodule.\par \par

## 2022-05-13 NOTE — ASSESSMENT
[FreeTextEntry1] : Extensive stage small cell lung cancer diagnosed late March 2019 who was treated with chemotherapy plus immunotherapy from April-June 2019; achieved CR. Treated with immunotherapy maintenance from June 2019 through May 2021 with sustained response.  Developed immunotherapy related esophagitis; started on steroids in June 2021 with clinical response.  Restaging PET/CT March 2022 with disease progression.  Started re-treatment with Carboplatin/Etoposide chemotherapy March 2022; achieved MI. \par Now s/p C3 on 4/26-4/28. \par Recommend: \par -Port flush with mid-cycle labs today \par –Continue chemotherapy with Carboplatin (retreat protocol) and Etoposide as scheduled; for C4 next week.  Discussed the option to dose–reduce chemotherapy agents in an effort to improve tolerability however they were uncomfortable with this option and wished to maintain the current course.\par – Continue Pegfilgrastim/Onpro or biosimilar equivalent for prophylaxis of chemotherapy induced neutropenic complications\par – Reviewed again the plan to treat the patient with 4–6 cycles of chemotherapy, if he was responding.  Also reviewed again the prior discussion of potentially incorporating immunotherapy with durvalumab into the latter cycles of chemotherapy if he were responding to treatment, understanding the risk of autoimmune side effects such as the esophagitis he experienced with the other immunotherapy drug atezolizumab, but with the overall plan to ultimately maintain him on immunotherapy as a maintenance strategy following the completion of the 4–6 cycles of planned chemotherapy.  He endorses understanding of the risks and benefits of adding durvalumab to chemotherapy and wishes to proceed.  We will add durvalumab 1500 mg IV to cycle 4 which is scheduled for next week.  We will monitor for autoimmune side effects such as return of esophagitis.\par – With the incorporation of durvalumab with cycle 4, we will continue to assess his tolerability to chemotherapy and consider either dose–reductions of chemotherapy with C5 or C6 versus discontinuation of cytotoxic chemotherapy and continuation of durvalumab maintenance\par -Patient no longer on Prednisone: will resume Dex premed with cycles 4-6. \par –Esophagitis resolved: stopped taking Prednisone 5mg daily on 4/15. He has previously declined to utilize famotidine or PPI prophylaxis due to constipation.\par -Continue symptom management under care of Palliative Care Dr. Moreno\par -Continue levothyroxine; monitor periodic TFT's \par -Avoided addition of bone modifying agent given the dramatic response in the bones\par -Mediport in place\par -Check out form given to patient with instructions for making appointments for his mid-cycle f/u visit.  Will obtain labs at mid-cycle via Port flush.  \par -F/U following C4 mid-cycle or sooner should problems arise

## 2022-05-13 NOTE — HISTORY OF PRESENT ILLNESS
[Disease: _____________________] : Disease: [unfilled] [AJCC Stage: ____] : AJCC Stage: [unfilled] [de-identified] : The patient is a former smoker with a history of CAD who developed chest pain in March 2019 and had a cardiac stent placed. When his chest pain persisted, he was sent for a CT scan of his chest in late March 2019 that revealed a left upper lobe lung mass with evidence of metastatic disease. 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 CT scan revealed evidence of extensive bony and liver metastases. MRI of his brain was negative for brain metastases however there was extensive calvarial and T-spine metastases with possible evidence of epidural involvement. He was treated in [place] by an outside medical oncologist and started on carboplatin, etoposide and atezolizumab in early April 2019. He received 4 cycles of triplet combination therapy followed by immunotherapy maintenance. A restaging PET/CT scan in June 2019 revealed a dramatic response. He moved from [place] and transferred his care to [facility].  \par Treated with immunotherapy maintenance from June 2019 through May 2021 with sustained response.  \par Developed severe dysphagia and odynophagia.  He was treated for H-pylori and symptoms continued; H Pylori stool Ag was negative.  PET/CT with findings consistent with esophagitis/gastritis.  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  Restaging PET/CT March 2022 with disease progression.  Started chemotherapy with Carboplatin/Etoposide in March 2022.   [de-identified] : -Lung, left hilar FNA 3/29/19: Positive for malignant cells. Poorly differentiated neuroendocrine carcinoma. IHC is positive for MCK, TTF-1 and synaptophysin while negative for desmoglein3, P40, NapsinA, CD20, PAX5, CD3.  Ki-67 is markedly high. [de-identified] : *Wife provided translation where needed\par Started re-treatment with Carboplatin/Etoposide chemotherapy in March 2022; now s/p C3 on 4/26-4/28.  Overall tolerated therapy fairly well; reports feeling fatigue for one week after treatment when he mostly stays in bed. For the next 2 weeks, he swims at an indoor pool for one hour/day. His appetite and weight are stable; no return of esophagitis symptoms since discontinuation of Prednisone on 4/15. He has occasional constipation that is managed with Colace/Senna daily.\par No F/N/V/D.  He has CASH (especially climbing stairs) and dry cough.\par \par Restaging CT Chest 5/9/22 with AL:  In comparison with 3/7/2022, interval decrease of left upper lobe mass. No new or enlarging nodule.

## 2022-05-17 ENCOUNTER — RESULT REVIEW (OUTPATIENT)
Age: 67
End: 2022-05-17

## 2022-05-17 ENCOUNTER — APPOINTMENT (OUTPATIENT)
Dept: INFUSION THERAPY | Facility: HOSPITAL | Age: 67
End: 2022-05-17

## 2022-05-17 ENCOUNTER — NON-APPOINTMENT (OUTPATIENT)
Age: 67
End: 2022-05-17

## 2022-05-17 LAB
BASOPHILS # BLD AUTO: 0.02 K/UL — SIGNIFICANT CHANGE UP (ref 0–0.2)
BASOPHILS NFR BLD AUTO: 0.4 % — SIGNIFICANT CHANGE UP (ref 0–2)
EOSINOPHIL # BLD AUTO: 0.06 K/UL — SIGNIFICANT CHANGE UP (ref 0–0.5)
EOSINOPHIL NFR BLD AUTO: 1.2 % — SIGNIFICANT CHANGE UP (ref 0–6)
HCT VFR BLD CALC: 34.6 % — LOW (ref 39–50)
HGB BLD-MCNC: 12.1 G/DL — LOW (ref 13–17)
IMM GRANULOCYTES NFR BLD AUTO: 0.8 % — SIGNIFICANT CHANGE UP (ref 0–1.5)
LYMPHOCYTES # BLD AUTO: 1.4 K/UL — SIGNIFICANT CHANGE UP (ref 1–3.3)
LYMPHOCYTES # BLD AUTO: 28.7 % — SIGNIFICANT CHANGE UP (ref 13–44)
MCHC RBC-ENTMCNC: 31.3 PG — SIGNIFICANT CHANGE UP (ref 27–34)
MCHC RBC-ENTMCNC: 35 G/DL — SIGNIFICANT CHANGE UP (ref 32–36)
MCV RBC AUTO: 89.6 FL — SIGNIFICANT CHANGE UP (ref 80–100)
MONOCYTES # BLD AUTO: 0.61 K/UL — SIGNIFICANT CHANGE UP (ref 0–0.9)
MONOCYTES NFR BLD AUTO: 12.5 % — SIGNIFICANT CHANGE UP (ref 2–14)
NEUTROPHILS # BLD AUTO: 2.74 K/UL — SIGNIFICANT CHANGE UP (ref 1.8–7.4)
NEUTROPHILS NFR BLD AUTO: 56.4 % — SIGNIFICANT CHANGE UP (ref 43–77)
NRBC # BLD: 0 /100 WBCS — SIGNIFICANT CHANGE UP (ref 0–0)
PLATELET # BLD AUTO: 183 K/UL — SIGNIFICANT CHANGE UP (ref 150–400)
RBC # BLD: 3.86 M/UL — LOW (ref 4.2–5.8)
RBC # FLD: 14.9 % — HIGH (ref 10.3–14.5)
T4 FREE SERPL-MCNC: 1 NG/DL — SIGNIFICANT CHANGE UP (ref 0.9–1.8)
TSH SERPL-MCNC: 2.94 UIU/ML — SIGNIFICANT CHANGE UP (ref 0.27–4.2)
WBC # BLD: 4.87 K/UL — SIGNIFICANT CHANGE UP (ref 3.8–10.5)
WBC # FLD AUTO: 4.87 K/UL — SIGNIFICANT CHANGE UP (ref 3.8–10.5)

## 2022-05-18 ENCOUNTER — APPOINTMENT (OUTPATIENT)
Dept: INFUSION THERAPY | Facility: HOSPITAL | Age: 67
End: 2022-05-18

## 2022-05-18 DIAGNOSIS — R11.2 NAUSEA WITH VOMITING, UNSPECIFIED: ICD-10-CM

## 2022-05-18 DIAGNOSIS — Z51.11 ENCOUNTER FOR ANTINEOPLASTIC CHEMOTHERAPY: ICD-10-CM

## 2022-05-19 ENCOUNTER — APPOINTMENT (OUTPATIENT)
Dept: INFUSION THERAPY | Facility: HOSPITAL | Age: 67
End: 2022-05-19

## 2022-05-21 ENCOUNTER — NON-APPOINTMENT (OUTPATIENT)
Age: 67
End: 2022-05-21

## 2022-06-01 ENCOUNTER — APPOINTMENT (OUTPATIENT)
Dept: HEMATOLOGY ONCOLOGY | Facility: CLINIC | Age: 67
End: 2022-06-01
Payer: COMMERCIAL

## 2022-06-01 ENCOUNTER — APPOINTMENT (OUTPATIENT)
Dept: INFUSION THERAPY | Facility: HOSPITAL | Age: 67
End: 2022-06-01

## 2022-06-01 ENCOUNTER — RESULT REVIEW (OUTPATIENT)
Age: 67
End: 2022-06-01

## 2022-06-01 VITALS
OXYGEN SATURATION: 98 % | SYSTOLIC BLOOD PRESSURE: 107 MMHG | TEMPERATURE: 97.2 F | HEART RATE: 67 BPM | BODY MASS INDEX: 31.95 KG/M2 | WEIGHT: 222.64 LBS | RESPIRATION RATE: 18 BRPM | DIASTOLIC BLOOD PRESSURE: 67 MMHG

## 2022-06-01 LAB
BASOPHILS # BLD AUTO: 0.03 K/UL — SIGNIFICANT CHANGE UP (ref 0–0.2)
BASOPHILS NFR BLD AUTO: 0.4 % — SIGNIFICANT CHANGE UP (ref 0–2)
EOSINOPHIL # BLD AUTO: 0.06 K/UL — SIGNIFICANT CHANGE UP (ref 0–0.5)
EOSINOPHIL NFR BLD AUTO: 0.7 % — SIGNIFICANT CHANGE UP (ref 0–6)
HCT VFR BLD CALC: 34.3 % — LOW (ref 39–50)
HGB BLD-MCNC: 11.5 G/DL — LOW (ref 13–17)
IMM GRANULOCYTES NFR BLD AUTO: 0.7 % — SIGNIFICANT CHANGE UP (ref 0–1.5)
LYMPHOCYTES # BLD AUTO: 1.84 K/UL — SIGNIFICANT CHANGE UP (ref 1–3.3)
LYMPHOCYTES # BLD AUTO: 21.6 % — SIGNIFICANT CHANGE UP (ref 13–44)
MCHC RBC-ENTMCNC: 31.2 PG — SIGNIFICANT CHANGE UP (ref 27–34)
MCHC RBC-ENTMCNC: 33.5 G/DL — SIGNIFICANT CHANGE UP (ref 32–36)
MCV RBC AUTO: 93 FL — SIGNIFICANT CHANGE UP (ref 80–100)
MONOCYTES # BLD AUTO: 0.93 K/UL — HIGH (ref 0–0.9)
MONOCYTES NFR BLD AUTO: 10.9 % — SIGNIFICANT CHANGE UP (ref 2–14)
NEUTROPHILS # BLD AUTO: 5.58 K/UL — SIGNIFICANT CHANGE UP (ref 1.8–7.4)
NEUTROPHILS NFR BLD AUTO: 65.7 % — SIGNIFICANT CHANGE UP (ref 43–77)
NRBC # BLD: 0 /100 WBCS — SIGNIFICANT CHANGE UP (ref 0–0)
PLATELET # BLD AUTO: 107 K/UL — LOW (ref 150–400)
RBC # BLD: 3.69 M/UL — LOW (ref 4.2–5.8)
RBC # FLD: 15.4 % — HIGH (ref 10.3–14.5)
WBC # BLD: 8.5 K/UL — SIGNIFICANT CHANGE UP (ref 3.8–10.5)
WBC # FLD AUTO: 8.5 K/UL — SIGNIFICANT CHANGE UP (ref 3.8–10.5)

## 2022-06-01 PROCEDURE — 99214 OFFICE O/P EST MOD 30 MIN: CPT

## 2022-06-02 LAB
ALBUMIN SERPL ELPH-MCNC: 4.4 G/DL — SIGNIFICANT CHANGE UP (ref 3.3–5)
ALP SERPL-CCNC: 95 U/L — SIGNIFICANT CHANGE UP (ref 40–120)
ALT FLD-CCNC: 15 U/L — SIGNIFICANT CHANGE UP (ref 10–45)
ANION GAP SERPL CALC-SCNC: 12 MMOL/L — SIGNIFICANT CHANGE UP (ref 5–17)
AST SERPL-CCNC: 19 U/L — SIGNIFICANT CHANGE UP (ref 10–40)
BILIRUB SERPL-MCNC: 0.3 MG/DL — SIGNIFICANT CHANGE UP (ref 0.2–1.2)
BUN SERPL-MCNC: 12 MG/DL — SIGNIFICANT CHANGE UP (ref 7–23)
CALCIUM SERPL-MCNC: 9.5 MG/DL — SIGNIFICANT CHANGE UP (ref 8.4–10.5)
CHLORIDE SERPL-SCNC: 106 MMOL/L — SIGNIFICANT CHANGE UP (ref 96–108)
CO2 SERPL-SCNC: 24 MMOL/L — SIGNIFICANT CHANGE UP (ref 22–31)
CREAT SERPL-MCNC: 0.71 MG/DL — SIGNIFICANT CHANGE UP (ref 0.5–1.3)
EGFR: 99 ML/MIN/1.73M2 — SIGNIFICANT CHANGE UP
GLUCOSE SERPL-MCNC: 72 MG/DL — SIGNIFICANT CHANGE UP (ref 70–99)
LDH SERPL L TO P-CCNC: 258 U/L — HIGH (ref 50–242)
POTASSIUM SERPL-MCNC: 4.4 MMOL/L — SIGNIFICANT CHANGE UP (ref 3.5–5.3)
POTASSIUM SERPL-SCNC: 4.4 MMOL/L — SIGNIFICANT CHANGE UP (ref 3.5–5.3)
PROT SERPL-MCNC: 6.8 G/DL — SIGNIFICANT CHANGE UP (ref 6–8.3)
SODIUM SERPL-SCNC: 142 MMOL/L — SIGNIFICANT CHANGE UP (ref 135–145)

## 2022-06-07 ENCOUNTER — RESULT REVIEW (OUTPATIENT)
Age: 67
End: 2022-06-07

## 2022-06-07 ENCOUNTER — APPOINTMENT (OUTPATIENT)
Dept: INFUSION THERAPY | Facility: HOSPITAL | Age: 67
End: 2022-06-07

## 2022-06-07 LAB
BASOPHILS # BLD AUTO: 0.01 K/UL — SIGNIFICANT CHANGE UP (ref 0–0.2)
BASOPHILS NFR BLD AUTO: 0.2 % — SIGNIFICANT CHANGE UP (ref 0–2)
EOSINOPHIL # BLD AUTO: 0.07 K/UL — SIGNIFICANT CHANGE UP (ref 0–0.5)
EOSINOPHIL NFR BLD AUTO: 1.5 % — SIGNIFICANT CHANGE UP (ref 0–6)
HCT VFR BLD CALC: 34.8 % — LOW (ref 39–50)
HGB BLD-MCNC: 12 G/DL — LOW (ref 13–17)
IMM GRANULOCYTES NFR BLD AUTO: 0.8 % — SIGNIFICANT CHANGE UP (ref 0–1.5)
LYMPHOCYTES # BLD AUTO: 1.31 K/UL — SIGNIFICANT CHANGE UP (ref 1–3.3)
LYMPHOCYTES # BLD AUTO: 27.2 % — SIGNIFICANT CHANGE UP (ref 13–44)
MCHC RBC-ENTMCNC: 31.3 PG — SIGNIFICANT CHANGE UP (ref 27–34)
MCHC RBC-ENTMCNC: 34.5 G/DL — SIGNIFICANT CHANGE UP (ref 32–36)
MCV RBC AUTO: 90.6 FL — SIGNIFICANT CHANGE UP (ref 80–100)
MONOCYTES # BLD AUTO: 0.59 K/UL — SIGNIFICANT CHANGE UP (ref 0–0.9)
MONOCYTES NFR BLD AUTO: 12.2 % — SIGNIFICANT CHANGE UP (ref 2–14)
NEUTROPHILS # BLD AUTO: 2.8 K/UL — SIGNIFICANT CHANGE UP (ref 1.8–7.4)
NEUTROPHILS NFR BLD AUTO: 58.1 % — SIGNIFICANT CHANGE UP (ref 43–77)
NRBC # BLD: 0 /100 WBCS — SIGNIFICANT CHANGE UP (ref 0–0)
PLATELET # BLD AUTO: 183 K/UL — SIGNIFICANT CHANGE UP (ref 150–400)
RBC # BLD: 3.84 M/UL — LOW (ref 4.2–5.8)
RBC # FLD: 14.9 % — HIGH (ref 10.3–14.5)
WBC # BLD: 4.82 K/UL — SIGNIFICANT CHANGE UP (ref 3.8–10.5)
WBC # FLD AUTO: 4.82 K/UL — SIGNIFICANT CHANGE UP (ref 3.8–10.5)

## 2022-06-08 ENCOUNTER — NON-APPOINTMENT (OUTPATIENT)
Age: 67
End: 2022-06-08

## 2022-06-08 ENCOUNTER — APPOINTMENT (OUTPATIENT)
Dept: INFUSION THERAPY | Facility: HOSPITAL | Age: 67
End: 2022-06-08

## 2022-06-08 NOTE — PHYSICAL EXAM
[Restricted in physically strenuous activity but ambulatory and able to carry out work of a light or sedentary nature] : Status 1- Restricted in physically strenuous activity but ambulatory and able to carry out work of a light or sedentary nature, e.g., light house work, office work [Normal] : affect appropriate [de-identified] : No icterus  [de-identified] : MMM O/P Clear  [de-identified] : Supple No LAD  [de-identified] : Clear  [de-identified] : S1 S2  [de-identified] : No edema  [de-identified] : No spine/CVA tenderness [de-identified] : Mediport in place

## 2022-06-08 NOTE — RESULTS/DATA
[FreeTextEntry1] : -CT chest 3/25/19: Infiltrative left hilar mass extending to the left upper lobe and left mediastinum compatible with neoplasm with encasement of the pulmonary artery and left upper lobe bronchial structures with collapse of the left upper lobe. Lytic lesions in the upper thoracic spine. Innumerable hypodense masses throughout the liver compatible with metastases. Small left pleural effusion.\par \par -CT C/A/P 4/5/19:  Large mass in the left hilum extending into the left upper lobe consistent with bronchogenic malignancy with possible metastatic lymphadenopathy. Significant compression of the left lobe pulmonary artery with obstruction of the left upper lobe bronchus with resultant atelectasis. Innumerable lesions throughout the liver. Upper abdominal lymphadenopathy. Bony metastases of the spine, pelvis and bilateral hips.\par \par -MRI brain/T-spine in 3/28/19: Negative for metastatic disease involving the brain. Of the central canal at T9-T10 concerning for epidural metastasis.\par \par -Nuclear medicine bone scan 4/5/19: Focal uptake in one of the right lower lateral ribs, possibly the 10th rib, likely represents a small fracture. Metastatic disease is less likely. No additional areas of abnormal uptake are noted.\par \par -PET/CT 6/26/19: Markedly improved positive treatment response. The previously seen a large left-sided upper lobe lung mass with hilar extension is markedly reduced in size with mild or residual ill-defined soft tissue density at the left hilar region now demonstrating only partial invasion of the left upper lobe bronchus. No separate mediastinal lymphadenopathy is noted. Markedly improved appearance of both the numerous metastatic liver lesions and hepatomegaly. Interval resolution of previously seen upper abdominal lymphadenopathy. No residual active osseous metastatic disease.\par \par -PET/CT 10/17/19:  FDG-PET/CT scan demonstrates: \par 1. Minimally FDG-avid, small, difficult to delineate left upper lobe/perihilar soft tissue, unchanged as compared to prior study dated 6/25/2019. Small focus of residual disease is not excluded. \par 2. Non-FDG-avid low-attenuation density in pretracheal cricoid region, unchanged. \par 3. Remainder of study is unremarkable, demonstrating no evidence of FDG-avid disease. \par \par -MRI Brain 10/20/19: No evidence of metastasis\par \par -PET/CT 1/15/20:  FDG-PET/CT scan demonstrates: \par 1. New, indeterminate linear focus of mild FDG activity in left suprahilar/paramediastinal region (SUV 4.1). \par 2. Minimally FDG-avid, small, difficult to delineate left upper lobe/perihilar soft tissue, unchanged as compared to prior study dated 6/25/2019. Small focus of residual disease is not excluded. \par 3. Remainder of study is unchanged, demonstrating no evidence of FDG-avid disease. \par \par -CT Abdomen/Pelvis 2/19/20:  No evidence of focal bowel wall thickening or distention.  Right inguinal hernia \par \par -CT Chest 4/8/20: Stable disease\par \par -MRI Brain 3/5/20: New opacification involving the right mastoid and middle ear region as described above, otherwise no significant change when allowing for differences in technique. \par \par -PET/CT 6/25/20:  Compared to FDG-PET/CT scan dated 1/15/2020: \par 1. Focus of mild FDG activity in left upper lobe paramediastinal region is unchanged. This may reflect posttreatment change. \par 2. Non-FDG-avid 7 mm nodular density within posterior left upper lobe, also unchanged. \par 3. Nonspecific hypermetabolism in distal esophagus and fundus/body of stomach, mildly decreased. Correlate clinically for esophagitis/gastritis and with endoscopy, as indicated. \par 4. Remainder of study is unchanged, demonstrating no evidence of FDG-avid recurrent or metastatic disease. \par \par -PET/CT 9/1/20:  Compared to FDG-PET/CT scan dated 6/25/2020: \par 1. Resolution of focus of mild FDG activity in left upper lobe paramediastinal region. \par 2. Non-FDG-avid 7 mm nodular density within posterior left upper lobe, unchanged. \par 3. Nonspecific hypermetabolism in distal esophagus and fundus/body, unchanged. \par 4. No evidence of FDG-avid recurrent or metastatic disease. \par \par -MRI Brain 12/14/20:  No evidence acute hemorrhage, mass mass effect or abnormal enhancement seen.  Improved aeration of the right mastoid and middle ear region.\par \par -PET/CT 12/14/20:  Compared to FDG-PET/CT scan on 9/1/2020:\par 1. Nonspecific minimal increased FDG avidity in the gastric fundus/body. Please correlate clinically or with endoscopy as indicated.\par 2. Non-FDG avid 7 mm nodular density within the posterior left upper lobe, unchanged.\par 3. New focal cutaneous FDG avidity along the left lower aspect of the face, probably focal inflammation. Please correlate with physical examination.\par 4. Nonspecific new focal mild hypermetabolism in the anterior aspect of the hyoid bone just to the right of the midline without CT correlate. Unchanged non-FDG avid low-attenuation density in the precricoid region. Please correlate clinically.\par \par -PET/CT 3/5/21:  Compared to FDG-PET/CT scan dated 12/14/2020:\par 1. Few new FDG avid left axillary lymph nodes are nonspecific. Suggest correlation with ultrasound and possible percutaneous FNA biopsy as indicated.\par 2. Nonspecific FDG avidity in mid to distal esophagus, gastric fundus/body, slightly more prominent, with new FDG avidity in the pylorus. Please correlate clinically or with endoscopy as indicated.\par 3. Nonspecific non-FDG avid groundglass opacity in the anterior right upper lobe, more conspicuous.\par 4. Interval resolution of focal mild hypermetabolism in the anterior aspect of the hyoid bone and focal cutaneous FDG avidity in the left lower face. Unchanged non-FDG avid low-attenuation density in the cricoarytenoid region.\par \par -Esophagram: Small hiatal hernia without demonstrated gastroesophageal reflux. There is esophagoesophageal reflux.\par \par -Pet/CT 6/20/21:  Compared to FDG-PET/CT scan dated 3/5/2021:\par 1. Interval resolution of a few mildly FDG avid left axillary lymph nodes and small mildly FDG avid foci in bilateral hilar regions.\par 2. Nonspecific FDG avidity in mid to distal esophagus, gastric boy and pylorus, unchanged. Please correlate clinically or with endoscopy as indicated.\par 3. Unchanged nonspecific non-FDG avid groundglass opacity in the anterior right upper lobe.\par 4. Non-FDG avid low-attenuation density in the pretracheal region, decreased in size.\par \par -MRI Brain 6/21/21:  There is no intraparenchymal hematoma, mass effect or midline shift.  No evidence of intracranial metastatic disease. Slight interval increase in density within the right mastoid and middle ear regions when compared most recent prior examination.\par \par -PET/CT 9/13/21:  Compared to FDG-PET/CT scan dated 6/20/2021:\par 1. New cluster of FDG-avid nodules in left upper lobe. Primary consideration is infection/mucoid impacted distal airways. Please correlate clinically and follow-up with dedicated CT of chest in 1 month.\par 2. Nonspecific FDG avidity in mid and distal esophagus, unchanged. Resolution of gastric hypermetabolism.\par 3. Nonspecific, non-FDG avid right upper lobe groundglass opacity, unchanged.\par 4. Non-FDG avid low-attenuation density in the pretracheal region, unchanged.\par \par -MRI Brain 12/1/21:  No significant change when allowing for differences in technique.\par \par -PET/CT 12/6/21:  Compared to FDG-PET/CT scan dated 6/20/2021:\par 1. Mildly FDG-avid cluster of nodules in left upper lobe, unchanged on CT, and decreased in metabolism. An infectious/inflammatory etiology is favored. Please correlate clinically. A dedicated CT of chest may be obtained for further characterization.\par 2. Nonspecific FDG avidity in mid and distal esophagus, unchanged.\par 3. Nonspecific, non-FDG avid right upper lobe groundglass opacity, unchanged.\par 4. Non-FDG avid low-attenuation density in the pretracheal region, unchanged.\par \par -PET/CT 3/7/22:  Compared to FDG-PET/CT scan dated 12/6/2021:\par 1. FDG-avid, centrally photopenic left upper lobe lung mass and adjacent FDG-avid nodules, markedly increased in size and metabolism as compared to prior study, are compatible with recurrent disease.\par 2. Nonspecific FDG avidity in mid and distal esophagus, unchanged.\par 3. Nonspecific, non-FDG avid right upper lobe groundglass opacity, unchanged.\par 4. Non-FDG avid low-attenuation density in the pretracheal region, unchanged.\par \par -Brain MRI 3/30/22: \par 1. Age appropriate involutional changes.\par 2. No abnormal intraparenchymal or leptomeningeal enhancement. No evidence of intracranial metastases or leptomeningeal carcinomatosis.\par 3. Presence of fluid again appreciated within the right-sided mastoid air cells, slightly increased compared with prior. Correlate clinically for mastoiditis.\par \par –CT Chest 5/9/22:  In comparison with 3/7/2022, interval decrease of left upper lobe mass. No new or enlarging nodule.\par \par

## 2022-06-08 NOTE — HISTORY OF PRESENT ILLNESS
[Disease: _____________________] : Disease: [unfilled] [AJCC Stage: ____] : AJCC Stage: [unfilled] [de-identified] : The patient is a former smoker with a history of CAD who developed chest pain in March 2019 and had a cardiac stent placed. When his chest pain persisted, he was sent for a CT scan of his chest in late March 2019 that revealed a left upper lobe lung mass with evidence of metastatic disease. 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 CT scan revealed evidence of extensive bony and liver metastases. MRI of his brain was negative for brain metastases however there was extensive calvarial and T-spine metastases with possible evidence of epidural involvement. He was treated in [place] by an outside medical oncologist and started on carboplatin, etoposide and atezolizumab in early April 2019. He received 4 cycles of triplet combination therapy followed by immunotherapy maintenance. A restaging PET/CT scan in June 2019 revealed a dramatic response. He moved from [place] and transferred his care to [facility].  \par Treated with immunotherapy maintenance from June 2019 through May 2021 with sustained response.  \par Developed severe dysphagia and odynophagia.  He was treated for H-pylori and symptoms continued; H Pylori stool Ag was negative.  PET/CT with findings consistent with esophagitis/gastritis.  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  Restaging PET/CT March 2022 with disease progression.  Started chemotherapy with Carboplatin/Etoposide in March 2022.   [de-identified] : -Lung, left hilar FNA 3/29/19: Positive for malignant cells. Poorly differentiated neuroendocrine carcinoma. IHC is positive for MCK, TTF-1 and synaptophysin while negative for desmoglein3, P40, NapsinA, CD20, PAX5, CD3.  Ki-67 is markedly high. [de-identified] : *Wife provided translation where needed\par Started re-treatment with Carboplatin/Etoposide chemotherapy in March 2022; now s/p C4 (with addition of Durvalumab)  5/17-19. \par Wife contacted on call physician on 5/21 due to patient's c/o chest pain. Patient was advised to go to the ED however his wife gave him ASA, Ibuprofen and TUMs with resolution of pain and no further episodes.  Overall tolerating therapy fairly well; reports feeling fatigue for one week after treatment when he mostly stays in bed. For the next 2 weeks.His appetite and weight are stable; no return of esophagitis symptoms since discontinuation of Prednisone on 4/15. He has occasional constipation that is managed with Dulcolax after which he has diarrhea; recommended ongoing bowel regimen of colace/senna with Dulcolax prn for more sustained control. \par No F/N/V/D.  He has dyspnea on exertion and dry cough.\par \par

## 2022-06-08 NOTE — ASSESSMENT
[FreeTextEntry1] : Extensive stage small cell lung cancer diagnosed late March 2019 who was treated with chemotherapy plus immunotherapy from April-June 2019; achieved CR. Treated with immunotherapy maintenance from June 2019 through May 2021 with sustained response.  Developed immunotherapy related esophagitis; started on steroids in June 2021 with clinical response.  Restaging PET/CT March 2022 with disease progression.  Started re-treatment with Carboplatin/Etoposide chemotherapy March 2022; achieved CT.\par Now s/p C4 with addition of Durvalumab on 5/17-19. \par Recommend: \par –Continue chemotherapy with Carboplatin (retreat protocol) and Etoposide as scheduled. Durvalumab 1500 mg IV was added with C4.  Previously discussed the option to dose–reduce chemotherapy agents in an effort to improve tolerability however they were uncomfortable with this option and wished to maintain the current course.\par – Reviewed again the plan to treat the patient with 4–6 cycles of chemotherapy, as he had Immunotherapy with durvalumab added with C4 of chemotherapy. They understand the risk of autoimmune side effects such as the esophagitis he experienced with the other immunotherapy drug atezolizumab, but with the overall plan to ultimately maintain him on immunotherapy as a maintenance strategy following the completion of the 4–6 cycles of planned chemotherapy. We will monitor for autoimmune side effects such as return of esophagitis.\par – With the incorporation of durvalumab with cycle 4, we will continue to assess his tolerability to chemotherapy and consider either dose–reductions of chemotherapy with C5 or C6 versus discontinuation of cytotoxic chemotherapy and continuation of durvalumab maintenance \par – Continue Pegfilgrastim/Onpro or biosimilar equivalent for prophylaxis of chemotherapy induced neutropenic complications\par -Patient no longer on Prednisone: will resume Dex premed with cycles 4-6. \par –Esophagitis resolved: stopped taking Prednisone 5mg daily on 4/15. He has previously declined to utilize famotidine or PPI prophylaxis due to constipation.\par -Constipation: recommend ongoing bowel regimen with Colace/Senna daily with addition of dulcolax prn. \par -Continue symptom management under care of Palliative Care Dr. Moreno\par -Continue levothyroxine; monitor periodic TFT's \par -Avoided addition of bone modifying agent given the dramatic response in the bones\par -Mediport in place\par -Check out form given to patient with instructions for making appointments for his mid-cycle f/u visit.  Will obtain labs at mid-cycle via Port flush.  \par -F/U following C5 mid-cycle or sooner should problems arise

## 2022-06-09 ENCOUNTER — APPOINTMENT (OUTPATIENT)
Dept: INFUSION THERAPY | Facility: HOSPITAL | Age: 67
End: 2022-06-09

## 2022-06-14 ENCOUNTER — OUTPATIENT (OUTPATIENT)
Dept: OUTPATIENT SERVICES | Facility: HOSPITAL | Age: 67
LOS: 1 days | Discharge: HOME | End: 2022-06-14

## 2022-06-14 ENCOUNTER — INPATIENT (INPATIENT)
Facility: HOSPITAL | Age: 67
LOS: 1 days | Discharge: HOME | End: 2022-06-16
Attending: INTERNAL MEDICINE | Admitting: INTERNAL MEDICINE
Payer: COMMERCIAL

## 2022-06-14 VITALS
HEIGHT: 69.69 IN | HEART RATE: 79 BPM | RESPIRATION RATE: 20 BRPM | DIASTOLIC BLOOD PRESSURE: 75 MMHG | SYSTOLIC BLOOD PRESSURE: 129 MMHG | OXYGEN SATURATION: 100 %

## 2022-06-14 DIAGNOSIS — C34.90 MALIGNANT NEOPLASM OF UNSPECIFIED PART OF UNSPECIFIED BRONCHUS OR LUNG: ICD-10-CM

## 2022-06-14 DIAGNOSIS — I21.3 ST ELEVATION (STEMI) MYOCARDIAL INFARCTION OF UNSPECIFIED SITE: ICD-10-CM

## 2022-06-14 LAB
ALBUMIN SERPL ELPH-MCNC: 3.8 G/DL — SIGNIFICANT CHANGE UP (ref 3.3–5)
ALBUMIN SERPL ELPH-MCNC: 4.2 G/DL — SIGNIFICANT CHANGE UP (ref 3.3–5)
ALP SERPL-CCNC: 107 U/L — SIGNIFICANT CHANGE UP (ref 40–120)
ALP SERPL-CCNC: 119 U/L — SIGNIFICANT CHANGE UP (ref 40–120)
ALT FLD-CCNC: 13 U/L — SIGNIFICANT CHANGE UP (ref 4–41)
ALT FLD-CCNC: 17 U/L — SIGNIFICANT CHANGE UP (ref 4–41)
ANION GAP SERPL CALC-SCNC: 12 MMOL/L — SIGNIFICANT CHANGE UP (ref 7–14)
ANION GAP SERPL CALC-SCNC: 13 MMOL/L — SIGNIFICANT CHANGE UP (ref 7–14)
APPEARANCE UR: ABNORMAL
APTT BLD: 30.3 SEC — SIGNIFICANT CHANGE UP (ref 27–36.3)
APTT BLD: 37.3 SEC — HIGH (ref 27–36.3)
AST SERPL-CCNC: 18 U/L — SIGNIFICANT CHANGE UP (ref 4–40)
AST SERPL-CCNC: 72 U/L — HIGH (ref 4–40)
B PERT DNA SPEC QL NAA+PROBE: SIGNIFICANT CHANGE UP
B PERT+PARAPERT DNA PNL SPEC NAA+PROBE: SIGNIFICANT CHANGE UP
BASOPHILS # BLD AUTO: 0 K/UL — SIGNIFICANT CHANGE UP (ref 0–0.2)
BASOPHILS NFR BLD AUTO: 0 % — SIGNIFICANT CHANGE UP (ref 0–2)
BILIRUB SERPL-MCNC: 0.4 MG/DL — SIGNIFICANT CHANGE UP (ref 0.2–1.2)
BILIRUB SERPL-MCNC: 0.4 MG/DL — SIGNIFICANT CHANGE UP (ref 0.2–1.2)
BILIRUB UR-MCNC: NEGATIVE — SIGNIFICANT CHANGE UP
BLD GP AB SCN SERPL QL: NEGATIVE — SIGNIFICANT CHANGE UP
BORDETELLA PARAPERTUSSIS (RAPRVP): SIGNIFICANT CHANGE UP
BUN SERPL-MCNC: 15 MG/DL — SIGNIFICANT CHANGE UP (ref 7–23)
BUN SERPL-MCNC: 18 MG/DL — SIGNIFICANT CHANGE UP (ref 7–23)
C PNEUM DNA SPEC QL NAA+PROBE: SIGNIFICANT CHANGE UP
CALCIUM SERPL-MCNC: 8.9 MG/DL — SIGNIFICANT CHANGE UP (ref 8.4–10.5)
CALCIUM SERPL-MCNC: 9.8 MG/DL — SIGNIFICANT CHANGE UP (ref 8.4–10.5)
CHLORIDE SERPL-SCNC: 102 MMOL/L — SIGNIFICANT CHANGE UP (ref 98–107)
CHLORIDE SERPL-SCNC: 102 MMOL/L — SIGNIFICANT CHANGE UP (ref 98–107)
CK MB BLD-MCNC: 17.9 % — HIGH (ref 0–2.5)
CK MB CFR SERPL CALC: 103.1 NG/ML — HIGH
CK SERPL-CCNC: 575 U/L — HIGH (ref 30–200)
CO2 SERPL-SCNC: 21 MMOL/L — LOW (ref 22–31)
CO2 SERPL-SCNC: 24 MMOL/L — SIGNIFICANT CHANGE UP (ref 22–31)
COLOR SPEC: YELLOW — SIGNIFICANT CHANGE UP
CREAT SERPL-MCNC: 0.47 MG/DL — LOW (ref 0.5–1.3)
CREAT SERPL-MCNC: 0.7 MG/DL — SIGNIFICANT CHANGE UP (ref 0.5–1.3)
DIFF PNL FLD: NEGATIVE — SIGNIFICANT CHANGE UP
EGFR: 100 ML/MIN/1.73M2 — SIGNIFICANT CHANGE UP
EGFR: 112 ML/MIN/1.73M2 — SIGNIFICANT CHANGE UP
EOSINOPHIL # BLD AUTO: 0 K/UL — SIGNIFICANT CHANGE UP (ref 0–0.5)
EOSINOPHIL NFR BLD AUTO: 0 % — SIGNIFICANT CHANGE UP (ref 0–6)
FLUAV SUBTYP SPEC NAA+PROBE: SIGNIFICANT CHANGE UP
FLUBV RNA SPEC QL NAA+PROBE: SIGNIFICANT CHANGE UP
GLUCOSE SERPL-MCNC: 123 MG/DL — HIGH (ref 70–99)
GLUCOSE SERPL-MCNC: 146 MG/DL — HIGH (ref 70–99)
GLUCOSE UR QL: NEGATIVE — SIGNIFICANT CHANGE UP
HADV DNA SPEC QL NAA+PROBE: SIGNIFICANT CHANGE UP
HCOV 229E RNA SPEC QL NAA+PROBE: SIGNIFICANT CHANGE UP
HCOV HKU1 RNA SPEC QL NAA+PROBE: SIGNIFICANT CHANGE UP
HCOV NL63 RNA SPEC QL NAA+PROBE: SIGNIFICANT CHANGE UP
HCOV OC43 RNA SPEC QL NAA+PROBE: SIGNIFICANT CHANGE UP
HCT VFR BLD CALC: 31.8 % — LOW (ref 39–50)
HCT VFR BLD CALC: 34 % — LOW (ref 39–50)
HGB BLD-MCNC: 10.7 G/DL — LOW (ref 13–17)
HGB BLD-MCNC: 11.5 G/DL — LOW (ref 13–17)
HMPV RNA SPEC QL NAA+PROBE: SIGNIFICANT CHANGE UP
HPIV1 RNA SPEC QL NAA+PROBE: SIGNIFICANT CHANGE UP
HPIV2 RNA SPEC QL NAA+PROBE: SIGNIFICANT CHANGE UP
HPIV3 RNA SPEC QL NAA+PROBE: SIGNIFICANT CHANGE UP
HPIV4 RNA SPEC QL NAA+PROBE: SIGNIFICANT CHANGE UP
IANC: 20.08 K/UL — HIGH (ref 1.8–7.4)
INR BLD: 1.16 RATIO — SIGNIFICANT CHANGE UP (ref 0.88–1.16)
KETONES UR-MCNC: NEGATIVE — SIGNIFICANT CHANGE UP
LEUKOCYTE ESTERASE UR-ACNC: NEGATIVE — SIGNIFICANT CHANGE UP
LYMPHOCYTES # BLD AUTO: 0.76 K/UL — LOW (ref 1–3.3)
LYMPHOCYTES # BLD AUTO: 3.4 % — LOW (ref 13–44)
M PNEUMO DNA SPEC QL NAA+PROBE: SIGNIFICANT CHANGE UP
MCHC RBC-ENTMCNC: 31.3 PG — SIGNIFICANT CHANGE UP (ref 27–34)
MCHC RBC-ENTMCNC: 31.4 PG — SIGNIFICANT CHANGE UP (ref 27–34)
MCHC RBC-ENTMCNC: 33.6 GM/DL — SIGNIFICANT CHANGE UP (ref 32–36)
MCHC RBC-ENTMCNC: 33.8 GM/DL — SIGNIFICANT CHANGE UP (ref 32–36)
MCV RBC AUTO: 92.6 FL — SIGNIFICANT CHANGE UP (ref 80–100)
MCV RBC AUTO: 93.3 FL — SIGNIFICANT CHANGE UP (ref 80–100)
MONOCYTES # BLD AUTO: 0.18 K/UL — SIGNIFICANT CHANGE UP (ref 0–0.9)
MONOCYTES NFR BLD AUTO: 0.8 % — LOW (ref 2–14)
NEUTROPHILS # BLD AUTO: 21.36 K/UL — HIGH (ref 1.8–7.4)
NEUTROPHILS NFR BLD AUTO: 93.3 % — HIGH (ref 43–77)
NITRITE UR-MCNC: NEGATIVE — SIGNIFICANT CHANGE UP
NRBC # BLD: 0 /100 WBCS — SIGNIFICANT CHANGE UP
NRBC # FLD: 0 K/UL — SIGNIFICANT CHANGE UP
PH UR: 5.5 — SIGNIFICANT CHANGE UP (ref 5–8)
PLATELET # BLD AUTO: 129 K/UL — LOW (ref 150–400)
PLATELET # BLD AUTO: 145 K/UL — LOW (ref 150–400)
POTASSIUM SERPL-MCNC: 4 MMOL/L — SIGNIFICANT CHANGE UP (ref 3.5–5.3)
POTASSIUM SERPL-MCNC: 5.5 MMOL/L — HIGH (ref 3.5–5.3)
POTASSIUM SERPL-SCNC: 4 MMOL/L — SIGNIFICANT CHANGE UP (ref 3.5–5.3)
POTASSIUM SERPL-SCNC: 5.5 MMOL/L — HIGH (ref 3.5–5.3)
PROT SERPL-MCNC: 6.4 G/DL — SIGNIFICANT CHANGE UP (ref 6–8.3)
PROT SERPL-MCNC: 6.8 G/DL — SIGNIFICANT CHANGE UP (ref 6–8.3)
PROT UR-MCNC: ABNORMAL
PROTHROM AB SERPL-ACNC: 13.5 SEC — HIGH (ref 10.5–13.4)
RAPID RVP RESULT: SIGNIFICANT CHANGE UP
RBC # BLD: 3.41 M/UL — LOW (ref 4.2–5.8)
RBC # BLD: 3.67 M/UL — LOW (ref 4.2–5.8)
RBC # FLD: 14.4 % — SIGNIFICANT CHANGE UP (ref 10.3–14.5)
RBC # FLD: 14.9 % — HIGH (ref 10.3–14.5)
RH IG SCN BLD-IMP: POSITIVE — SIGNIFICANT CHANGE UP
RSV RNA SPEC QL NAA+PROBE: SIGNIFICANT CHANGE UP
RV+EV RNA SPEC QL NAA+PROBE: SIGNIFICANT CHANGE UP
SARS-COV-2 RNA SPEC QL NAA+PROBE: SIGNIFICANT CHANGE UP
SARS-COV-2 RNA SPEC QL NAA+PROBE: SIGNIFICANT CHANGE UP
SODIUM SERPL-SCNC: 135 MMOL/L — SIGNIFICANT CHANGE UP (ref 135–145)
SODIUM SERPL-SCNC: 139 MMOL/L — SIGNIFICANT CHANGE UP (ref 135–145)
SP GR SPEC: 1.03 — SIGNIFICANT CHANGE UP (ref 1–1.05)
TROPONIN T, HIGH SENSITIVITY RESULT: 20 NG/L — SIGNIFICANT CHANGE UP
TROPONIN T, HIGH SENSITIVITY RESULT: 236 NG/L — CRITICAL HIGH
UROBILINOGEN FLD QL: SIGNIFICANT CHANGE UP
WBC # BLD: 22.48 K/UL — HIGH (ref 3.8–10.5)
WBC # BLD: 22.52 K/UL — HIGH (ref 3.8–10.5)
WBC # FLD AUTO: 22.48 K/UL — HIGH (ref 3.8–10.5)
WBC # FLD AUTO: 22.52 K/UL — HIGH (ref 3.8–10.5)

## 2022-06-14 PROCEDURE — 92941 PRQ TRLML REVSC TOT OCCL AMI: CPT | Mod: LC

## 2022-06-14 PROCEDURE — 93306 TTE W/DOPPLER COMPLETE: CPT | Mod: 26

## 2022-06-14 PROCEDURE — 99291 CRITICAL CARE FIRST HOUR: CPT

## 2022-06-14 PROCEDURE — 99222 1ST HOSP IP/OBS MODERATE 55: CPT

## 2022-06-14 PROCEDURE — 71045 X-RAY EXAM CHEST 1 VIEW: CPT | Mod: 26

## 2022-06-14 PROCEDURE — 70450 CT HEAD/BRAIN W/O DYE: CPT | Mod: 26

## 2022-06-14 PROCEDURE — 93458 L HRT ARTERY/VENTRICLE ANGIO: CPT | Mod: 26,59

## 2022-06-14 RX ORDER — TAMSULOSIN HYDROCHLORIDE 0.4 MG/1
0.4 CAPSULE ORAL AT BEDTIME
Refills: 0 | Status: DISCONTINUED | OUTPATIENT
Start: 2022-06-14 | End: 2022-06-16

## 2022-06-14 RX ORDER — ATORVASTATIN CALCIUM 80 MG/1
80 TABLET, FILM COATED ORAL AT BEDTIME
Refills: 0 | Status: DISCONTINUED | OUTPATIENT
Start: 2022-06-14 | End: 2022-06-16

## 2022-06-14 RX ORDER — PANTOPRAZOLE SODIUM 20 MG/1
40 TABLET, DELAYED RELEASE ORAL
Refills: 0 | Status: DISCONTINUED | OUTPATIENT
Start: 2022-06-14 | End: 2022-06-16

## 2022-06-14 RX ORDER — ASPIRIN/CALCIUM CARB/MAGNESIUM 324 MG
81 TABLET ORAL DAILY
Refills: 0 | Status: DISCONTINUED | OUTPATIENT
Start: 2022-06-14 | End: 2022-06-16

## 2022-06-14 RX ORDER — CLOPIDOGREL BISULFATE 75 MG/1
75 TABLET, FILM COATED ORAL DAILY
Refills: 0 | Status: DISCONTINUED | OUTPATIENT
Start: 2022-06-14 | End: 2022-06-14

## 2022-06-14 RX ORDER — HEPARIN SODIUM 5000 [USP'U]/ML
INJECTION INTRAVENOUS; SUBCUTANEOUS
Qty: 25000 | Refills: 0 | Status: DISCONTINUED | OUTPATIENT
Start: 2022-06-14 | End: 2022-06-14

## 2022-06-14 RX ORDER — HEPARIN SODIUM 5000 [USP'U]/ML
4000 INJECTION INTRAVENOUS; SUBCUTANEOUS EVERY 6 HOURS
Refills: 0 | Status: DISCONTINUED | OUTPATIENT
Start: 2022-06-14 | End: 2022-06-14

## 2022-06-14 RX ORDER — LEVOTHYROXINE SODIUM 125 MCG
25 TABLET ORAL DAILY
Refills: 0 | Status: DISCONTINUED | OUTPATIENT
Start: 2022-06-14 | End: 2022-06-16

## 2022-06-14 RX ORDER — INFLUENZA VIRUS VACCINE 15; 15; 15; 15 UG/.5ML; UG/.5ML; UG/.5ML; UG/.5ML
0.7 SUSPENSION INTRAMUSCULAR ONCE
Refills: 0 | Status: DISCONTINUED | OUTPATIENT
Start: 2022-06-14 | End: 2022-06-16

## 2022-06-14 RX ORDER — FENTANYL CITRATE 50 UG/ML
25 INJECTION INTRAVENOUS ONCE
Refills: 0 | Status: DISCONTINUED | OUTPATIENT
Start: 2022-06-14 | End: 2022-06-14

## 2022-06-14 RX ORDER — HEPARIN SODIUM 5000 [USP'U]/ML
4000 INJECTION INTRAVENOUS; SUBCUTANEOUS ONCE
Refills: 0 | Status: COMPLETED | OUTPATIENT
Start: 2022-06-14 | End: 2022-06-14

## 2022-06-14 RX ORDER — ONDANSETRON 8 MG/1
4 TABLET, FILM COATED ORAL ONCE
Refills: 0 | Status: COMPLETED | OUTPATIENT
Start: 2022-06-14 | End: 2022-06-14

## 2022-06-14 RX ORDER — FENTANYL CITRATE 50 UG/ML
50 INJECTION INTRAVENOUS ONCE
Refills: 0 | Status: DISCONTINUED | OUTPATIENT
Start: 2022-06-14 | End: 2022-06-14

## 2022-06-14 RX ORDER — CLOPIDOGREL BISULFATE 75 MG/1
600 TABLET, FILM COATED ORAL ONCE
Refills: 0 | Status: COMPLETED | OUTPATIENT
Start: 2022-06-14 | End: 2022-06-14

## 2022-06-14 RX ORDER — CHLORHEXIDINE GLUCONATE 213 G/1000ML
1 SOLUTION TOPICAL
Refills: 0 | Status: DISCONTINUED | OUTPATIENT
Start: 2022-06-14 | End: 2022-06-16

## 2022-06-14 RX ORDER — ASPIRIN/CALCIUM CARB/MAGNESIUM 324 MG
162 TABLET ORAL ONCE
Refills: 0 | Status: COMPLETED | OUTPATIENT
Start: 2022-06-14 | End: 2022-06-14

## 2022-06-14 RX ORDER — TICAGRELOR 90 MG/1
90 TABLET ORAL EVERY 12 HOURS
Refills: 0 | Status: DISCONTINUED | OUTPATIENT
Start: 2022-06-15 | End: 2022-06-16

## 2022-06-14 RX ORDER — HEPARIN SODIUM 5000 [USP'U]/ML
5000 INJECTION INTRAVENOUS; SUBCUTANEOUS EVERY 8 HOURS
Refills: 0 | Status: DISCONTINUED | OUTPATIENT
Start: 2022-06-14 | End: 2022-06-16

## 2022-06-14 RX ORDER — HEPARIN SODIUM 5000 [USP'U]/ML
1300 INJECTION INTRAVENOUS; SUBCUTANEOUS
Qty: 25000 | Refills: 0 | Status: DISCONTINUED | OUTPATIENT
Start: 2022-06-14 | End: 2022-06-14

## 2022-06-14 RX ORDER — TICAGRELOR 90 MG/1
1 TABLET ORAL
Qty: 60 | Refills: 0
Start: 2022-06-14 | End: 2022-07-13

## 2022-06-14 RX ORDER — FENTANYL CITRATE 50 UG/ML
25 INJECTION INTRAVENOUS ONCE
Refills: 0 | Status: COMPLETED | OUTPATIENT
Start: 2022-06-14 | End: 2022-06-14

## 2022-06-14 RX ORDER — CLOPIDOGREL BISULFATE 75 MG/1
300 TABLET, FILM COATED ORAL ONCE
Refills: 0 | Status: DISCONTINUED | OUTPATIENT
Start: 2022-06-14 | End: 2022-06-14

## 2022-06-14 RX ADMIN — Medication 81 MILLIGRAM(S): at 11:05

## 2022-06-14 RX ADMIN — ONDANSETRON 4 MILLIGRAM(S): 8 TABLET, FILM COATED ORAL at 03:36

## 2022-06-14 RX ADMIN — PANTOPRAZOLE SODIUM 40 MILLIGRAM(S): 20 TABLET, DELAYED RELEASE ORAL at 06:20

## 2022-06-14 RX ADMIN — ONDANSETRON 4 MILLIGRAM(S): 8 TABLET, FILM COATED ORAL at 20:13

## 2022-06-14 RX ADMIN — FENTANYL CITRATE 50 MICROGRAM(S): 50 INJECTION INTRAVENOUS at 01:35

## 2022-06-14 RX ADMIN — TAMSULOSIN HYDROCHLORIDE 0.4 MILLIGRAM(S): 0.4 CAPSULE ORAL at 22:34

## 2022-06-14 RX ADMIN — HEPARIN SODIUM 1000 UNIT(S)/HR: 5000 INJECTION INTRAVENOUS; SUBCUTANEOUS at 02:34

## 2022-06-14 RX ADMIN — ATORVASTATIN CALCIUM 80 MILLIGRAM(S): 80 TABLET, FILM COATED ORAL at 22:36

## 2022-06-14 RX ADMIN — ONDANSETRON 4 MILLIGRAM(S): 8 TABLET, FILM COATED ORAL at 12:45

## 2022-06-14 RX ADMIN — Medication 162 MILLIGRAM(S): at 01:20

## 2022-06-14 RX ADMIN — Medication 25 MICROGRAM(S): at 11:20

## 2022-06-14 RX ADMIN — HEPARIN SODIUM 1000 UNIT(S)/HR: 5000 INJECTION INTRAVENOUS; SUBCUTANEOUS at 01:31

## 2022-06-14 RX ADMIN — FENTANYL CITRATE 25 MICROGRAM(S): 50 INJECTION INTRAVENOUS at 08:46

## 2022-06-14 RX ADMIN — FENTANYL CITRATE 25 MICROGRAM(S): 50 INJECTION INTRAVENOUS at 03:51

## 2022-06-14 RX ADMIN — HEPARIN SODIUM 4000 UNIT(S): 5000 INJECTION INTRAVENOUS; SUBCUTANEOUS at 01:31

## 2022-06-14 RX ADMIN — HEPARIN SODIUM 5000 UNIT(S): 5000 INJECTION INTRAVENOUS; SUBCUTANEOUS at 22:34

## 2022-06-14 RX ADMIN — FENTANYL CITRATE 25 MICROGRAM(S): 50 INJECTION INTRAVENOUS at 09:15

## 2022-06-14 RX ADMIN — CHLORHEXIDINE GLUCONATE 1 APPLICATION(S): 213 SOLUTION TOPICAL at 06:20

## 2022-06-14 RX ADMIN — FENTANYL CITRATE 25 MICROGRAM(S): 50 INJECTION INTRAVENOUS at 03:35

## 2022-06-14 RX ADMIN — CLOPIDOGREL BISULFATE 600 MILLIGRAM(S): 75 TABLET, FILM COATED ORAL at 11:09

## 2022-06-14 NOTE — ED ADULT NURSE REASSESSMENT NOTE - NS ED NURSE REASSESS COMMENT FT1
Pt started on heparin drip at 10 ml/hr as per  ACS nomogram. Pt and family educated on signs and symptoms of bleeding with proper feed back. No complaints of   nausea, dizziness, vomiting  SOB, fever, chills verbalized. pt on cardiac monitorin NSR.

## 2022-06-14 NOTE — ED ADULT NURSE NOTE - OBJECTIVE STATEMENT
Sylvester RN: pt presents to ED, TR A, for STEMI notification. A&04 complaining of sharp non radiating midsternal chest pain starting earlier last night. PMH of CAD, CABG, lung CA currently on chemo (last treatment last tuesday). No complaints of headache, nausea, dizziness, vomiting  SOB, fever, chills verbalized. medicated as ordered. 20GIV Rforearm, 20GL hand in place, labs sent. on cardiac monitor. awaiting CT. report given to primary RN Ginger

## 2022-06-14 NOTE — ED PROVIDER NOTE - NS ED ROS FT
Review of Systems    Constitutional: (-) fever, (-) chills, (-) fatigue  HEENT: (-) sore throat, (-) hearing loss, (-) nasal congestion  Cardiovascular: (+) chest pain, (-) syncope  Respiratory: (-) cough, (+) shortness of breath  Gastrointestinal: (-) vomiting, (-) diarrhea, (-) abdominal pain  Musculoskeletal: (-) neck pain, (-) back pain, (-) joint pain  Integumentary: (-) rash, (-) edema, (-) wound  Neurological: (-) headache, (-) altered mental status    Except as documented in the HPI, all other systems are negative.

## 2022-06-14 NOTE — ED PROVIDER NOTE - ATTENDING CONTRIBUTION TO CARE
Attending Attestation: Dr. Leary  I have personally performed a history and physical examination of the patient and discussed management with the resident as well as the patient.  I reviewed the resident's note and agree with the documented findings and plan of care.  I have authored and modified critical sections of the Provider Note, including but not limited to HPI, Physical Exam and MDM. 67 year old male with PMH CAD s/p stents x 4 and CABG, HTN, hypothyroidism, BPH, HLD, lung ca on chemo presents with chest pain x 1 hour. Pt reports substernal chest tightness associated with nausea and SOB a/w diaphoresis and nausea - started 1 hour prior to arrival. Pt reports taking 2 x 81 mg aspirin with little improvement. Denies any fevers, abdominal pain, vomiting, diarrhea, bloody stools, black tarry stools, dysuria, headache, vision change, numbness, weakness, or rash. HUMERA noted on triage ECG and code STEMI called upon arrival. Per Dr. Villanueva d/t lung cancer will not activate cath lab at this time and will reassess in AM.  Cardiology NP at bedside, plan for labs, imaging, supportive treatment, admission to CCU.    Upon my evaluation, this patient had a high probability of imminent or life-threatening deterioration due to concern for STEMI which required my direct attention, intervention, and personal management.  The patient has a  medical condition that impairs one or more vital organ systems.  Frequent personal assessment and adjustment of medical interventions was performed.       I have personally provided 33 minutes of critical care time exclusive of time spent on separately billable procedures. Time includes review of laboratory data, radiology results, discussion with consultants, patient and family; monitoring for potential decompensation, as well as time spent retrieving data and reviewing the chart and documenting the visit. Interventions were performed as documented above.

## 2022-06-14 NOTE — H&P ADULT - ASSESSMENT
66yo M with PMHx of CAD s/p stents x 4 and CABG, HTN, Lung CA ?metastatic on chemo who presents to the ED with chest pain. Patient accompanied by his wife at the bedside. States that he was sitting on the couch watching TV when he developed acute onset 10/10 substernal chest pain. In the ED, EKG revealing ERIBERTO in II, III, AVF suggestive of MI. Case discussed in detail with Dr. Villanueva and CCU fellow. Will admit to the CCU for further management.     Neuro:  -- Alert and oriented X 3  -- No acute issues    Cardio:  -- Hx of CAD with stents X4, CABG  -- Currently with STEMI involving inferior leads  -- S/P ASA/Plavix load  -- Start heparin gtt for ACS  -- Fentanyl PRN for pain. Avoid SLN for now  -- Check A1c, lipids, TSH  -- Check Cardiac enzymes and trend to peak     Resp:  -- Stable on room air at this time  -- CXR clear lungs  -- Continue to monitor    GI:   -- NPO except meds for now    Renal:  -- Monitor renal indices  -- Replete electrolytes as needed     Infectious Disease:  -- Afebrile in triage  -- Monitor off of abx for now     Heme:  -- Continue with aspirin, plavix  -- Heparin gtt for ACS. Titrate according to PTT  -- Monitor for signs of bleeding    Endocrine:  -- Check A1c    Disposition:  -- Admit to the CCU for further management

## 2022-06-14 NOTE — H&P ADULT - NSHPPHYSICALEXAM_GEN_ALL_CORE
ICU Vital Signs Last 24 Hrs  HR: 81 (14 Jun 2022 01:38) (79 - 81)  BP: 119/67 (14 Jun 2022 01:38) (119/67 - 129/75)  RR: 17 (14 Jun 2022 01:38) (17 - 20)  SpO2: 100% (14 Jun 2022 01:38) (100% - 100%)    PHYSICAL EXAM:    GENERAL: NAD, well-groomed, well-developed  HEAD:  Atraumatic, Normocephalic  EYES: EOMI, PERRLA, conjunctiva and sclera clear  ENMT: No tonsillar erythema, exudates, or enlargement; Moist mucous membranes, Good dentition, No lesions  NECK: Supple, No JVD, Normal thyroid  NERVOUS SYSTEM:  Alert & Oriented X3, Good concentration; Motor Strength 5/5 B/L upper and lower extremities; DTRs 2+ intact and symmetric  CHEST/LUNG: Clear to percussion bilaterally; No rales, rhonchi, wheezing, or rubs  HEART: Regular rate and rhythm; No murmurs, rubs, or gallops  ABDOMEN: Soft, Nontender, Nondistended; Bowel sounds present  EXTREMITIES:  2+ Peripheral Pulses, No clubbing, cyanosis, or edema

## 2022-06-14 NOTE — ED PROVIDER NOTE - PHYSICAL EXAMINATION
CONSTITUTIONAL: non-toxic, NAD  SKIN: no rash, no petechiae.  EYES: pink conjunctiva, anicteric  NECK: Supple; no meningismus, no JVD  CARD: RRR, no murmurs, equal radial pulses bilaterally 2+  RESP: CTAB, no respiratory distress  ABD: Soft, non-tender, non-distended, no peritoneal signs  EXT: Normal ROM x4. No edema.   NEURO: Alert, oriented. Neuro exam nonfocal.  PSYCH: Cooperative, appropriate.

## 2022-06-14 NOTE — PROGRESS NOTE ADULT - SUBJECTIVE AND OBJECTIVE BOX
Patient is a 67y old  Male who presents with a chief complaint of STEMI (2022 08:17)    HPI:  Patient is a 68yo M with PMHx of CAD s/p stents x 4 and CABG, HTN, Lung CA ?metastatic on chemo who presents to the ED with chest pain. Patient accompanied by his wife at the bedside. States that he was sitting on the couch watching TV when he developed acute onset 10/10 substernal chest pain. In the ED, EKG revealing ERIBERTO in II, III, AVF suggestive of MI. Currently admits to chest pain 8/10 improved since coming to the ED. Denies headaches, dizziness, palpitations, shortness of breath, abdominal pain, N/V at this time.  (2022 01:45)       INTERVAL HPI/OVERNIGHT EVENTS:   No overnight events   Afebrile, hemodynamically stable     Subjective: having chest pain 6/10 pressure like quality, throughout front of chest,     ICU Vital Signs Last 24 Hrs  T(C): 36.6 (2022 07:37), Max: 36.9 (2022 04:00)  T(F): 97.9 (2022 07:37), Max: 98.4 (2022 04:00)  HR: 72 (2022 06:00) (69 - 89)  BP: 121/56 (2022 06:00) (114/52 - 137/70)  BP(mean): 70 (2022 06:00) (67 - 86)  ABP: --  ABP(mean): --  RR: 14 (2022 06:00) (11 - 20)  SpO2: 97% (2022 06:00) (93% - 100%)    I&O's Summary    2022 07:01  -  2022 07:00  --------------------------------------------------------  IN: 50 mL / OUT: 100 mL / NET: -50 mL          Daily Height in cm: 177 (2022 01:04)    Daily     Adult Advanced Hemodynamics Last 24 Hrs  CVP(mm Hg): --  CVP(cm H2O): --  CO: --  CI: --  PA: --  PA(mean): --  PCWP: --  SVR: --  SVRI: --  PVR: --  PVRI: --    EKG/Telemetry Events:    MEDICATIONS  (STANDING):  chlorhexidine 2% Cloths 1 Application(s) Topical <User Schedule>  fentaNYL    Injectable 25 MICROGram(s) IV Push once  heparin  Infusion.  Unit(s)/Hr (10 mL/Hr) IV Continuous <Continuous>  influenza  Vaccine (HIGH DOSE) 0.7 milliLiter(s) IntraMuscular once  pantoprazole    Tablet 40 milliGRAM(s) Oral before breakfast  tamsulosin 0.4 milliGRAM(s) Oral at bedtime    MEDICATIONS  (PRN):  heparin   Injectable 4000 Unit(s) IV Push every 6 hours PRN For aPTT less than 40      PHYSICAL EXAM:  GENERAL:   HEAD:  Atraumatic, Normocephalic  EYES: EOMI, PERRLA, conjunctiva and sclera clear  NECK: Supple, No JVD, Normal thyroid, no enlarged nodes  NERVOUS SYSTEM:  Alert & Awake.   CHEST/LUNG: B/L good air entry; No rales, rhonchi, or wheezing  HEART: S1S2 normal, no S3, Regular rate and rhythm; No murmurs  ABDOMEN: Soft, Nontender, Nondistended; Bowel sounds present  EXTREMITIES:  2+ Peripheral Pulses, No clubbing, cyanosis, or edema  LYMPH: No lymphadenopathy noted  SKIN: No rashes or lesions    LABS:                        11.5   22.48 )-----------( 145      ( 2022 01:13 )             34.0     06-14    139  |  102  |  18  ----------------------------<  146<H>  4.0   |  24  |  0.70    Ca    9.8      2022 01:13    TPro  6.8  /  Alb  4.2  /  TBili  0.4  /  DBili  x   /  AST  18  /  ALT  13  /  AlkPhos  119  06-14    LIVER FUNCTIONS - ( 2022 01:13 )  Alb: 4.2 g/dL / Pro: 6.8 g/dL / ALK PHOS: 119 U/L / ALT: 13 U/L / AST: 18 U/L / GGT: x           PT/INR - ( 2022 01:13 )   PT: 13.5 sec;   INR: 1.16 ratio         PTT - ( 2022 01:13 )  PTT:30.3 sec  CAPILLARY BLOOD GLUCOSE                Urinalysis Basic - ( 2022 06:00 )    Color: Yellow / Appearance: Slightly Turbid / S.028 / pH: x  Gluc: x / Ketone: Negative  / Bili: Negative / Urobili: <2 mg/dL   Blood: x / Protein: 30 mg/dL / Nitrite: Negative   Leuk Esterase: Negative / RBC: 4 /HPF / WBC 8 /HPF   Sq Epi: x / Non Sq Epi: 4 /HPF / Bacteria: Negative          RADIOLOGY & ADDITIONAL TESTS:  CXR:        Care Discussed with Consultants/Other Providers [ x] YES  [ ] NO           Patient is a 67y old  Male who presents with a chief complaint of STEMI (2022 08:17)    HPI:  Patient is a 68yo M with PMHx of CAD s/p stents x 4 and CABG, HTN, Lung CA ?metastatic on chemo who presents to the ED with chest pain. Patient accompanied by his wife at the bedside. States that he was sitting on the couch watching TV when he developed acute onset 10/10 substernal chest pain. In the ED, EKG revealing ERIBERTO in II, III, AVF suggestive of MI. Currently admits to chest pain 8/10 improved since coming to the ED. Denies headaches, dizziness, palpitations, shortness of breath, abdominal pain, N/V at this time.  (2022 01:45)       INTERVAL HPI/OVERNIGHT EVENTS:   No overnight events   Afebrile, hemodynamically stable     Subjective: having chest pain 6/10 pressure like quality, throughout front of chest, also having nausea and vomiting all which improved with fentanyl 25 ug. denies f/c, dysuria, abdominal pain    ICU Vital Signs Last 24 Hrs  T(C): 36.6 (2022 07:37), Max: 36.9 (2022 04:00)  T(F): 97.9 (2022 07:37), Max: 98.4 (2022 04:00)  HR: 72 (2022 06:00) (69 - 89)  BP: 121/56 (2022 06:00) (114/52 - 137/70)  BP(mean): 70 (2022 06:00) (67 - 86)  ABP: --  ABP(mean): --  RR: 14 (2022 06:00) (11 - 20)  SpO2: 97% (2022 06:00) (93% - 100%)    I&O's Summary    2022 07:01  -  2022 07:00  --------------------------------------------------------  IN: 50 mL / OUT: 100 mL / NET: -50 mL          Daily Height in cm: 177 (2022 01:04)    Daily     Adult Advanced Hemodynamics Last 24 Hrs  CVP(mm Hg): --  CVP(cm H2O): --  CO: --  CI: --  PA: --  PA(mean): --  PCWP: --  SVR: --  SVRI: --  PVR: --  PVRI: --    EKG/Telemetry Events:    MEDICATIONS  (STANDING):  chlorhexidine 2% Cloths 1 Application(s) Topical <User Schedule>  fentaNYL    Injectable 25 MICROGram(s) IV Push once  heparin  Infusion.  Unit(s)/Hr (10 mL/Hr) IV Continuous <Continuous>  influenza  Vaccine (HIGH DOSE) 0.7 milliLiter(s) IntraMuscular once  pantoprazole    Tablet 40 milliGRAM(s) Oral before breakfast  tamsulosin 0.4 milliGRAM(s) Oral at bedtime    MEDICATIONS  (PRN):  heparin   Injectable 4000 Unit(s) IV Push every 6 hours PRN For aPTT less than 40      PHYSICAL EXAM:  GENERAL: mild to moderate distress due to pain  HEAD:  Atraumatic, Normocephalic  EYES: EOMI, PERRLA, conjunctiva and sclera clear  NECK: Supple, No JVD, Normal thyroid, no enlarged nodes  NERVOUS SYSTEM:  Alert & Awake.   CHEST/LUNG: B/L good air entry; mild bibasilar crackles. No rhonchi, or wheezing  HEART: S1S2 normal, no S3, Regular rate and rhythm; No murmurs  ABDOMEN: Soft, Nontender, protuberant; Bowel sounds present  EXTREMITIES:  2+ Peripheral Pulses, No clubbing, cyanosis, or edema  LYMPH: No lymphadenopathy noted  SKIN: No rashes or lesions    LABS:                        11.5   22.48 )-----------( 145      ( 2022 01:13 )             34.0     06-14    139  |  102  |  18  ----------------------------<  146<H>  4.0   |  24  |  0.70    Ca    9.8      2022 01:13    TPro  6.8  /  Alb  4.2  /  TBili  0.4  /  DBili  x   /  AST  18  /  ALT  13  /  AlkPhos  119  06-14    LIVER FUNCTIONS - ( 2022 01:13 )  Alb: 4.2 g/dL / Pro: 6.8 g/dL / ALK PHOS: 119 U/L / ALT: 13 U/L / AST: 18 U/L / GGT: x           PT/INR - ( 2022 01:13 )   PT: 13.5 sec;   INR: 1.16 ratio         PTT - ( 2022 01:13 )  PTT:30.3 sec  CAPILLARY BLOOD GLUCOSE                Urinalysis Basic - ( 2022 06:00 )    Color: Yellow / Appearance: Slightly Turbid / S.028 / pH: x  Gluc: x / Ketone: Negative  / Bili: Negative / Urobili: <2 mg/dL   Blood: x / Protein: 30 mg/dL / Nitrite: Negative   Leuk Esterase: Negative / RBC: 4 /HPF / WBC 8 /HPF   Sq Epi: x / Non Sq Epi: 4 /HPF / Bacteria: Negative          RADIOLOGY & ADDITIONAL TESTS:  CXR:    EKG: ST elevation in II, III, avF    Care Discussed with Consultants/Other Providers [ x] YES  [ ] NO

## 2022-06-14 NOTE — ED ADULT TRIAGE NOTE - CHIEF COMPLAINT QUOTE
Pt arrives with 1 hour of midsternal CP, clutching chest upon arrival. EKG obtained immediately upon arrival, as per MD Leary to be seen immediately. Charge RN aware and brought directly back to Tr A.

## 2022-06-14 NOTE — ED PROVIDER NOTE - OBJECTIVE STATEMENT
67 year old male with PMH CAD s/p stents x 4 and CABG, HTN, lung ca on chemo presents with chest pain x 1 hour. Pt reports substernal chest tightness associated with nausea and SOB. Pt reports taking 2 x 81 mg aspirin with little improvement. Denies any fevers, abdominal pain, vomiting, diarrhea, bloody stools, black tarry stools, dysuria, headache, vision change, numbness, weakness, or rash. STEMI called upon arrival. 67 year old male with PMH CAD s/p stents x 4 and CABG, HTN, HLD, BPH, hypothyroidism, lung ca on chemo presents with chest pain x 1 hour. Pt reports substernal chest tightness associated with nausea and SOB. Did not vomit. +diaphoresis. Pt reports taking 2 x 81 mg aspirin with little improvement. Denies any fevers, abdominal pain, vomiting, diarrhea, bloody stools, black tarry stools, dysuria, headache, vision change, numbness, weakness, or rash. STEMI called upon arrival d/t IWMI with ERIBERTO > 4 mm at inferior leads and corresponding anterior STD.  At 1:10 AM Dr. Delta Villanueva (cath attending) declined to activate lab tonight and recc admit to CCU d/t patient's Lung CA diagnosis.    Wife at bedside. 67 year old male with PMH CAD s/p stents x 4 and CABG, HTN, HLD, BPH, hypothyroidism, lung ca on chemo presents with chest pain x 1 hour. Pt reports substernal chest tightness associated with nausea and SOB. Did not vomit. +diaphoresis. Pt reports taking 2 x 81 mg aspirin with little improvement. Denies any fevers, abdominal pain, vomiting, diarrhea, bloody stools, black tarry stools, dysuria, headache, vision change, numbness, weakness, or rash. STEMI called upon arrival d/t IWMI with ERIBERTO > 4 mm at inferior leads and corresponding anterior STD.  At 1:10 AM Dr. Delta Villanueva (cath attending) declined to activate lab tonight and recc admit to CCU d/t patient's Lung CA diagnosis (recurrence, mets, responding to current regimen).    Wife at bedside.

## 2022-06-14 NOTE — PROGRESS NOTE ADULT - ASSESSMENT
66yo M with PMHx of CAD s/p stents x 4 and CABG, HTN, Lung CA ?metastatic on chemo who presents to the ED with chest pain. Patient accompanied by his wife at the bedside. States that he was sitting on the couch watching TV when he developed acute onset 10/10 substernal chest pain. In the ED, EKG revealing ERIBERTO in II, III, AVF suggestive of MI. Case discussed in detail with Dr. Villanueva and CCU fellow. Will admit to the CCU for further management.     Neuro:  -- Alert and oriented X 3  -- No acute issues    Cardio:  -- Hx of CAD with stents X4, CABG  -- Currently with STEMI involving inferior leads  -- S/P ASA/Plavix load  -- Start heparin gtt for ACS  -- Fentanyl PRN for pain. Avoid SLN for now  -- Check A1c, lipids, TSH  -- Check Cardiac enzymes and trend to peak     Resp:  -- Stable on room air at this time  -- CXR clear lungs  -- Continue to monitor    GI:   -- NPO except meds for now  # GERD:  - c/w home protonix 40 mg PO     Renal:  -- Monitor renal indices  -- Replete electrolytes as needed     Infectious Disease:  -- Afebrile in triage  -- Monitor off of abx for now       Heme:  -- Continue with aspirin, plavix  -- Heparin gtt for ACS. Titrate according to PTT  -- Monitor for signs of bleeding    Endocrine:  -- Check A1c    Disposition:  -- Admit to the CCU for further management  68yo M with PMHx of CAD s/p stents x 4 and CABG, HTN, Lung CA ?metastatic on chemo who presents to the ED with chest pain. Patient accompanied by his wife at the bedside. States that he was sitting on the couch watching TV when he developed acute onset 10/10 substernal chest pain. In the ED, EKG revealing ERIBERTO in II, III, AVF suggestive of MI. Case discussed in detail with Dr. Villanueva and CCU fellow. Will admit to the CCU for further management.     Neuro:  -- Alert and oriented X 3  -- No acute issues    Cardio:  -- Hx of CAD with stents X4, CABG  -- Currently with STEMI involving inferior leads  -- S/P ASA/Plavix load  -- Start heparin gtt for ACS  -- Fentanyl PRN for pain. Avoid SLN for now  -- Check A1c, lipids, TSH  -- Check Cardiac enzymes and trend to peak     Resp:  -- Stable on room air at this time  -- CXR clear lungs  -- Continue to monitor    GI:   -- NPO except meds for now  # GERD:  - c/w home protonix 40 mg PO     Renal:  -- Monitor renal indices  -- Replete electrolytes as needed     Infectious Disease:  -- Afebrile in triage  -- Monitor off of abx for now       Heme:  -- Continue with aspirin, plavix  -- Heparin gtt for ACS. Titrate according to PTT  -- Monitor for signs of bleeding    Endocrine:  -- Check A1c    Disposition:  -- Admit to the CCU for further management     Henry Lion Fellow Attestation   Plan for cath today given patient's ERIBERTO in inferior leads. Will load with plavix as not given in ED. Hold metoprolol at this time  68yo M with PMHx of CAD s/p stents x 4 (3/2019) and CABG (1/2008), HTN, Stage IVB small cell lung cancer on chemo (last round on 6/7/22), former 30 pack year smoker (quit 15 years ago) who presents to the ED with chest pain, found to have EKG revealing ERIBERTO in II, III, AVF suggestive of MI. Admitted to the CCU for further management.     Neuro:  -- Alert and oriented X 3  -- No acute issues    Cardio:  # IWSTEMI:  - EKG in ED with ST elevation in II, III, aVF, trop increased from 20 to 236  - s/p asa load, loaded with 600 mg plavix 6/14  - fentanyl PRN for pain, avoid SLN given inferior wall pathology  - hep gtt for ACS  - TTE  - Check A1c, lipids, TSH  - cycle Cardiac enzymes and trend to peak   - remain NPO for LHC    # CAD s/p 4 stents (3/2019) and CABG (1/2008)  - c/w home 81ASA  - c/w home rosuvastatin 40 mg daily    # HTN  - hold home metop XL 50 mg daily    # HLD  - c/w home rosuvastatin 40 mg daily  - c/w home zetia 10 mg daily     Resp:  -- Stable on room air at this time  -- CXR clear lungs  -- Continue to monitor    GI:   -- NPO except meds for now    Renal:  -- Monitor renal indices  -- Replete electrolytes as needed     BPH  - c/w home flomax    Infectious Disease:  # leukocytosis   - WBC on admit 22.48 with neutrophilic predominance, increased from 4.82 on 6/7/22. Ddx likely reactive iso recent chemo and receiving neulasta prior to chemotherapy 6/7. less likely infectious/SIRS given patient is afebrile without acute symptoms   - RVP neg, u/a bland  - await bcx and ucx   - Monitor off of abx for now     Heme:  -- Continue with aspirin, plavix  -- Heparin gtt for ACS. Titrate according to PTT  -- Monitor for signs of bleeding    # stage IVB small cell lung cancer  - on active chemotherapy, last 5th round on 6/7 at [facility] with Dr. Carranza  - onc consult, appreciate recs    Endocrine:  # Hypothyroidism  - c/w home synthroid 25 ug daily  - TSH    -- Check A1c    Disposition:  -- Admit to the CCU for further management     Henry Lion Fellow Attestation   Plan for cath today given patient's ERIBERTO in inferior leads. Will load with plavix as not given in ED. Hold metoprolol at this time

## 2022-06-14 NOTE — CONSULT NOTE ADULT - ASSESSMENT
incomplete will update shortly  66 yo male with known medical history of HTN, CAD s/p 4 stents, CABG presenting with acute chest pain found to have inferior wall MI pending cath. Oncology consulted for follow up on small cell cancer     # Stage IV Small cell lung cancer   - Follows with Dr costello at [facility]   - Initially diagnosed in March 2019 with extensive metastatic disease ( liver, bones, T-spine, cavarial base, bone with suspected leptomenigeal ds, but never confirmed). He responded dramatically to Carbo, etoposide, Atezolizumab with immunotherapy maintenance where he was in CR with no evidence of metastatic disease in liver or bones. He developed gastritis/esophagitis which was thought to be secondary to immunotherapy and it was discontinued. june 2021. He was found to have relapse disease in the lungs and LN only. He was started on carbo/etoposide again with the addition of Durvalumab at cycle 4 (end of april 2022). Last received Carbo/etoposide/durvalumab + neulasta on 6/7.   - Imaging after cycle 3 as outpatient showing ND with decrease in L upper lobe mass.   - No plan for inpatient treatment at this time, will follow up with Dr costello as outaptient    # Leukocytosis   - likely secondary to Neulasta   - infectious work up pending per primary team     # ACS / inf wall MI   - Per CCU/cardiology management   - [facility] today       Celina Page MD   PGY4 heme onc fellow   [phone]; please call on call fellow from 5pm to 8am and weekends.  68 yo male with known medical history of HTN, CAD s/p 4 stents, CABG presenting with acute chest pain found to have inferior wall MI pending cath. Oncology consulted for follow up on small cell cancer     # Stage IV Small cell lung cancer   - Follows with Dr costello at [facility]   - Initially diagnosed in March 2019 with extensive metastatic disease ( liver, bones, T-spine, cavarial base, bone with suspected leptomenigeal ds, but never confirmed). He responded dramatically to Carbo, etoposide, Atezolizumab with immunotherapy maintenance where he was in CR with no evidence of metastatic disease in liver or bones. He developed gastritis/esophagitis which was thought to be secondary to immunotherapy and it was discontinued. june 2021. He was found to have relapse disease in the lungs and LN only. He was started on carbo/etoposide again with the addition of Durvalumab at cycle 4 (end of april 2022). Last received Carbo/etoposide/durvalumab + neulasta on 6/7.   - Imaging after cycle 3 as outpatient showing PA with decrease in L upper lobe mass.   - No plan for inpatient treatment at this time, will follow up with Dr costello as outaptient    # Leukocytosis   - likely secondary to Neulasta   - infectious work up pending per primary team     # ACS / inf wall MI   - Per CCU/cardiology management   - LHC with 100 % to mCircumflex SVG s/p PCI and GORGE x1 and 60-70% occlusion to OM1 with patent stent      Celina Page MD   PGY4 heme onc fellow   [phone]; please call on call fellow from 5pm to 8am and weekends.

## 2022-06-14 NOTE — CHART NOTE - NSCHARTNOTEFT_GEN_A_CORE
6Fr RFA sheath removed, good hemostasis achieved with 20 minute manual hold. Site clean and dry, no evidence of hematoma or pseudoaneurysm. R distal pulses 2+ palp and with doppler, feet warm. DSD placed, VSS throughout. Patient instructed to lie flat X at least 1 hour and then can gradually increase movement.

## 2022-06-14 NOTE — ED PROVIDER NOTE - PROGRESS NOTE DETAILS
Kathy-PGY3: discussed with CCU NP, accepted for admission. Will obtain CT head eval for mets/ICH prior to initiating plavix.

## 2022-06-14 NOTE — ED PROVIDER NOTE - CLINICAL SUMMARY MEDICAL DECISION MAKING FREE TEXT BOX
Kathy-PGY3: 67 year old male with PMH CAD s/p stents x 4 and CABG, HTN, lung ca on chemo presents with chest pain x 1 hour. Pt reports substernal chest tightness associated with nausea and SOB. Pt reports taking 2 x 81 mg aspirin with little improvement. Denies any fevers, abdominal pain, vomiting, diarrhea, bloody stools, black tarry stools, dysuria, headache, vision change, numbness, weakness, or rash. STEMI called upon arrival. No indication for emergent cath per discussed with interventionalist. Cardiology at bedside, plan for labs, imaging, supportive treatment, admission. 67 year old male with PMH CAD s/p stents x 4 and CABG, HTN, hypothyroidism, BPH, HLD, lung ca on chemo presents with chest pain x 1 hour. Pt reports substernal chest tightness associated with nausea and SOB a/w diaphoresis and nausea - started 1 hour prior to arrival. Pt reports taking 2 x 81 mg aspirin with little improvement. Denies any fevers, abdominal pain, vomiting, diarrhea, bloody stools, black tarry stools, dysuria, headache, vision change, numbness, weakness, or rash. HUMERA noted on triage ECG and code STEMI called upon arrival. Per Dr. Villanueva d/t lung cancer will not activate cath lab at this time and will reassess in AM.  Cardiology NP at bedside, plan for labs, imaging, supportive treatment, admission to CCU.

## 2022-06-14 NOTE — ED PROVIDER NOTE - WR ORDER NAME 1
In setting of covid  On heparin gtt  Dyspnea improved after diuresis  D/c'd systemic steroids  Monitor sx   Us dvt, pending    Xray Chest 1 View-PORTABLE IMMEDIATE

## 2022-06-14 NOTE — H&P ADULT - NSHPREVIEWOFSYSTEMS_GEN_ALL_CORE
REVIEW OF SYSTEMS:    CONSTITUTIONAL: No fever, weight loss, or fatigue  EYES: No eye pain, visual disturbances, or discharge  ENMT:  No difficulty hearing, tinnitus, vertigo; No sinus or throat pain  NECK: No pain or stiffness  BREASTS: No pain, masses, or nipple discharge  RESPIRATORY: No cough, wheezing, chills or hemoptysis; No shortness of breath  CARDIOVASCULAR: +chest pain, No palpitations, dizziness, or leg swelling  GASTROINTESTINAL: No abdominal or epigastric pain. No nausea, vomiting, or hematemesis; No diarrhea or constipation. No melena or hematochezia.  GENITOURINARY: No dysuria, frequency, hematuria, or incontinence  NEUROLOGICAL: No headaches, memory loss, loss of strength, numbness, or tremors  SKIN: No itching, burning, rashes, or lesions   LYMPH NODES: No enlarged glands  ENDOCRINE: No heat or cold intolerance; No hair loss  MUSCULOSKELETAL: No joint pain or swelling; No muscle, back, or extremity pain

## 2022-06-14 NOTE — PATIENT PROFILE ADULT - FALL HARM RISK - RISK INTERVENTIONS
Bed in lowest position, wheels locked, appropriate side rails in place/Call bell, personal items and telephone in reach/Instruct patient to call for assistance before getting out of bed or chair/Non-slip footwear when patient is out of bed/Widen to call system/Physically safe environment - no spills, clutter or unnecessary equipment/Purposeful Proactive Rounding/Room/bathroom lighting operational, light cord in reach

## 2022-06-14 NOTE — CONSULT NOTE ADULT - SUBJECTIVE AND OBJECTIVE BOX
Oncology Consult Note    HPI:  Patient is a 68yo M with PMHx of CAD s/p stents x 4 and CABG, HTN, Lung CA ?metastatic on chemo who presents to the ED with chest pain. Patient accompanied by his wife at the bedside. States that he was sitting on the couch watching TV when he developed acute onset 10/10 substernal chest pain. In the ED, EKG revealing ERIBERTO in II, III, AVF suggestive of MI. Currently admits to chest pain 8/10 improved since coming to the ED. Denies headaches, dizziness, palpitations, shortness of breath, abdominal pain, N/V at this time.  (14 Jun 2022 01:45)      Allergies    No Known Allergies    Intolerances        MEDICATIONS  (STANDING):  aspirin enteric coated 81 milliGRAM(s) Oral daily  atorvastatin 80 milliGRAM(s) Oral at bedtime  chlorhexidine 2% Cloths 1 Application(s) Topical <User Schedule>  clopidogrel Tablet 75 milliGRAM(s) Oral daily  heparin  Infusion. 1300 Unit(s)/Hr (13 mL/Hr) IV Continuous <Continuous>  influenza  Vaccine (HIGH DOSE) 0.7 milliLiter(s) IntraMuscular once  pantoprazole    Tablet 40 milliGRAM(s) Oral before breakfast  tamsulosin 0.4 milliGRAM(s) Oral at bedtime    MEDICATIONS  (PRN):      PAST MEDICAL & SURGICAL HISTORY:  CAD (coronary artery disease)      HTN (hypertension)      Lung cancer      No significant past surgical history          FAMILY HISTORY:      SOCIAL HISTORY: No EtOH, no tobacco    Height (cm): 177 (06-14 @ 01:04)  Weight (kg): 95.254 (06-14 @ 01:11)  BMI (kg/m2): 30.4 (06-14 @ 01:11)  BSA (m2): 2.12 (06-14 @ 01:11)    T(F): 97.9 (06-14-22 @ 07:37), Max: 98.4 (06-14-22 @ 04:00)  HR: 66 (06-14-22 @ 09:00)  BP: 116/56 (06-14-22 @ 09:00)  RR: 11 (06-14-22 @ 09:00)  SpO2: 95% (06-14-22 @ 09:00)  Wt(kg): --    GENERAL: NAD, well-developed  HEAD:  Atraumatic, Normocephalic  EYES: EOMI, PERRLA, conjunctiva and sclera clear  NECK: Supple, No JVD  CHEST/LUNG: Clear to auscultation bilaterally; No wheeze  HEART: Regular rate and rhythm; No murmurs, rubs, or gallops  ABDOMEN: Soft, Nontender, Nondistended; Bowel sounds present  EXTREMITIES:  2+ Peripheral Pulses, No clubbing, cyanosis, or edema  NEUROLOGY: non-focal  SKIN: No rashes or lesions                          10.7   22.52 )-----------( 129      ( 14 Jun 2022 08:30 )             31.8       06-14    135  |  102  |  15  ----------------------------<  123<H>  5.5<H>   |  21<L>  |  0.47<L>    Ca    8.9      14 Jun 2022 08:30    TPro  6.4  /  Alb  3.8  /  TBili  0.4  /  DBili  x   /  AST  72<H>  /  ALT  17  /  AlkPhos  107  06-14           Oncology Consult Note    HPI:  Patient is a 68yo M with PMHx of CAD s/p stents x 4 and CABG, HTN, Lung CA ?metastatic on chemo who presents to the ED with chest pain. Patient accompanied by his wife at the bedside. States that he was sitting on the couch watching TV when he developed acute onset 10/10 substernal chest pain. In the ED, EKG revealing ERIBERTO in II, III, AVF suggestive of MI. Currently admits to chest pain 8/10 improved since coming to the ED. Denies headaches, dizziness, palpitations, shortness of breath, abdominal pain, N/V at this time.  (14 Jun 2022 01:45)      Allergies    No Known Allergies    Intolerances        MEDICATIONS  (STANDING):  aspirin enteric coated 81 milliGRAM(s) Oral daily  atorvastatin 80 milliGRAM(s) Oral at bedtime  chlorhexidine 2% Cloths 1 Application(s) Topical <User Schedule>  clopidogrel Tablet 75 milliGRAM(s) Oral daily  heparin  Infusion. 1300 Unit(s)/Hr (13 mL/Hr) IV Continuous <Continuous>  influenza  Vaccine (HIGH DOSE) 0.7 milliLiter(s) IntraMuscular once  pantoprazole    Tablet 40 milliGRAM(s) Oral before breakfast  tamsulosin 0.4 milliGRAM(s) Oral at bedtime    MEDICATIONS  (PRN):      PAST MEDICAL & SURGICAL HISTORY:  CAD (coronary artery disease)      HTN (hypertension)      Lung cancer      No significant past surgical history          FAMILY HISTORY:      SOCIAL HISTORY: No EtOH, no tobacco    Height (cm): 177 (06-14 @ 01:04)  Weight (kg): 95.254 (06-14 @ 01:11)  BMI (kg/m2): 30.4 (06-14 @ 01:11)  BSA (m2): 2.12 (06-14 @ 01:11)    T(F): 97.9 (06-14-22 @ 07:37), Max: 98.4 (06-14-22 @ 04:00)  HR: 66 (06-14-22 @ 09:00)  BP: 116/56 (06-14-22 @ 09:00)  RR: 11 (06-14-22 @ 09:00)  SpO2: 95% (06-14-22 @ 09:00)  Wt(kg): --    GENERAL: mild to moderate distress due to pain  HEAD:  Atraumatic, Normocephalic  EYES: EOMI, PERRLA, conjunctiva and sclera clear  NECK: Supple, No JVD, Normal thyroid, no enlarged nodes  NERVOUS SYSTEM:  Alert & Awake.   CHEST/LUNG: B/L good air entry; mild bibasilar crackles. No rhonchi, or wheezing  HEART: S1S2 normal, no S3, Regular rate and rhythm; No murmurs  ABDOMEN: Soft, Nontender, protuberant; Bowel sounds present  EXTREMITIES:  2+ Peripheral Pulses, No clubbing, cyanosis, or edema  LYMPH: No lymphadenopathy noted  SKIN: No rashes or lesions                          10.7   22.52 )-----------( 129      ( 14 Jun 2022 08:30 )             31.8       06-14    135  |  102  |  15  ----------------------------<  123<H>  5.5<H>   |  21<L>  |  0.47<L>    Ca    8.9      14 Jun 2022 08:30    TPro  6.4  /  Alb  3.8  /  TBili  0.4  /  DBili  x   /  AST  72<H>  /  ALT  17  /  AlkPhos  107  06-14

## 2022-06-14 NOTE — PROGRESS NOTE ADULT - SUBJECTIVE AND OBJECTIVE BOX
Patient is a 67y old  Male who presents with a chief complaint of STEMI (2022 01:45)    HPI:  Patient is a 66yo M with PMHx of CAD s/p stents x 4 and CABG, HTN, Lung CA ?metastatic on chemo who presents to the ED with chest pain. Patient accompanied by his wife at the bedside. States that he was sitting on the couch watching TV when he developed acute onset 10/10 substernal chest pain. In the ED, EKG revealing ERIBERTO in II, III, AVF suggestive of MI. Currently admits to chest pain 8/10 improved since coming to the ED. Denies headaches, dizziness, palpitations, shortness of breath, abdominal pain, N/V at this time.  (2022 01:45)       INTERVAL HPI/OVERNIGHT EVENTS:   admitted overnight for chest pain  Afebrile, hemodynamically stable     Subjective:    ICU Vital Signs Last 24 Hrs  T(C): 36.6 (2022 07:37), Max: 36.9 (2022 04:00)  T(F): 97.9 (2022 07:37), Max: 98.4 (2022 04:00)  HR: 72 (2022 06:00) (69 - 89)  BP: 121/56 (2022 06:00) (114/52 - 137/70)  BP(mean): 70 (2022 06:00) (67 - 86)  ABP: --  ABP(mean): --  RR: 14 (2022 06:00) (11 - 20)  SpO2: 97% (2022 06:00) (93% - 100%)    I&O's Summary    2022 07:01  -  2022 07:00  --------------------------------------------------------  IN: 50 mL / OUT: 100 mL / NET: -50 mL          Daily Height in cm: 177 (2022 01:04)    Daily     Adult Advanced Hemodynamics Last 24 Hrs  CVP(mm Hg): --  CVP(cm H2O): --  CO: --  CI: --  PA: --  PA(mean): --  PCWP: --  SVR: --  SVRI: --  PVR: --  PVRI: --    EKG/Telemetry Events:    MEDICATIONS  (STANDING):  chlorhexidine 2% Cloths 1 Application(s) Topical <User Schedule>  heparin  Infusion.  Unit(s)/Hr (10 mL/Hr) IV Continuous <Continuous>  influenza  Vaccine (HIGH DOSE) 0.7 milliLiter(s) IntraMuscular once  pantoprazole    Tablet 40 milliGRAM(s) Oral before breakfast  tamsulosin 0.4 milliGRAM(s) Oral at bedtime    MEDICATIONS  (PRN):  heparin   Injectable 4000 Unit(s) IV Push every 6 hours PRN For aPTT less than 40      PHYSICAL EXAM:  GENERAL:   HEAD:  Atraumatic, Normocephalic  EYES: EOMI, PERRLA, conjunctiva and sclera clear  NECK: Supple, No JVD, Normal thyroid, no enlarged nodes  NERVOUS SYSTEM:  Alert & Awake.   CHEST/LUNG: B/L good air entry; No rales, rhonchi, or wheezing  HEART: S1S2 normal, no S3, Regular rate and rhythm; No murmurs  ABDOMEN: Soft, Nontender, Nondistended; Bowel sounds present  EXTREMITIES:  2+ Peripheral Pulses, No clubbing, cyanosis, or edema  LYMPH: No lymphadenopathy noted  SKIN: No rashes or lesions    LABS:                        11.5   22.48 )-----------( 145      ( 2022 01:13 )             34.0     06-14    139  |  102  |  18  ----------------------------<  146<H>  4.0   |  24  |  0.70    Ca    9.8      2022 01:13    TPro  6.8  /  Alb  4.2  /  TBili  0.4  /  DBili  x   /  AST  18  /  ALT  13  /  AlkPhos  119  06-14    LIVER FUNCTIONS - ( 2022 01:13 )  Alb: 4.2 g/dL / Pro: 6.8 g/dL / ALK PHOS: 119 U/L / ALT: 13 U/L / AST: 18 U/L / GGT: x           PT/INR - ( 2022 01:13 )   PT: 13.5 sec;   INR: 1.16 ratio         PTT - ( 2022 01:13 )  PTT:30.3 sec  CAPILLARY BLOOD GLUCOSE                Urinalysis Basic - ( 2022 06:00 )    Color: Yellow / Appearance: Slightly Turbid / S.028 / pH: x  Gluc: x / Ketone: Negative  / Bili: Negative / Urobili: <2 mg/dL   Blood: x / Protein: 30 mg/dL / Nitrite: Negative   Leuk Esterase: Negative / RBC: 4 /HPF / WBC 8 /HPF   Sq Epi: x / Non Sq Epi: 4 /HPF / Bacteria: Negative          RADIOLOGY & ADDITIONAL TESTS:  CXR:        Care Discussed with Consultants/Other Providers [ x] YES  [ ] NO

## 2022-06-14 NOTE — H&P ADULT - HISTORY OF PRESENT ILLNESS
Patient is a 68yo M with PMHx of CAD s/p stents x 4 and CABG, HTN, Lung CA ?metastatic on chemo who presents to the ED with chest pain. Patient accompanied by his wife at the bedside. States that he was sitting on the couch watching TV when he developed acute onset 10/10 substernal chest pain. In the ED, EKG revealing ERIBERTO in II, III, AVF suggestive of MI. Currently admits to chest pain 8/10 improved since coming to the ED. Denies headaches, dizziness, palpitations, shortness of breath, abdominal pain, N/V at this time.

## 2022-06-15 ENCOUNTER — TRANSCRIPTION ENCOUNTER (OUTPATIENT)
Age: 67
End: 2022-06-15

## 2022-06-15 LAB
ALBUMIN SERPL ELPH-MCNC: 3.9 G/DL — SIGNIFICANT CHANGE UP (ref 3.3–5)
ALP SERPL-CCNC: 106 U/L — SIGNIFICANT CHANGE UP (ref 40–120)
ALT FLD-CCNC: 41 U/L — SIGNIFICANT CHANGE UP (ref 4–41)
ANION GAP SERPL CALC-SCNC: 9 MMOL/L — SIGNIFICANT CHANGE UP (ref 7–14)
APTT BLD: 28.6 SEC — SIGNIFICANT CHANGE UP (ref 27–36.3)
AST SERPL-CCNC: 158 U/L — HIGH (ref 4–40)
BILIRUB SERPL-MCNC: 0.7 MG/DL — SIGNIFICANT CHANGE UP (ref 0.2–1.2)
BUN SERPL-MCNC: 15 MG/DL — SIGNIFICANT CHANGE UP (ref 7–23)
CALCIUM SERPL-MCNC: 9.6 MG/DL — SIGNIFICANT CHANGE UP (ref 8.4–10.5)
CHLORIDE SERPL-SCNC: 99 MMOL/L — SIGNIFICANT CHANGE UP (ref 98–107)
CK MB BLD-MCNC: 12.6 % — HIGH (ref 0–2.5)
CK MB CFR SERPL CALC: 121.6 NG/ML — HIGH
CK SERPL-CCNC: 963 U/L — HIGH (ref 30–200)
CO2 SERPL-SCNC: 28 MMOL/L — SIGNIFICANT CHANGE UP (ref 22–31)
CREAT SERPL-MCNC: 0.74 MG/DL — SIGNIFICANT CHANGE UP (ref 0.5–1.3)
CULTURE RESULTS: NO GROWTH — SIGNIFICANT CHANGE UP
EGFR: 98 ML/MIN/1.73M2 — SIGNIFICANT CHANGE UP
GLUCOSE SERPL-MCNC: 107 MG/DL — HIGH (ref 70–99)
HCT VFR BLD CALC: 33.6 % — LOW (ref 39–50)
HCV AB S/CO SERPL IA: 0.12 S/CO — SIGNIFICANT CHANGE UP (ref 0–0.99)
HCV AB SERPL-IMP: SIGNIFICANT CHANGE UP
HGB BLD-MCNC: 10.9 G/DL — LOW (ref 13–17)
INR BLD: 1.19 RATIO — HIGH (ref 0.88–1.16)
MAGNESIUM SERPL-MCNC: 1.9 MG/DL — SIGNIFICANT CHANGE UP (ref 1.6–2.6)
MCHC RBC-ENTMCNC: 30.4 PG — SIGNIFICANT CHANGE UP (ref 27–34)
MCHC RBC-ENTMCNC: 32.4 GM/DL — SIGNIFICANT CHANGE UP (ref 32–36)
MCV RBC AUTO: 93.6 FL — SIGNIFICANT CHANGE UP (ref 80–100)
NRBC # BLD: 0 /100 WBCS — SIGNIFICANT CHANGE UP
NRBC # FLD: 0 K/UL — SIGNIFICANT CHANGE UP
PHOSPHATE SERPL-MCNC: 2.9 MG/DL — SIGNIFICANT CHANGE UP (ref 2.5–4.5)
PLATELET # BLD AUTO: 134 K/UL — LOW (ref 150–400)
POTASSIUM SERPL-MCNC: 3.5 MMOL/L — SIGNIFICANT CHANGE UP (ref 3.5–5.3)
POTASSIUM SERPL-SCNC: 3.5 MMOL/L — SIGNIFICANT CHANGE UP (ref 3.5–5.3)
PROT SERPL-MCNC: 6.5 G/DL — SIGNIFICANT CHANGE UP (ref 6–8.3)
PROTHROM AB SERPL-ACNC: 13.8 SEC — HIGH (ref 10.5–13.4)
RBC # BLD: 3.59 M/UL — LOW (ref 4.2–5.8)
RBC # FLD: 14.3 % — SIGNIFICANT CHANGE UP (ref 10.3–14.5)
SODIUM SERPL-SCNC: 136 MMOL/L — SIGNIFICANT CHANGE UP (ref 135–145)
SPECIMEN SOURCE: SIGNIFICANT CHANGE UP
TROPONIN T, HIGH SENSITIVITY RESULT: 3210 NG/L — CRITICAL HIGH
WBC # BLD: 8.55 K/UL — SIGNIFICANT CHANGE UP (ref 3.8–10.5)
WBC # FLD AUTO: 8.55 K/UL — SIGNIFICANT CHANGE UP (ref 3.8–10.5)

## 2022-06-15 PROCEDURE — 99233 SBSQ HOSP IP/OBS HIGH 50: CPT

## 2022-06-15 RX ORDER — METOPROLOL TARTRATE 50 MG
25 TABLET ORAL
Refills: 0 | Status: DISCONTINUED | OUTPATIENT
Start: 2022-06-15 | End: 2022-06-15

## 2022-06-15 RX ORDER — METOPROLOL TARTRATE 50 MG
25 TABLET ORAL ONCE
Refills: 0 | Status: DISCONTINUED | OUTPATIENT
Start: 2022-06-15 | End: 2022-06-15

## 2022-06-15 RX ORDER — METOPROLOL TARTRATE 50 MG
25 TABLET ORAL DAILY
Refills: 0 | Status: DISCONTINUED | OUTPATIENT
Start: 2022-06-15 | End: 2022-06-16

## 2022-06-15 RX ADMIN — TICAGRELOR 90 MILLIGRAM(S): 90 TABLET ORAL at 17:19

## 2022-06-15 RX ADMIN — ATORVASTATIN CALCIUM 80 MILLIGRAM(S): 80 TABLET, FILM COATED ORAL at 22:50

## 2022-06-15 RX ADMIN — CHLORHEXIDINE GLUCONATE 1 APPLICATION(S): 213 SOLUTION TOPICAL at 11:17

## 2022-06-15 RX ADMIN — Medication 81 MILLIGRAM(S): at 11:17

## 2022-06-15 RX ADMIN — Medication 25 MILLIGRAM(S): at 11:17

## 2022-06-15 RX ADMIN — HEPARIN SODIUM 5000 UNIT(S): 5000 INJECTION INTRAVENOUS; SUBCUTANEOUS at 17:19

## 2022-06-15 RX ADMIN — HEPARIN SODIUM 5000 UNIT(S): 5000 INJECTION INTRAVENOUS; SUBCUTANEOUS at 05:34

## 2022-06-15 RX ADMIN — PANTOPRAZOLE SODIUM 40 MILLIGRAM(S): 20 TABLET, DELAYED RELEASE ORAL at 05:34

## 2022-06-15 RX ADMIN — TAMSULOSIN HYDROCHLORIDE 0.4 MILLIGRAM(S): 0.4 CAPSULE ORAL at 22:50

## 2022-06-15 RX ADMIN — HEPARIN SODIUM 5000 UNIT(S): 5000 INJECTION INTRAVENOUS; SUBCUTANEOUS at 22:50

## 2022-06-15 RX ADMIN — TICAGRELOR 90 MILLIGRAM(S): 90 TABLET ORAL at 05:34

## 2022-06-15 RX ADMIN — Medication 25 MICROGRAM(S): at 05:34

## 2022-06-15 NOTE — PROGRESS NOTE ADULT - ASSESSMENT
66yo M with PMHx of CAD s/p stents x 4 (3/2019) and CABG (1/2008), HTN, Stage IVB small cell lung cancer on chemo (last round on 6/7/22), former 30 pack year smoker (quit 15 years ago) who presents to the ED with chest pain, found to have EKG revealing ERIBERTO in II, III, AVF suggestive of MI. Admitted to the CCU for further management.     Neuro:  -- Alert and oriented X 3  -- No acute issues    Cardio:  # IWSTEMI: s/p LHC with 100 % to mCircumflex SVG s/p PCI and GORGE x1 and 60-70% occlusion to OM1 with patent stent  - EKG in ED with ST elevation in II, III, aVF, trop increased from 20 to 236  - s/p asa load, loaded with 600 mg plavix 6/14  - TTE: moderate severe MR, mild segmental LVSF, hypokinesis of inferolateral wall and basal inf wall. Stage 2 diastolic dysfunction. RV enlargement with decreased RVSF. moderate pulmonary hypertension  - fentanyl PRN for pain, avoid SLN given inferior wall pathology  - hep gtt for ACS  - Check A1c, lipids, TSH    # CAD s/p 4 stents (3/2019) and CABG (1/2008)  - c/w home 81ASA  - c/w home rosuvastatin 40 mg daily    # HTN  - hold home metop XL 50 mg daily    # HLD  - c/w home rosuvastatin 40 mg daily  - c/w home zetia 10 mg daily     Resp:  -- Stable on room air at this time  -- CXR clear lungs  -- Continue to monitor    GI:   -- NPO except meds for now    Renal:  -- Monitor renal indices  -- Replete electrolytes as needed     BPH  - c/w home flomax    Infectious Disease:  # leukocytosis, resolved  - WBC on admit 22.48 with neutrophilic predominance, increased from 4.82 on 6/7/22. Ddx likely reactive iso recent chemo and receiving neulasta prior to chemotherapy 6/7. less likely infectious/SIRS given patient is afebrile without acute symptoms   - RVP neg, u/a bland  - await bcx and ucx   - Monitor off of abx for now     Heme:  -- Continue with aspirin, plavix  -- Heparin gtt for ACS. Titrate according to PTT  -- Monitor for signs of bleeding    # stage IVB small cell lung cancer  - on active chemotherapy, last 5th round on 6/7 at [facility] with Dr. Carranza  - onc consult, appreciate recs    Endocrine:  # Hypothyroidism  - c/w home synthroid 25 ug daily  - TSH    -- Check A1c    Disposition:  -- Admit to the CCU for further management    66yo M with PMHx of CAD s/p stents x 4 (3/2019) and CABG (1/2008), HTN, Stage IVB small cell lung cancer on chemo (last round on 6/7/22), former 30 pack year smoker (quit 15 years ago) who presents to the ED with chest pain, found to have EKG revealing ERIBERTO in II, III, AVF suggestive of MI. Admitted to the CCU for further management.     Neuro:  -- Alert and oriented X 3  -- No acute issues    Cardio:  # IWSTEMI: s/p LHC with 100 % to mCircumflex SVG s/p PCI and GORGE x1 and 60-70% occlusion to OM1 with patent stent  - EKG in ED with ST elevation in II, III, aVF, trop increased from 20 to 236  - s/p asa load, loaded with 600 mg plavix 6/14  - TTE: moderate severe MR, mild segmental LVSF, hypokinesis of inferolateral wall and basal inf wall. Stage 2 diastolic dysfunction. RV enlargement with decreased RVSF. moderate pulmonary hypertension  - fentanyl PRN for pain, avoid SLN given inferior wall pathology  - hep gtt for ACS  - Check A1c, lipids, TSH    # CAD s/p 4 stents (3/2019) and CABG (1/2008)  - c/w home 81ASA  - c/w home rosuvastatin 40 mg daily    # HTN  - hold home metop XL 50 mg daily    # HLD  - c/w home rosuvastatin 40 mg daily  - c/w home zetia 10 mg daily     Resp:  -- Stable on room air at this time  -- CXR clear lungs  -- Continue to monitor    GI:   -- NPO except meds for now    Renal:  -- Monitor renal indices  -- Replete electrolytes as needed     BPH  - c/w home flomax    Infectious Disease:  # leukocytosis, resolved  - WBC on admit 22.48 with neutrophilic predominance, increased from 4.82 on 6/7/22. Ddx likely reactive iso recent chemo and receiving neulasta prior to chemotherapy 6/7. less likely infectious/SIRS given patient is afebrile without acute symptoms   - RVP neg, u/a bland  - await bcx and ucx   - Monitor off of abx for now     Heme:  -- Continue with aspirin, plavix  -- Heparin gtt for ACS. Titrate according to PTT  -- Monitor for signs of bleeding    # stage IVB small cell lung cancer  - on active chemotherapy, last 5th round on 6/7 at [facility] with Dr. Carranza  - onc consult, appreciate recs    Endocrine:  # Hypothyroidism  - c/w home synthroid 25 ug daily  - TSH    -- Check A1c    Disposition:  -- Admit to the CCU for further management     Henry Lion Fellow Attestation   Plan to discharge home today with tolerates lower dose of BB, on DAPT therapy    68yo M with PMHx of CAD s/p stents x 4 (3/2019) and CABG (1/2008), HTN, Stage IVB small cell lung cancer on chemo (last round on 6/7/22), former 30 pack year smoker (quit 15 years ago) who presents to the ED with chest pain, found to have EKG revealing ERIBERTO in II, III, AVF suggestive of MI. Admitted to the CCU for further management.     Neuro:  -- Alert and oriented X 3  -- No acute issues    Cardio:  # IWSTEMI: s/p LHC with 100 % to mCircumflex SVG s/p PCI and GORGE x1 and 60-70% occlusion to OM1 with patent stent  - EKG in ED with ST elevation in II, III, aVF, trop increased from 20 to 236  - s/p asa load, loaded with 600 mg plavix 6/14  - TTE: moderate severe MR, mild segmental LVSF, hypokinesis of inferolateral wall and basal inf wall. Stage 2 diastolic dysfunction. RV enlargement with decreased RVSF. moderate pulmonary hypertension  - fentanyl PRN for pain, avoid SLN given inferior wall pathology  - s/p hep gtt for ACS  - c/w asa/ticagrelor   - Check A1c, lipids, TSH    # CAD s/p 4 stents (3/2019) and CABG (1/2008)  - c/w home 81ASA  - c/w home rosuvastatin 40 mg daily    # HTN  - resume XL 25 mg daily (home dose metoprolol XL 50 mg daily)    # HLD  - c/w home rosuvastatin 40 mg daily  - c/w home zetia 10 mg daily     Resp:  -- Stable on room air at this time  -- CXR clear lungs  -- Continue to monitor    GI:   -- NPO except meds for now    Renal:  -- Monitor renal indices  -- Replete electrolytes as needed     BPH  - c/w home flomax    Infectious Disease:  # leukocytosis, resolved  - WBC on admit 22.48 with neutrophilic predominance, increased from 4.82 on 6/7/22. Ddx likely reactive iso recent chemo and receiving neulasta prior to chemotherapy 6/7. less likely infectious/SIRS given patient is afebrile without acute symptoms   - RVP neg, u/a bland  - await bcx and ucx   - Monitor off of abx for now     Heme:  -- Continue with aspirin, plavix  -- Monitor for signs of bleeding    # stage IVB small cell lung cancer  - on active chemotherapy, last 5th round on 6/7 at [facility] with Dr. Carranza  - onc consult, appreciate recs    Endocrine:  # Hypothyroidism  - c/w home synthroid 25 ug daily  - TSH    -- Check A1c    Disposition:  -- Admit to the CCU for further management     Henry Lion Fellow Attestation   Plan to discharge home today with tolerates lower dose of BB, on DAPT therapy

## 2022-06-15 NOTE — CHART NOTE - NSCHARTNOTEFT_GEN_A_CORE
CCU Transfer Note    Transfer from: CCU  Transfer to:  (x) Medicine    (  ) Telemetry    (  ) RCU    (  ) Palliative    (  ) Stroke Unit    (  ) _______________  Accepting physician:    Patient is a 68yo M with PMHx of CAD s/p stents x 4 and CABG, HTN, Lung CA ?metastatic on chemo who presents to the ED with chest pain. Patient accompanied by his wife at the bedside. States that he was sitting on the couch watching TV when he developed acute onset 10/10 substernal chest pain. In the ED, EKG revealing ERIBERTO in II, III, AVF suggestive of MI. Currently admits to chest pain 8/10 improved since coming to the ED. Denies headaches, dizziness, palpitations, shortness of breath, abdominal pain, N/V at this time.     68yo M with PMHx of CAD s/p stents x 4 (3/2019) and CABG (2008), HTN, Stage IVB small cell lung cancer on chemo (last round on 22), former 30 pack year smoker (quit 15 years ago) who presents to the ED with chest pain, found to have EKG revealing ERIBERTO in II, III, AVF suggestive of MI. Admitted to the CCU for further management.     Neuro:  -- Alert and oriented X 3  -- No acute issues    Cardio:  # IWSTEMI: s/p LHC with 100 % occlusion to midCircumflex SVG s/p PCI and GORGE x1 and 60-70% occlusion to OM1 with patent stent  - EKG in ED with ST elevation in II, III, aVF, trop increased from 20 to 236  - s/p asa load, loaded with 600 mg plavix   - TTE: moderate severe MR, mild segmental LVSF, hypokinesis of inferolateral wall and basal inf wall. Stage 2 diastolic dysfunction. RV enlargement with decreased RVSF. moderate pulmonary hypertension  - fentanyl PRN for pain, avoid SLN given inferior wall pathology  - s/p hep gtt for ACS  - c/w asa/ticagrelor   - Check A1c, lipids, TSH    # CAD s/p 4 stents (3/2019) and CABG (2008)  - c/w home 81ASA  - c/w home rosuvastatin 40 mg daily    # HTN  - resume XL 25 mg daily (home dose metoprolol XL 50 mg daily)    # HLD  - c/w home rosuvastatin 40 mg daily  - c/w home zetia 10 mg daily     Resp:  -- Stable on room air at this time  -- CXR clear lungs  -- Continue to monitor    GI:   -- NPO except meds for now    Renal:  -- Monitor renal indices  -- Replete electrolytes as needed     BPH  - c/w home flomax    Infectious Disease:  # leukocytosis, resolved  - WBC on admit 22.48 with neutrophilic predominance, increased from 4.82 on 22. Ddx likely reactive iso recent chemo and receiving neulasta prior to chemotherapy . less likely infectious/SIRS given patient is afebrile without acute symptoms   - RVP neg, u/a bland  - await bcx and ucx   - Monitor off of abx for now     Heme:  -- Continue with aspirin, plavix  -- Monitor for signs of bleeding    # stage IVB small cell lung cancer  - on active chemotherapy, last 5th round on  at [facility] with Dr. Carranza  - onc consult, appreciate recs    Endocrine:  # Hypothyroidism  - c/w home synthroid 25 ug daily  - TSH    -- Check A1c    Disposition:  -- Admit to the CCU for further management     MEDICATIONS:  STANDING MEDICATIONS  aspirin enteric coated 81 milliGRAM(s) Oral daily  atorvastatin 80 milliGRAM(s) Oral at bedtime  chlorhexidine 2% Cloths 1 Application(s) Topical <User Schedule>  heparin   Injectable 5000 Unit(s) SubCutaneous every 8 hours  influenza  Vaccine (HIGH DOSE) 0.7 milliLiter(s) IntraMuscular once  levothyroxine 25 MICROGram(s) Oral daily  metoprolol succinate ER 25 milliGRAM(s) Oral daily  pantoprazole    Tablet 40 milliGRAM(s) Oral before breakfast  tamsulosin 0.4 milliGRAM(s) Oral at bedtime  ticagrelor 90 milliGRAM(s) Oral every 12 hours    PRN MEDICATIONS      VITAL SIGNS: Last 24 Hours  T(C): 37 (15 Issac 2022 07:48), Max: 37 (15 Issac 2022 04:00)  T(F): 98.6 (15 Issac 2022 07:48), Max: 98.6 (15 Issac 2022 04:00)  HR: 58 (15 Issac 2022 09:00) (54 - 72)  BP: 100/40 (15 Issac 2022 09:00) (93/48 - 126/80)  BP(mean): 56 (15 Issac 2022 09:00) (56 - 89)  RR: 17 (15 Issac 2022 09:00) (10 - 21)  SpO2: 96% (15 Issac 2022 09:00) (93% - 98%)    LABS:                        10.9   8.55  )-----------( 134      ( 15 Issac 2022 05:05 )             33.6     06-15    136  |  99  |  15  ----------------------------<  107<H>  3.5   |  28  |  0.74    Ca    9.6      15 Issac 2022 05:05  Phos  2.9     06-15  Mg     1.90     06-15    TPro  6.5  /  Alb  3.9  /  TBili  0.7  /  DBili  x   /  AST  158<H>  /  ALT  41  /  AlkPhos  106  06-15    PT/INR - ( 15 Issac 2022 05:05 )   PT: 13.8 sec;   INR: 1.19 ratio         PTT - ( 15 Issac 2022 05:05 )  PTT:28.6 sec  Urinalysis Basic - ( 2022 06:00 )    Color: Yellow / Appearance: Slightly Turbid / S.028 / pH: x  Gluc: x / Ketone: Negative  / Bili: Negative / Urobili: <2 mg/dL   Blood: x / Protein: 30 mg/dL / Nitrite: Negative   Leuk Esterase: Negative / RBC: 4 /HPF / WBC 8 /HPF   Sq Epi: x / Non Sq Epi: 4 /HPF / Bacteria: Negative          Culture - Urine (collected 2022 07:28)  Source: Clean Catch Clean Catch (Midstream)  Final Report (15 Issac 2022 08:02):    No growth    Culture - Blood (collected 2022 02:53)  Source: .Blood Blood  Preliminary Report (15 Issac 2022 07:02):    No growth to date.    Culture - Blood (collected 2022 02:47)  Source: .Blood Blood  Preliminary Report (15 Issac 2022 07:02):    No growth to date.      CARDIAC MARKERS ( 15 Issac 2022 05:05 )  x     / x     / 963 U/L / x     / 121.6 ng/mL  CARDIAC MARKERS ( 2022 08:30 )  x     / x     / 575 U/L / x     / 103.1 ng/mL      RADIOLOGY:    CCU COURSE:          ASSESSMENT & PLAN:         For Follow-Up: CCU Transfer Note    Transfer from: CCU  Transfer to:  (x) Medicine    (  ) Telemetry    (  ) RCU    (  ) Palliative    (  ) Stroke Unit    (  ) _______________  Accepting physician:    Patient is a 68yo M with PMHx of CAD s/p stents x 4 and CABG, HTN, Lung CA ?metastatic on chemo who presents to the ED with chest pain. Patient accompanied by his wife at the bedside. States that he was sitting on the couch watching TV when he developed acute onset 1010 substernal chest pain. In the ED, EKG revealing ERIBERTO in II, III, AVF suggestive of MI. Currently admits to chest pain 8/10 improved since coming to the ED. Denies headaches, dizziness, palpitations, shortness of breath, abdominal pain, N/V at this time.       MEDICATIONS:  STANDING MEDICATIONS  aspirin enteric coated 81 milliGRAM(s) Oral daily  atorvastatin 80 milliGRAM(s) Oral at bedtime  chlorhexidine 2% Cloths 1 Application(s) Topical <User Schedule>  heparin   Injectable 5000 Unit(s) SubCutaneous every 8 hours  influenza  Vaccine (HIGH DOSE) 0.7 milliLiter(s) IntraMuscular once  levothyroxine 25 MICROGram(s) Oral daily  metoprolol succinate ER 25 milliGRAM(s) Oral daily  pantoprazole    Tablet 40 milliGRAM(s) Oral before breakfast  tamsulosin 0.4 milliGRAM(s) Oral at bedtime  ticagrelor 90 milliGRAM(s) Oral every 12 hours    PRN MEDICATIONS      VITAL SIGNS: Last 24 Hours  T(C): 37 (15 Issac 2022 07:48), Max: 37 (15 Issac 2022 04:00)  T(F): 98.6 (15 Issac 2022 07:48), Max: 98.6 (15 Issac 2022 04:00)  HR: 58 (15 Issac 2022 09:00) (54 - 72)  BP: 100/40 (15 Issac 2022 09:00) (93/48 - 126/80)  BP(mean): 56 (15 Issac 2022 09:00) (56 - 89)  RR: 17 (15 Issac 2022 09:00) (10 - 21)  SpO2: 96% (15 Issac 2022 09:00) (93% - 98%)    LABS:                        10.9   8.55  )-----------( 134      ( 15 Issac 2022 05:05 )             33.6     06-15    136  |  99  |  15  ----------------------------<  107<H>  3.5   |  28  |  0.74    Ca    9.6      15 Issac 2022 05:05  Phos  2.9     06-15  Mg     1.90     06-15    TPro  6.5  /  Alb  3.9  /  TBili  0.7  /  DBili  x   /  AST  158<H>  /  ALT  41  /  AlkPhos  106  06-15    PT/INR - ( 15 Issac 2022 05:05 )   PT: 13.8 sec;   INR: 1.19 ratio         PTT - ( 15 Issac 2022 05:05 )  PTT:28.6 sec  Urinalysis Basic - ( 2022 06:00 )    Color: Yellow / Appearance: Slightly Turbid / S.028 / pH: x  Gluc: x / Ketone: Negative  / Bili: Negative / Urobili: <2 mg/dL   Blood: x / Protein: 30 mg/dL / Nitrite: Negative   Leuk Esterase: Negative / RBC: 4 /HPF / WBC 8 /HPF   Sq Epi: x / Non Sq Epi: 4 /HPF / Bacteria: Negative          Culture - Urine (collected 2022 07:28)  Source: Clean Catch Clean Catch (Midstream)  Final Report (15 Issac 2022 08:02):    No growth    Culture - Blood (collected 2022 02:53)  Source: .Blood Blood  Preliminary Report (15 Issac 2022 07:02):    No growth to date.    Culture - Blood (collected 2022 02:47)  Source: .Blood Blood  Preliminary Report (15 Issac 2022 07:02):    No growth to date.      CARDIAC MARKERS ( 15 Issac 2022 05:05 )  x     / x     / 963 U/L / x     / 121.6 ng/mL  CARDIAC MARKERS ( 2022 08:30 )  x     / x     / 575 U/L / x     / 103.1 ng/mL      RADIOLOGY:      CCU COURSE:  Patient was found to have an inferior wall STEMI, was sent to the cath lab for urgent left heart cath. Started on heparin gtt, and loaded with aspirin and plavix. However had emesis in the cath lab. Due to uncertain level of plavix absorption was also loaded with brillinta in the cath lab. Found to have 100% occlusion to mid-circumflex SVG s/p PCI and DESx1 and 60-70% occlusion to OM1 with patent stent. Patient continued on 81ASA and brilinta        ASSESSMENT & PLAN:     68yo M with PMHx of CAD s/p stents x 4 (3/2019) and CABG (2008), HTN, Stage IVB small cell lung cancer on chemo (last round on 22), former 30 pack year smoker (quit 15 years ago) who presents to the ED with chest pain, found to have EKG revealing ERIBERTO in II, III, AVF suggestive of MI. Admitted to the CCU for further management.     Neuro:  -- Alert and oriented X 3  -- No acute issues    Cardio:  # IWSTEMI: s/p LHC with 100 % occlusion to midCircumflex SVG s/p PCI and GORGE x1 and 60-70% occlusion to OM1 with patent stent  - EKG in ED with ST elevation in II, III, aVF, trop increased from 20 to 236  - s/p asa load, loaded with 600 mg plavix   - TTE: moderate severe MR, mild segmental LVSF, hypokinesis of inferolateral wall and basal inf wall. Stage 2 diastolic dysfunction. RV enlargement with decreased RVSF. moderate pulmonary hypertension  - fentanyl PRN for pain, avoid SLN given inferior wall pathology  - s/p hep gtt for ACS  - c/w asa/ticagrelor   - Check A1c, lipids, TSH    # CAD s/p 4 stents (3/2019) and CABG (2008)  - c/w home 81ASA  - c/w home rosuvastatin 40 mg daily    # HTN  - resume XL 25 mg daily (home dose metoprolol XL 50 mg daily)    # HLD  - c/w home rosuvastatin 40 mg daily  - c/w home zetia 10 mg daily     Resp:  -- Stable on room air at this time  -- CXR clear lungs  -- Continue to monitor    GI:   -- NPO except meds for now    Renal:  -- Monitor renal indices  -- Replete electrolytes as needed     BPH  - c/w home flomax    Infectious Disease:  # leukocytosis, resolved  - WBC on admit 22.48 with neutrophilic predominance, increased from 4.82 on 22. Ddx likely reactive iso recent chemo and receiving neulasta prior to chemotherapy . less likely infectious/SIRS given patient is afebrile without acute symptoms   - RVP neg, u/a bland  - await bcx and ucx   - Monitor off of abx for now     Heme:  -- Continue with aspirin, plavix  -- Monitor for signs of bleeding    # stage IVB small cell lung cancer  - on active chemotherapy, last 5th round on  at [facility] with Dr. Carranza  - onc consult, appreciate recs    Endocrine:  # Hypothyroidism  - c/w home synthroid 25 ug daily  - TSH    -- Check A1c    Disposition:  -- Admit to the CCU for further management       For Follow-Up:  [ ] CCU Transfer Note    Transfer from: CCU  Transfer to:  ( ) Medicine    (x) Telemetry    (  ) RCU    (  ) Palliative    (  ) Stroke Unit    (  ) _______________  Accepting physician:    Patient is a 68yo M with PMHx of CAD s/p stents x 4 and CABG, HTN, Lung CA ?metastatic on chemo who presents to the ED with chest pain. Patient accompanied by his wife at the bedside. States that he was sitting on the couch watching TV when he developed acute onset 1010 substernal chest pain. In the ED, EKG revealing ERIBERTO in II, III, AVF suggestive of MI. Currently admits to chest pain 8/10 improved since coming to the ED. Denies headaches, dizziness, palpitations, shortness of breath, abdominal pain, N/V at this time.       MEDICATIONS:  STANDING MEDICATIONS  aspirin enteric coated 81 milliGRAM(s) Oral daily  atorvastatin 80 milliGRAM(s) Oral at bedtime  chlorhexidine 2% Cloths 1 Application(s) Topical <User Schedule>  heparin   Injectable 5000 Unit(s) SubCutaneous every 8 hours  influenza  Vaccine (HIGH DOSE) 0.7 milliLiter(s) IntraMuscular once  levothyroxine 25 MICROGram(s) Oral daily  metoprolol succinate ER 25 milliGRAM(s) Oral daily  pantoprazole    Tablet 40 milliGRAM(s) Oral before breakfast  tamsulosin 0.4 milliGRAM(s) Oral at bedtime  ticagrelor 90 milliGRAM(s) Oral every 12 hours    PRN MEDICATIONS      VITAL SIGNS: Last 24 Hours  T(C): 37 (15 Issac 2022 07:48), Max: 37 (15 Issac 2022 04:00)  T(F): 98.6 (15 Issac 2022 07:48), Max: 98.6 (15 Issac 2022 04:00)  HR: 58 (15 Issac 2022 09:00) (54 - 72)  BP: 100/40 (15 Issac 2022 09:00) (93/48 - 126/80)  BP(mean): 56 (15 Issac 2022 09:00) (56 - 89)  RR: 17 (15 Issac 2022 09:00) (10 - 21)  SpO2: 96% (15 Issac 2022 09:00) (93% - 98%)    LABS:                        10.9   8.55  )-----------( 134      ( 15 Issac 2022 05:05 )             33.6     06-15    136  |  99  |  15  ----------------------------<  107<H>  3.5   |  28  |  0.74    Ca    9.6      15 Issac 2022 05:05  Phos  2.9     06-15  Mg     1.90     06-15    TPro  6.5  /  Alb  3.9  /  TBili  0.7  /  DBili  x   /  AST  158<H>  /  ALT  41  /  AlkPhos  106  06-15    PT/INR - ( 15 Issac 2022 05:05 )   PT: 13.8 sec;   INR: 1.19 ratio         PTT - ( 15 Issac 2022 05:05 )  PTT:28.6 sec  Urinalysis Basic - ( 2022 06:00 )    Color: Yellow / Appearance: Slightly Turbid / S.028 / pH: x  Gluc: x / Ketone: Negative  / Bili: Negative / Urobili: <2 mg/dL   Blood: x / Protein: 30 mg/dL / Nitrite: Negative   Leuk Esterase: Negative / RBC: 4 /HPF / WBC 8 /HPF   Sq Epi: x / Non Sq Epi: 4 /HPF / Bacteria: Negative          Culture - Urine (collected 2022 07:28)  Source: Clean Catch Clean Catch (Midstream)  Final Report (15 Issac 2022 08:02):    No growth    Culture - Blood (collected 2022 02:53)  Source: .Blood Blood  Preliminary Report (15 Issac 2022 07:02):    No growth to date.    Culture - Blood (collected 2022 02:47)  Source: .Blood Blood  Preliminary Report (15 Issac 2022 07:02):    No growth to date.      CARDIAC MARKERS ( 15 Issac 2022 05:05 )  x     / x     / 963 U/L / x     / 121.6 ng/mL  CARDIAC MARKERS ( 2022 08:30 )  x     / x     / 575 U/L / x     / 103.1 ng/mL      RADIOLOGY:      CCU COURSE:  Patient was found to have an inferior wall STEMI, was sent to the cath lab for urgent left heart cath. Started on heparin gtt, and loaded with aspirin and plavix. However had emesis in the cath lab. Due to uncertain level of plavix absorption was also loaded with brillinta in the cath lab. Found to have 100% occlusion to mid-circumflex SVG s/p PCI and DESx1 and 60-70% occlusion to OM1 with patent stent. Patient continued on 81ASA and brilinta. Patient also had leukocytosis to >22k iso recent neulasta administration with chemotherapy on 22, now improved and remained afebrile throughout admission.         ASSESSMENT & PLAN:     68yo M with PMHx of CAD s/p stents x 4 (3/2019) and CABG (2008), HTN, Stage IVB small cell lung cancer on chemo (last round on 22), former 30 pack year smoker (quit 15 years ago) who presents to the ED with chest pain, found to have EKG revealing ERIBERTO in II, III, AVF suggestive of MI. Admitted to the CCU for further management.     Neuro:  -- Alert and oriented X 3  -- No acute issues    Cardio:  # IWSTEMI: s/p LHC with 100 % occlusion to midCircumflex SVG s/p PCI and GORGE x1 and 60-70% occlusion to OM1 with patent stent  - EKG in ED with ST elevation in II, III, aVF, trop increased from 20 to 236  - s/p asa load, loaded with 600 mg plavix   - TTE: moderate severe MR, mild segmental LVSF, hypokinesis of inferolateral wall and basal inf wall. Stage 2 diastolic dysfunction. RV enlargement with decreased RVSF. moderate pulmonary hypertension  - fentanyl PRN for pain, avoid SLN given inferior wall pathology  - s/p hep gtt for ACS  - c/w asa/ticagrelor   - Check A1c, lipids, TSH    # CAD s/p 4 stents (3/2019) and CABG (2008)  - c/w home 81ASA  - c/w home rosuvastatin 40 mg daily    # HTN  - resume XL 25 mg daily (home dose metoprolol XL 50 mg daily)    # HLD  - c/w home rosuvastatin 40 mg daily  - c/w home zetia 10 mg daily     Resp:  -- Stable on room air at this time  -- CXR clear lungs  -- Continue to monitor    GI:   -- NPO except meds for now    Renal:  -- Monitor renal indices  -- Replete electrolytes as needed     BPH  - c/w home flomax    Infectious Disease:  # leukocytosis, resolved  - WBC on admit 22.48 with neutrophilic predominance, increased from 4.82 on 22. Ddx likely reactive iso recent chemo and receiving neulasta prior to chemotherapy . less likely infectious/SIRS given patient is afebrile without acute symptoms   - RVP neg, u/a bland  - await bcx and ucx   - Monitor off of abx for now     Heme:  -- Continue with aspirin, plavix  -- Monitor for signs of bleeding    # stage IVB small cell lung cancer  - on active chemotherapy, last 5th round on  at [facility] with Dr. Carranza  - onc consult, appreciate recs    Endocrine:  # Hypothyroidism  - c/w home synthroid 25 ug daily  - TSH    -- Check A1c    Disposition:  -- Admit to the CCU for further management       For Follow-Up:  [ ] send medications to mail order [facility]  [ ] continue DAPT  [ ] continue to monitor vitals, patient takes metoprolol XL 50 mg daily, can titrate from 25 mg daily as tolerated

## 2022-06-15 NOTE — DISCHARGE NOTE PROVIDER - NSDCMRMEDTOKEN_GEN_ALL_CORE_FT
aspirin 81 mg oral tablet: 1 tab(s) orally once a day  Brilinta (ticagrelor) 90 mg oral tablet: 1 tab(s) orally 2 times a day. please price check coverage with patient&#x27;s insurance. thank you  Flomax 0.4 mg oral capsule: 1 cap(s) orally once a day  rosuvastatin 40 mg oral tablet: 1 tab(s) orally once a day  Synthroid 25 mcg (0.025 mg) oral tablet: 1 tab(s) orally once a day  Toprol-XL 50 mg oral tablet, extended release: 1 tab(s) orally once a day  Zetia 10 mg oral tablet: 1 tab(s) orally once a day

## 2022-06-15 NOTE — DISCHARGE NOTE PROVIDER - NSDCCPTREATMENT_GEN_ALL_CORE_FT
PRINCIPAL PROCEDURE  Procedure: Left heart cardiac cath  Findings and Treatment:        PRINCIPAL PROCEDURE  Procedure: Left heart cardiac cath  Findings and Treatment:       SECONDARY PROCEDURE  Procedure: TTE (transthoracic echocardiography)  Findings and Treatment:     Procedure: CT head wo con  Findings and Treatment:

## 2022-06-15 NOTE — PROGRESS NOTE ADULT - SUBJECTIVE AND OBJECTIVE BOX
Patient is a 67y old  Male who presents with a chief complaint of STEMI (2022 09:36)    HPI:  Patient is a 66yo M with PMHx of CAD s/p stents x 4 and CABG, HTN, Lung CA ?metastatic on chemo who presents to the ED with chest pain. Patient accompanied by his wife at the bedside. States that he was sitting on the couch watching TV when he developed acute onset 10/10 substernal chest pain. In the ED, EKG revealing ERIBERTO in II, III, AVF suggestive of MI. Currently admits to chest pain 8/10 improved since coming to the ED. Denies headaches, dizziness, palpitations, shortness of breath, abdominal pain, N/V at this time.  (2022 01:45)       INTERVAL HPI/OVERNIGHT EVENTS:   No overnight events   Afebrile, hemodynamically stable     Subjective:    ICU Vital Signs Last 24 Hrs  T(C): 37 (15 Issac 2022 07:48), Max: 37 (2022 12:08)  T(F): 98.6 (15 Issac 2022 07:48), Max: 98.6 (2022 12:08)  HR: 58 (15 Issac 2022 06:00) (54 - 72)  BP: 93/48 (15 Issac 2022 06:00) (93/48 - 126/80)  BP(mean): 58 (15 Issac 2022 06:00) (58 - 89)  ABP: --  ABP(mean): --  RR: 17 (15 Issac 2022 06:00) (10 - 21)  SpO2: 97% (15 Issac 2022 06:00) (93% - 98%)    I&O's Summary    2022 07:01  -  15 Issac 2022 07:00  --------------------------------------------------------  IN: 733 mL / OUT: 1250 mL / NET: -517 mL          Daily     Daily Weight in k.1 (15 Issac 2022 04:00)    Adult Advanced Hemodynamics Last 24 Hrs  CVP(mm Hg): --  CVP(cm H2O): --  CO: --  CI: --  PA: --  PA(mean): --  PCWP: --  SVR: --  SVRI: --  PVR: --  PVRI: --    EKG/Telemetry Events:    MEDICATIONS  (STANDING):  aspirin enteric coated 81 milliGRAM(s) Oral daily  atorvastatin 80 milliGRAM(s) Oral at bedtime  chlorhexidine 2% Cloths 1 Application(s) Topical <User Schedule>  heparin   Injectable 5000 Unit(s) SubCutaneous every 8 hours  influenza  Vaccine (HIGH DOSE) 0.7 milliLiter(s) IntraMuscular once  levothyroxine 25 MICROGram(s) Oral daily  pantoprazole    Tablet 40 milliGRAM(s) Oral before breakfast  tamsulosin 0.4 milliGRAM(s) Oral at bedtime  ticagrelor 90 milliGRAM(s) Oral every 12 hours    MEDICATIONS  (PRN):      PHYSICAL EXAM:  GENERAL:   HEAD:  Atraumatic, Normocephalic  EYES: EOMI, PERRLA, conjunctiva and sclera clear  NECK: Supple, No JVD, Normal thyroid, no enlarged nodes  NERVOUS SYSTEM:  Alert & Awake.   CHEST/LUNG: B/L good air entry; No rales, rhonchi, or wheezing  HEART: S1S2 normal, no S3, Regular rate and rhythm; No murmurs  ABDOMEN: Soft, Nontender, Nondistended; Bowel sounds present  EXTREMITIES:  2+ Peripheral Pulses, No clubbing, cyanosis, or edema  LYMPH: No lymphadenopathy noted  SKIN: No rashes or lesions    LABS:                        10.9   8.55  )-----------( 134      ( 15 Issac 2022 05:05 )             33.6     06-15    136  |  99  |  15  ----------------------------<  107<H>  3.5   |  28  |  0.74    Ca    9.6      15 Issac 2022 05:05  Phos  2.9     06-15  Mg     1.90     06-15    TPro  6.5  /  Alb  3.9  /  TBili  0.7  /  DBili  x   /  AST  158<H>  /  ALT  41  /  AlkPhos  106  06-15    LIVER FUNCTIONS - ( 15 Issac 2022 05:05 )  Alb: 3.9 g/dL / Pro: 6.5 g/dL / ALK PHOS: 106 U/L / ALT: 41 U/L / AST: 158 U/L / GGT: x           PT/INR - ( 15 Issac 2022 05:05 )   PT: 13.8 sec;   INR: 1.19 ratio         PTT - ( 15 Issac 2022 05:05 )  PTT:28.6 sec  CAPILLARY BLOOD GLUCOSE          CKMB Units: 121.6 ng/mL (06-15 @ 05:05)  Creatine Kinase, Serum: 963 U/L (06-15 @ 05:05)    CARDIAC MARKERS ( 15 Issac 2022 05:05 )  x     / x     / 963 U/L / x     / 121.6 ng/mL  CARDIAC MARKERS ( 2022 08:30 )  x     / x     / 575 U/L / x     / 103.1 ng/mL      Urinalysis Basic - ( 2022 06:00 )    Color: Yellow / Appearance: Slightly Turbid / S.028 / pH: x  Gluc: x / Ketone: Negative  / Bili: Negative / Urobili: <2 mg/dL   Blood: x / Protein: 30 mg/dL / Nitrite: Negative   Leuk Esterase: Negative / RBC: 4 /HPF / WBC 8 /HPF   Sq Epi: x / Non Sq Epi: 4 /HPF / Bacteria: Negative          RADIOLOGY & ADDITIONAL TESTS:  CXR:      < from: TTE with Doppler (w/Cont) (22 @ 10:01) >  1. Mitralannular calcification, otherwise normal mitral  valve. Moderate-severe mitral regurgitation.  2. Calcified trileaflet aortic valve with normal opening.  Mild aortic regurgitation.  3. Mildly dilated left atrium.  LA volume index = 40 cc/m2.  4. Normal left ventricular internal dimensions and wall  thicknesses.  5. Mild segmental left ventricular systolic dysfunction.  Hypokinesis of the inferolateral wall and basal inferior  wall. Endocardial visualization enhanced with intravenous  injection of echo contrast (Definity).  6. Moderate diastolic dysfunction (Stage II).  7. Right ventricular enlargement with decreased right  ventricular systolic function.  8. Estimated right ventricular systolic pressure equals 50  mm Hg, assuming right atrial pressure equals 10 mm Hg,  consistent with moderate pulmonary hypertension.    < end of copied text >    Care Discussed with Consultants/Other Providers [ x] YES  [ ] NO           Patient is a 67y old  Male who presents with a chief complaint of STEMI (2022 09:36)    HPI:  Patient is a 66yo M with PMHx of CAD s/p stents x 4 and CABG, HTN, Lung CA ?metastatic on chemo who presents to the ED with chest pain. Patient accompanied by his wife at the bedside. States that he was sitting on the couch watching TV when he developed acute onset 1010 substernal chest pain. In the ED, EKG revealing ERIBERTO in II, III, AVF suggestive of MI. Currently admits to chest pain 8/10 improved since coming to the ED. Denies headaches, dizziness, palpitations, shortness of breath, abdominal pain, N/V at this time.  (2022 01:45)       INTERVAL HPI/OVERNIGHT EVENTS:   No overnight events   Afebrile, hemodynamically stable     Subjective: patient seen and examined at bedside without acute complaints    ICU Vital Signs Last 24 Hrs  T(C): 37 (15 Issac 2022 07:48), Max: 37 (2022 12:08)  T(F): 98.6 (15 Issac 2022 07:48), Max: 98.6 (2022 12:08)  HR: 58 (15 Issac 2022 06:00) (54 - 72)  BP: 93/48 (15 Issac 2022 06:00) (93/48 - 126/80)  BP(mean): 58 (15 Issac 2022 06:00) (58 - 89)  ABP: --  ABP(mean): --  RR: 17 (15 Issac 2022 06:00) (10 - 21)  SpO2: 97% (15 Issac 2022 06:00) (93% - 98%)    I&O's Summary    2022 07:01  -  15 Issac 2022 07:00  --------------------------------------------------------  IN: 733 mL / OUT: 1250 mL / NET: -517 mL          Daily     Daily Weight in k.1 (15 Issac 2022 04:00)    Adult Advanced Hemodynamics Last 24 Hrs  CVP(mm Hg): --  CVP(cm H2O): --  CO: --  CI: --  PA: --  PA(mean): --  PCWP: --  SVR: --  SVRI: --  PVR: --  PVRI: --    EKG/Telemetry Events:    MEDICATIONS  (STANDING):  aspirin enteric coated 81 milliGRAM(s) Oral daily  atorvastatin 80 milliGRAM(s) Oral at bedtime  chlorhexidine 2% Cloths 1 Application(s) Topical <User Schedule>  heparin   Injectable 5000 Unit(s) SubCutaneous every 8 hours  influenza  Vaccine (HIGH DOSE) 0.7 milliLiter(s) IntraMuscular once  levothyroxine 25 MICROGram(s) Oral daily  pantoprazole    Tablet 40 milliGRAM(s) Oral before breakfast  tamsulosin 0.4 milliGRAM(s) Oral at bedtime  ticagrelor 90 milliGRAM(s) Oral every 12 hours    MEDICATIONS  (PRN):      PHYSICAL EXAM:  GENERAL:   HEAD:  Atraumatic, Normocephalic  EYES: EOMI, PERRLA, conjunctiva and sclera clear  NECK: Supple, No JVD, Normal thyroid, no enlarged nodes  NERVOUS SYSTEM:  Alert & Awake.   CHEST/LUNG: B/L good air entry; No rales, rhonchi, or wheezing  HEART: S1S2 normal, no S3, Regular rate and rhythm; No murmurs  ABDOMEN: Soft, Nontender, Nondistended; Bowel sounds present  EXTREMITIES:  2+ Peripheral Pulses, No clubbing, cyanosis, or edema  LYMPH: No lymphadenopathy noted  SKIN: No rashes or lesions    LABS:                        10.9   8.55  )-----------( 134      ( 15 Issac 2022 05:05 )             33.6     -15    136  |  99  |  15  ----------------------------<  107<H>  3.5   |  28  |  0.74    Ca    9.6      15 Issac 2022 05:05  Phos  2.9     06-15  Mg     1.90     -15    TPro  6.5  /  Alb  3.9  /  TBili  0.7  /  DBili  x   /  AST  158<H>  /  ALT  41  /  AlkPhos  106  -15    LIVER FUNCTIONS - ( 15 Issac 2022 05:05 )  Alb: 3.9 g/dL / Pro: 6.5 g/dL / ALK PHOS: 106 U/L / ALT: 41 U/L / AST: 158 U/L / GGT: x           PT/INR - ( 15 Issac 2022 05:05 )   PT: 13.8 sec;   INR: 1.19 ratio         PTT - ( 15 Issac 2022 05:05 )  PTT:28.6 sec  CAPILLARY BLOOD GLUCOSE          CKMB Units: 121.6 ng/mL (06-15 @ 05:05)  Creatine Kinase, Serum: 963 U/L (06-15 @ 05:05)    CARDIAC MARKERS ( 15 Issac 2022 05:05 )  x     / x     / 963 U/L / x     / 121.6 ng/mL  CARDIAC MARKERS ( 2022 08:30 )  x     / x     / 575 U/L / x     / 103.1 ng/mL      Urinalysis Basic - ( 2022 06:00 )    Color: Yellow / Appearance: Slightly Turbid / S.028 / pH: x  Gluc: x / Ketone: Negative  / Bili: Negative / Urobili: <2 mg/dL   Blood: x / Protein: 30 mg/dL / Nitrite: Negative   Leuk Esterase: Negative / RBC: 4 /HPF / WBC 8 /HPF   Sq Epi: x / Non Sq Epi: 4 /HPF / Bacteria: Negative          RADIOLOGY & ADDITIONAL TESTS:  CXR:      < from: TTE with Doppler (w/Cont) (22 @ 10:01) >  1. Mitralannular calcification, otherwise normal mitral  valve. Moderate-severe mitral regurgitation.  2. Calcified trileaflet aortic valve with normal opening.  Mild aortic regurgitation.  3. Mildly dilated left atrium.  LA volume index = 40 cc/m2.  4. Normal left ventricular internal dimensions and wall  thicknesses.  5. Mild segmental left ventricular systolic dysfunction.  Hypokinesis of the inferolateral wall and basal inferior  wall. Endocardial visualization enhanced with intravenous  injection of echo contrast (Definity).  6. Moderate diastolic dysfunction (Stage II).  7. Right ventricular enlargement with decreased right  ventricular systolic function.  8. Estimated right ventricular systolic pressure equals 50  mm Hg, assuming right atrial pressure equals 10 mm Hg,  consistent with moderate pulmonary hypertension.    < end of copied text >    Care Discussed with Consultants/Other Providers [ x] YES  [ ] NO           Patient is a 67y old  Male who presents with a chief complaint of STEMI (2022 09:36)    HPI:  Patient is a 66yo M with PMHx of CAD s/p stents x 4 and CABG, HTN, Lung CA ?metastatic on chemo who presents to the ED with chest pain. Patient accompanied by his wife at the bedside. States that he was sitting on the couch watching TV when he developed acute onset 1010 substernal chest pain. In the ED, EKG revealing ERIBERTO in II, III, AVF suggestive of MI. Currently admits to chest pain 8/10 improved since coming to the ED. Denies headaches, dizziness, palpitations, shortness of breath, abdominal pain, N/V at this time.  (2022 01:45)       INTERVAL HPI/OVERNIGHT EVENTS:   No overnight events   Afebrile, hemodynamically stable     Subjective: patient seen and examined at bedside without acute complaints    ICU Vital Signs Last 24 Hrs  T(C): 37 (15 Issac 2022 07:48), Max: 37 (2022 12:08)  T(F): 98.6 (15 Issac 2022 07:48), Max: 98.6 (2022 12:08)  HR: 58 (15 Issac 2022 06:00) (54 - 72)  BP: 93/48 (15 Issac 2022 06:00) (93/48 - 126/80)  BP(mean): 58 (15 Issac 2022 06:00) (58 - 89)  ABP: --  ABP(mean): --  RR: 17 (15 Issac 2022 06:00) (10 - 21)  SpO2: 97% (15 Issac 2022 06:00) (93% - 98%)    I&O's Summary    2022 07:01  -  15 Issac 2022 07:00  --------------------------------------------------------  IN: 733 mL / OUT: 1250 mL / NET: -517 mL          Daily     Daily Weight in k.1 (15 Issac 2022 04:00)    Adult Advanced Hemodynamics Last 24 Hrs  CVP(mm Hg): --  CVP(cm H2O): --  CO: --  CI: --  PA: --  PA(mean): --  PCWP: --  SVR: --  SVRI: --  PVR: --  PVRI: --    EKG/Telemetry Events:    MEDICATIONS  (STANDING):  aspirin enteric coated 81 milliGRAM(s) Oral daily  atorvastatin 80 milliGRAM(s) Oral at bedtime  chlorhexidine 2% Cloths 1 Application(s) Topical <User Schedule>  heparin   Injectable 5000 Unit(s) SubCutaneous every 8 hours  influenza  Vaccine (HIGH DOSE) 0.7 milliLiter(s) IntraMuscular once  levothyroxine 25 MICROGram(s) Oral daily  pantoprazole    Tablet 40 milliGRAM(s) Oral before breakfast  tamsulosin 0.4 milliGRAM(s) Oral at bedtime  ticagrelor 90 milliGRAM(s) Oral every 12 hours    MEDICATIONS  (PRN):      PHYSICAL EXAM:  GENERAL: comfortable, NAD, responds to all questions appropriately  HEAD:  Atraumatic, Normocephalic  EYES: EOMI, PERRLA, conjunctiva and sclera clear  NECK: Supple, No JVD, Normal thyroid, no enlarged nodes  NERVOUS SYSTEM:  Alert & Awake.   CHEST/LUNG: B/L good air entry; No rales, rhonchi, or wheezing  HEART: S1S2 normal, no S3, Regular rate and rhythm; No murmurs  ABDOMEN: Soft, Nontender, protuberant; Bowel sounds present  EXTREMITIES:  2+ Peripheral Pulses, No clubbing, cyanosis, or edema  LYMPH: No lymphadenopathy noted  SKIN: No rashes or lesions    LABS:                        10.9   8.55  )-----------( 134      ( 15 Issac 2022 05:05 )             33.6     06-15    136  |  99  |  15  ----------------------------<  107<H>  3.5   |  28  |  0.74    Ca    9.6      15 Issac 2022 05:05  Phos  2.9     06-15  Mg     1.90     06-15    TPro  6.5  /  Alb  3.9  /  TBili  0.7  /  DBili  x   /  AST  158<H>  /  ALT  41  /  AlkPhos  106  06-15    LIVER FUNCTIONS - ( 15 Issac 2022 05:05 )  Alb: 3.9 g/dL / Pro: 6.5 g/dL / ALK PHOS: 106 U/L / ALT: 41 U/L / AST: 158 U/L / GGT: x           PT/INR - ( 15 Issac 2022 05:05 )   PT: 13.8 sec;   INR: 1.19 ratio         PTT - ( 15 Issac 2022 05:05 )  PTT:28.6 sec  CAPILLARY BLOOD GLUCOSE          CKMB Units: 121.6 ng/mL (06-15 @ 05:05)  Creatine Kinase, Serum: 963 U/L (06-15 @ 05:05)    CARDIAC MARKERS ( 15 Issac 2022 05:05 )  x     / x     / 963 U/L / x     / 121.6 ng/mL  CARDIAC MARKERS ( 2022 08:30 )  x     / x     / 575 U/L / x     / 103.1 ng/mL      Urinalysis Basic - ( 2022 06:00 )    Color: Yellow / Appearance: Slightly Turbid / S.028 / pH: x  Gluc: x / Ketone: Negative  / Bili: Negative / Urobili: <2 mg/dL   Blood: x / Protein: 30 mg/dL / Nitrite: Negative   Leuk Esterase: Negative / RBC: 4 /HPF / WBC 8 /HPF   Sq Epi: x / Non Sq Epi: 4 /HPF / Bacteria: Negative          RADIOLOGY & ADDITIONAL TESTS:  CXR:      < from: TTE with Doppler (w/Cont) (22 @ 10:01) >  1. Mitralannular calcification, otherwise normal mitral  valve. Moderate-severe mitral regurgitation.  2. Calcified trileaflet aortic valve with normal opening.  Mild aortic regurgitation.  3. Mildly dilated left atrium.  LA volume index = 40 cc/m2.  4. Normal left ventricular internal dimensions and wall  thicknesses.  5. Mild segmental left ventricular systolic dysfunction.  Hypokinesis of the inferolateral wall and basal inferior  wall. Endocardial visualization enhanced with intravenous  injection of echo contrast (Definity).  6. Moderate diastolic dysfunction (Stage II).  7. Right ventricular enlargement with decreased right  ventricular systolic function.  8. Estimated right ventricular systolic pressure equals 50  mm Hg, assuming right atrial pressure equals 10 mm Hg,  consistent with moderate pulmonary hypertension.    < end of copied text >    Care Discussed with Consultants/Other Providers [ x] YES  [ ] NO

## 2022-06-16 ENCOUNTER — TRANSCRIPTION ENCOUNTER (OUTPATIENT)
Age: 67
End: 2022-06-16

## 2022-06-16 VITALS
DIASTOLIC BLOOD PRESSURE: 61 MMHG | SYSTOLIC BLOOD PRESSURE: 132 MMHG | HEART RATE: 97 BPM | RESPIRATION RATE: 31 BRPM | TEMPERATURE: 98 F

## 2022-06-16 LAB
ALBUMIN SERPL ELPH-MCNC: 3.7 G/DL — SIGNIFICANT CHANGE UP (ref 3.3–5)
ALP SERPL-CCNC: 91 U/L — SIGNIFICANT CHANGE UP (ref 40–120)
ALT FLD-CCNC: 30 U/L — SIGNIFICANT CHANGE UP (ref 4–41)
ANION GAP SERPL CALC-SCNC: 11 MMOL/L — SIGNIFICANT CHANGE UP (ref 7–14)
AST SERPL-CCNC: 47 U/L — HIGH (ref 4–40)
BILIRUB SERPL-MCNC: 0.3 MG/DL — SIGNIFICANT CHANGE UP (ref 0.2–1.2)
BUN SERPL-MCNC: 13 MG/DL — SIGNIFICANT CHANGE UP (ref 7–23)
CALCIUM SERPL-MCNC: 9.6 MG/DL — SIGNIFICANT CHANGE UP (ref 8.4–10.5)
CHLORIDE SERPL-SCNC: 103 MMOL/L — SIGNIFICANT CHANGE UP (ref 98–107)
CK MB BLD-MCNC: 5.7 % — HIGH (ref 0–2.5)
CK MB CFR SERPL CALC: 11.3 NG/ML — HIGH
CK SERPL-CCNC: 197 U/L — SIGNIFICANT CHANGE UP (ref 30–200)
CO2 SERPL-SCNC: 26 MMOL/L — SIGNIFICANT CHANGE UP (ref 22–31)
CREAT SERPL-MCNC: 0.72 MG/DL — SIGNIFICANT CHANGE UP (ref 0.5–1.3)
EGFR: 99 ML/MIN/1.73M2 — SIGNIFICANT CHANGE UP
GLUCOSE SERPL-MCNC: 110 MG/DL — HIGH (ref 70–99)
HCT VFR BLD CALC: 32.7 % — LOW (ref 39–50)
HGB BLD-MCNC: 10.7 G/DL — LOW (ref 13–17)
INR BLD: 1.06 RATIO — SIGNIFICANT CHANGE UP (ref 0.88–1.16)
MAGNESIUM SERPL-MCNC: 1.8 MG/DL — SIGNIFICANT CHANGE UP (ref 1.6–2.6)
MCHC RBC-ENTMCNC: 31 PG — SIGNIFICANT CHANGE UP (ref 27–34)
MCHC RBC-ENTMCNC: 32.7 GM/DL — SIGNIFICANT CHANGE UP (ref 32–36)
MCV RBC AUTO: 94.8 FL — SIGNIFICANT CHANGE UP (ref 80–100)
NRBC # BLD: 0 /100 WBCS — SIGNIFICANT CHANGE UP
NRBC # FLD: 0 K/UL — SIGNIFICANT CHANGE UP
PHOSPHATE SERPL-MCNC: 3.1 MG/DL — SIGNIFICANT CHANGE UP (ref 2.5–4.5)
PLATELET # BLD AUTO: 121 K/UL — LOW (ref 150–400)
POTASSIUM SERPL-MCNC: 3.7 MMOL/L — SIGNIFICANT CHANGE UP (ref 3.5–5.3)
POTASSIUM SERPL-SCNC: 3.7 MMOL/L — SIGNIFICANT CHANGE UP (ref 3.5–5.3)
PROT SERPL-MCNC: 6.4 G/DL — SIGNIFICANT CHANGE UP (ref 6–8.3)
PROTHROM AB SERPL-ACNC: 12.3 SEC — SIGNIFICANT CHANGE UP (ref 10.5–13.4)
RBC # BLD: 3.45 M/UL — LOW (ref 4.2–5.8)
RBC # FLD: 14.5 % — SIGNIFICANT CHANGE UP (ref 10.3–14.5)
SODIUM SERPL-SCNC: 140 MMOL/L — SIGNIFICANT CHANGE UP (ref 135–145)
TROPONIN T, HIGH SENSITIVITY RESULT: 3123 NG/L — CRITICAL HIGH
WBC # BLD: 9.09 K/UL — SIGNIFICANT CHANGE UP (ref 3.8–10.5)
WBC # FLD AUTO: 9.09 K/UL — SIGNIFICANT CHANGE UP (ref 3.8–10.5)

## 2022-06-16 PROCEDURE — 99233 SBSQ HOSP IP/OBS HIGH 50: CPT

## 2022-06-16 RX ORDER — TICAGRELOR 90 MG/1
1 TABLET ORAL
Qty: 14 | Refills: 0
Start: 2022-06-16 | End: 2022-06-22

## 2022-06-16 RX ORDER — POTASSIUM CHLORIDE 20 MEQ
40 PACKET (EA) ORAL ONCE
Refills: 0 | Status: COMPLETED | OUTPATIENT
Start: 2022-06-16 | End: 2022-06-16

## 2022-06-16 RX ORDER — LOSARTAN POTASSIUM 100 MG/1
0.5 TABLET, FILM COATED ORAL
Qty: 3.5 | Refills: 0
Start: 2022-06-16 | End: 2022-06-22

## 2022-06-16 RX ORDER — METOPROLOL TARTRATE 50 MG
1 TABLET ORAL
Qty: 7 | Refills: 0
Start: 2022-06-16 | End: 2022-06-22

## 2022-06-16 RX ORDER — LOSARTAN POTASSIUM 100 MG/1
12.5 TABLET, FILM COATED ORAL
Qty: 0 | Refills: 0 | DISCHARGE
Start: 2022-06-16

## 2022-06-16 RX ORDER — METOPROLOL TARTRATE 50 MG
1 TABLET ORAL
Qty: 0 | Refills: 0 | DISCHARGE
Start: 2022-06-16

## 2022-06-16 RX ORDER — TICAGRELOR 90 MG/1
1 TABLET ORAL
Qty: 60 | Refills: 0
Start: 2022-06-16 | End: 2022-07-15

## 2022-06-16 RX ORDER — METOPROLOL TARTRATE 50 MG
1 TABLET ORAL
Qty: 30 | Refills: 0
Start: 2022-06-16 | End: 2022-07-15

## 2022-06-16 RX ORDER — LOSARTAN POTASSIUM 100 MG/1
12.5 TABLET, FILM COATED ORAL DAILY
Refills: 0 | Status: DISCONTINUED | OUTPATIENT
Start: 2022-06-16 | End: 2022-06-16

## 2022-06-16 RX ORDER — MAGNESIUM SULFATE 500 MG/ML
2 VIAL (ML) INJECTION ONCE
Refills: 0 | Status: COMPLETED | OUTPATIENT
Start: 2022-06-16 | End: 2022-06-16

## 2022-06-16 RX ORDER — METOPROLOL TARTRATE 50 MG
1 TABLET ORAL
Qty: 0 | Refills: 0 | DISCHARGE

## 2022-06-16 RX ORDER — LOSARTAN POTASSIUM 100 MG/1
0.5 TABLET, FILM COATED ORAL
Qty: 15 | Refills: 0
Start: 2022-06-16 | End: 2022-07-15

## 2022-06-16 RX ORDER — ASPIRIN/CALCIUM CARB/MAGNESIUM 324 MG
1 TABLET ORAL
Qty: 7 | Refills: 0
Start: 2022-06-16 | End: 2022-06-22

## 2022-06-16 RX ORDER — ASPIRIN/CALCIUM CARB/MAGNESIUM 324 MG
1 TABLET ORAL
Qty: 30 | Refills: 0
Start: 2022-06-16 | End: 2022-07-15

## 2022-06-16 RX ORDER — TICAGRELOR 90 MG/1
1 TABLET ORAL
Qty: 0 | Refills: 0 | DISCHARGE
Start: 2022-06-16

## 2022-06-16 RX ADMIN — Medication 40 MILLIEQUIVALENT(S): at 10:34

## 2022-06-16 RX ADMIN — Medication 81 MILLIGRAM(S): at 11:29

## 2022-06-16 RX ADMIN — LOSARTAN POTASSIUM 12.5 MILLIGRAM(S): 100 TABLET, FILM COATED ORAL at 11:29

## 2022-06-16 RX ADMIN — Medication 25 MICROGRAM(S): at 05:45

## 2022-06-16 RX ADMIN — PANTOPRAZOLE SODIUM 40 MILLIGRAM(S): 20 TABLET, DELAYED RELEASE ORAL at 05:45

## 2022-06-16 RX ADMIN — Medication 25 MILLIGRAM(S): at 10:34

## 2022-06-16 RX ADMIN — Medication 25 GRAM(S): at 10:35

## 2022-06-16 RX ADMIN — TICAGRELOR 90 MILLIGRAM(S): 90 TABLET ORAL at 05:44

## 2022-06-16 RX ADMIN — HEPARIN SODIUM 5000 UNIT(S): 5000 INJECTION INTRAVENOUS; SUBCUTANEOUS at 05:45

## 2022-06-16 RX ADMIN — CHLORHEXIDINE GLUCONATE 1 APPLICATION(S): 213 SOLUTION TOPICAL at 10:35

## 2022-06-16 NOTE — DISCHARGE NOTE PROVIDER - NSDCCPTREATMENT_GEN_ALL_CORE_FT
PRINCIPAL PROCEDURE  Procedure: Left heart cardiac cath  Findings and Treatment:       SECONDARY PROCEDURE  Procedure: TTE (transthoracic echocardiography)  Findings and Treatment:   < end of copied text >  CONCLUSIONS:  1. Mitralannular calcification, otherwise normal mitral  valve. Moderate-severe mitral regurgitation.  2. Calcified trileaflet aortic valve with normal opening.  Mild aortic regurgitation.  3. Mildly dilated left atrium.  LA volume index = 40 cc/m2.  4. Normal left ventricular internal dimensions and wall  thicknesses.  5. Mild segmental left ventricular systolic dysfunction.  Hypokinesis of the inferolateral wall and basal inferior  wall. Endocardial visualization enhanced with intravenous  injection of echo contrast (Definity).  6. Moderate diastolic dysfunction (Stage II).  7. Right ventricular enlargement with decreased right  ventricular systolic function.  8. Estimated right ventricular systolic pressure equals 50  mm Hg, assuming right atrial pressure equals 10 mm Hg,  consistent with moderate pulmonary hypertension.< from: TTE with Doppler (w/Cont) (06.14.22 @ 10:01) >      Procedure: CT head wo con  Findings and Treatment:   < end of copied text >  Parenchymal volume loss and chronic microvascular ischemic changes are   identified.  There is no acute hemorrhage mass or mass effect seen.  Evaluation of the osseous structures with the appropriate window appears   unremarkable  Sclerotic changes involving the right mastoid region.  IMPRESSION: No evidence of acute hemorrhage, mass or mass effect.< from: CT Head No Cont (06.14.22 @ 02:11) >

## 2022-06-16 NOTE — DIETITIAN INITIAL EVALUATION ADULT - OTHER CALCULATIONS
lb; @ 129% IBW.  UBW ~220 lb over the past few months per wife.  Current wt: 97.2kg (6/16), 97.1kg (6/15).

## 2022-06-16 NOTE — DISCHARGE NOTE PROVIDER - NSDCFUADDAPPT_GEN_ALL_CORE_FT
APPTS ARE READY TO BE MADE: [x] YES    Best Family or Patient Contact (if needed):    Additional Information about above appointments (if needed):    1: Dr. Misha Casas (Cardiology within 1 week after discharge)  2:   3:     Other comments or requests:

## 2022-06-16 NOTE — DISCHARGE NOTE PROVIDER - CARE PROVIDER_API CALL
Misha Casas (MD; MBBS)  Cardiovascular Disease; Internal Medicine; Nuclear Cardiology  [address] [facility], [place] [address]  Phone: [phone]  Fax: [phone]  Established Patient  Follow Up Time: 1 week

## 2022-06-16 NOTE — DIETITIAN INITIAL EVALUATION ADULT - ADD RECOMMEND
1. Honor food preferences PRN. Halal diet per pt's request. 2. Encourage po intake as tolerated, assist with meals and menu selections, provide alternatives PRN. 3. Monitor weights, labs, BM's, skin integrity, PO intake/tolerance.

## 2022-06-16 NOTE — DISCHARGE NOTE PROVIDER - HOSPITAL COURSE
Patient is a 68yo M with PMHx of CAD s/p stents x 4 and CABG, HTN, Lung CA ?metastatic on chemo who presents to the ED with chest pain. Patient accompanied by his wife at the bedside. States that he was sitting on the couch watching TV when he developed acute onset 10/10 substernal chest pain. In the ED, EKG revealing ERIBERTO in II, III, AVF suggestive of inferior wall STEMI. Admits to active chest pain and went to cath lab s/p LHC with 100 % to mCircumflex SVG s/p PCI and GORGE x1 and 60-70% occlusion to OM1 with patent stent HPI     Patient is a 66yo M with PMHx of CAD s/p stents x 4 and CABG, HTN, Lung CA ?metastatic on chemo who presents to the ED with chest pain. Patient accompanied by his wife at the bedside. States that he was sitting on the couch watching TV when he developed acute onset 10/10 substernal chest pain. In the ED, EKG revealing ERIBERTO in II, III, AVF suggestive of MI. Currently admits to chest pain 8/10 improved since coming to the ED. Denies headaches, dizziness, palpitations, shortness of breath, abdominal pain, N/V at this time.     CCU Course    Admitted to CCU for further management s/p LHC with 100 % to mCircumflex SVG s/p PCI and GORGE x1 and 60-70% occlusion to OM1 with patent stent. Brillinta loaded in cath lab. Noted to have leukocytosis, pan-cultured.     6/14: Admit to CCU for ERIBERTO in inf leads. Pain relieved with fentanyl. Pan cultured for elevated WBC. First troponin 20. Onc consulted. Pending echo   2/2 N/V unclear if plavix absorbed  Echo reveals TTE EF 45% /w mod-severe MR. Tolerated Toprol XL trial and restarted.    Noted to have intermittent spencer episodes. Losartan restarted and tolerated well.   Discharged to home in good, stable, and improved condition with        HPI     Patient is a 68yo M with PMHx of CAD s/p stents x 4 and CABG, HTN, Lung CA ?metastatic on chemo who presents to the ED with chest pain. Patient accompanied by his wife at the bedside. States that he was sitting on the couch watching TV when he developed acute onset 10/10 substernal chest pain. In the ED, EKG revealing ERIBERTO in II, III, AVF suggestive of MI. Currently admits to chest pain 8/10 improved since coming to the ED. Denies headaches, dizziness, palpitations, shortness of breath, abdominal pain, N/V at this time.     CCU Course    Admitted to CCU for further management s/p LHC with 100 % to mCircumflex SVG s/p PCI and GORGE x1 and 60-70% occlusion to OM1 with patent stent. Brillinta loaded in cath lab. Noted to have leukocytosis, pan-cultured.     6/14: Admit to CCU for ERIBERTO in inf leads. Pain relieved with fentanyl. Pan cultured for elevated WBC. First troponin 20. Onc consulted. Pending echo   2/2 N/V unclear if plavix absorbed  Echo reveals TTE EF 45% /w mod-severe MR. Tolerated Toprol XL trial and restarted.    Noted to have intermittent spencer episodes. Losartan restarted and tolerated well.   Discharged to home in good, stable, and improved condition with close follow up with cardiology, PCP and Markus.       HPI     66yo M with PMHx of CAD s/p stents x 4 (3/2019) and CABG (1/2008), HTN, Stage IVB small cell lung cancer on chemo (last round on 6/7/22), former 30 pack year smoker (quit 15 years ago) who presents to the ED with chest pain. States that he was sitting on the couch watching TV when he developed acute onset 10/10 substernal chest pain. Found to have EKG revealing ERIBERTO in II, III, AVF suggestive of MI. On admission reports chest pain 8/10, improved since coming to the ED.     CCU Course    Admitted to CCU for further management. Loaded with aspirin and brillinta and started on heparin gtt. s/p LHC with 100 % to mCircumflex SVG s/p PCI and GORGE x1 and 60-70% occlusion to OM1 with patent stent. Brillinta loaded in cath lab. Noted to have leukocytosis, pan-cultured and infectious workup negative to date. Leukocytosis thought to be in the setting of recent chemotherapy and receiving neulasta prior ot chemo on 6/7.  TTE with LVEF 45%, moderate-severe MR, mild segmental LVSF, hypokinesis of inferolateral wall and basal inferior wall. Stage 2 diastolic dysfunction. RV enlargement with decreased RVSF. moderate pulmonary hypertension. Patient was started on 81ASA and brillinta 90 mg BID, atorvastatin 80 mg daily. Patient was resumed on metoprolol XL 25 mg daily. However, patient's blood pressures remained soft and so dose was not titrated up home dose of metoprolol XL 50 mg daily. Due to patient's MR and heart disease, losartan 12.5 mg daily was started for afterload reduction. At the time of discharge, patient was medically stable for discharge home with close flollow up with cardiology, PCP and Markus for chemotherapy.    To do:    [ ] please monitor blood pressures and consider resuming patient's home dose of metoprolol XL 50 mg daily if blood pressures are appropriate

## 2022-06-16 NOTE — PROGRESS NOTE ADULT - ATTENDING COMMENTS
67 year old man with known CAD/CABG and PCI in the past  and metastatic lung CA who presents with late presentation IWMI  Taken to [facility] and found to have LCX disease sp PCI  TTE revealed overall preserved EF with moderate to severe MR  No events overnight    Meds:  ASA  Brilinta 90 mg BID  Atorvastatin 80 mg daily  Toprol 25 mg daily  Heparin sq   Protonix 40 mg daily    #Neuro- No active issues  #Pulm- known lung ca; no active issues  #CV- IWMI with PCI to LCX  -Now on ASA/Brilinta /Statin  -continue Toprol  -start ARB- losartan 12.5 mg daily  #Renal- Continue to monitor  #DC planning with outpatient followup
67 year old man with known CAD/CABG and PCI in the past  and metastatic lung CA who presents with late presentation IWMI  Taken to [facility] and found to have LCX disease sp PCI  TTE revealed overall preserved EF with moderate to severe MR  -Now on ASA/Brilinta /Statin  -restart toprol  -eventual ARB

## 2022-06-16 NOTE — DIETITIAN INITIAL EVALUATION ADULT - OTHER INFO
Per chart, 66yo M with PMHx of CAD s/p stents x 4 (3/2019) and CABG (1/2008), HTN, Stage IVB small cell lung cancer on chemo (last round on 6/7/22), former 30 pack year smoker (quit 15 years ago) who presents to the ED with chest pain, found to have EKG revealing ERIBERTO in II, III, AVF suggestive of MI. Admitted to the CCU for further management.     Patient was sleeping during encounter, collateral information obtained from pt's wife at bedside today. Per wife, patient appetite remains good at this time, tolerated diet well, noted PO intake ~75% at meals reported, denies chewing/swallowing difficulties at meals, denies N/V/C/D at this time. Wife declined oral nutrition supplement offered. RDN encouraged PO intake as tolerated and discussed increase intake of HBV protein (including eggs, lean meats, beans/lentils etc). All diet-related questions were answered at this time.

## 2022-06-16 NOTE — PROGRESS NOTE ADULT - ASSESSMENT
68yo M with PMHx of CAD s/p stents x 4 (3/2019) and CABG (1/2008), HTN, Stage IVB small cell lung cancer on chemo (last round on 6/7/22), former 30 pack year smoker (quit 15 years ago) who presents to the ED with chest pain, found to have EKG revealing ERIBERTO in II, III, AVF suggestive of MI. Admitted to the CCU for further management.     Neuro:  -- Alert and oriented X 3  -- No acute issues    Cardio:  # IWSTEMI: s/p LHC with 100 % to mCircumflex SVG s/p PCI and GORGE x1 and 60-70% occlusion to OM1 with patent stent  - EKG in ED with ST elevation in II, III, aVF, trop increased from 20 to 236  - s/p asa load, loaded with 600 mg plavix 6/14  - TTE: moderate severe MR, mild segmental LVSF, hypokinesis of inferolateral wall and basal inf wall. Stage 2 diastolic dysfunction. RV enlargement with decreased RVSF. moderate pulmonary hypertension  - fentanyl PRN for pain, avoid SLN given inferior wall pathology  - s/p hep gtt for ACS  - c/w asa/ticagrelor   - Check A1c, lipids, TSH    # CAD s/p 4 stents (3/2019) and CABG (1/2008)  - c/w home 81ASA  - c/w home rosuvastatin 40 mg daily    # HTN  - resume XL 25 mg daily (home dose metoprolol XL 50 mg daily)    # HLD  - c/w home rosuvastatin 40 mg daily  - c/w home zetia 10 mg daily     Resp:  -- Stable on room air at this time  -- CXR clear lungs  -- Continue to monitor    GI:   -- NPO except meds for now    Renal:  -- Monitor renal indices  -- Replete electrolytes as needed     BPH  - c/w home flomax    Infectious Disease:  - no acute issues    Heme:  -- Continue with aspirin, plavix  -- Monitor for signs of bleeding  # leukocytosis, resolved  - WBC on admit 22.48 with neutrophilic predominance, increased from 4.82 on 6/7/22. Ddx likely reactive iso recent chemo and receiving neulasta prior to chemotherapy 6/7. less likely infectious/SIRS given patient is afebrile without acute symptoms   - RVP neg, u/a bland  - await bcx and ucx NGTD  - Monitor off of abx for now     # stage IVB small cell lung cancer  - on active chemotherapy, last 5th round on 6/7 at [facility] with Dr. Carranza  - onc consult, appreciate recs    #DVT ppx: HSQ    Endocrine:  # Hypothyroidism  - c/w home synthroid 25 ug daily  - TSH    -- Check A1c    Disposition:  -- bed boarded to telemetry medicine floor    Henry Lion Fellow Attestation   Plan to discharge home today with tolerates lower dose of BB, on DAPT therapy    66yo M with PMHx of CAD s/p stents x 4 (3/2019) and CABG (1/2008), HTN, Stage IVB small cell lung cancer on chemo (last round on 6/7/22), former 30 pack year smoker (quit 15 years ago) who presents to the ED with chest pain, found to have EKG revealing ERIBERTO in II, III, AVF suggestive of MI. Admitted to the CCU for further management.     Neuro:  -- Alert and oriented X 3  -- No acute issues    Cardio:  # IWSTEMI: s/p LHC with 100 % to mCircumflex SVG s/p PCI and GORGE x1 and 60-70% occlusion to OM1 with patent stent  - EKG in ED with ST elevation in II, III, aVF, trop increased from 20 to 236  - s/p asa load, loaded with 600 mg plavix 6/14  - TTE: moderate severe MR, mild segmental LVSF, hypokinesis of inferolateral wall and basal inf wall. Stage 2 diastolic dysfunction. RV enlargement with decreased RVSF. moderate pulmonary hypertension  - fentanyl PRN for pain, avoid SLN given inferior wall pathology  - s/p hep gtt for ACS  - c/w asa/ticagrelor   - Check A1c, lipids, TSH    # CAD s/p 4 stents (3/2019) and CABG (1/2008)  - c/w home 81ASA  - c/w home rosuvastatin 40 mg daily    # HTN  - resume XL 25 mg daily (home dose metoprolol XL 50 mg daily)    # HLD  - c/w home rosuvastatin 40 mg daily  - c/w home zetia 10 mg daily     Resp:  -- Stable on room air at this time  -- CXR clear lungs  -- Continue to monitor    GI:   -- DASH diet    Renal:  -- Monitor renal indices  -- Replete electrolytes as needed     BPH  - c/w home flomax    Infectious Disease:  - no acute issues    Heme:  -- Continue with aspirin, plavix  -- Monitor for signs of bleeding  # leukocytosis, resolved  - WBC on admit 22.48 with neutrophilic predominance, increased from 4.82 on 6/7/22. Ddx likely reactive iso recent chemo and receiving neulasta prior to chemotherapy 6/7. less likely infectious/SIRS given patient is afebrile without acute symptoms   - RVP neg, u/a bland  - await bcx and ucx NGTD  - Monitor off of abx for now     # stage IVB small cell lung cancer  - on active chemotherapy, last 5th round on 6/7 at [facility] with Dr. Carranza  - onc consult, appreciate recs    #DVT ppx: HSQ    Endocrine:  # Hypothyroidism  - c/w home synthroid 25 ug daily  - TSH    -- Check A1c    Disposition:  -- discharge home today    Henry Lion Fellow Attestation   Plan to discharge home today with tolerates lower dose of BB, on DAPT therapy    66yo M with PMHx of CAD s/p stents x 4 (3/2019) and CABG (1/2008), HTN, Stage IVB small cell lung cancer on chemo (last round on 6/7/22), former 30 pack year smoker (quit 15 years ago) who presents to the ED with chest pain, found to have EKG revealing ERIBERTO in II, III, AVF suggestive of MI. Admitted to the CCU for further management.     Neuro:  -- Alert and oriented X 3  -- No acute issues    Cardio:  # IWSTEMI: s/p LHC with 100 % to mCircumflex SVG s/p PCI and GORGE x1 and 60-70% occlusion to OM1 with patent stent  - EKG in ED with ST elevation in II, III, aVF, trop increased from 20 to 236  - s/p asa load, loaded with 600 mg plavix 6/14  - TTE: moderate severe MR, mild segmental LVSF, hypokinesis of inferolateral wall and basal inf wall. Stage 2 diastolic dysfunction. RV enlargement with decreased RVSF. moderate pulmonary hypertension  - fentanyl PRN for pain, avoid SLN given inferior wall pathology  - s/p hep gtt for ACS  - c/w asa/ticagrelor   - losartan 12.5 mg daily  - Check A1c, lipids, TSH    # CAD s/p 4 stents (3/2019) and CABG (1/2008)  - c/w home 81ASA  - c/w home rosuvastatin 40 mg daily    # HTN  - resume XL 25 mg daily (home dose metoprolol XL 50 mg daily)    # MR  - TTE: moderate-severe MR  - losartan 12.5 mg daily    # HLD  - c/w home rosuvastatin 40 mg daily  - c/w home zetia 10 mg daily     Resp:  -- Stable on room air at this time  -- CXR clear lungs  -- Continue to monitor    GI:   -- DASH diet    Renal:  -- Monitor renal indices  -- Replete electrolytes as needed     BPH  - c/w home flomax    Infectious Disease:  - no acute issues    Heme:  -- Continue with aspirin, plavix  -- Monitor for signs of bleeding  # leukocytosis, resolved  - WBC on admit 22.48 with neutrophilic predominance, increased from 4.82 on 6/7/22. Ddx likely reactive iso recent chemo and receiving neulasta prior to chemotherapy 6/7. less likely infectious/SIRS given patient is afebrile without acute symptoms   - RVP neg, u/a bland  - await bcx and ucx NGTD  - Monitor off of abx for now     # stage IVB small cell lung cancer  - on active chemotherapy, last 5th round on 6/7 at [facility] with Dr. Carranza  - onc consult, appreciate recs    #DVT ppx: HSQ    Endocrine:  # Hypothyroidism  - c/w home synthroid 25 ug daily  - TSH    -- Check A1c    Disposition:  -- discharge home today    Henry Lion Fellow Attestation   Plan to discharge home today with tolerates lower dose of BB, on DAPT therapy

## 2022-06-16 NOTE — DISCHARGE NOTE PROVIDER - NSDCCPCAREPLAN_GEN_ALL_CORE_FT
PRINCIPAL DISCHARGE DIAGNOSIS  Diagnosis: ST elevation MI (STEMI)  Assessment and Plan of Treatment: You came to the hospital because you were having chest pain. You got an EKG with showed signs concerning for a heart attack.       PRINCIPAL DISCHARGE DIAGNOSIS  Diagnosis: ST elevation MI (STEMI)  Assessment and Plan of Treatment: You were treated in the hospital for chest pain. Your evaluation and examination reveals that you had a heart attack, also known as a STEMI (ST-elevation Myocardial Infarct). You were provided care under the CCU, for which   Please seek medical attention if you develop any new or worsening symptoms, such as chest pain that is dull or crushing in nature, palpitations, sensations of your heart racing, sweating at rest, pain that travels into your jaw or arms, uncontrolled abdominal pain, vomiting, diarrhea, dizziness, lightheadedness, confusion, or any other symptoms you find worrisome. You should follow up with your PCP and cardiologist to receive continued medical care.       PRINCIPAL DISCHARGE DIAGNOSIS  Diagnosis: ST elevation MI (STEMI)  Assessment and Plan of Treatment: You were treated in the hospital for chest pain. Your evaluation and examination reveals that you had a heart attack, also known as a STEMI (ST-elevation Myocardial Infarct). You were provided care under the CCU, for which you received a left heart catherization which showed a 100% blockage to the mid-circumflex CABG site for which you received a drug-eluting stent to open up the blockage. You were also found to have a 60-70% blockage in another one of your arteries that previously received a stent, however this stent was found to be open and so the interventional cardiologists decided to not intervene. You were started on blood thinners called baby aspirin (81mg) and brilinta 90 mg every 12 hours. It is very important that you take these every day as prescribed to prevent the clot from reaccumulating in the stent, which will cause another heart attack to happen. Please also continue to take your cholesterol medications daily.   Please seek medical attention if you develop any new or worsening symptoms, such as chest pain that is dull or crushing in nature, palpitations, sensations of your heart racing, sweating at rest, pain that travels into your jaw or arms, uncontrolled abdominal pain, vomiting, diarrhea, dizziness, lightheadedness, confusion, or any other symptoms you find worrisome. You should follow up with your PCP and cardiologist to receive continued medical care. Before you came to the hospital you took metoprolol XL 50 mg daily, however because your blood pressures remained on the lower end of normal, you were discharged with metoprolol XL 25 mg daily. In addition, you were started on a medication called losartan 12.5 mg daily to help treat your heart and your mitral regurgitation. Please follow up with your heart doctor to gradually increase the dose of your medications as tolerated.      SECONDARY DISCHARGE DIAGNOSES  Diagnosis: Mitral regurgitation  Assessment and Plan of Treatment: You received an echocardiogram, which is an ultrasound of your heart, during your hospitalization which showed that you have moderate to severe mitral regurgitation. You were started on a medication called losartan to help control your blood pressure and prevent the mitral regurgitation from worsening. Please make sure to take your medications as prescribed and please follow up with your heart doctor.     PRINCIPAL DISCHARGE DIAGNOSIS  Diagnosis: ST elevation MI (STEMI)  Assessment and Plan of Treatment: You were treated in the hospital for chest pain. Your evaluation and examination reveals that you had a heart attack, also known as a STEMI (ST-elevation Myocardial Infarct). You were provided care under the CCU, for which you received a left heart catherization which showed a 100% blockage to the mid-circumflex CABG site for which you received a drug-eluting stent to open up the blockage. You were also found to have a 60-70% blockage in another one of your arteries that previously received a stent, however this stent was found to be open and so the interventional cardiologists decided to not intervene. You were started on blood thinners called baby aspirin (81mg) and brilinta 90 mg every 12 hours. It is very important that you take these every day as prescribed to prevent the clot from reaccumulating in the stent, which will cause another heart attack to happen. Please also continue to take your cholesterol medications daily.   Before you came to the hospital you took metoprolol XL 50 mg daily, however because your blood pressures remained on the lower end of normal, you were discharged with metoprolol XL 25 mg daily. In addition, you were started on a medication called losartan 12.5 mg daily to help treat your heart and your mitral regurgitation. Please follow up with your heart doctor to gradually increase the dose of your medications as tolerated.  Please return to the hospital if you have new or worsening chest pain, heart palpitations, sweating at rest, nausea, vomiting, dizziness or lightheadedness, trouble breathing.      SECONDARY DISCHARGE DIAGNOSES  Diagnosis: Mitral regurgitation  Assessment and Plan of Treatment: You received an echocardiogram, which is an ultrasound of your heart, during your hospitalization which showed that you have moderate to severe mitral regurgitation. You were started on a medication called losartan to help control your blood pressure and prevent the mitral regurgitation from worsening. Please make sure to take your medications as prescribed and please follow up with your heart doctor.

## 2022-06-16 NOTE — DIETITIAN INITIAL EVALUATION ADULT - PERTINENT MEDS FT
MEDICATIONS  (STANDING):  aspirin enteric coated 81 milliGRAM(s) Oral daily  atorvastatin 80 milliGRAM(s) Oral at bedtime  chlorhexidine 2% Cloths 1 Application(s) Topical <User Schedule>  heparin   Injectable 5000 Unit(s) SubCutaneous every 8 hours  influenza  Vaccine (HIGH DOSE) 0.7 milliLiter(s) IntraMuscular once  levothyroxine 25 MICROGram(s) Oral daily  losartan 12.5 milliGRAM(s) Oral daily  metoprolol succinate ER 25 milliGRAM(s) Oral daily  pantoprazole    Tablet 40 milliGRAM(s) Oral before breakfast  tamsulosin 0.4 milliGRAM(s) Oral at bedtime  ticagrelor 90 milliGRAM(s) Oral every 12 hours    MEDICATIONS  (PRN):

## 2022-06-16 NOTE — DIETITIAN INITIAL EVALUATION ADULT - NSICDXPASTMEDICALHX_GEN_ALL_CORE_FT
PAST MEDICAL HISTORY:  CAD (coronary artery disease)     HTN (hypertension)     Lung cancer     
Labs/EKG/Imaging Studies

## 2022-06-16 NOTE — DIETITIAN INITIAL EVALUATION ADULT - ORAL INTAKE PTA/DIET HISTORY
Pt's wife reports that patient has a good appetite at home, not on any oral nutrition supplement PTA as he dislikes most of the supplements including Ensure and Boost. Patient requires Halal diet, and dislikes fish -- honored on CBoard. Denies changes to taste/smell to foods.

## 2022-06-16 NOTE — DISCHARGE NOTE PROVIDER - NSDCMRMEDTOKEN_GEN_ALL_CORE_FT
aspirin 81 mg oral tablet: 1 tab(s) orally once a day  Brilinta (ticagrelor) 90 mg oral tablet: 1 tab(s) orally 2 times a day. please price check coverage with patient&#x27;s insurance. thank you  Flomax 0.4 mg oral capsule: 1 cap(s) orally once a day  rosuvastatin 40 mg oral tablet: 1 tab(s) orally once a day  Synthroid 25 mcg (0.025 mg) oral tablet: 1 tab(s) orally once a day  Toprol-XL 50 mg oral tablet, extended release: 1 tab(s) orally once a day  Zetia 10 mg oral tablet: 1 tab(s) orally once a day   aspirin 81 mg oral tablet: 1 tab(s) orally once a day  Brilinta (ticagrelor) 90 mg oral tablet: 1 tab(s) orally every 12 hours  Flomax 0.4 mg oral capsule: 1 cap(s) orally once a day  losartan: 12.5 milligram(s) orally once a day  metoprolol succinate 25 mg oral tablet, extended release: 1 tab(s) orally once a day  rosuvastatin 40 mg oral tablet: 1 tab(s) orally once a day  Synthroid 25 mcg (0.025 mg) oral tablet: 1 tab(s) orally once a day  Zetia 10 mg oral tablet: 1 tab(s) orally once a day

## 2022-06-16 NOTE — DISCHARGE NOTE NURSING/CASE MANAGEMENT/SOCIAL WORK - PATIENT PORTAL LINK FT
You can access the FollowMyHealth Patient Portal offered by [facility] by registering at the following website: [email or web address]. By joining [facility]’s FollowMyHealth portal, you will also be able to view your health information using other applications (apps) compatible with our system.

## 2022-06-16 NOTE — DIETITIAN INITIAL EVALUATION ADULT - PERTINENT LABORATORY DATA
06-16    140  |  103  |  13  ----------------------------<  110<H>  3.7   |  26  |  0.72    Ca    9.6      16 Jun 2022 06:30  Phos  3.1     06-16  Mg     1.80     06-16    TPro  6.4  /  Alb  3.7  /  TBili  0.3  /  DBili  x   /  AST  47<H>  /  ALT  30  /  AlkPhos  91  06-16

## 2022-06-16 NOTE — PROGRESS NOTE ADULT - SUBJECTIVE AND OBJECTIVE BOX
Patient is a 67y old  Male who presents with a chief complaint of STEMI (15 Issac 2022 08:34)    HPI:  Patient is a 66yo M with PMHx of CAD s/p stents x 4 and CABG, HTN, Lung CA ?metastatic on chemo who presents to the ED with chest pain. Patient accompanied by his wife at the bedside. States that he was sitting on the couch watching TV when he developed acute onset 10/10 substernal chest pain. In the ED, EKG revealing ERIBERTO in II, III, AVF suggestive of MI. Currently admits to chest pain 8/10 improved since coming to the ED. Denies headaches, dizziness, palpitations, shortness of breath, abdominal pain, N/V at this time.  (2022 01:45)       INTERVAL HPI/OVERNIGHT EVENTS:   No overnight events   Afebrile, hemodynamically stable     Subjective:    ICU Vital Signs Last 24 Hrs  T(C): 36.6 (2022 04:00), Max: 36.9 (15 Issac 2022 16:20)  T(F): 97.8 (2022 04:00), Max: 98.5 (15 Issac 2022 16:20)  HR: 77 (2022 07:00) (54 - 97)  BP: 110/50 (2022 07:00) (86/39 - 138/43)  BP(mean): 64 (2022 07:00) (48 - 77)  ABP: --  ABP(mean): --  RR: 20 (2022 07:00) (14 - 23)  SpO2: 99% (2022 07:00) (96% - 100%)    I&O's Summary    15 Issac 2022 07:01  -  2022 07:00  --------------------------------------------------------  IN: 550 mL / OUT: 650 mL / NET: -100 mL          Daily     Daily Weight in k.2 (2022 04:00)    Adult Advanced Hemodynamics Last 24 Hrs  CVP(mm Hg): --  CVP(cm H2O): --  CO: --  CI: --  PA: --  PA(mean): --  PCWP: --  SVR: --  SVRI: --  PVR: --  PVRI: --    EKG/Telemetry Events:    MEDICATIONS  (STANDING):  aspirin enteric coated 81 milliGRAM(s) Oral daily  atorvastatin 80 milliGRAM(s) Oral at bedtime  chlorhexidine 2% Cloths 1 Application(s) Topical <User Schedule>  heparin   Injectable 5000 Unit(s) SubCutaneous every 8 hours  influenza  Vaccine (HIGH DOSE) 0.7 milliLiter(s) IntraMuscular once  levothyroxine 25 MICROGram(s) Oral daily  magnesium sulfate  IVPB 2 Gram(s) IV Intermittent once  metoprolol succinate ER 25 milliGRAM(s) Oral daily  pantoprazole    Tablet 40 milliGRAM(s) Oral before breakfast  potassium chloride    Tablet ER 40 milliEquivalent(s) Oral once  tamsulosin 0.4 milliGRAM(s) Oral at bedtime  ticagrelor 90 milliGRAM(s) Oral every 12 hours    MEDICATIONS  (PRN):      PHYSICAL EXAM:  GENERAL:   HEAD:  Atraumatic, Normocephalic  EYES: EOMI, PERRLA, conjunctiva and sclera clear  NECK: Supple, No JVD, Normal thyroid, no enlarged nodes  NERVOUS SYSTEM:  Alert & Awake.   CHEST/LUNG: B/L good air entry; No rales, rhonchi, or wheezing  HEART: S1S2 normal, no S3, Regular rate and rhythm; No murmurs  ABDOMEN: Soft, Nontender, Nondistended; Bowel sounds present  EXTREMITIES:  2+ Peripheral Pulses, No clubbing, cyanosis, or edema  LYMPH: No lymphadenopathy noted  SKIN: No rashes or lesions    LABS:                        10.7   9.09  )-----------( 121      ( 2022 06:30 )             32.7     06-16    140  |  103  |  13  ----------------------------<  110<H>  3.7   |  26  |  0.72    Ca    9.6      2022 06:30  Phos  3.1     06-16  Mg     1.80     06-16    TPro  6.4  /  Alb  3.7  /  TBili  0.3  /  DBili  x   /  AST  47<H>  /  ALT  30  /  AlkPhos  91  06-16    LIVER FUNCTIONS - ( 2022 06:30 )  Alb: 3.7 g/dL / Pro: 6.4 g/dL / ALK PHOS: 91 U/L / ALT: 30 U/L / AST: 47 U/L / GGT: x           PT/INR - ( 2022 06:30 )   PT: 12.3 sec;   INR: 1.06 ratio         PTT - ( 15 Issac 2022 05:05 )  PTT:28.6 sec  CAPILLARY BLOOD GLUCOSE          CKMB Units: 11.3 ng/mL ( @ 06:30)  Creatine Kinase, Serum: 197 U/L ( @ 06:30)    CARDIAC MARKERS ( 2022 06:30 )  x     / x     / 197 U/L / x     / 11.3 ng/mL  CARDIAC MARKERS ( 15 Issac 2022 05:05 )  x     / x     / 963 U/L / x     / 121.6 ng/mL  CARDIAC MARKERS ( 2022 08:30 )  x     / x     / 575 U/L / x     / 103.1 ng/mL            RADIOLOGY & ADDITIONAL TESTS:  CXR:        Care Discussed with Consultants/Other Providers [ x] YES  [ ] NO           Patient is a 67y old  Male who presents with a chief complaint of STEMI (15 Issac 2022 08:34)    HPI:  Patient is a 66yo M with PMHx of CAD s/p stents x 4 and CABG, HTN, Lung CA ?metastatic on chemo who presents to the ED with chest pain. Patient accompanied by his wife at the bedside. States that he was sitting on the couch watching TV when he developed acute onset 10/10 substernal chest pain. In the ED, EKG revealing ERIBERTO in II, III, AVF suggestive of MI. Currently admits to chest pain 8/10 improved since coming to the ED. Denies headaches, dizziness, palpitations, shortness of breath, abdominal pain, N/V at this time.  (2022 01:45)       INTERVAL HPI/OVERNIGHT EVENTS:   No overnight events   Afebrile, hemodynamically stable     Subjective: patient seen and examined at bedside, without acute complaints, denies chest pain, SOB, n/v, abdominal pain. wondering when he can go home    ICU Vital Signs Last 24 Hrs  T(C): 36.6 (2022 04:00), Max: 36.9 (15 Issac 2022 16:20)  T(F): 97.8 (2022 04:00), Max: 98.5 (15 Issac 2022 16:20)  HR: 77 (2022 07:00) (54 - 97)  BP: 110/50 (2022 07:00) (86/39 - 138/43)  BP(mean): 64 (2022 07:00) (48 - 77)  ABP: --  ABP(mean): --  RR: 20 (2022 07:00) (14 - 23)  SpO2: 99% (2022 07:00) (96% - 100%)    I&O's Summary    15 Issac 2022 07:01  -  2022 07:00  --------------------------------------------------------  IN: 550 mL / OUT: 650 mL / NET: -100 mL          Daily     Daily Weight in k.2 (2022 04:00)    Adult Advanced Hemodynamics Last 24 Hrs  CVP(mm Hg): --  CVP(cm H2O): --  CO: --  CI: --  PA: --  PA(mean): --  PCWP: --  SVR: --  SVRI: --  PVR: --  PVRI: --    EKG/Telemetry Events:    MEDICATIONS  (STANDING):  aspirin enteric coated 81 milliGRAM(s) Oral daily  atorvastatin 80 milliGRAM(s) Oral at bedtime  chlorhexidine 2% Cloths 1 Application(s) Topical <User Schedule>  heparin   Injectable 5000 Unit(s) SubCutaneous every 8 hours  influenza  Vaccine (HIGH DOSE) 0.7 milliLiter(s) IntraMuscular once  levothyroxine 25 MICROGram(s) Oral daily  magnesium sulfate  IVPB 2 Gram(s) IV Intermittent once  metoprolol succinate ER 25 milliGRAM(s) Oral daily  pantoprazole    Tablet 40 milliGRAM(s) Oral before breakfast  potassium chloride    Tablet ER 40 milliEquivalent(s) Oral once  tamsulosin 0.4 milliGRAM(s) Oral at bedtime  ticagrelor 90 milliGRAM(s) Oral every 12 hours    MEDICATIONS  (PRN):      PHYSICAL EXAM:  GENERAL: comfortable, pleasant, NAD, responds to all questions appropirately  HEAD:  Atraumatic, Normocephalic  EYES: EOMI, PERRLA, conjunctiva and sclera clear  NECK: Supple, No JVD, Normal thyroid, no enlarged nodes  NERVOUS SYSTEM:  Alert & Awake.   CHEST/LUNG: B/L good air entry; No rales, rhonchi, or wheezing  HEART: S1S2 normal, no S3, Regular rate and rhythm; No murmurs  ABDOMEN: Soft, Nontender, protuberant; Bowel sounds present  EXTREMITIES:  2+ Peripheral Pulses, No clubbing, cyanosis, or edema  LYMPH: No lymphadenopathy noted  SKIN: No rashes or lesions    LABS:                        10.7   9.09  )-----------( 121      ( 2022 06:30 )             32.7     06-16    140  |  103  |  13  ----------------------------<  110<H>  3.7   |  26  |  0.72    Ca    9.6      2022 06:30  Phos  3.1     06-16  Mg     1.80     06-16    TPro  6.4  /  Alb  3.7  /  TBili  0.3  /  DBili  x   /  AST  47<H>  /  ALT  30  /  AlkPhos  91      LIVER FUNCTIONS - ( 2022 06:30 )  Alb: 3.7 g/dL / Pro: 6.4 g/dL / ALK PHOS: 91 U/L / ALT: 30 U/L / AST: 47 U/L / GGT: x           PT/INR - ( 2022 06:30 )   PT: 12.3 sec;   INR: 1.06 ratio         PTT - ( 15 Issac 2022 05:05 )  PTT:28.6 sec  CAPILLARY BLOOD GLUCOSE          CKMB Units: 11.3 ng/mL ( @ 06:30)  Creatine Kinase, Serum: 197 U/L ( @ 06:30)    CARDIAC MARKERS ( 2022 06:30 )  x     / x     / 197 U/L / x     / 11.3 ng/mL  CARDIAC MARKERS ( 15 Issac 2022 05:05 )  x     / x     / 963 U/L / x     / 121.6 ng/mL  CARDIAC MARKERS ( 2022 08:30 )  x     / x     / 575 U/L / x     / 103.1 ng/mL            RADIOLOGY & ADDITIONAL TESTS:  CXR:        Care Discussed with Consultants/Other Providers [ x] YES  [ ] NO

## 2022-06-16 NOTE — DISCHARGE NOTE PROVIDER - NSDCFUSCHEDAPPT_GEN_ALL_CORE_FT
Rafy Cararnza  [facility]  [facility]  Scheduled Appointment: 06/21/2022    [facility]  [facility]  Scheduled Appointment: 06/21/2022    [facility]  [facility]  Scheduled Appointment: 06/28/2022    [facility]  Scheduled Appointment: 06/29/2022    [facility]  [facility]  Scheduled Appointment: 06/30/2022    Rafy Carranza  [facility]  [facility]  Scheduled Appointment: 07/12/2022    [facility]  [facility]  Scheduled Appointment: 07/12/2022    
Rafy Carranza  [facility]  [facility]  Scheduled Appointment: 06/21/2022    [facility]  [facility]  Scheduled Appointment: 06/21/2022    [facility]  [facility]  Scheduled Appointment: 06/28/2022    [facility]  Scheduled Appointment: 06/29/2022    [facility]  [facility]  Scheduled Appointment: 06/30/2022    Rafy Carranza  [facility]  [facility]  Scheduled Appointment: 07/12/2022    [facility]  [facility]  Scheduled Appointment: 07/12/2022

## 2022-06-17 ENCOUNTER — NON-APPOINTMENT (OUTPATIENT)
Age: 67
End: 2022-06-17

## 2022-06-17 PROBLEM — I10 ESSENTIAL (PRIMARY) HYPERTENSION: Chronic | Status: ACTIVE | Noted: 2022-06-14

## 2022-06-17 PROBLEM — I25.10 ATHEROSCLEROTIC HEART DISEASE OF NATIVE CORONARY ARTERY WITHOUT ANGINA PECTORIS: Chronic | Status: ACTIVE | Noted: 2022-06-14

## 2022-06-17 PROBLEM — C34.90 MALIGNANT NEOPLASM OF UNSPECIFIED PART OF UNSPECIFIED BRONCHUS OR LUNG: Chronic | Status: ACTIVE | Noted: 2022-06-14

## 2022-06-19 LAB
CULTURE RESULTS: SIGNIFICANT CHANGE UP
CULTURE RESULTS: SIGNIFICANT CHANGE UP
SPECIMEN SOURCE: SIGNIFICANT CHANGE UP
SPECIMEN SOURCE: SIGNIFICANT CHANGE UP

## 2022-06-21 ENCOUNTER — APPOINTMENT (OUTPATIENT)
Dept: HEMATOLOGY ONCOLOGY | Facility: CLINIC | Age: 67
End: 2022-06-21
Payer: COMMERCIAL

## 2022-06-21 ENCOUNTER — APPOINTMENT (OUTPATIENT)
Dept: INFUSION THERAPY | Facility: HOSPITAL | Age: 67
End: 2022-06-21

## 2022-06-21 VITALS
RESPIRATION RATE: 18 BRPM | HEART RATE: 85 BPM | BODY MASS INDEX: 30.84 KG/M2 | OXYGEN SATURATION: 99 % | SYSTOLIC BLOOD PRESSURE: 126 MMHG | DIASTOLIC BLOOD PRESSURE: 79 MMHG | HEIGHT: 70 IN | WEIGHT: 215.39 LBS | TEMPERATURE: 96.8 F

## 2022-06-21 PROCEDURE — 99214 OFFICE O/P EST MOD 30 MIN: CPT

## 2022-06-23 RX ORDER — METOPROLOL SUCCINATE 50 MG/1
50 TABLET, EXTENDED RELEASE ORAL DAILY
Qty: 90 | Refills: 1 | Status: DISCONTINUED | COMMUNITY
Start: 2020-07-13 | End: 2022-06-23

## 2022-06-27 ENCOUNTER — NON-APPOINTMENT (OUTPATIENT)
Age: 67
End: 2022-06-27

## 2022-06-28 ENCOUNTER — APPOINTMENT (OUTPATIENT)
Dept: INFUSION THERAPY | Facility: HOSPITAL | Age: 67
End: 2022-06-28

## 2022-06-29 ENCOUNTER — APPOINTMENT (OUTPATIENT)
Dept: INFUSION THERAPY | Facility: HOSPITAL | Age: 67
End: 2022-06-29

## 2022-06-29 NOTE — PHYSICAL EXAM
[Restricted in physically strenuous activity but ambulatory and able to carry out work of a light or sedentary nature] : Status 1- Restricted in physically strenuous activity but ambulatory and able to carry out work of a light or sedentary nature, e.g., light house work, office work [Normal] : affect appropriate [de-identified] : No icterus  [de-identified] : MMM O/P Clear  [de-identified] : Supple No LAD  [de-identified] : Clear  [de-identified] : S1 S2  [de-identified] : No edema  [de-identified] : No spine/CVA tenderness [de-identified] : Mediport in place

## 2022-06-29 NOTE — RESULTS/DATA
[FreeTextEntry1] : -CT chest 3/25/19: Infiltrative left hilar mass extending to the left upper lobe and left mediastinum compatible with neoplasm with encasement of the pulmonary artery and left upper lobe bronchial structures with collapse of the left upper lobe. Lytic lesions in the upper thoracic spine. Innumerable hypodense masses throughout the liver compatible with metastases. Small left pleural effusion.\par \par -CT C/A/P 4/5/19:  Large mass in the left hilum extending into the left upper lobe consistent with bronchogenic malignancy with possible metastatic lymphadenopathy. Significant compression of the left lobe pulmonary artery with obstruction of the left upper lobe bronchus with resultant atelectasis. Innumerable lesions throughout the liver. Upper abdominal lymphadenopathy. Bony metastases of the spine, pelvis and bilateral hips.\par \par -MRI brain/T-spine in 3/28/19: Negative for metastatic disease involving the brain. Of the central canal at T9-T10 concerning for epidural metastasis.\par \par -Nuclear medicine bone scan 4/5/19: Focal uptake in one of the right lower lateral ribs, possibly the 10th rib, likely represents a small fracture. Metastatic disease is less likely. No additional areas of abnormal uptake are noted.\par \par -PET/CT 6/26/19: Markedly improved positive treatment response. The previously seen a large left-sided upper lobe lung mass with hilar extension is markedly reduced in size with mild or residual ill-defined soft tissue density at the left hilar region now demonstrating only partial invasion of the left upper lobe bronchus. No separate mediastinal lymphadenopathy is noted. Markedly improved appearance of both the numerous metastatic liver lesions and hepatomegaly. Interval resolution of previously seen upper abdominal lymphadenopathy. No residual active osseous metastatic disease.\par \par -PET/CT 10/17/19:  FDG-PET/CT scan demonstrates: \par 1. Minimally FDG-avid, small, difficult to delineate left upper lobe/perihilar soft tissue, unchanged as compared to prior study dated 6/25/2019. Small focus of residual disease is not excluded. \par 2. Non-FDG-avid low-attenuation density in pretracheal cricoid region, unchanged. \par 3. Remainder of study is unremarkable, demonstrating no evidence of FDG-avid disease. \par \par -MRI Brain 10/20/19: No evidence of metastasis\par \par -PET/CT 1/15/20:  FDG-PET/CT scan demonstrates: \par 1. New, indeterminate linear focus of mild FDG activity in left suprahilar/paramediastinal region (SUV 4.1). \par 2. Minimally FDG-avid, small, difficult to delineate left upper lobe/perihilar soft tissue, unchanged as compared to prior study dated 6/25/2019. Small focus of residual disease is not excluded. \par 3. Remainder of study is unchanged, demonstrating no evidence of FDG-avid disease. \par \par -CT Abdomen/Pelvis 2/19/20:  No evidence of focal bowel wall thickening or distention.  Right inguinal hernia \par \par -CT Chest 4/8/20: Stable disease\par \par -MRI Brain 3/5/20: New opacification involving the right mastoid and middle ear region as described above, otherwise no significant change when allowing for differences in technique. \par \par -PET/CT 6/25/20:  Compared to FDG-PET/CT scan dated 1/15/2020: \par 1. Focus of mild FDG activity in left upper lobe paramediastinal region is unchanged. This may reflect posttreatment change. \par 2. Non-FDG-avid 7 mm nodular density within posterior left upper lobe, also unchanged. \par 3. Nonspecific hypermetabolism in distal esophagus and fundus/body of stomach, mildly decreased. Correlate clinically for esophagitis/gastritis and with endoscopy, as indicated. \par 4. Remainder of study is unchanged, demonstrating no evidence of FDG-avid recurrent or metastatic disease. \par \par -PET/CT 9/1/20:  Compared to FDG-PET/CT scan dated 6/25/2020: \par 1. Resolution of focus of mild FDG activity in left upper lobe paramediastinal region. \par 2. Non-FDG-avid 7 mm nodular density within posterior left upper lobe, unchanged. \par 3. Nonspecific hypermetabolism in distal esophagus and fundus/body, unchanged. \par 4. No evidence of FDG-avid recurrent or metastatic disease. \par \par -MRI Brain 12/14/20:  No evidence acute hemorrhage, mass mass effect or abnormal enhancement seen.  Improved aeration of the right mastoid and middle ear region.\par \par -PET/CT 12/14/20:  Compared to FDG-PET/CT scan on 9/1/2020:\par 1. Nonspecific minimal increased FDG avidity in the gastric fundus/body. Please correlate clinically or with endoscopy as indicated.\par 2. Non-FDG avid 7 mm nodular density within the posterior left upper lobe, unchanged.\par 3. New focal cutaneous FDG avidity along the left lower aspect of the face, probably focal inflammation. Please correlate with physical examination.\par 4. Nonspecific new focal mild hypermetabolism in the anterior aspect of the hyoid bone just to the right of the midline without CT correlate. Unchanged non-FDG avid low-attenuation density in the precricoid region. Please correlate clinically.\par \par -PET/CT 3/5/21:  Compared to FDG-PET/CT scan dated 12/14/2020:\par 1. Few new FDG avid left axillary lymph nodes are nonspecific. Suggest correlation with ultrasound and possible percutaneous FNA biopsy as indicated.\par 2. Nonspecific FDG avidity in mid to distal esophagus, gastric fundus/body, slightly more prominent, with new FDG avidity in the pylorus. Please correlate clinically or with endoscopy as indicated.\par 3. Nonspecific non-FDG avid groundglass opacity in the anterior right upper lobe, more conspicuous.\par 4. Interval resolution of focal mild hypermetabolism in the anterior aspect of the hyoid bone and focal cutaneous FDG avidity in the left lower face. Unchanged non-FDG avid low-attenuation density in the cricoarytenoid region.\par \par -Esophagram: Small hiatal hernia without demonstrated gastroesophageal reflux. There is esophagoesophageal reflux.\par \par -Pet/CT 6/20/21:  Compared to FDG-PET/CT scan dated 3/5/2021:\par 1. Interval resolution of a few mildly FDG avid left axillary lymph nodes and small mildly FDG avid foci in bilateral hilar regions.\par 2. Nonspecific FDG avidity in mid to distal esophagus, gastric boy and pylorus, unchanged. Please correlate clinically or with endoscopy as indicated.\par 3. Unchanged nonspecific non-FDG avid groundglass opacity in the anterior right upper lobe.\par 4. Non-FDG avid low-attenuation density in the pretracheal region, decreased in size.\par \par -MRI Brain 6/21/21:  There is no intraparenchymal hematoma, mass effect or midline shift.  No evidence of intracranial metastatic disease. Slight interval increase in density within the right mastoid and middle ear regions when compared most recent prior examination.\par \par -PET/CT 9/13/21:  Compared to FDG-PET/CT scan dated 6/20/2021:\par 1. New cluster of FDG-avid nodules in left upper lobe. Primary consideration is infection/mucoid impacted distal airways. Please correlate clinically and follow-up with dedicated CT of chest in 1 month.\par 2. Nonspecific FDG avidity in mid and distal esophagus, unchanged. Resolution of gastric hypermetabolism.\par 3. Nonspecific, non-FDG avid right upper lobe groundglass opacity, unchanged.\par 4. Non-FDG avid low-attenuation density in the pretracheal region, unchanged.\par \par -MRI Brain 12/1/21:  No significant change when allowing for differences in technique.\par \par -PET/CT 12/6/21:  Compared to FDG-PET/CT scan dated 6/20/2021:\par 1. Mildly FDG-avid cluster of nodules in left upper lobe, unchanged on CT, and decreased in metabolism. An infectious/inflammatory etiology is favored. Please correlate clinically. A dedicated CT of chest may be obtained for further characterization.\par 2. Nonspecific FDG avidity in mid and distal esophagus, unchanged.\par 3. Nonspecific, non-FDG avid right upper lobe groundglass opacity, unchanged.\par 4. Non-FDG avid low-attenuation density in the pretracheal region, unchanged.\par \par -PET/CT 3/7/22:  Compared to FDG-PET/CT scan dated 12/6/2021:\par 1. FDG-avid, centrally photopenic left upper lobe lung mass and adjacent FDG-avid nodules, markedly increased in size and metabolism as compared to prior study, are compatible with recurrent disease.\par 2. Nonspecific FDG avidity in mid and distal esophagus, unchanged.\par 3. Nonspecific, non-FDG avid right upper lobe groundglass opacity, unchanged.\par 4. Non-FDG avid low-attenuation density in the pretracheal region, unchanged.\par \par -Brain MRI 3/30/22: \par 1. Age appropriate involutional changes.\par 2. No abnormal intraparenchymal or leptomeningeal enhancement. No evidence of intracranial metastases or leptomeningeal carcinomatosis.\par 3. Presence of fluid again appreciated within the right-sided mastoid air cells, slightly increased compared with prior. Correlate clinically for mastoiditis.\par \par –CT Chest 5/9/22:  In comparison with 3/7/2022, interval decrease of left upper lobe mass. No new or enlarging nodule.\par \par -CT Head 6/14/11: No evidence of acute hemorrhage, mass or mass effect.\par \par -In hospital labs 6/16: ~WBC 9.09  ~HGB 10.7  ~HCT 32.7  ~Platelets 121  ~ Sodium 140  ~Potassium 3.7  ~BUN/Cr 13/ 0.72  ~Ca 9.6  ~Mag 1.80\par \par

## 2022-06-29 NOTE — HISTORY OF PRESENT ILLNESS
[Disease: _____________________] : Disease: [unfilled] [AJCC Stage: ____] : AJCC Stage: [unfilled] [de-identified] : The patient is a former smoker with a history of CAD who developed chest pain in March 2019 and had a cardiac stent placed. When his chest pain persisted, he was sent for a CT scan of his chest in late March 2019 that revealed a left upper lobe lung mass with evidence of metastatic disease. 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 CT scan revealed evidence of extensive bony and liver metastases. MRI of his brain was negative for brain metastases however there was extensive calvarial and T-spine metastases with possible evidence of epidural involvement. He was treated in [place] by an outside medical oncologist and started on carboplatin, etoposide and atezolizumab in early April 2019. He received 4 cycles of triplet combination therapy followed by immunotherapy maintenance. A restaging PET/CT scan in June 2019 revealed a dramatic response. He moved from [place] and transferred his care to [facility].  \par Treated with immunotherapy maintenance from June 2019 through May 2021 with sustained response.  \par Developed severe dysphagia and odynophagia.  He was treated for H-pylori and symptoms continued; H Pylori stool Ag was negative.  PET/CT with findings consistent with esophagitis/gastritis.  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  Restaging PET/CT March 2022 with disease progression.  Started chemotherapy with Carboplatin/Etoposide in March 2022; Durvalumab was added with C4. Patient completed C5 with Carbo (retreat)/Etoposide/Durvalumab in early June 2022.  [de-identified] : -Lung, left hilar FNA 3/29/19: Positive for malignant cells. Poorly differentiated neuroendocrine carcinoma. IHC is positive for MCK, TTF-1 and synaptophysin while negative for desmoglein3, P40, NapsinA, CD20, PAX5, CD3.  Ki-67 is markedly high. [de-identified] : *Wife provided translation where needed\par Started re-treatment with Carboplatin/Etoposide chemotherapy in March 2022; now s/p C5 (with addition of Durvalumab C4)  6/7-6/9. \par \par Patient presented to the  ED on 6/14 with chest pain. States that he was sitting on the couch watching TV when he developed acute onset 10/10 substernal chest pain. Found to have EKG revealing ERIBERTO in II, III, AVF suggestive of MI. On admission reports chest pain 8/10, improved since coming to the ED. CT Head 6/14 with no evidence of acute hemorrhage, mass or mass effect.\par He was admitted to CCU for further management. Loaded with aspirin and brillinta and started on heparin gtt. s/p LHC with 100 % to mCircumflex SVG s/p PCI and GORGE x1 and 60-70% occlusion to OM1 with patent stent. Brillinta loaded in cath lab. Noted to have leukocytosis, pan-cultured and infectious workup negative to date. Leukocytosis thought to be in the setting of recent chemotherapy and receiving neulasta prior ot chemo on 6/7. TTE with LVEF 45%, moderate-severe MR, mild segmental LVSF, hypokinesis of inferolateral wall and basal inferior wall. Stage 2 diastolic dysfunction. RV enlargement with decreased RVSF. moderate pulmonary hypertension. Patient was started on 81ASA and brillinta 90 mg BID, atorvastatin 80 mg daily. Patient was resumed on metoprolol XL 25 mg daily. However, patient's blood pressures remained soft and so dose was not titrated up home dose of metoprolol XL 50 mg daily. Due to patient's MR and heart disease, losartan 12.5 mg daily was started for afterload reduction. He was discharged on 6/16 with recommendation to f/u with cardiology as an outpatient.\par \par In hospital labs 6/16: ~WBC 9.09  ~HGB 10.7  ~HCT 32.7  ~Platelets 121  ~ Sodium 140  ~Potassium 3.7  ~BUN/Cr 13/ 0.72  ~Ca 9.6  ~Mag 1.80\par \par Patient reports feeling well today. He restarted Omeprazole with improvement of reflux; this was previously held according to patient's wife due to constipation; he has had no difficulty with bowel movements since restarting. He has a new patient visit with Dr. Figueroa on 7/1. He is currently scheduled for C6 with Carbo/Etoposide/Durvalumab 6/28-30; discussed that at this time would recommend discontinuation of chemo and would proceed with maintenance  Durvalumab q4 weeks to begin upon clearance from cardiology to resume treatment; likely w/o 7/4 (patient's wife requests 7/8 specifically).

## 2022-06-29 NOTE — ASSESSMENT
[FreeTextEntry1] : Extensive stage small cell lung cancer diagnosed late March 2019 who was treated with chemotherapy plus immunotherapy from April-June 2019; achieved CR. Treated with immunotherapy maintenance from June 2019 through May 2021 with sustained response.  Developed immunotherapy related esophagitis; started on steroids in June 2021 with clinical response.  Restaging PET/CT March 2022 with disease progression.  Started re-treatment with Carboplatin/Etoposide chemotherapy March 2022; achieved DE. Durvalumab added with D4. \par Now s/p C5 with addition of Durvalumab on 6/7-6/9. Patient s/p hospitalization at [facility] 6/14-16 for chest pain; inferior wall MI and started on 81ASA, brillinta 90 mg BID, atorvastatin 80 mg daily. CT Head 6/14 with no evidence of acute hemorrhage, mass or mass effect.\par Recommend: \par –Discontinue Carboplatin and Etoposide chemotherapy; he completed 5 cycles as of early June 2022. \par -Can avoid labs today as we have blood work from his recent hospitalization. \par -Plan to continue with maintenance  Durvalumab 1500 mg IV q4 weeks UPON CARDIOLOGY CLEARANCE. Plan to restart week of 7/4. \par -Appointment with Dr. Figueroa scheduled for 7/1\par -They understand the risk of autoimmune side effects such as the esophagitis he experienced with the other immunotherapy drug atezolizumab, but with the overall plan to ultimately maintain him on immunotherapy as a maintenance strategy following the completion of C5. We will monitor for autoimmune side effects such as return of esophagitis.\par –Esophagitis resolved: stopped taking Prednisone 5mg daily on 4/15. He has restarted Omeprazole without recurrence of constipation (90 day refill sent to mail order pharmacy as requested). . \par -Constipation: recommend ongoing bowel regimen with Colace/Senna daily with addition of dulcolax prn. \par -Continue symptom management under care of Palliative Care Dr. Moreno\par -Continue levothyroxine; monitor periodic TFT's \par -Avoided addition of bone modifying agent given the dramatic response in the bones\par -Mediport in place\par -Follow up prior to treatment going forward or sooner if needed\par -Schedule to be emailed to patient once completed.

## 2022-06-30 ENCOUNTER — APPOINTMENT (OUTPATIENT)
Dept: INFUSION THERAPY | Facility: HOSPITAL | Age: 67
End: 2022-06-30

## 2022-07-01 ENCOUNTER — APPOINTMENT (OUTPATIENT)
Dept: CARDIOLOGY | Facility: CLINIC | Age: 67
End: 2022-07-01

## 2022-07-01 ENCOUNTER — RESULT REVIEW (OUTPATIENT)
Age: 67
End: 2022-07-01

## 2022-07-01 VITALS
HEART RATE: 73 BPM | WEIGHT: 218.68 LBS | RESPIRATION RATE: 18 BRPM | SYSTOLIC BLOOD PRESSURE: 135 MMHG | BODY MASS INDEX: 31.38 KG/M2 | DIASTOLIC BLOOD PRESSURE: 71 MMHG | OXYGEN SATURATION: 97 % | TEMPERATURE: 97.3 F

## 2022-07-01 LAB
BASOPHILS # BLD AUTO: 0.01 K/UL — SIGNIFICANT CHANGE UP (ref 0–0.2)
BASOPHILS NFR BLD AUTO: 0.2 % — SIGNIFICANT CHANGE UP (ref 0–2)
EOSINOPHIL # BLD AUTO: 0.07 K/UL — SIGNIFICANT CHANGE UP (ref 0–0.5)
EOSINOPHIL NFR BLD AUTO: 1.2 % — SIGNIFICANT CHANGE UP (ref 0–6)
HCT VFR BLD CALC: 34.7 % — LOW (ref 39–50)
HGB BLD-MCNC: 11.6 G/DL — LOW (ref 13–17)
IMM GRANULOCYTES NFR BLD AUTO: 0.5 % — SIGNIFICANT CHANGE UP (ref 0–1.5)
LYMPHOCYTES # BLD AUTO: 1.13 K/UL — SIGNIFICANT CHANGE UP (ref 1–3.3)
LYMPHOCYTES # BLD AUTO: 19.4 % — SIGNIFICANT CHANGE UP (ref 13–44)
MCHC RBC-ENTMCNC: 31.8 PG — SIGNIFICANT CHANGE UP (ref 27–34)
MCHC RBC-ENTMCNC: 33.4 G/DL — SIGNIFICANT CHANGE UP (ref 32–36)
MCV RBC AUTO: 95.1 FL — SIGNIFICANT CHANGE UP (ref 80–100)
MONOCYTES # BLD AUTO: 0.96 K/UL — HIGH (ref 0–0.9)
MONOCYTES NFR BLD AUTO: 16.5 % — HIGH (ref 2–14)
NEUTROPHILS # BLD AUTO: 3.63 K/UL — SIGNIFICANT CHANGE UP (ref 1.8–7.4)
NEUTROPHILS NFR BLD AUTO: 62.2 % — SIGNIFICANT CHANGE UP (ref 43–77)
NRBC # BLD: 0 /100 WBCS — SIGNIFICANT CHANGE UP (ref 0–0)
PLATELET # BLD AUTO: 163 K/UL — SIGNIFICANT CHANGE UP (ref 150–400)
RBC # BLD: 3.65 M/UL — LOW (ref 4.2–5.8)
RBC # FLD: 14.5 % — SIGNIFICANT CHANGE UP (ref 10.3–14.5)
WBC # BLD: 5.83 K/UL — SIGNIFICANT CHANGE UP (ref 3.8–10.5)
WBC # FLD AUTO: 5.83 K/UL — SIGNIFICANT CHANGE UP (ref 3.8–10.5)

## 2022-07-01 PROCEDURE — 93000 ELECTROCARDIOGRAM COMPLETE: CPT

## 2022-07-01 PROCEDURE — 99215 OFFICE O/P EST HI 40 MIN: CPT

## 2022-07-01 PROCEDURE — 93010 ELECTROCARDIOGRAM REPORT: CPT

## 2022-07-01 RX ORDER — ROSUVASTATIN CALCIUM 40 MG/1
40 TABLET, FILM COATED ORAL
Qty: 90 | Refills: 1 | Status: DISCONTINUED | COMMUNITY
Start: 2021-05-14 | End: 2022-07-01

## 2022-07-02 LAB
ALBUMIN SERPL ELPH-MCNC: 4.5 G/DL
ALP BLD-CCNC: 66 U/L
ALT SERPL-CCNC: 17 U/L
ANION GAP SERPL CALC-SCNC: 11 MMOL/L
AST SERPL-CCNC: 19 U/L
BILIRUB SERPL-MCNC: 0.3 MG/DL
BUN SERPL-MCNC: 10 MG/DL
CALCIUM SERPL-MCNC: 9.8 MG/DL
CHLORIDE SERPL-SCNC: 107 MMOL/L
CHOLEST SERPL-MCNC: 114 MG/DL
CO2 SERPL-SCNC: 26 MMOL/L
CREAT SERPL-MCNC: 1.1 MG/DL
EGFR: 73 ML/MIN/1.73M2
GLUCOSE SERPL-MCNC: 97 MG/DL
HDLC SERPL-MCNC: 50 MG/DL
LDLC SERPL CALC-MCNC: 40 MG/DL
NONHDLC SERPL-MCNC: 64 MG/DL
NT-PROBNP SERPL-MCNC: 640 PG/ML
POTASSIUM SERPL-SCNC: 4.7 MMOL/L
PROT SERPL-MCNC: 6.5 G/DL
SODIUM SERPL-SCNC: 144 MMOL/L
TRIGL SERPL-MCNC: 117 MG/DL
TROPONIN I SERPL-MCNC: 0.02 NG/ML
TROPONIN-T, HIGH SENSITIVITY: 16 NG/L

## 2022-07-05 RX ORDER — LOSARTAN POTASSIUM 25 MG/1
25 TABLET, FILM COATED ORAL DAILY
Refills: 0 | Status: COMPLETED | COMMUNITY
End: 2022-07-05

## 2022-07-05 NOTE — PHYSICAL EXAM
[Well Developed] : well developed [Well Nourished] : well nourished [No Acute Distress] : no acute distress [Normal Conjunctiva] : normal conjunctiva [No Xanthelasma] : no xanthelasma [No Carotid Bruit] : no carotid bruit [Normal S1, S2] : normal S1, S2 [No Murmur] : no murmur [No Rub] : no rub [No Gallop] : no gallop [Good Air Entry] : good air entry [No Respiratory Distress] : no respiratory distress  [Soft] : abdomen soft [Normal Gait] : normal gait [Gait - Sufficient for Exercise Testing] : gait - sufficient for exercise testing [No Cyanosis] : no cyanosis [No Clubbing] : no clubbing [Edema ___] : edema [unfilled] [No Rash] : no rash [Moves all extremities] : moves all extremities [No Focal Deficits] : no focal deficits [Normal Speech] : normal speech [Alert and Oriented] : alert and oriented [Normal memory] : normal memory

## 2022-07-05 NOTE — REASON FOR VISIT
[Coronary Artery Disease] : coronary artery disease [Spouse] : spouse [FreeTextEntry3] : Dr. Rafy Carranza

## 2022-07-05 NOTE — ASSESSMENT
[FreeTextEntry1] : 67 year old man with multivessel coronary disease with occluded bypass grafts, seen after inferior STEMI while on PD1- inhibitor immunotherapy. While differential includes immune-related adverse event, this is less likely in setting of patient with prior tolerance to immunotherapy for over a year. Increased atherosclerotic plaque progression is noted in the setting of immunotherapy, and in the setting of underlying CAD this may contribute, but should not be a contraindication to therapy if his disease is responding well in view of metastatic SCC.\par \par His ECG today shows PVCs, which correspond to the irregular pulse/heart rate noted at home. This may be seen either with immunotherapy myocardial inflammation or with resolving ischemic injury. We checked follow up troponin today (both Troponin I and troponin T ) -- which were normal. This is consistent with infarct and makes direct myocardial injury from immunotherapy much less likely.\par \par Dyspnea on exertion may be explained by congestive heart failure in setting of moderate to severe MR on the recent echo cardiogram. While orthostatic hypotension was a problem in the past, in the setting of HF with reduced EF (ARB/ACI/ANRI) therapy is important. Discussed this with the patient and his wife (a non-practicing internist), and will aim to optimize GDMT with monitoring of his BP. If he develops lifestyle limiting/symptomatic orthostatic hypotension, we will adjust management accordingly.\par \par They are concerned about his carotid artery atherosclerosis. If view of orthostatic symptoms above and ICI related plaque progression, will repeat carotid duplex ultrasound at this time and continue maximal medical therapy.\par \par Cardiac MRI will be requested for further evaluation of the myocardial substrate.\par \par # 3 Vessel CAD:\par - continue rosuvastatin 40\par - continue exetimibe 10\par - Start Entresto 24/26 BID\par - aspirin and ticagrelor given recent STEMI and GORGE, hold ticagrelor if platelet < 75 or bleeding. Otherwise maintain DAPT for at least 3 months without interruption. Ideally would benefit from prolonged DAPT given extent of CAD and multiple prior interventions.\par - Toprol 25 mg daily\par - LDL at goal\par \par # Dyspnea on exertion: most likely explained by CHF and moderate MR, but side effect of ticagrelor is not excluded. Discussed today that we will maintain current therapy for now and optimize treatment for CHF, HTN. If dyspnea persists, may consider ticagrelor as cause.\par - pro-BNP today mildly elevated\par - repeat echo\par \par # HF with reduced EF\par - medical therapy as above\par - would add empaglifozin as well\par \par # SCC lung cancer on immunotherapy\par - cardiac MRI\par \par # Carotid artery atherosclerosis\par - carotid duplex ultrasound\par - antiplatelet and statin\par \par # PVCs:\par - continue Toprol\par - cMRI\par - repeat echo\par \par Above recommendations discussed with the patient and his wife and all questions were answered to the best of my ability and to their apparent satisfaction. Advised to call me with any question or concern.\par \par Follow up in one month with me.\par \par Total time spent on today's encounter was 80 minutes. >50% time spent in counseling and coordination of care and on addressing above medical conditions in assessment.\par All labs, imaging, consulting reports, and any relevant outside records including laboratory work personally reviewed in order to evaluate, manage, and coordinate care amongst providers. Patient-Risk (High).\par \par

## 2022-07-05 NOTE — HISTORY OF PRESENT ILLNESS
[FreeTextEntry1] : EDUIN SANCHEZ is a 67 year man with a history of cigarette smoking, coronary artery disease s/p CABG in 2008 (LIMA to LAD, DONTRELL to Ramus, SVG to posterolateral branch of RCA) and PCI to pLAD in 2019, and metastatic small cell lung cancer referred for cardio-oncology evaluation and clearance to resume immunotherapy.\par \par Prior Cancer Treatments:\par ------------------------------------------------------------------------\par Chemo/targeted therapy:\par 4/2019: carboplatin, etoposide and atezolizumab x 4 followed by immunotherapy maintenance\par 3/2022: carboplatin/etoposide, durvalumab added May 2022\par \par \par After second cycle of durvalumab (6/9), developed inferior STEMI. \par \par He presented to the ED on 6/14/2022 with acute retrosternal chest pain which began while watching TV at home.  EKG showed inferior STEMI. LHC with 100 % mCx at prior intervention site, treated with GORGE. About 2 weeks prior had similar episode of chest pain, which resolved with ibuprofen, but this time the pain persisted.\par \par Echo showed LVEF 45%, moderate-severe MR, hypokinesis of inferolateral wall and basal inferior wall, RV enlargement with decreased RVSF, and moderate pulmonary hypertension. Started on medical therapy and dismissed 6/16/2022. Troponin T peaked at 3210 ng/L and was 3123 ng/L on 6/16.\par \par Had issues with postural dizziness in the past.\par \par Since return home, he wife (who is a physician) notes pulse rate is at time irregular. The patient endorses dyspnea after a few steps, such as when using the bathroom at home. He denies any chest pain.\par \par Previously followed for many years by Dr. Misha Casas. \par \par Cardiovascular Testing:\par ---------------------------------------\par ECG:\par 7/1/2022: sinus 75 bpm with PVCs, inferior/posterior infarct\par 9/20/2021: sinus, minimal inferior Q wave, anterior TWI\par ---------------------------------------\par Echo:\par 6/14/2022: EF 45 %, Moderate-severe MR, mild AI, mild segmental LV systolic dysfunction (inferolateral and basal inferior), moderate pulmonary HTN\par 5/14/2020: LVEF 65%\par ---------------------------------------\par Stress:\par 10/7/2021: MPI no ischemia\par ---------------------------------------\par Cath/PCI\par 6/14/2022: PCI to mLCX ISR with GORGE\par 3/18/2019: GORGE x2 to LCx, GORGE to LAD, GORGE to Ramus (staged PCI)\par [facility] LIMA-LAD, DONTRELL-Ramus, SVG-RCA)\par 2007: BMS for chest pain with ISR\par ---------------------------------------\par Vascular:\par 5/4/2020: moderate heterogeneous plaque left carotid\par

## 2022-07-05 NOTE — REVIEW OF SYSTEMS
[Feeling Fatigued] : feeling fatigued [Dyspnea on exertion] : dyspnea during exertion [Lower Ext Edema] : lower extremity edema [Negative] : Heme/Lymph

## 2022-07-08 ENCOUNTER — NON-APPOINTMENT (OUTPATIENT)
Age: 67
End: 2022-07-08

## 2022-07-08 ENCOUNTER — APPOINTMENT (OUTPATIENT)
Dept: HEMATOLOGY ONCOLOGY | Facility: CLINIC | Age: 67
End: 2022-07-08

## 2022-07-08 ENCOUNTER — APPOINTMENT (OUTPATIENT)
Dept: INFUSION THERAPY | Facility: HOSPITAL | Age: 67
End: 2022-07-08

## 2022-07-08 VITALS
RESPIRATION RATE: 18 BRPM | HEART RATE: 87 BPM | OXYGEN SATURATION: 94 % | TEMPERATURE: 97.2 F | BODY MASS INDEX: 30.61 KG/M2 | HEIGHT: 70 IN | DIASTOLIC BLOOD PRESSURE: 71 MMHG | WEIGHT: 213.85 LBS | SYSTOLIC BLOOD PRESSURE: 118 MMHG

## 2022-07-08 PROCEDURE — 99215 OFFICE O/P EST HI 40 MIN: CPT

## 2022-07-08 NOTE — HISTORY OF PRESENT ILLNESS
[Disease: _____________________] : Disease: [unfilled] [AJCC Stage: ____] : AJCC Stage: [unfilled] [de-identified] : The patient is a former smoker with a history of CAD who developed chest pain in March 2019 and had a cardiac stent placed. When his chest pain persisted, he was sent for a CT scan of his chest in late March 2019 that revealed a left upper lobe lung mass with evidence of metastatic disease. 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 CT scan revealed evidence of extensive bony and liver metastases. MRI of his brain was negative for brain metastases however there was extensive calvarial and T-spine metastases with possible evidence of epidural involvement. He was treated in [place] by an outside medical oncologist and started on carboplatin, etoposide and atezolizumab in early April 2019. He received 4 cycles of triplet combination therapy followed by immunotherapy maintenance. A restaging PET/CT scan in June 2019 revealed a dramatic response. He moved from [place] and transferred his care to [facility].  \par Treated with immunotherapy maintenance from June 2019 through May 2021 with sustained response.  \par Developed severe dysphagia and odynophagia.  He was treated for H-pylori and symptoms continued; H Pylori stool Ag was negative.  PET/CT with findings consistent with esophagitis/gastritis.  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  Restaging PET/CT March 2022 with disease progression.  Started chemotherapy with Carboplatin/Etoposide in March 2022; Durvalumab was added with C4. Patient completed C5 with Carbo (retreat)/Etoposide/Durvalumab in early June 2022.  Hospitalized with STEMI in June 2022.   [de-identified] : -Lung, left hilar FNA 3/29/19: Positive for malignant cells. Poorly differentiated neuroendocrine carcinoma. IHC is positive for MCK, TTF-1 and synaptophysin while negative for desmoglein3, P40, NapsinA, CD20, PAX5, CD3.  Ki-67 is markedly high. [de-identified] : *Wife provided translation where needed\par Started re-treatment with Carboplatin/Etoposide chemotherapy in March 2022; Durvalumab was added with C4.  Completed 5 cycles through early June 2022.  \par Hospitalized with STEMI in June 2022.  \par Patient establish care with cardiologist Dr. Figueroa and will continue medical management.\par He presents today prior to planned treatment with single agent durvalumab.\par He has experienced a return of his esophagitis symptoms.  He reports an inability to eat without dysphagia for the past 10 days.  He has been eating in order to maintain his nutritional status but with significant discomfort.  He had been off of steroids however several days ago he restarted prednisone 10 mg but with an inadequate effect on symptoms, he began taking 40 mg over the past 3 days.  He took 80 mg of prednisone today.  He was on omeprazole previously but has now been taking this sporadically.  He lost a few pounds.

## 2022-07-08 NOTE — ASSESSMENT
[FreeTextEntry1] : Extensive stage small cell lung cancer diagnosed late March 2019 who was treated with chemotherapy plus immunotherapy from April-June 2019; achieved CR. Treated with immunotherapy maintenance from June 2019 through May 2021 with sustained response.  Developed immunotherapy related esophagitis; started on steroids in June 2021 with clinical response.  Restaging PET/CT March 2022 with disease progression.  Started re-treatment with Carboplatin/Etoposide chemotherapy March 2022; achieved IA. Durvalumab added with D4. Completed 5 cycles through early June 2022.  Hospitalized in June for STEMI.  Now with recurrence of esophagitis symptoms.  \par Recommend: \par –Hold Durvalumab treatment today.  Offered to administer IVF hydration today but they declined.  \par -Esophagitis:  continue Prednisone which he has responded to in the past, however will need sustained increased dosing for the short term.  Advised taking Prednisone 80mg (1mg/kg/day) daily.  New Rx for 10mg pills sent to pharmacy that can be tapered.  Would take this dosing for several days and then begin slow taper once Sx start to improve.  When taper begins, suggested decreasing dose by 10mg q3 days.  \par -Discussed that we could not continue to administer the ICI therapy in setting of acute serious toxicity.  Can consider resumption of Durvalumab in conjunction with steroids if the autoimmune toxicity responds/improves.  Will have him re-scheduled for potential treatment in 2 weeks with f/u visit for evaluation beforehand.  \par -Continue Cardiology management with Dr. Figueroa \par –Encouraged continuation and compliance with Omeprazole for GI prophylaxis while on steroids\par -Constipation: bowel regimen with Colace/Senna daily with addition of dulcolax prn. \par -Continue symptom management under care of Palliative Care Dr. Moreno\par -Continue levothyroxine; monitor periodic TFT's \par -Avoided addition of bone modifying agent given the dramatic response in the bones\par -Mediport in place\par -Restaging scan ordered for next month.  Discussed that if we were unable to continue ICI-therapy, there were other chemotherapy options.  \par -Will adjust schedule going forward.  They plan to travel to [place] in September which will obviously depend on his clinical status.

## 2022-07-08 NOTE — PHYSICAL EXAM
[Restricted in physically strenuous activity but ambulatory and able to carry out work of a light or sedentary nature] : Status 1- Restricted in physically strenuous activity but ambulatory and able to carry out work of a light or sedentary nature, e.g., light house work, office work [Normal] : affect appropriate [de-identified] : No icterus  [de-identified] : MMM O/P Clear, No visible mucositis or erythema [de-identified] : Supple No LAD  [de-identified] : Clear  [de-identified] : S1 S2  [de-identified] : No edema  [de-identified] : No spine/CVA tenderness [de-identified] : Mediport in place

## 2022-07-08 NOTE — RESULTS/DATA
[FreeTextEntry1] : -CT chest 3/25/19: Infiltrative left hilar mass extending to the left upper lobe and left mediastinum compatible with neoplasm with encasement of the pulmonary artery and left upper lobe bronchial structures with collapse of the left upper lobe. Lytic lesions in the upper thoracic spine. Innumerable hypodense masses throughout the liver compatible with metastases. Small left pleural effusion.\par \par -CT C/A/P 4/5/19:  Large mass in the left hilum extending into the left upper lobe consistent with bronchogenic malignancy with possible metastatic lymphadenopathy. Significant compression of the left lobe pulmonary artery with obstruction of the left upper lobe bronchus with resultant atelectasis. Innumerable lesions throughout the liver. Upper abdominal lymphadenopathy. Bony metastases of the spine, pelvis and bilateral hips.\par \par -MRI brain/T-spine in 3/28/19: Negative for metastatic disease involving the brain. Of the central canal at T9-T10 concerning for epidural metastasis.\par \par -Nuclear medicine bone scan 4/5/19: Focal uptake in one of the right lower lateral ribs, possibly the 10th rib, likely represents a small fracture. Metastatic disease is less likely. No additional areas of abnormal uptake are noted.\par \par -PET/CT 6/26/19: Markedly improved positive treatment response. The previously seen a large left-sided upper lobe lung mass with hilar extension is markedly reduced in size with mild or residual ill-defined soft tissue density at the left hilar region now demonstrating only partial invasion of the left upper lobe bronchus. No separate mediastinal lymphadenopathy is noted. Markedly improved appearance of both the numerous metastatic liver lesions and hepatomegaly. Interval resolution of previously seen upper abdominal lymphadenopathy. No residual active osseous metastatic disease.\par \par -PET/CT 10/17/19:  FDG-PET/CT scan demonstrates: \par 1. Minimally FDG-avid, small, difficult to delineate left upper lobe/perihilar soft tissue, unchanged as compared to prior study dated 6/25/2019. Small focus of residual disease is not excluded. \par 2. Non-FDG-avid low-attenuation density in pretracheal cricoid region, unchanged. \par 3. Remainder of study is unremarkable, demonstrating no evidence of FDG-avid disease. \par \par -MRI Brain 10/20/19: No evidence of metastasis\par \par -PET/CT 1/15/20:  FDG-PET/CT scan demonstrates: \par 1. New, indeterminate linear focus of mild FDG activity in left suprahilar/paramediastinal region (SUV 4.1). \par 2. Minimally FDG-avid, small, difficult to delineate left upper lobe/perihilar soft tissue, unchanged as compared to prior study dated 6/25/2019. Small focus of residual disease is not excluded. \par 3. Remainder of study is unchanged, demonstrating no evidence of FDG-avid disease. \par \par -CT Abdomen/Pelvis 2/19/20:  No evidence of focal bowel wall thickening or distention.  Right inguinal hernia \par \par -CT Chest 4/8/20: Stable disease\par \par -MRI Brain 3/5/20: New opacification involving the right mastoid and middle ear region as described above, otherwise no significant change when allowing for differences in technique. \par \par -PET/CT 6/25/20:  Compared to FDG-PET/CT scan dated 1/15/2020: \par 1. Focus of mild FDG activity in left upper lobe paramediastinal region is unchanged. This may reflect posttreatment change. \par 2. Non-FDG-avid 7 mm nodular density within posterior left upper lobe, also unchanged. \par 3. Nonspecific hypermetabolism in distal esophagus and fundus/body of stomach, mildly decreased. Correlate clinically for esophagitis/gastritis and with endoscopy, as indicated. \par 4. Remainder of study is unchanged, demonstrating no evidence of FDG-avid recurrent or metastatic disease. \par \par -PET/CT 9/1/20:  Compared to FDG-PET/CT scan dated 6/25/2020: \par 1. Resolution of focus of mild FDG activity in left upper lobe paramediastinal region. \par 2. Non-FDG-avid 7 mm nodular density within posterior left upper lobe, unchanged. \par 3. Nonspecific hypermetabolism in distal esophagus and fundus/body, unchanged. \par 4. No evidence of FDG-avid recurrent or metastatic disease. \par \par -MRI Brain 12/14/20:  No evidence acute hemorrhage, mass mass effect or abnormal enhancement seen.  Improved aeration of the right mastoid and middle ear region.\par \par -PET/CT 12/14/20:  Compared to FDG-PET/CT scan on 9/1/2020:\par 1. Nonspecific minimal increased FDG avidity in the gastric fundus/body. Please correlate clinically or with endoscopy as indicated.\par 2. Non-FDG avid 7 mm nodular density within the posterior left upper lobe, unchanged.\par 3. New focal cutaneous FDG avidity along the left lower aspect of the face, probably focal inflammation. Please correlate with physical examination.\par 4. Nonspecific new focal mild hypermetabolism in the anterior aspect of the hyoid bone just to the right of the midline without CT correlate. Unchanged non-FDG avid low-attenuation density in the precricoid region. Please correlate clinically.\par \par -PET/CT 3/5/21:  Compared to FDG-PET/CT scan dated 12/14/2020:\par 1. Few new FDG avid left axillary lymph nodes are nonspecific. Suggest correlation with ultrasound and possible percutaneous FNA biopsy as indicated.\par 2. Nonspecific FDG avidity in mid to distal esophagus, gastric fundus/body, slightly more prominent, with new FDG avidity in the pylorus. Please correlate clinically or with endoscopy as indicated.\par 3. Nonspecific non-FDG avid groundglass opacity in the anterior right upper lobe, more conspicuous.\par 4. Interval resolution of focal mild hypermetabolism in the anterior aspect of the hyoid bone and focal cutaneous FDG avidity in the left lower face. Unchanged non-FDG avid low-attenuation density in the cricoarytenoid region.\par \par -Esophagram: Small hiatal hernia without demonstrated gastroesophageal reflux. There is esophagoesophageal reflux.\par \par -Pet/CT 6/20/21:  Compared to FDG-PET/CT scan dated 3/5/2021:\par 1. Interval resolution of a few mildly FDG avid left axillary lymph nodes and small mildly FDG avid foci in bilateral hilar regions.\par 2. Nonspecific FDG avidity in mid to distal esophagus, gastric boy and pylorus, unchanged. Please correlate clinically or with endoscopy as indicated.\par 3. Unchanged nonspecific non-FDG avid groundglass opacity in the anterior right upper lobe.\par 4. Non-FDG avid low-attenuation density in the pretracheal region, decreased in size.\par \par -MRI Brain 6/21/21:  There is no intraparenchymal hematoma, mass effect or midline shift.  No evidence of intracranial metastatic disease. Slight interval increase in density within the right mastoid and middle ear regions when compared most recent prior examination.\par \par -PET/CT 9/13/21:  Compared to FDG-PET/CT scan dated 6/20/2021:\par 1. New cluster of FDG-avid nodules in left upper lobe. Primary consideration is infection/mucoid impacted distal airways. Please correlate clinically and follow-up with dedicated CT of chest in 1 month.\par 2. Nonspecific FDG avidity in mid and distal esophagus, unchanged. Resolution of gastric hypermetabolism.\par 3. Nonspecific, non-FDG avid right upper lobe groundglass opacity, unchanged.\par 4. Non-FDG avid low-attenuation density in the pretracheal region, unchanged.\par \par -MRI Brain 12/1/21:  No significant change when allowing for differences in technique.\par \par -PET/CT 12/6/21:  Compared to FDG-PET/CT scan dated 6/20/2021:\par 1. Mildly FDG-avid cluster of nodules in left upper lobe, unchanged on CT, and decreased in metabolism. An infectious/inflammatory etiology is favored. Please correlate clinically. A dedicated CT of chest may be obtained for further characterization.\par 2. Nonspecific FDG avidity in mid and distal esophagus, unchanged.\par 3. Nonspecific, non-FDG avid right upper lobe groundglass opacity, unchanged.\par 4. Non-FDG avid low-attenuation density in the pretracheal region, unchanged.\par \par -PET/CT 3/7/22:  Compared to FDG-PET/CT scan dated 12/6/2021:\par 1. FDG-avid, centrally photopenic left upper lobe lung mass and adjacent FDG-avid nodules, markedly increased in size and metabolism as compared to prior study, are compatible with recurrent disease.\par 2. Nonspecific FDG avidity in mid and distal esophagus, unchanged.\par 3. Nonspecific, non-FDG avid right upper lobe groundglass opacity, unchanged.\par 4. Non-FDG avid low-attenuation density in the pretracheal region, unchanged.\par \par -Brain MRI 3/30/22: \par 1. Age appropriate involutional changes.\par 2. No abnormal intraparenchymal or leptomeningeal enhancement. No evidence of intracranial metastases or leptomeningeal carcinomatosis.\par 3. Presence of fluid again appreciated within the right-sided mastoid air cells, slightly increased compared with prior. Correlate clinically for mastoiditis.\par \par –CT Chest 5/9/22:  In comparison with 3/7/2022, interval decrease of left upper lobe mass. No new or enlarging nodule.\par \par -CT Head 6/14/22: No evidence of acute hemorrhage, mass or mass effect.\par \par

## 2022-07-11 ENCOUNTER — APPOINTMENT (OUTPATIENT)
Dept: CV DIAGNOSITCS | Facility: HOSPITAL | Age: 67
End: 2022-07-11

## 2022-07-11 ENCOUNTER — OUTPATIENT (OUTPATIENT)
Dept: OUTPATIENT SERVICES | Facility: HOSPITAL | Age: 67
LOS: 1 days | End: 2022-07-11

## 2022-07-11 DIAGNOSIS — R07.9 CHEST PAIN, UNSPECIFIED: ICD-10-CM

## 2022-07-11 PROCEDURE — 93880 EXTRACRANIAL BILAT STUDY: CPT | Mod: 26

## 2022-07-11 PROCEDURE — 93306 TTE W/DOPPLER COMPLETE: CPT | Mod: 26

## 2022-07-12 ENCOUNTER — APPOINTMENT (OUTPATIENT)
Dept: INFUSION THERAPY | Facility: HOSPITAL | Age: 67
End: 2022-07-12

## 2022-07-12 ENCOUNTER — APPOINTMENT (OUTPATIENT)
Dept: HEMATOLOGY ONCOLOGY | Facility: CLINIC | Age: 67
End: 2022-07-12

## 2022-07-22 ENCOUNTER — RESULT REVIEW (OUTPATIENT)
Age: 67
End: 2022-07-22

## 2022-07-22 ENCOUNTER — APPOINTMENT (OUTPATIENT)
Dept: INFUSION THERAPY | Facility: HOSPITAL | Age: 67
End: 2022-07-22

## 2022-07-22 ENCOUNTER — APPOINTMENT (OUTPATIENT)
Dept: HEMATOLOGY ONCOLOGY | Facility: CLINIC | Age: 67
End: 2022-07-22

## 2022-07-22 VITALS
RESPIRATION RATE: 16 BRPM | TEMPERATURE: 97.7 F | HEART RATE: 73 BPM | WEIGHT: 209.44 LBS | BODY MASS INDEX: 30.05 KG/M2 | SYSTOLIC BLOOD PRESSURE: 118 MMHG | OXYGEN SATURATION: 97 % | DIASTOLIC BLOOD PRESSURE: 74 MMHG

## 2022-07-22 LAB
24R-OH-CALCIDIOL SERPL-MCNC: 33.3 NG/ML — SIGNIFICANT CHANGE UP (ref 30–80)
ALBUMIN SERPL ELPH-MCNC: 4.2 G/DL — SIGNIFICANT CHANGE UP (ref 3.3–5)
ALP SERPL-CCNC: 64 U/L — SIGNIFICANT CHANGE UP (ref 40–120)
ALT FLD-CCNC: 18 U/L — SIGNIFICANT CHANGE UP (ref 10–45)
ANION GAP SERPL CALC-SCNC: 11 MMOL/L — SIGNIFICANT CHANGE UP (ref 5–17)
AST SERPL-CCNC: 16 U/L — SIGNIFICANT CHANGE UP (ref 10–40)
BASOPHILS # BLD AUTO: 0 K/UL — SIGNIFICANT CHANGE UP (ref 0–0.2)
BASOPHILS NFR BLD AUTO: 0 % — SIGNIFICANT CHANGE UP (ref 0–2)
BILIRUB SERPL-MCNC: 0.3 MG/DL — SIGNIFICANT CHANGE UP (ref 0.2–1.2)
BUN SERPL-MCNC: 21 MG/DL — SIGNIFICANT CHANGE UP (ref 7–23)
CALCIUM SERPL-MCNC: 9.6 MG/DL — SIGNIFICANT CHANGE UP (ref 8.4–10.5)
CHLORIDE SERPL-SCNC: 104 MMOL/L — SIGNIFICANT CHANGE UP (ref 96–108)
CO2 SERPL-SCNC: 22 MMOL/L — SIGNIFICANT CHANGE UP (ref 22–31)
CREAT SERPL-MCNC: 1.02 MG/DL — SIGNIFICANT CHANGE UP (ref 0.5–1.3)
EGFR: 80 ML/MIN/1.73M2 — SIGNIFICANT CHANGE UP
EOSINOPHIL # BLD AUTO: 0 K/UL — SIGNIFICANT CHANGE UP (ref 0–0.5)
EOSINOPHIL NFR BLD AUTO: 0 % — SIGNIFICANT CHANGE UP (ref 0–6)
GLUCOSE SERPL-MCNC: 150 MG/DL — HIGH (ref 70–99)
HCT VFR BLD CALC: 39.8 % — SIGNIFICANT CHANGE UP (ref 39–50)
HGB BLD-MCNC: 13.4 G/DL — SIGNIFICANT CHANGE UP (ref 13–17)
IMM GRANULOCYTES NFR BLD AUTO: 0.7 % — SIGNIFICANT CHANGE UP (ref 0–1.5)
LYMPHOCYTES # BLD AUTO: 0.36 K/UL — LOW (ref 1–3.3)
LYMPHOCYTES # BLD AUTO: 4.3 % — LOW (ref 13–44)
MCHC RBC-ENTMCNC: 31.3 PG — SIGNIFICANT CHANGE UP (ref 27–34)
MCHC RBC-ENTMCNC: 33.7 G/DL — SIGNIFICANT CHANGE UP (ref 32–36)
MCV RBC AUTO: 93 FL — SIGNIFICANT CHANGE UP (ref 80–100)
MONOCYTES # BLD AUTO: 0.26 K/UL — SIGNIFICANT CHANGE UP (ref 0–0.9)
MONOCYTES NFR BLD AUTO: 3.1 % — SIGNIFICANT CHANGE UP (ref 2–14)
NEUTROPHILS # BLD AUTO: 7.61 K/UL — HIGH (ref 1.8–7.4)
NEUTROPHILS NFR BLD AUTO: 91.9 % — HIGH (ref 43–77)
NRBC # BLD: 0 /100 WBCS — SIGNIFICANT CHANGE UP (ref 0–0)
PLATELET # BLD AUTO: 142 K/UL — LOW (ref 150–400)
POTASSIUM SERPL-MCNC: 4.8 MMOL/L — SIGNIFICANT CHANGE UP (ref 3.5–5.3)
POTASSIUM SERPL-SCNC: 4.8 MMOL/L — SIGNIFICANT CHANGE UP (ref 3.5–5.3)
PROT SERPL-MCNC: 6.5 G/DL — SIGNIFICANT CHANGE UP (ref 6–8.3)
RBC # BLD: 4.28 M/UL — SIGNIFICANT CHANGE UP (ref 4.2–5.8)
RBC # FLD: 13.2 % — SIGNIFICANT CHANGE UP (ref 10.3–14.5)
SODIUM SERPL-SCNC: 137 MMOL/L — SIGNIFICANT CHANGE UP (ref 135–145)
T4 FREE SERPL-MCNC: 1.1 NG/DL — SIGNIFICANT CHANGE UP (ref 0.9–1.8)
TSH SERPL-MCNC: 0.42 UIU/ML — SIGNIFICANT CHANGE UP (ref 0.27–4.2)
VIT D25+D1,25 OH+D1,25 PNL SERPL-MCNC: 71.1 PG/ML — SIGNIFICANT CHANGE UP (ref 19.9–79.3)
WBC # BLD: 8.29 K/UL — SIGNIFICANT CHANGE UP (ref 3.8–10.5)
WBC # FLD AUTO: 8.29 K/UL — SIGNIFICANT CHANGE UP (ref 3.8–10.5)

## 2022-07-22 PROCEDURE — 99214 OFFICE O/P EST MOD 30 MIN: CPT

## 2022-07-22 RX ORDER — LIDOCAINE AND PRILOCAINE 25; 25 MG/G; MG/G
2.5-2.5 CREAM TOPICAL
Qty: 1 | Refills: 3 | Status: ACTIVE | COMMUNITY
Start: 2022-07-22 | End: 1900-01-01

## 2022-07-22 RX ORDER — ZOLPIDEM TARTRATE 5 MG/1
5 TABLET ORAL
Qty: 30 | Refills: 5 | Status: ACTIVE | COMMUNITY
Start: 2022-07-22 | End: 1900-01-01

## 2022-07-25 DIAGNOSIS — R11.2 NAUSEA WITH VOMITING, UNSPECIFIED: ICD-10-CM

## 2022-07-25 DIAGNOSIS — Z51.11 ENCOUNTER FOR ANTINEOPLASTIC CHEMOTHERAPY: ICD-10-CM

## 2022-07-26 ENCOUNTER — OUTPATIENT (OUTPATIENT)
Dept: OUTPATIENT SERVICES | Facility: HOSPITAL | Age: 67
LOS: 1 days | End: 2022-07-26
Payer: COMMERCIAL

## 2022-07-26 ENCOUNTER — APPOINTMENT (OUTPATIENT)
Dept: MRI IMAGING | Facility: CLINIC | Age: 67
End: 2022-07-26

## 2022-07-26 DIAGNOSIS — Z92.89 PERSONAL HISTORY OF OTHER MEDICAL TREATMENT: ICD-10-CM

## 2022-07-26 PROCEDURE — A9585: CPT

## 2022-07-26 PROCEDURE — 75561 CARDIAC MRI FOR MORPH W/DYE: CPT | Mod: 26

## 2022-07-26 PROCEDURE — 75561 CARDIAC MRI FOR MORPH W/DYE: CPT

## 2022-07-28 NOTE — HISTORY OF PRESENT ILLNESS
[Disease: _____________________] : Disease: [unfilled] [AJCC Stage: ____] : AJCC Stage: [unfilled] [de-identified] : The patient is a former smoker with a history of CAD who developed chest pain in March 2019 and had a cardiac stent placed. When his chest pain persisted, he was sent for a CT scan of his chest in late March 2019 that revealed a left upper lobe lung mass with evidence of metastatic disease. 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 CT scan revealed evidence of extensive bony and liver metastases. MRI of his brain was negative for brain metastases however there was extensive calvarial and T-spine metastases with possible evidence of epidural involvement. He was treated in [place] by an outside medical oncologist and started on carboplatin, etoposide and atezolizumab in early April 2019. He received 4 cycles of triplet combination therapy followed by immunotherapy maintenance. A restaging PET/CT scan in June 2019 revealed a dramatic response. He moved from [place] and transferred his care to [facility].  \par Treated with immunotherapy maintenance from June 2019 through May 2021 with sustained response.  \par Developed severe dysphagia and odynophagia.  He was treated for H-pylori and symptoms continued; H Pylori stool Ag was negative.  PET/CT with findings consistent with esophagitis/gastritis.  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  Restaging PET/CT March 2022 with disease progression.  Started chemotherapy with Carboplatin/Etoposide in March 2022; Durvalumab was added with C4. Patient completed C5 with Carbo (retreat)/Etoposide/Durvalumab in early June 2022.  Hospitalized with STEMI in June 2022.   [de-identified] : -Lung, left hilar FNA 3/29/19: Positive for malignant cells. Poorly differentiated neuroendocrine carcinoma. IHC is positive for MCK, TTF-1 and synaptophysin while negative for desmoglein3, P40, NapsinA, CD20, PAX5, CD3.  Ki-67 is markedly high. [de-identified] : *Wife provided translation where needed\par He presents today prior to planned resumption of treatment with single agent durvalumab.\par At his last visit, esophagitis symptoms had returned and high-dose steroids were restarted.  \par Symptoms responded to steroids and he has gradually titrated dosing down; currently on 50mg Prednisone daily.  He is able to eat and drink without difficulty.  \par He c/o insomnia.

## 2022-07-28 NOTE — PHYSICAL EXAM
[Restricted in physically strenuous activity but ambulatory and able to carry out work of a light or sedentary nature] : Status 1- Restricted in physically strenuous activity but ambulatory and able to carry out work of a light or sedentary nature, e.g., light house work, office work [Normal] : affect appropriate [de-identified] : No icterus  [de-identified] : MMM O/P Clear, No visible mucositis or erythema [de-identified] : Supple No LAD  [de-identified] : Clear  [de-identified] : S1 S2  [de-identified] : No edema  [de-identified] : No spine/CVA tenderness [de-identified] : Mediport in place

## 2022-07-28 NOTE — ASSESSMENT
[FreeTextEntry1] : Extensive stage small cell lung cancer diagnosed late March 2019 who was treated with chemotherapy plus immunotherapy from April-June 2019; achieved CR. Treated with immunotherapy maintenance from June 2019 through May 2021 with sustained response.  Developed immunotherapy related esophagitis; started on steroids in June 2021 with clinical response.  Restaging PET/CT March 2022 with disease progression.  Started re-treatment with Carboplatin/Etoposide chemotherapy March 2022; achieved NH. Durvalumab added with D4. Completed 5 cycles through early June 2022.  Hospitalized in June for STEMI.  Developed recurrence of esophagitis symptoms in June 2022 that responded to reintroduction of high-dose Prednisone.  \par Recommend: \par –Resume Durvalumab treatment today.    \par -Esophagitis:  continue Prednisone.  Currently on 50mg daily.  He will titrate down by 10mg q4 days.   \par -Continue Cardiology management with Dr. Figueroa \par –Continue Omeprazole for GI prophylaxis while on steroids\par -Constipation: bowel regimen with Colace/Senna daily with addition of dulcolax prn. \par -Continue symptom management under care of Palliative Care Dr. Moreno\par -Continue levothyroxine; monitor periodic TFT's \par -Avoided addition of bone modifying agent given the dramatic response in the bones\par -Mediport in place\par -Insomnia:  zolpidem prescribed. I-stop reviewed. \par -Restaging scan ordered for next month.  F/U to review results They plan to travel to [place] in September which will obviously depend on his clinical status.

## 2022-08-05 ENCOUNTER — APPOINTMENT (OUTPATIENT)
Dept: HEMATOLOGY ONCOLOGY | Facility: CLINIC | Age: 67
End: 2022-08-05

## 2022-08-05 ENCOUNTER — RESULT REVIEW (OUTPATIENT)
Age: 67
End: 2022-08-05

## 2022-08-05 ENCOUNTER — APPOINTMENT (OUTPATIENT)
Dept: CARDIOLOGY | Facility: CLINIC | Age: 67
End: 2022-08-05

## 2022-08-05 ENCOUNTER — APPOINTMENT (OUTPATIENT)
Dept: INFUSION THERAPY | Facility: HOSPITAL | Age: 67
End: 2022-08-05

## 2022-08-05 VITALS
HEART RATE: 89 BPM | DIASTOLIC BLOOD PRESSURE: 68 MMHG | OXYGEN SATURATION: 98 % | TEMPERATURE: 97.1 F | WEIGHT: 209.44 LBS | RESPIRATION RATE: 17 BRPM | SYSTOLIC BLOOD PRESSURE: 108 MMHG | BODY MASS INDEX: 30.05 KG/M2

## 2022-08-05 LAB
BASOPHILS # BLD AUTO: 0.01 K/UL — SIGNIFICANT CHANGE UP (ref 0–0.2)
BASOPHILS NFR BLD AUTO: 0.1 % — SIGNIFICANT CHANGE UP (ref 0–2)
EOSINOPHIL # BLD AUTO: 0 K/UL — SIGNIFICANT CHANGE UP (ref 0–0.5)
EOSINOPHIL NFR BLD AUTO: 0 % — SIGNIFICANT CHANGE UP (ref 0–6)
HCT VFR BLD CALC: 41.7 % — SIGNIFICANT CHANGE UP (ref 39–50)
HGB BLD-MCNC: 14.1 G/DL — SIGNIFICANT CHANGE UP (ref 13–17)
IMM GRANULOCYTES NFR BLD AUTO: 0.5 % — SIGNIFICANT CHANGE UP (ref 0–1.5)
LYMPHOCYTES # BLD AUTO: 0.58 K/UL — LOW (ref 1–3.3)
LYMPHOCYTES # BLD AUTO: 7.9 % — LOW (ref 13–44)
MCHC RBC-ENTMCNC: 31.6 PG — SIGNIFICANT CHANGE UP (ref 27–34)
MCHC RBC-ENTMCNC: 33.8 G/DL — SIGNIFICANT CHANGE UP (ref 32–36)
MCV RBC AUTO: 93.5 FL — SIGNIFICANT CHANGE UP (ref 80–100)
MONOCYTES # BLD AUTO: 0.35 K/UL — SIGNIFICANT CHANGE UP (ref 0–0.9)
MONOCYTES NFR BLD AUTO: 4.8 % — SIGNIFICANT CHANGE UP (ref 2–14)
NEUTROPHILS # BLD AUTO: 6.32 K/UL — SIGNIFICANT CHANGE UP (ref 1.8–7.4)
NEUTROPHILS NFR BLD AUTO: 86.7 % — HIGH (ref 43–77)
NRBC # BLD: 0 /100 WBCS — SIGNIFICANT CHANGE UP (ref 0–0)
PLATELET # BLD AUTO: 116 K/UL — LOW (ref 150–400)
RBC # BLD: 4.46 M/UL — SIGNIFICANT CHANGE UP (ref 4.2–5.8)
RBC # FLD: 12.8 % — SIGNIFICANT CHANGE UP (ref 10.3–14.5)
WBC # BLD: 7.3 K/UL — SIGNIFICANT CHANGE UP (ref 3.8–10.5)
WBC # FLD AUTO: 7.3 K/UL — SIGNIFICANT CHANGE UP (ref 3.8–10.5)

## 2022-08-05 PROCEDURE — 99215 OFFICE O/P EST HI 40 MIN: CPT

## 2022-08-05 PROCEDURE — 93000 ELECTROCARDIOGRAM COMPLETE: CPT

## 2022-08-05 NOTE — REVIEW OF SYSTEMS
[SOB] : shortness of breath [Dyspnea on exertion] : dyspnea during exertion [Negative] : Heme/Lymph [Lower Ext Edema] : no extremity edema [PND] : no PND [FreeTextEntry3] : occasional dark line

## 2022-08-05 NOTE — PHYSICAL EXAM
[Well Developed] : well developed [No Acute Distress] : no acute distress [Normal Conjunctiva] : normal conjunctiva [Normal Venous Pressure] : normal venous pressure [No Carotid Bruit] : no carotid bruit [Normal S1, S2] : normal S1, S2 [No Murmur] : no murmur [No Rub] : no rub [No Gallop] : no gallop [Clear Lung Fields] : clear lung fields [Good Air Entry] : good air entry [No Respiratory Distress] : no respiratory distress  [Soft] : abdomen soft [Normal Gait] : normal gait [No Edema] : no edema [No Cyanosis] : no cyanosis [No Clubbing] : no clubbing [No Varicosities] : no varicosities [No Rash] : no rash [Moves all extremities] : moves all extremities [No Focal Deficits] : no focal deficits [Alert and Oriented] : alert and oriented

## 2022-08-05 NOTE — HISTORY OF PRESENT ILLNESS
[FreeTextEntry1] : Interval History:\par Echo with segmental LV dysfunction, EF ~ 41 %, moderate MR.\par cMRI no evidence of myocarditis\par L ICA with moderate to severe calcified plaque\par Resumed durvalumab 7/22\par Still endorses dyspnea with exertion (walking to bathroom from bedroom at night at home). No chest pain. Is worried about his CAD and residual plaque since recent MI seemed to occur so suddenly.\par \par Plans to travel to [place] 8/25-9/20.\par \par \par History:\par After second cycle of durvalumab (6/9), developed inferior STEMI. \par \par He presented to the ED on 6/14/2022 with acute retrosternal chest pain which began while watching TV at home.  EKG showed inferior STEMI. LHC with 100 % mCx at prior intervention site, treated with GORGE. About 2 weeks prior had similar episode of chest pain, which resolved with ibuprofen, but this time the pain persisted.\par \par Echo showed LVEF 45%, moderate-severe MR, hypokinesis of inferolateral wall and basal inferior wall, RV enlargement with decreased RVSF, and moderate pulmonary hypertension. Started on medical therapy and dismissed 6/16/2022. Troponin T peaked at 3210 ng/L and was 3123 ng/L on 6/16.\par \par Had issues with postural dizziness in the past.\par \par Since return home, he wife (who is a physician) notes pulse rate is at time irregular. The patient endorses dyspnea after a few steps, such as when using the bathroom at home. He denies any chest pain.\par \par Previously followed for many years by Dr. Misha Casas. \par \par Cardiovascular Testing:\par ---------------------------------------\par ECG:\par 8/5/2022: sinus LAD, inferior infarct \par 7/1/2022: sinus 75 bpm with PVCs, inferior/posterior infarct\par 9/20/2021: sinus, minimal inferior Q wave, anterior TWI\par ---------------------------------------\par Echo:\par 7/11/2022: EF 42 %, moderate MR, basal inferior and inferolateral hypokinesis\par 6/14/2022: EF 45 %, Moderate-severe MR, mild AI, mild segmental LV systolic dysfunction (inferolateral and basal inferior), moderate pulmonary HTN\par 5/14/2020: LVEF 65%\par ---------------------------------------\par Stress:\par 10/7/2021: MPI no ischemia\par ---------------------------------------\par Cath/PCI\par 6/14/2022: PCI to mLCX ISR with GORGE\par 3/18/2019: GORGE x2 to LCx, GORGE to LAD, GORGE to Ramus (staged PCI)\par 1/16/2008: CABG ([facility]), LIMA-LAD, DONTRELL-Ramus, SVG-RCA)\par 2007: BMS for chest pain with ISR\par ---------------------------------------\par Vascular:\par 7/11/2022: moderate to severe calcified plaque in L ICA, > 70 %, R ICA 16-49 %\par 5/4/2020: moderate heterogeneous plaque left carotid 50-69 %\par --------------------------------------\par cMRI:\par 7/26/2022: base and mid-cavity subendocardial LGE compatible with prior MI (ischemic cardiomyopathy)

## 2022-08-05 NOTE — REASON FOR VISIT
[Spouse] : spouse [FreeTextEntry3] : Dr. Carranza [FreeTextEntry1] : EDUIN SANCHEZ is a 67 year man with a history of cigarette smoking, coronary artery disease s/p CABG, (LIMA to LAD, DONTRELL to Ramus, SVG to posterolateral branch of RCA), PCI to pLAD in 2019,  and metastatic small cell lung cancer on immunotherapy who presents for follow up of inferior STEMI and ischemic cardiomyopathy.\par \par Prior Cancer Treatments:\par ------------------------------------------------------------------------\par Chemo/targeted therapy:\par 4/2019: carboplatin, etoposide and atezolizumab x 4 followed by immunotherapy maintenance\par 3/2022: carboplatin/etoposide, durvalumab added May 2022

## 2022-08-05 NOTE — DISCUSSION/SUMMARY
[FreeTextEntry1] : 67 year old man with multivessel coronary disease with occluded bypass grafts, seen after inferior STEMI while on PD1- inhibitor immunotherapy. Immune-related adverse event less likely in setting of patient with prior tolerance to immunotherapy for over a year. Increased atherosclerotic plaque progression is noted in the setting of immunotherapy, and in the setting of underlying disease may contribute, but not a contraindication to therapy if metastatic SCC is responding.\par \par Cardiac MRI no evidence of myocarditis and troponin I and T both negative.\par \par Dyspnea on exertion may be explained by congestive heart failure in setting of MR on the recent echo cardiogram. \par \par # 3 Vessel CAD:\par - continue rosuvastatin 40\par - continue ezetimibe 10\par - Continue Entresto 24/26 BID\par - aspirin and ticagrelor given recent STEMI and GORGE, hold ticagrelor if platelet < 75 or bleeding. Otherwise maintain DAPT for at least 3 months without interruption. Ideally would benefit from prolonged DAPT given extent of CAD and multiple prior interventions.\par - Increase Toprol to 50 mg daily\par - LDL at goal\par \par # Dyspnea on exertion: euvolemic on examination. Residual CAD, CHF, lung cancer may contribute, but side effect of ticagrelor is not excluded\par - repeat BNP today\par - optimize medical therapy\par \par # HF with reduced EF due to ischemic cardiomyopathy\par - Entresto 24/26 BID\par - add empaglifozin 10 mg daily\par - Increase Toprol to 50 mg daily\par \par # Carotid artery atherosclerosis, left ICA  > 70 %\par - antiplatelet and statin\par - will refer to vascular interventional cardiology\par \par # PVCs:\par - continue Toprol\par \par Above recommendations discussed with the patient and his wife and all questions were answered to the best of my ability and to their apparent satisfaction. Advised to call me with any question or concern.\par \par Follow up in 2 months [EKG obtained to assist in diagnosis and management of assessed problem(s)] : EKG obtained to assist in diagnosis and management of assessed problem(s)

## 2022-08-07 LAB
ALBUMIN SERPL ELPH-MCNC: 4.3 G/DL
ALP BLD-CCNC: 59 U/L
ALT SERPL-CCNC: 27 U/L
ANION GAP SERPL CALC-SCNC: 13 MMOL/L
AST SERPL-CCNC: 13 U/L
BILIRUB SERPL-MCNC: 0.4 MG/DL
BUN SERPL-MCNC: 19 MG/DL
CALCIUM SERPL-MCNC: 10 MG/DL
CHLORIDE SERPL-SCNC: 102 MMOL/L
CO2 SERPL-SCNC: 23 MMOL/L
CREAT SERPL-MCNC: 1.06 MG/DL
EGFR: 76 ML/MIN/1.73M2
ESTIMATED AVERAGE GLUCOSE: 134 MG/DL
GLUCOSE SERPL-MCNC: 184 MG/DL
HBA1C MFR BLD HPLC: 6.3 %
NT-PROBNP SERPL-MCNC: 301 PG/ML
POTASSIUM SERPL-SCNC: 4.2 MMOL/L
PROT SERPL-MCNC: 6.4 G/DL
SODIUM SERPL-SCNC: 137 MMOL/L

## 2022-08-15 ENCOUNTER — APPOINTMENT (OUTPATIENT)
Dept: NUCLEAR MEDICINE | Facility: IMAGING CENTER | Age: 67
End: 2022-08-15

## 2022-08-15 ENCOUNTER — OUTPATIENT (OUTPATIENT)
Dept: OUTPATIENT SERVICES | Facility: HOSPITAL | Age: 67
LOS: 1 days | End: 2022-08-15
Payer: COMMERCIAL

## 2022-08-15 DIAGNOSIS — C34.12 MALIGNANT NEOPLASM OF UPPER LOBE, LEFT BRONCHUS OR LUNG: ICD-10-CM

## 2022-08-15 PROCEDURE — 78815 PET IMAGE W/CT SKULL-THIGH: CPT | Mod: 26,PS

## 2022-08-15 PROCEDURE — A9552: CPT

## 2022-08-15 PROCEDURE — 78815 PET IMAGE W/CT SKULL-THIGH: CPT

## 2022-08-16 ENCOUNTER — OUTPATIENT (OUTPATIENT)
Dept: OUTPATIENT SERVICES | Facility: HOSPITAL | Age: 67
LOS: 1 days | Discharge: HOME | End: 2022-08-16

## 2022-08-16 DIAGNOSIS — C34.90 MALIGNANT NEOPLASM OF UNSPECIFIED PART OF UNSPECIFIED BRONCHUS OR LUNG: ICD-10-CM

## 2022-08-18 ENCOUNTER — APPOINTMENT (OUTPATIENT)
Dept: CARDIOLOGY | Facility: CLINIC | Age: 67
End: 2022-08-18

## 2022-08-18 VITALS
DIASTOLIC BLOOD PRESSURE: 65 MMHG | OXYGEN SATURATION: 98 % | HEIGHT: 70 IN | WEIGHT: 200 LBS | SYSTOLIC BLOOD PRESSURE: 101 MMHG | BODY MASS INDEX: 28.63 KG/M2 | HEART RATE: 63 BPM

## 2022-08-18 PROCEDURE — 93000 ELECTROCARDIOGRAM COMPLETE: CPT

## 2022-08-18 PROCEDURE — 99213 OFFICE O/P EST LOW 20 MIN: CPT

## 2022-08-18 RX ORDER — HYDROCORTISONE AND ACETIC ACID OTIC 20.75; 10.375 MG/ML; MG/ML
1-2 SOLUTION AURICULAR (OTIC)
Qty: 10 | Refills: 3 | Status: DISCONTINUED | COMMUNITY
Start: 2021-04-27 | End: 2022-08-18

## 2022-08-18 RX ORDER — METOCLOPRAMIDE 10 MG/1
10 TABLET ORAL
Qty: 60 | Refills: 5 | Status: DISCONTINUED | COMMUNITY
Start: 2022-03-11 | End: 2022-08-18

## 2022-08-18 RX ORDER — SUCRALFATE 1 G/10ML
1 SUSPENSION ORAL 4 TIMES DAILY
Qty: 1 | Refills: 0 | Status: DISCONTINUED | COMMUNITY
Start: 2021-06-23 | End: 2022-08-18

## 2022-08-18 NOTE — PHYSICAL EXAM
[Well Developed] : well developed [Well Nourished] : well nourished [No Acute Distress] : no acute distress [Normal Venous Pressure] : normal venous pressure [No Carotid Bruit] : no carotid bruit [Normal S1, S2] : normal S1, S2 [No Murmur] : no murmur [No Rub] : no rub [Clear Lung Fields] : clear lung fields [Good Air Entry] : good air entry [No Respiratory Distress] : no respiratory distress  [Soft] : abdomen soft [Non Tender] : non-tender [Normal Gait] : normal gait [No Edema] : no edema [No Cyanosis] : no cyanosis [No Clubbing] : no clubbing [No Varicosities] : no varicosities [No Rash] : no rash [Moves all extremities] : moves all extremities [Alert and Oriented] : alert and oriented

## 2022-08-19 ENCOUNTER — RESULT REVIEW (OUTPATIENT)
Age: 67
End: 2022-08-19

## 2022-08-19 ENCOUNTER — NON-APPOINTMENT (OUTPATIENT)
Age: 67
End: 2022-08-19

## 2022-08-19 ENCOUNTER — APPOINTMENT (OUTPATIENT)
Dept: HEMATOLOGY ONCOLOGY | Facility: CLINIC | Age: 67
End: 2022-08-19

## 2022-08-19 ENCOUNTER — APPOINTMENT (OUTPATIENT)
Dept: INFUSION THERAPY | Facility: HOSPITAL | Age: 67
End: 2022-08-19

## 2022-08-19 VITALS
BODY MASS INDEX: 28.63 KG/M2 | OXYGEN SATURATION: 99 % | HEIGHT: 70 IN | DIASTOLIC BLOOD PRESSURE: 67 MMHG | SYSTOLIC BLOOD PRESSURE: 102 MMHG | HEART RATE: 86 BPM | RESPIRATION RATE: 16 BRPM | WEIGHT: 199.96 LBS | TEMPERATURE: 97 F

## 2022-08-19 LAB
ALBUMIN SERPL ELPH-MCNC: 4.5 G/DL — SIGNIFICANT CHANGE UP (ref 3.3–5)
ALP SERPL-CCNC: 55 U/L — SIGNIFICANT CHANGE UP (ref 40–120)
ALT FLD-CCNC: 31 U/L — SIGNIFICANT CHANGE UP (ref 10–45)
ANION GAP SERPL CALC-SCNC: 13 MMOL/L — SIGNIFICANT CHANGE UP (ref 5–17)
AST SERPL-CCNC: 17 U/L — SIGNIFICANT CHANGE UP (ref 10–40)
BASOPHILS # BLD AUTO: 0.01 K/UL — SIGNIFICANT CHANGE UP (ref 0–0.2)
BASOPHILS NFR BLD AUTO: 0.2 % — SIGNIFICANT CHANGE UP (ref 0–2)
BILIRUB SERPL-MCNC: 0.3 MG/DL — SIGNIFICANT CHANGE UP (ref 0.2–1.2)
BUN SERPL-MCNC: 18 MG/DL — SIGNIFICANT CHANGE UP (ref 7–23)
CALCIUM SERPL-MCNC: 10 MG/DL — SIGNIFICANT CHANGE UP (ref 8.4–10.5)
CHLORIDE SERPL-SCNC: 102 MMOL/L — SIGNIFICANT CHANGE UP (ref 96–108)
CO2 SERPL-SCNC: 24 MMOL/L — SIGNIFICANT CHANGE UP (ref 22–31)
CREAT SERPL-MCNC: 0.78 MG/DL — SIGNIFICANT CHANGE UP (ref 0.5–1.3)
EGFR: 97 ML/MIN/1.73M2 — SIGNIFICANT CHANGE UP
EOSINOPHIL # BLD AUTO: 0 K/UL — SIGNIFICANT CHANGE UP (ref 0–0.5)
EOSINOPHIL NFR BLD AUTO: 0 % — SIGNIFICANT CHANGE UP (ref 0–6)
GLUCOSE SERPL-MCNC: 118 MG/DL — HIGH (ref 70–99)
HCT VFR BLD CALC: 41.2 % — SIGNIFICANT CHANGE UP (ref 39–50)
HGB BLD-MCNC: 13.9 G/DL — SIGNIFICANT CHANGE UP (ref 13–17)
IMM GRANULOCYTES NFR BLD AUTO: 1.4 % — SIGNIFICANT CHANGE UP (ref 0–1.5)
LYMPHOCYTES # BLD AUTO: 0.62 K/UL — LOW (ref 1–3.3)
LYMPHOCYTES # BLD AUTO: 11 % — LOW (ref 13–44)
MCHC RBC-ENTMCNC: 31.5 PG — SIGNIFICANT CHANGE UP (ref 27–34)
MCHC RBC-ENTMCNC: 33.7 G/DL — SIGNIFICANT CHANGE UP (ref 32–36)
MCV RBC AUTO: 93.4 FL — SIGNIFICANT CHANGE UP (ref 80–100)
MONOCYTES # BLD AUTO: 0.29 K/UL — SIGNIFICANT CHANGE UP (ref 0–0.9)
MONOCYTES NFR BLD AUTO: 5.1 % — SIGNIFICANT CHANGE UP (ref 2–14)
NEUTROPHILS # BLD AUTO: 4.66 K/UL — SIGNIFICANT CHANGE UP (ref 1.8–7.4)
NEUTROPHILS NFR BLD AUTO: 82.3 % — HIGH (ref 43–77)
NRBC # BLD: 0 /100 WBCS — SIGNIFICANT CHANGE UP (ref 0–0)
PLATELET # BLD AUTO: 124 K/UL — LOW (ref 150–400)
POTASSIUM SERPL-MCNC: 4.8 MMOL/L — SIGNIFICANT CHANGE UP (ref 3.5–5.3)
POTASSIUM SERPL-SCNC: 4.8 MMOL/L — SIGNIFICANT CHANGE UP (ref 3.5–5.3)
PROT SERPL-MCNC: 6.4 G/DL — SIGNIFICANT CHANGE UP (ref 6–8.3)
RBC # BLD: 4.41 M/UL — SIGNIFICANT CHANGE UP (ref 4.2–5.8)
RBC # FLD: 12.4 % — SIGNIFICANT CHANGE UP (ref 10.3–14.5)
SODIUM SERPL-SCNC: 139 MMOL/L — SIGNIFICANT CHANGE UP (ref 135–145)
WBC # BLD: 5.66 K/UL — SIGNIFICANT CHANGE UP (ref 3.8–10.5)
WBC # FLD AUTO: 5.66 K/UL — SIGNIFICANT CHANGE UP (ref 3.8–10.5)

## 2022-08-19 PROCEDURE — 99214 OFFICE O/P EST MOD 30 MIN: CPT

## 2022-08-19 NOTE — DISCUSSION/SUMMARY
[Coronary Artery Disease] : coronary artery disease [Peripheral Vascular Disease] : peripheral vascular disease [Stable] : stable [None] : There are no changes in medication management [FreeTextEntry1] : Carotid duplex next month to assess for progression. [EKG obtained to assist in diagnosis and management of assessed problem(s)] : EKG obtained to assist in diagnosis and management of assessed problem(s)

## 2022-08-19 NOTE — ASSESSMENT
[FreeTextEntry1] : Stable Ischemic Cardiomyopathy, HFrEF: \par Titration of GDMT with improvement in symptoms. Likely not Ticagrelor related CASH given that it resolved with uptitration of beta blocker. Continue current DAPT regimen. Discussed that aggressive risk factor optimization was murdock for limiting progression of disease. He needs to keep track of his symptoms and let us know when something changes. He is a complex patient with multiple medical issues going on; teasing out etiologies will require communication between all of his medical care team.  Reviewed the cath films with the patient; 60% stenosis of the proximal ramus artery. No role for any additional invasive studies at this time. Continue to monitor for symptoms and GDMT. \par \par Carotid artery disease: LICA with likely >70% stenosis. \par Currently asymptomatic. Last carotid vascular duplex 7/2022; will repeat 10/2022 to better understand rate of progression. Will then plan for CTA head and neck to understand cerebral perfusion and carotid anatomy. Discussed options for revascularization today, including stent, CEA and TCAR. Continue current medical therapy. He will follow-up with Dr. Figueroa next month who will arrange for carotid duplex at [facility]. I will review and discuss next steps.

## 2022-08-19 NOTE — HISTORY OF PRESENT ILLNESS
[FreeTextEntry1] : Relevant course prior to being seen today: \par \par "After second cycle of durvalumab (6/9), developed inferior STEMI. He presented to the ED on 6/14/2022 with acute retrosternal chest pain which began while watching TV at home. EKG showed inferior STEMI. [facility] with 100 % mCx at prior intervention site, treated with GORGE. About 2 weeks prior had similar episode of chest pain, which resolved with ibuprofen, but this time the pain persisted.\par \par Echo showed LVEF 45%, moderate-severe MR, hypokinesis of inferolateral wall and basal inferior wall, RV enlargement with decreased RVSF, and moderate pulmonary hypertension. Started on medical therapy and dismissed 6/16/2022. Troponin T peaked at 3210 ng/L and was 3123 ng/L on 6/16.\par \par Had issues with postural dizziness in the past.\par \par Since return home, he wife (who is a physician) notes pulse rate is at time irregular. The patient endorses dyspnea after a few steps, such as when using the bathroom at home. He denies any chest pain."\par \par At his last visit with Dr. Figueroa his beta blocker dose was uptitrated and jardiance was added. He no longer has dyspnea. He is walking without symptoms. No chest pain, syncope, leg swellling. He has intermittent palpitations. He denies signs or symptoms of stroke/TIA. \par \par His last carotid duplex was 7/2022 which demonstrated a prox LICA stenosis that was likely >70% based on the ICA/CCA ratio, velocity and cross sectional imaging.  Prior duplex was 2020 that demonstrated 50-79% LICA.  \par \par Cardiovascular Testing:\par ---------------------------------------\par ECG:\par 8/5/2022: sinus LAD, inferior infarct \par 7/1/2022: sinus 75 bpm with PVCs, inferior/posterior infarct\par 9/20/2021: sinus, minimal inferior Q wave, anterior TWI\par ---------------------------------------\par Echo:\par 7/11/2022: EF 42 %, moderate MR, basal inferior and inferolateral hypokinesis\par 6/14/2022: EF 45 %, Moderate-severe MR, mild AI, mild segmental LV systolic dysfunction (inferolateral and basal inferior), moderate pulmonary HTN\par 5/14/2020: LVEF 65%\par ---------------------------------------\par Stress:\par 10/7/2021: MPI no ischemia\par ---------------------------------------\par Cath/PCI\par 6/14/2022: PCI to mLCX ISR with GORGE\par 3/18/2019: GORGE x2 to LCx, GORGE to LAD, GORGE to Ramus (staged PCI)\par 1/16/2008: CABG ([facility]), LIMA-LAD, DONTRELL-Ramus, SVG-RCA)\par 2007: BMS for chest pain with ISR\par ---------------------------------------\par Vascular:\par 7/11/2022: moderate to severe calcified plaque in L ICA, > 70 %, R ICA 16-49 %\par 5/4/2020: moderate heterogeneous plaque left carotid 50-69 %\par --------------------------------------\par cMRI:\par 7/26/2022: base and mid-cavity subendocardial LGE compatible with prior MI\par \par

## 2022-08-19 NOTE — REASON FOR VISIT
[Coronary Artery Disease] : coronary artery disease [Carotid, Aortic and Peripheral Vascular Disease] : carotid, aortic and peripheral vascular disease [Spouse] : spouse [FreeTextEntry3] : Carolina [FreeTextEntry1] : 67 year man with a history of cigarette smoking, coronary artery disease s/p CABG, (LIMA to LAD, DONTRELL to Ramus, SVG to posterolateral branch of RCA) with known occluded grafts, PCI to pLAD, LCx in 2019, inferior STEMI 6/2022 s/p PCI to LCx, LICA stenosis and metastatic small cell lung cancer on immunotherapy who presents for evaluation of CAD and carotid artery disease. \par \par

## 2022-08-19 NOTE — CARDIOLOGY SUMMARY
[de-identified] : Cardiovascular Testing: --------------------------------------- EC2022: sinus LAD, inferior infarct  2022: sinus 75 bpm with PVCs, inferior/posterior infarct 2021: sinus, minimal inferior Q wave, anterior TWI --------------------------------------- Echo: 2022: EF 42 %, moderate MR, basal inferior and inferolateral hypokinesis 2022: EF 45 %, Moderate-severe MR, mild AI, mild segmental LV systolic dysfunction (inferolateral and basal inferior), moderate pulmonary HTN 2020: LVEF 65% --------------------------------------- Stress: 10/7/2021: MPI no ischemia --------------------------------------- Cath/PCI 2022: PCI to mLCX ISR with GORGE 3/18/2019: GORGE x2 to LCx, GORGE to LAD, GORGE to Ramus (staged PCI) 2008: CABG ([facility] [place]), LIMA-LAD, DONTRELL-Ramus, SVG-RCA) : BMS for chest pain with ISR --------------------------------------- Vascular: 2022: moderate to severe calcified plaque in L ICA, > 70 %, R ICA 16-49 % 2020: moderate heterogeneous plaque left carotid 50-69 % -------------------------------------- cMRI: 2022: base and mid-cavity subendocardial LGE compatible with prior M

## 2022-08-19 NOTE — REVIEW OF SYSTEMS
[Palpitations] : palpitations [Negative] : Heme/Lymph [Fever] : no fever [Chills] : no chills [Blurry Vision] : no blurred vision [SOB] : no shortness of breath [Dyspnea on exertion] : not dyspnea during exertion [Chest Discomfort] : no chest discomfort [Lower Ext Edema] : no extremity edema [Leg Claudication] : no intermittent leg claudication [Orthopnea] : no orthopnea [PND] : no PND [Syncope] : no syncope [Cough] : no cough [Wheezing] : no wheezing [Abdominal Pain] : no abdominal pain [Nausea] : no nausea [Vomiting] : no vomiting

## 2022-08-22 DIAGNOSIS — C34.12 MALIGNANT NEOPLASM OF UPPER LOBE, LEFT BRONCHUS OR LUNG: ICD-10-CM

## 2022-08-22 DIAGNOSIS — Z51.11 ENCOUNTER FOR ANTINEOPLASTIC CHEMOTHERAPY: ICD-10-CM

## 2022-08-25 NOTE — ASSESSMENT
[FreeTextEntry1] : Extensive stage small cell lung cancer diagnosed late March 2019 who was treated with chemotherapy plus immunotherapy from April-June 2019; achieved CR. Treated with immunotherapy maintenance from June 2019 through May 2021 with sustained response.  Developed immunotherapy related esophagitis; started on steroids in June 2021 with clinical response.  Restaging PET/CT March 2022 with disease progression.  Started re-treatment with Carboplatin/Etoposide chemotherapy March 2022; achieved AK. Durvalumab added with D4. Completed 5 cycles through early June 2022.  Hospitalized in June for STEMI.  Developed recurrence of esophagitis symptoms in June 2022 that responded to reintroduction of Prednisone.  Durvalumab held with eventual resumption of treatment in conjunction with steroids.  \par Restaging PET/CT Aug 2022 with sustained response.  \par Recommend: \par –Continue Durvalumab as scheduled; for treatment today.    \par -Esophagitis:  continue Prednisone.  Currently on 60mg daily.  He will taper as tolerated.   Continue Omeprazole for GI prophylaxis while on steroids\par -Continue Cardiology management with Dr. Figueroa \par -Continue symptom management under care of Palliative Care Dr. Moreno\par -Repeat labs today.  Continue levothyroxine; monitor periodic TFT's \par -Thrombocytopenia:  mild and possibly secondary to immunotherapy.  Monitor.  \par -Avoided addition of bone modifying agent given the dramatic response in the bones\par -Mediport in place\par -Insomnia:  on zolpidem. \par -F/U in Sept upon return from their trip to [place]

## 2022-08-25 NOTE — PHYSICAL EXAM
[Restricted in physically strenuous activity but ambulatory and able to carry out work of a light or sedentary nature] : Status 1- Restricted in physically strenuous activity but ambulatory and able to carry out work of a light or sedentary nature, e.g., light house work, office work [Normal] : affect appropriate [de-identified] : No icterus  [de-identified] : MMM O/P Clear, No visible mucositis or erythema [de-identified] : Supple No LAD  [de-identified] : Clear  [de-identified] : S1 S2  [de-identified] : No edema  [de-identified] : No spine/CVA tenderness [de-identified] : Mediport in place

## 2022-08-25 NOTE — RESULTS/DATA
[FreeTextEntry1] : -CT chest 3/25/19: Infiltrative left hilar mass extending to the left upper lobe and left mediastinum compatible with neoplasm with encasement of the pulmonary artery and left upper lobe bronchial structures with collapse of the left upper lobe. Lytic lesions in the upper thoracic spine. Innumerable hypodense masses throughout the liver compatible with metastases. Small left pleural effusion.\par \par -CT C/A/P 4/5/19:  Large mass in the left hilum extending into the left upper lobe consistent with bronchogenic malignancy with possible metastatic lymphadenopathy. Significant compression of the left lobe pulmonary artery with obstruction of the left upper lobe bronchus with resultant atelectasis. Innumerable lesions throughout the liver. Upper abdominal lymphadenopathy. Bony metastases of the spine, pelvis and bilateral hips.\par \par -MRI brain/T-spine in 3/28/19: Negative for metastatic disease involving the brain. Of the central canal at T9-T10 concerning for epidural metastasis.\par \par -Nuclear medicine bone scan 4/5/19: Focal uptake in one of the right lower lateral ribs, possibly the 10th rib, likely represents a small fracture. Metastatic disease is less likely. No additional areas of abnormal uptake are noted.\par \par -PET/CT 6/26/19: Markedly improved positive treatment response. The previously seen a large left-sided upper lobe lung mass with hilar extension is markedly reduced in size with mild or residual ill-defined soft tissue density at the left hilar region now demonstrating only partial invasion of the left upper lobe bronchus. No separate mediastinal lymphadenopathy is noted. Markedly improved appearance of both the numerous metastatic liver lesions and hepatomegaly. Interval resolution of previously seen upper abdominal lymphadenopathy. No residual active osseous metastatic disease.\par \par -PET/CT 10/17/19:  FDG-PET/CT scan demonstrates: \par 1. Minimally FDG-avid, small, difficult to delineate left upper lobe/perihilar soft tissue, unchanged as compared to prior study dated 6/25/2019. Small focus of residual disease is not excluded. \par 2. Non-FDG-avid low-attenuation density in pretracheal cricoid region, unchanged. \par 3. Remainder of study is unremarkable, demonstrating no evidence of FDG-avid disease. \par \par -MRI Brain 10/20/19: No evidence of metastasis\par \par -PET/CT 1/15/20:  FDG-PET/CT scan demonstrates: \par 1. New, indeterminate linear focus of mild FDG activity in left suprahilar/paramediastinal region (SUV 4.1). \par 2. Minimally FDG-avid, small, difficult to delineate left upper lobe/perihilar soft tissue, unchanged as compared to prior study dated 6/25/2019. Small focus of residual disease is not excluded. \par 3. Remainder of study is unchanged, demonstrating no evidence of FDG-avid disease. \par \par -CT Abdomen/Pelvis 2/19/20:  No evidence of focal bowel wall thickening or distention.  Right inguinal hernia \par \par -CT Chest 4/8/20: Stable disease\par \par -MRI Brain 3/5/20: New opacification involving the right mastoid and middle ear region as described above, otherwise no significant change when allowing for differences in technique. \par \par -PET/CT 6/25/20:  Compared to FDG-PET/CT scan dated 1/15/2020: \par 1. Focus of mild FDG activity in left upper lobe paramediastinal region is unchanged. This may reflect posttreatment change. \par 2. Non-FDG-avid 7 mm nodular density within posterior left upper lobe, also unchanged. \par 3. Nonspecific hypermetabolism in distal esophagus and fundus/body of stomach, mildly decreased. Correlate clinically for esophagitis/gastritis and with endoscopy, as indicated. \par 4. Remainder of study is unchanged, demonstrating no evidence of FDG-avid recurrent or metastatic disease. \par \par -PET/CT 9/1/20:  Compared to FDG-PET/CT scan dated 6/25/2020: \par 1. Resolution of focus of mild FDG activity in left upper lobe paramediastinal region. \par 2. Non-FDG-avid 7 mm nodular density within posterior left upper lobe, unchanged. \par 3. Nonspecific hypermetabolism in distal esophagus and fundus/body, unchanged. \par 4. No evidence of FDG-avid recurrent or metastatic disease. \par \par -MRI Brain 12/14/20:  No evidence acute hemorrhage, mass mass effect or abnormal enhancement seen.  Improved aeration of the right mastoid and middle ear region.\par \par -PET/CT 12/14/20:  Compared to FDG-PET/CT scan on 9/1/2020:\par 1. Nonspecific minimal increased FDG avidity in the gastric fundus/body. Please correlate clinically or with endoscopy as indicated.\par 2. Non-FDG avid 7 mm nodular density within the posterior left upper lobe, unchanged.\par 3. New focal cutaneous FDG avidity along the left lower aspect of the face, probably focal inflammation. Please correlate with physical examination.\par 4. Nonspecific new focal mild hypermetabolism in the anterior aspect of the hyoid bone just to the right of the midline without CT correlate. Unchanged non-FDG avid low-attenuation density in the precricoid region. Please correlate clinically.\par \par -PET/CT 3/5/21:  Compared to FDG-PET/CT scan dated 12/14/2020:\par 1. Few new FDG avid left axillary lymph nodes are nonspecific. Suggest correlation with ultrasound and possible percutaneous FNA biopsy as indicated.\par 2. Nonspecific FDG avidity in mid to distal esophagus, gastric fundus/body, slightly more prominent, with new FDG avidity in the pylorus. Please correlate clinically or with endoscopy as indicated.\par 3. Nonspecific non-FDG avid groundglass opacity in the anterior right upper lobe, more conspicuous.\par 4. Interval resolution of focal mild hypermetabolism in the anterior aspect of the hyoid bone and focal cutaneous FDG avidity in the left lower face. Unchanged non-FDG avid low-attenuation density in the cricoarytenoid region.\par \par -Esophagram: Small hiatal hernia without demonstrated gastroesophageal reflux. There is esophagoesophageal reflux.\par \par -Pet/CT 6/20/21:  Compared to FDG-PET/CT scan dated 3/5/2021:\par 1. Interval resolution of a few mildly FDG avid left axillary lymph nodes and small mildly FDG avid foci in bilateral hilar regions.\par 2. Nonspecific FDG avidity in mid to distal esophagus, gastric boy and pylorus, unchanged. Please correlate clinically or with endoscopy as indicated.\par 3. Unchanged nonspecific non-FDG avid groundglass opacity in the anterior right upper lobe.\par 4. Non-FDG avid low-attenuation density in the pretracheal region, decreased in size.\par \par -MRI Brain 6/21/21:  There is no intraparenchymal hematoma, mass effect or midline shift.  No evidence of intracranial metastatic disease. Slight interval increase in density within the right mastoid and middle ear regions when compared most recent prior examination.\par \par -PET/CT 9/13/21:  Compared to FDG-PET/CT scan dated 6/20/2021:\par 1. New cluster of FDG-avid nodules in left upper lobe. Primary consideration is infection/mucoid impacted distal airways. Please correlate clinically and follow-up with dedicated CT of chest in 1 month.\par 2. Nonspecific FDG avidity in mid and distal esophagus, unchanged. Resolution of gastric hypermetabolism.\par 3. Nonspecific, non-FDG avid right upper lobe groundglass opacity, unchanged.\par 4. Non-FDG avid low-attenuation density in the pretracheal region, unchanged.\par \par -MRI Brain 12/1/21:  No significant change when allowing for differences in technique.\par \par -PET/CT 12/6/21:  Compared to FDG-PET/CT scan dated 6/20/2021:\par 1. Mildly FDG-avid cluster of nodules in left upper lobe, unchanged on CT, and decreased in metabolism. An infectious/inflammatory etiology is favored. Please correlate clinically. A dedicated CT of chest may be obtained for further characterization.\par 2. Nonspecific FDG avidity in mid and distal esophagus, unchanged.\par 3. Nonspecific, non-FDG avid right upper lobe groundglass opacity, unchanged.\par 4. Non-FDG avid low-attenuation density in the pretracheal region, unchanged.\par \par -PET/CT 3/7/22:  Compared to FDG-PET/CT scan dated 12/6/2021:\par 1. FDG-avid, centrally photopenic left upper lobe lung mass and adjacent FDG-avid nodules, markedly increased in size and metabolism as compared to prior study, are compatible with recurrent disease.\par 2. Nonspecific FDG avidity in mid and distal esophagus, unchanged.\par 3. Nonspecific, non-FDG avid right upper lobe groundglass opacity, unchanged.\par 4. Non-FDG avid low-attenuation density in the pretracheal region, unchanged.\par \par -Brain MRI 3/30/22: \par 1. Age appropriate involutional changes.\par 2. No abnormal intraparenchymal or leptomeningeal enhancement. No evidence of intracranial metastases or leptomeningeal carcinomatosis.\par 3. Presence of fluid again appreciated within the right-sided mastoid air cells, slightly increased compared with prior. Correlate clinically for mastoiditis.\par \par –CT Chest 5/9/22:  In comparison with 3/7/2022, interval decrease of left upper lobe mass. No new or enlarging nodule.\par \par -CT Head 6/14/22: No evidence of acute hemorrhage, mass or mass effect.\par \par Images Reviewed/Interpreted:\par \par –Cardiac MRI 7/26/22:  At the base and midcavity, the combination of subendocardial late gadolinium enhancement, myocardial wall thinning and wall motion abnormalities are most likely secondary to prior myocardial infarction rather than myocarditis.\par \par -PET/CT 8/15/22:  Compared with FDG PET/CT scan 3/7/2022 as well as additional correlations as above:\par 1. Since prior PET/CT, LEFT UPPER lobe lung mass shows marked decrease in activity, with serial anatomic improvement on most recent chest CT as well as the current study.\par 2. Esophageal uptake stable, likely related to patient's reported prior immunotherapy-related esophagitis.\par 3. Nonspecific, non-FDG avid right upper lobe groundglass opacity, unchanged.\par 4. Non-FDG avid low-attenuation density in the pretracheal region, unchanged.\par 5. No NEW suspicious findings otherwise noted.\par \par

## 2022-08-25 NOTE — HISTORY OF PRESENT ILLNESS
[Disease: _____________________] : Disease: [unfilled] [AJCC Stage: ____] : AJCC Stage: [unfilled] [de-identified] : The patient is a former smoker with a history of CAD who developed chest pain in March 2019 and had a cardiac stent placed. When his chest pain persisted, he was sent for a CT scan of his chest in late March 2019 that revealed a left upper lobe lung mass with evidence of metastatic disease. 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 CT scan revealed evidence of extensive bony and liver metastases. MRI of his brain was negative for brain metastases however there was extensive calvarial and T-spine metastases with possible evidence of epidural involvement. He was treated in [place] by an outside medical oncologist and started on carboplatin, etoposide and atezolizumab in early April 2019. He received 4 cycles of triplet combination therapy followed by immunotherapy maintenance. A restaging PET/CT scan in June 2019 revealed a dramatic response. He moved from [place] and transferred his care to [facility].  \par Treated with immunotherapy maintenance from June 2019 through May 2021 with sustained response.  \par Developed severe dysphagia and odynophagia.  He was treated for H-pylori and symptoms continued; H Pylori stool Ag was negative.  PET/CT with findings consistent with esophagitis/gastritis.  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  Restaging PET/CT March 2022 with disease progression.  Started chemotherapy with Carboplatin/Etoposide in March 2022; Durvalumab was added with C4. Patient completed C5 with Carbo (retreat)/Etoposide/Durvalumab in early June 2022.  Hospitalized with STEMI in June 2022.  Esophagitis symptoms returned in June 2022.  Upon clinical improvement with restarting steroids, durvalumab was continued. [de-identified] : -Lung, left hilar FNA 3/29/19: Positive for malignant cells. Poorly differentiated neuroendocrine carcinoma. IHC is positive for MCK, TTF-1 and synaptophysin while negative for desmoglein3, P40, NapsinA, CD20, PAX5, CD3.  Ki-67 is markedly high. [de-identified] : *Wife provided translation where needed\par He presents today prior to planned continued treatment with single agent Durvalumab.\par He has continued on steroids, tapering by 10 mg every few days.  He reached 30 mg of prednisone yesterday.  He stopped the omeprazole 3 days prior for unclear reasons.  Reports return of esophagitis symptoms.  He increase the prednisone back up to 60 mg as of today.  He is able to eat and drink without difficulty.\par \par Restaging PET/CT with overall sustained response with regards to his lung cancer; esophageal uptake is noted to be stable related to esophagitis.\par \par They are traveling to [place] next week.

## 2022-09-02 ENCOUNTER — APPOINTMENT (OUTPATIENT)
Dept: HEMATOLOGY ONCOLOGY | Facility: CLINIC | Age: 67
End: 2022-09-02

## 2022-09-02 ENCOUNTER — APPOINTMENT (OUTPATIENT)
Dept: INFUSION THERAPY | Facility: HOSPITAL | Age: 67
End: 2022-09-02

## 2022-09-23 ENCOUNTER — RESULT REVIEW (OUTPATIENT)
Age: 67
End: 2022-09-23

## 2022-09-23 ENCOUNTER — APPOINTMENT (OUTPATIENT)
Dept: HEMATOLOGY ONCOLOGY | Facility: CLINIC | Age: 67
End: 2022-09-23

## 2022-09-23 ENCOUNTER — APPOINTMENT (OUTPATIENT)
Dept: INFUSION THERAPY | Facility: HOSPITAL | Age: 67
End: 2022-09-23

## 2022-09-23 VITALS
OXYGEN SATURATION: 98 % | DIASTOLIC BLOOD PRESSURE: 77 MMHG | BODY MASS INDEX: 30.21 KG/M2 | RESPIRATION RATE: 16 BRPM | SYSTOLIC BLOOD PRESSURE: 115 MMHG | TEMPERATURE: 97.2 F | HEART RATE: 62 BPM | WEIGHT: 210.54 LBS

## 2022-09-23 LAB
BASOPHILS # BLD AUTO: 0.01 K/UL — SIGNIFICANT CHANGE UP (ref 0–0.2)
BASOPHILS NFR BLD AUTO: 0.1 % — SIGNIFICANT CHANGE UP (ref 0–2)
EOSINOPHIL # BLD AUTO: 0 K/UL — SIGNIFICANT CHANGE UP (ref 0–0.5)
EOSINOPHIL NFR BLD AUTO: 0 % — SIGNIFICANT CHANGE UP (ref 0–6)
HCT VFR BLD CALC: 36.7 % — LOW (ref 39–50)
HGB BLD-MCNC: 12.6 G/DL — LOW (ref 13–17)
IMM GRANULOCYTES NFR BLD AUTO: 0.9 % — SIGNIFICANT CHANGE UP (ref 0–0.9)
LYMPHOCYTES # BLD AUTO: 0.97 K/UL — LOW (ref 1–3.3)
LYMPHOCYTES # BLD AUTO: 13.9 % — SIGNIFICANT CHANGE UP (ref 13–44)
MCHC RBC-ENTMCNC: 31.3 PG — SIGNIFICANT CHANGE UP (ref 27–34)
MCHC RBC-ENTMCNC: 34.3 G/DL — SIGNIFICANT CHANGE UP (ref 32–36)
MCV RBC AUTO: 91.1 FL — SIGNIFICANT CHANGE UP (ref 80–100)
MONOCYTES # BLD AUTO: 0.41 K/UL — SIGNIFICANT CHANGE UP (ref 0–0.9)
MONOCYTES NFR BLD AUTO: 5.9 % — SIGNIFICANT CHANGE UP (ref 2–14)
NEUTROPHILS # BLD AUTO: 5.51 K/UL — SIGNIFICANT CHANGE UP (ref 1.8–7.4)
NEUTROPHILS NFR BLD AUTO: 79.2 % — HIGH (ref 43–77)
NRBC # BLD: 0 /100 WBCS — SIGNIFICANT CHANGE UP (ref 0–0)
PLATELET # BLD AUTO: 127 K/UL — LOW (ref 150–400)
RBC # BLD: 4.03 M/UL — LOW (ref 4.2–5.8)
RBC # FLD: 14 % — SIGNIFICANT CHANGE UP (ref 10.3–14.5)
WBC # BLD: 6.96 K/UL — SIGNIFICANT CHANGE UP (ref 3.8–10.5)
WBC # FLD AUTO: 6.96 K/UL — SIGNIFICANT CHANGE UP (ref 3.8–10.5)

## 2022-09-23 PROCEDURE — G0008: CPT

## 2022-09-23 PROCEDURE — 90662 IIV NO PRSV INCREASED AG IM: CPT

## 2022-09-23 PROCEDURE — 99214 OFFICE O/P EST MOD 30 MIN: CPT

## 2022-09-24 LAB
ALBUMIN SERPL ELPH-MCNC: 4.2 G/DL — SIGNIFICANT CHANGE UP (ref 3.3–5)
ALP SERPL-CCNC: 42 U/L — SIGNIFICANT CHANGE UP (ref 40–120)
ALT FLD-CCNC: 28 U/L — SIGNIFICANT CHANGE UP (ref 10–45)
ANION GAP SERPL CALC-SCNC: 12 MMOL/L — SIGNIFICANT CHANGE UP (ref 5–17)
AST SERPL-CCNC: 12 U/L — SIGNIFICANT CHANGE UP (ref 10–40)
BILIRUB SERPL-MCNC: 0.3 MG/DL — SIGNIFICANT CHANGE UP (ref 0.2–1.2)
BUN SERPL-MCNC: 16 MG/DL — SIGNIFICANT CHANGE UP (ref 7–23)
CALCIUM SERPL-MCNC: 9.3 MG/DL — SIGNIFICANT CHANGE UP (ref 8.4–10.5)
CHLORIDE SERPL-SCNC: 105 MMOL/L — SIGNIFICANT CHANGE UP (ref 96–108)
CO2 SERPL-SCNC: 22 MMOL/L — SIGNIFICANT CHANGE UP (ref 22–31)
CREAT SERPL-MCNC: 1.14 MG/DL — SIGNIFICANT CHANGE UP (ref 0.5–1.3)
EGFR: 70 ML/MIN/1.73M2 — SIGNIFICANT CHANGE UP
GLUCOSE SERPL-MCNC: 88 MG/DL — SIGNIFICANT CHANGE UP (ref 70–99)
LDH SERPL L TO P-CCNC: 232 U/L — SIGNIFICANT CHANGE UP (ref 50–242)
POTASSIUM SERPL-MCNC: 4.7 MMOL/L — SIGNIFICANT CHANGE UP (ref 3.5–5.3)
POTASSIUM SERPL-SCNC: 4.7 MMOL/L — SIGNIFICANT CHANGE UP (ref 3.5–5.3)
PROT SERPL-MCNC: 6 G/DL — SIGNIFICANT CHANGE UP (ref 6–8.3)
SODIUM SERPL-SCNC: 139 MMOL/L — SIGNIFICANT CHANGE UP (ref 135–145)
T4 FREE SERPL-MCNC: 1.1 NG/DL — SIGNIFICANT CHANGE UP (ref 0.9–1.8)
TSH SERPL-MCNC: 0.82 UIU/ML — SIGNIFICANT CHANGE UP (ref 0.27–4.2)

## 2022-09-28 NOTE — HISTORY OF PRESENT ILLNESS
[Disease: _____________________] : Disease: [unfilled] [AJCC Stage: ____] : AJCC Stage: [unfilled] [de-identified] : The patient is a former smoker with a history of CAD who developed chest pain in March 2019 and had a cardiac stent placed. When his chest pain persisted, he was sent for a CT scan of his chest in late March 2019 that revealed a left upper lobe lung mass with evidence of metastatic disease. 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 CT scan revealed evidence of extensive bony and liver metastases. MRI of his brain was negative for brain metastases however there was extensive calvarial and T-spine metastases with possible evidence of epidural involvement. He was treated in [place] by an outside medical oncologist and started on carboplatin, etoposide and atezolizumab in early April 2019. He received 4 cycles of triplet combination therapy followed by immunotherapy maintenance. A restaging PET/CT scan in June 2019 revealed a dramatic response. He moved from [place] and transferred his care to [facility].  \par Treated with immunotherapy maintenance from June 2019 through May 2021 with sustained response.  \par Developed severe dysphagia and odynophagia.  He was treated for H-pylori and symptoms continued; H Pylori stool Ag was negative.  PET/CT with findings consistent with esophagitis/gastritis.  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  Restaging PET/CT March 2022 with disease progression.  Started chemotherapy with Carboplatin/Etoposide in March 2022; Durvalumab was added with C4. Patient completed C5 with Carbo (retreat)/Etoposide/Durvalumab in early June 2022.  Hospitalized with STEMI in June 2022.  Esophagitis symptoms returned in June 2022.  Upon clinical improvement with restarting steroids, durvalumab was continued. [de-identified] : -Lung, left hilar FNA 3/29/19: Positive for malignant cells. Poorly differentiated neuroendocrine carcinoma. IHC is positive for MCK, TTF-1 and synaptophysin while negative for desmoglein3, P40, NapsinA, CD20, PAX5, CD3.  Ki-67 is markedly high. [de-identified] : *Wife provided translation where needed\par Patient reports an enjoyable trip to [place] with his wife.\par He presents today prior to planned continued treatment with single agent Durvalumab.\par He has continued on steroids, currently 20mg daily. He reports that he increases dose to 30mg following treatment and then back down to 20mg for fear of recurrent esophagitis. He resumed the omeprazole as recommended.  He is able to eat and drink without difficulty.\par \par \par \par

## 2022-09-28 NOTE — PHYSICAL EXAM
[Restricted in physically strenuous activity but ambulatory and able to carry out work of a light or sedentary nature] : Status 1- Restricted in physically strenuous activity but ambulatory and able to carry out work of a light or sedentary nature, e.g., light house work, office work [Normal] : affect appropriate [de-identified] : No icterus  [de-identified] : MMM O/P Clear, No visible mucositis or erythema [de-identified] : Supple No LAD  [de-identified] : Clear  [de-identified] : S1 S2  [de-identified] : No edema  [de-identified] : No spine/CVA tenderness [de-identified] : Mediport in place

## 2022-09-28 NOTE — ASSESSMENT
[FreeTextEntry1] : Extensive stage small cell lung cancer diagnosed late March 2019 who was treated with chemotherapy plus immunotherapy from April-June 2019; achieved CR. Treated with immunotherapy maintenance from June 2019 through May 2021 with sustained response.  Developed immunotherapy related esophagitis; started on steroids in June 2021 with clinical response.  Restaging PET/CT March 2022 with disease progression.  Started re-treatment with Carboplatin/Etoposide chemotherapy March 2022; achieved GA. Durvalumab added with D4. Completed 5 cycles through early June 2022.  Hospitalized in June for STEMI.  Developed recurrence of esophagitis symptoms in June 2022 that responded to reintroduction of Prednisone.  Durvalumab held with eventual resumption of treatment in conjunction with steroids.  \par Restaging PET/CT Aug 2022 with sustained response.  \par Recommend: \par –Continue Durvalumab as scheduled; for treatment today.    \par -Esophagitis:  continue Prednisone.  Currently on 20mg daily. Continue Omeprazole for GI prophylaxis while on steroids\par -Continue Cardiology management with Dr. Figueroa \par -Continue symptom management under care of Palliative Care Dr. Moreno\par -Repeat labs today.  Continue levothyroxine; monitor periodic TFT's \par -Thrombocytopenia:  mild and possibly secondary to immunotherapy.  Monitor.  \par -Avoided addition of bone modifying agent given the dramatic response in the bones\par -Mediport in place\par -Insomnia:  on zolpidem. \par -F/U prior to treatment going forward or sooner if needed.

## 2022-09-30 ENCOUNTER — APPOINTMENT (OUTPATIENT)
Dept: CARDIOLOGY | Facility: CLINIC | Age: 67
End: 2022-09-30

## 2022-09-30 VITALS
BODY MASS INDEX: 30.06 KG/M2 | SYSTOLIC BLOOD PRESSURE: 102 MMHG | OXYGEN SATURATION: 96 % | WEIGHT: 210 LBS | DIASTOLIC BLOOD PRESSURE: 65 MMHG | HEART RATE: 71 BPM | HEIGHT: 70 IN | TEMPERATURE: 97.2 F | RESPIRATION RATE: 16 BRPM

## 2022-09-30 PROCEDURE — 99215 OFFICE O/P EST HI 40 MIN: CPT

## 2022-09-30 PROCEDURE — 93000 ELECTROCARDIOGRAM COMPLETE: CPT

## 2022-09-30 PROCEDURE — 93010 ELECTROCARDIOGRAM REPORT: CPT

## 2022-09-30 NOTE — PHYSICAL EXAM
[Normal] : normal conjunctiva [Normal Venous Pressure] : normal venous pressure [Normal S1, S2] : normal S1, S2 [No Rub] : no rub [No Gallop] : no gallop [Murmur] : murmur [Clear Lung Fields] : clear lung fields [Good Air Entry] : good air entry [No Respiratory Distress] : no respiratory distress  [Soft] : abdomen soft [Normal Gait] : normal gait [Gait - Sufficient for Exercise Testing] : gait - sufficient for exercise testing [No Edema] : no edema [No Cyanosis] : no cyanosis [No Clubbing] : no clubbing [Rash] : rash [Moves all extremities] : moves all extremities [No Focal Deficits] : no focal deficits [Alert and Oriented] : alert and oriented [de-identified] : soft systolic murmur at apex

## 2022-09-30 NOTE — ASSESSMENT
[FreeTextEntry1] : 67 year old man with multivessel coronary disease with occluded bypass grafts, seen initially after acute inferior STEMI while on PD1- inhibitor immunotherapy. Immune-related adverse event less likely in setting of patient with prior tolerance to immunotherapy for over a year. Increased atherosclerotic plaque progression is noted in the setting of immunotherapy, and in the setting of underlying disease may contribute, but not a contraindication to therapy if metastatic SCC is responding.\par \par Cardiac MRI no evidence of myocarditis and troponin I and T both negative.\par  \par \par # 3 Vessel CAD:\par - continue rosuvastatin 40\par - continue ezetimibe 10\par - Continue Entresto 24/26 BID, will not increase further given borderline BP and known carotid disease\par - aspirin and ticagrelor given recent STEMI and GORGE, hold ticagrelor if platelet < 75 or bleeding. Otherwise maintain DAPT for at least 3 months without interruption. Ideally would continue DAPT given extent of CAD and multiple prior interventions and risk factors.\par - Toprol 50 mg daily\par - LDL at goal\par \par # Dyspnea on exertion: euvolemic on examination. Residual CAD, CHF, lung cancer may contribute, improved with BB and empaglifozin.\par - repeat echo now to assess LV function and response to GDMT\par \par # HF with reduced EF due to ischemic cardiomyopathy\par - Entresto 24/26 BID\par - empaglifozin 10 mg daily\par - Toprol 50 mg daily\par - repeat echo now\par \par # Carotid artery atherosclerosis, left ICA > 70 %\par - antiplatelet and statin\par -  Vascular Interventional Cardiology assessment appreciated.\par - f/u repeat carotid duplex 10/3\par \par # PVCs:\par - continue Toprol\par \par Follow up in 3 months with me.\par \par Above recommendations discussed with the patient and his wife and all questions were answered to the best of my ability and to their apparent satisfaction. Advised to call with any question or concern.\par \par \par

## 2022-09-30 NOTE — REASON FOR VISIT
[Spouse] : spouse [FreeTextEntry3] : Dr. 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-1 inhibitor immunotherapy here for follow up of ischemic cardiomyopathy, coronary artery disease, and left internal carotid artery stenosis.\par \par Prior Cancer Treatments:\par ------------------------------------------------------------------------\par Chemo/targeted therapy:\par 4/2019: carboplatin, etoposide and atezolizumab x 4 followed by immunotherapy maintenance\par 3/2022: carboplatin/etoposide, durvalumab added May 2022\par 8/2022: resume durvalumab

## 2022-09-30 NOTE — REVIEW OF SYSTEMS
[Feeling Fatigued] : feeling fatigued [Dyspnea on exertion] : dyspnea during exertion [Leg Claudication] : no intermittent leg claudication [Rash] : rash [Negative] : Heme/Lymph [de-identified] : xerosis of palms bilateral

## 2022-09-30 NOTE — HISTORY OF PRESENT ILLNESS
[FreeTextEntry1] : Interval History:\par Started Jardiance and increased metoprolol with improvement in dyspnea\par Seen by Dr. Valnezuela for advice on carotid and coronary disease.  Follow up carotid duplex on 10/3 pending.\par Returns from [place] and reports feeling overall well, denies chest pain or new symptoms, rare dizziness when standing up quickly. Dry skin on palms.\par \par Remains on maintenance durvalumab, with prednisone because of immune checkpoint inhibitor related esophagitis.\par \par History:\par After second cycle of durvalumab (6/9/22), developed inferior STEMI. He presented to the ED on 6/14/2022 with acute retrosternal chest pain which began while watching TV at home.  EKG showed inferior STEMI. LHC with 100 % mCx at prior intervention site, treated with GORGE. About 2 weeks prior had similar episode of chest pain, which resolved with ibuprofen, but this time the pain persisted.\par \par Echo showed LVEF 45%, moderate-severe MR, hypokinesis of inferolateral wall and basal inferior wall, RV enlargement with decreased RVSF, and moderate pulmonary hypertension. Started on medical therapy and dismissed 6/16/2022. Troponin T peaked at 3210 ng/L and was 3123 ng/L on 6/16.\par \par Had issues with postural dizziness in the past.\par \par Since return home, he wife (who is a physician) notes pulse rate is at time irregular. The patient endorses dyspnea after a few steps, such as when using the bathroom at home. He denies any chest pain.\par \par Previously followed for many years by Dr. Misha Casas. \par \par 8/5/2022:\par cMRI no evidence of myocarditis\par L ICA with  moderate to severe calcified plaque\par Resumed duvalumab 7/22\par \par \par Cardiovascular Testing:\par ---------------------------------------\par ECG:\par 9/30/2022: sinus 65 bpm, inferior/posterior infarct (no change from prior)\par 7/1/2022: sinus 75 bpm with PVCs, inferior/posterior infarct\par 9/20/2021: sinus, minimal inferior Q wave, anterior TWI\par ---------------------------------------\par Echo:\par 7/11/2022: EF 42 %, moderate MR, basal inferior and inferolateral hypokinesis\par 6/14/2022: EF 45 %, Moderate-severe MR, mild AI, mild segmental LV systolic dysfunction (inferolateral and basal inferior), moderate pulmonary HTN\par 5/14/2020: LVEF 65%\par ---------------------------------------\par Stress:\par 10/7/2021: MPI no ischemia\par ---------------------------------------\par Cath/PCI\par 6/14/2022: PCI to mLCX ISR with GORGE\par 3/18/2019: GORGE x2 to LCx, GORGE to LAD, GORGE to Ramus (staged PCI)\par 1/16/2008: CABG ([facility] LIMA-LAD, DONTRELL-Ramus, SVG-RCA)\par 2007: BMS for chest pain with ISR\par ---------------------------------------\par Vascular:\par 7/11/2022: moderate to severe calcified plaque in L ICA, > 70 %, R ICA 16-49 %\par 5/4/2020: moderate heterogeneous plaque left carotid 50-69 %\par --------------------------------------\par cMRI:\par 7/26/2022: base and mid-cavity subendocardial LGE compatible with prior MI

## 2022-10-03 ENCOUNTER — OUTPATIENT (OUTPATIENT)
Dept: OUTPATIENT SERVICES | Facility: HOSPITAL | Age: 67
LOS: 1 days | End: 2022-10-03

## 2022-10-03 ENCOUNTER — APPOINTMENT (OUTPATIENT)
Dept: CV DIAGNOSITCS | Facility: HOSPITAL | Age: 67
End: 2022-10-03

## 2022-10-03 DIAGNOSIS — I77.9 DISORDER OF ARTERIES AND ARTERIOLES, UNSPECIFIED: ICD-10-CM

## 2022-10-03 DIAGNOSIS — I65.29 OCCLUSION AND STENOSIS OF UNSPECIFIED CAROTID ARTERY: ICD-10-CM

## 2022-10-03 DIAGNOSIS — Z92.89 PERSONAL HISTORY OF OTHER MEDICAL TREATMENT: ICD-10-CM

## 2022-10-03 PROCEDURE — 93880 EXTRACRANIAL BILAT STUDY: CPT | Mod: 26

## 2022-10-03 PROCEDURE — 93306 TTE W/DOPPLER COMPLETE: CPT | Mod: 26

## 2022-10-14 ENCOUNTER — RX RENEWAL (OUTPATIENT)
Age: 67
End: 2022-10-14

## 2022-10-16 ENCOUNTER — APPOINTMENT (OUTPATIENT)
Dept: CT IMAGING | Facility: IMAGING CENTER | Age: 67
End: 2022-10-16

## 2022-10-16 ENCOUNTER — OUTPATIENT (OUTPATIENT)
Dept: OUTPATIENT SERVICES | Facility: HOSPITAL | Age: 67
LOS: 1 days | End: 2022-10-16
Payer: COMMERCIAL

## 2022-10-16 DIAGNOSIS — I77.9 DISORDER OF ARTERIES AND ARTERIOLES, UNSPECIFIED: ICD-10-CM

## 2022-10-16 PROCEDURE — 70498 CT ANGIOGRAPHY NECK: CPT | Mod: 26

## 2022-10-16 PROCEDURE — 70498 CT ANGIOGRAPHY NECK: CPT

## 2022-10-17 ENCOUNTER — OUTPATIENT (OUTPATIENT)
Dept: OUTPATIENT SERVICES | Facility: HOSPITAL | Age: 67
LOS: 1 days | Discharge: HOME | End: 2022-10-17

## 2022-10-17 ENCOUNTER — RX RENEWAL (OUTPATIENT)
Age: 67
End: 2022-10-17

## 2022-10-17 DIAGNOSIS — C34.90 MALIGNANT NEOPLASM OF UNSPECIFIED PART OF UNSPECIFIED BRONCHUS OR LUNG: ICD-10-CM

## 2022-10-20 ENCOUNTER — APPOINTMENT (OUTPATIENT)
Dept: VASCULAR SURGERY | Facility: CLINIC | Age: 67
End: 2022-10-20

## 2022-10-20 VITALS
DIASTOLIC BLOOD PRESSURE: 70 MMHG | HEIGHT: 70 IN | SYSTOLIC BLOOD PRESSURE: 106 MMHG | HEART RATE: 78 BPM | BODY MASS INDEX: 30.64 KG/M2 | WEIGHT: 214 LBS | TEMPERATURE: 98 F

## 2022-10-20 PROCEDURE — 99204 OFFICE O/P NEW MOD 45 MIN: CPT

## 2022-10-21 ENCOUNTER — RESULT REVIEW (OUTPATIENT)
Age: 67
End: 2022-10-21

## 2022-10-21 ENCOUNTER — APPOINTMENT (OUTPATIENT)
Dept: HEMATOLOGY ONCOLOGY | Facility: CLINIC | Age: 67
End: 2022-10-21

## 2022-10-21 ENCOUNTER — APPOINTMENT (OUTPATIENT)
Dept: INFUSION THERAPY | Facility: HOSPITAL | Age: 67
End: 2022-10-21

## 2022-10-21 VITALS
RESPIRATION RATE: 16 BRPM | HEIGHT: 70 IN | DIASTOLIC BLOOD PRESSURE: 58 MMHG | BODY MASS INDEX: 30.08 KG/M2 | HEART RATE: 88 BPM | WEIGHT: 210.1 LBS | OXYGEN SATURATION: 98 % | TEMPERATURE: 97 F | SYSTOLIC BLOOD PRESSURE: 96 MMHG

## 2022-10-21 LAB
ALBUMIN SERPL ELPH-MCNC: 4.1 G/DL — SIGNIFICANT CHANGE UP (ref 3.3–5)
ALP SERPL-CCNC: 45 U/L — SIGNIFICANT CHANGE UP (ref 40–120)
ALT FLD-CCNC: 24 U/L — SIGNIFICANT CHANGE UP (ref 10–45)
ANION GAP SERPL CALC-SCNC: 12 MMOL/L — SIGNIFICANT CHANGE UP (ref 5–17)
AST SERPL-CCNC: 12 U/L — SIGNIFICANT CHANGE UP (ref 10–40)
BILIRUB SERPL-MCNC: 0.4 MG/DL — SIGNIFICANT CHANGE UP (ref 0.2–1.2)
BUN SERPL-MCNC: 16 MG/DL — SIGNIFICANT CHANGE UP (ref 7–23)
CALCIUM SERPL-MCNC: 9.6 MG/DL — SIGNIFICANT CHANGE UP (ref 8.4–10.5)
CHLORIDE SERPL-SCNC: 104 MMOL/L — SIGNIFICANT CHANGE UP (ref 96–108)
CO2 SERPL-SCNC: 23 MMOL/L — SIGNIFICANT CHANGE UP (ref 22–31)
CREAT SERPL-MCNC: 0.7 MG/DL — SIGNIFICANT CHANGE UP (ref 0.5–1.3)
EGFR: 100 ML/MIN/1.73M2 — SIGNIFICANT CHANGE UP
GLUCOSE SERPL-MCNC: 140 MG/DL — HIGH (ref 70–99)
LDH SERPL L TO P-CCNC: 210 U/L — SIGNIFICANT CHANGE UP (ref 50–242)
POTASSIUM SERPL-MCNC: 4.6 MMOL/L — SIGNIFICANT CHANGE UP (ref 3.5–5.3)
POTASSIUM SERPL-SCNC: 4.6 MMOL/L — SIGNIFICANT CHANGE UP (ref 3.5–5.3)
PROT SERPL-MCNC: 6.2 G/DL — SIGNIFICANT CHANGE UP (ref 6–8.3)
SODIUM SERPL-SCNC: 140 MMOL/L — SIGNIFICANT CHANGE UP (ref 135–145)

## 2022-10-21 PROCEDURE — 99214 OFFICE O/P EST MOD 30 MIN: CPT

## 2022-10-22 LAB
HCT VFR BLD CALC: 38.9 % — LOW (ref 39–50)
HGB BLD-MCNC: 13.6 G/DL — SIGNIFICANT CHANGE UP (ref 13–17)
MCHC RBC-ENTMCNC: 31.6 PG — SIGNIFICANT CHANGE UP (ref 27–34)
MCHC RBC-ENTMCNC: 35 GM/DL — SIGNIFICANT CHANGE UP (ref 32–36)
MCV RBC AUTO: 90.3 FL — SIGNIFICANT CHANGE UP (ref 80–100)
PLATELET # BLD AUTO: 112 K/UL — LOW (ref 150–400)
RBC # BLD: 4.31 M/UL — SIGNIFICANT CHANGE UP (ref 4.2–5.8)
RBC # FLD: 14.6 % — HIGH (ref 10.3–14.5)
WBC # BLD: 7.1 K/UL — SIGNIFICANT CHANGE UP (ref 3.8–10.5)
WBC # FLD AUTO: 7.1 K/UL — SIGNIFICANT CHANGE UP (ref 3.8–10.5)

## 2022-10-24 DIAGNOSIS — E03.9 HYPOTHYROIDISM, UNSPECIFIED: ICD-10-CM

## 2022-10-24 DIAGNOSIS — C34.12 MALIGNANT NEOPLASM OF UPPER LOBE, LEFT BRONCHUS OR LUNG: ICD-10-CM

## 2022-10-24 DIAGNOSIS — Z51.11 ENCOUNTER FOR ANTINEOPLASTIC CHEMOTHERAPY: ICD-10-CM

## 2022-10-26 ENCOUNTER — NON-APPOINTMENT (OUTPATIENT)
Age: 67
End: 2022-10-26

## 2022-10-27 NOTE — PHYSICAL EXAM
[Restricted in physically strenuous activity but ambulatory and able to carry out work of a light or sedentary nature] : Status 1- Restricted in physically strenuous activity but ambulatory and able to carry out work of a light or sedentary nature, e.g., light house work, office work [Normal] : affect appropriate [de-identified] : No icterus  [de-identified] : MMM O/P Clear, No visible mucositis or erythema [de-identified] : Supple No LAD  [de-identified] : Clear  [de-identified] : No edema  [de-identified] : S1 S2  [de-identified] : No spine/CVA tenderness [de-identified] : Mediport in place

## 2022-10-27 NOTE — RESULTS/DATA
[FreeTextEntry1] : -CT chest 3/25/19: Infiltrative left hilar mass extending to the left upper lobe and left mediastinum compatible with neoplasm with encasement of the pulmonary artery and left upper lobe bronchial structures with collapse of the left upper lobe. Lytic lesions in the upper thoracic spine. Innumerable hypodense masses throughout the liver compatible with metastases. Small left pleural effusion.\par \par -CT C/A/P 4/5/19:  Large mass in the left hilum extending into the left upper lobe consistent with bronchogenic malignancy with possible metastatic lymphadenopathy. Significant compression of the left lobe pulmonary artery with obstruction of the left upper lobe bronchus with resultant atelectasis. Innumerable lesions throughout the liver. Upper abdominal lymphadenopathy. Bony metastases of the spine, pelvis and bilateral hips.\par \par -MRI brain/T-spine in 3/28/19: Negative for metastatic disease involving the brain. Of the central canal at T9-T10 concerning for epidural metastasis.\par \par -Nuclear medicine bone scan 4/5/19: Focal uptake in one of the right lower lateral ribs, possibly the 10th rib, likely represents a small fracture. Metastatic disease is less likely. No additional areas of abnormal uptake are noted.\par \par -PET/CT 6/26/19: Markedly improved positive treatment response. The previously seen a large left-sided upper lobe lung mass with hilar extension is markedly reduced in size with mild or residual ill-defined soft tissue density at the left hilar region now demonstrating only partial invasion of the left upper lobe bronchus. No separate mediastinal lymphadenopathy is noted. Markedly improved appearance of both the numerous metastatic liver lesions and hepatomegaly. Interval resolution of previously seen upper abdominal lymphadenopathy. No residual active osseous metastatic disease.\par \par -PET/CT 10/17/19:  FDG-PET/CT scan demonstrates: \par 1. Minimally FDG-avid, small, difficult to delineate left upper lobe/perihilar soft tissue, unchanged as compared to prior study dated 6/25/2019. Small focus of residual disease is not excluded. \par 2. Non-FDG-avid low-attenuation density in pretracheal cricoid region, unchanged. \par 3. Remainder of study is unremarkable, demonstrating no evidence of FDG-avid disease. \par \par -MRI Brain 10/20/19: No evidence of metastasis\par \par -PET/CT 1/15/20:  FDG-PET/CT scan demonstrates: \par 1. New, indeterminate linear focus of mild FDG activity in left suprahilar/paramediastinal region (SUV 4.1). \par 2. Minimally FDG-avid, small, difficult to delineate left upper lobe/perihilar soft tissue, unchanged as compared to prior study dated 6/25/2019. Small focus of residual disease is not excluded. \par 3. Remainder of study is unchanged, demonstrating no evidence of FDG-avid disease. \par \par -CT Abdomen/Pelvis 2/19/20:  No evidence of focal bowel wall thickening or distention.  Right inguinal hernia \par \par -CT Chest 4/8/20: Stable disease\par \par -MRI Brain 3/5/20: New opacification involving the right mastoid and middle ear region as described above, otherwise no significant change when allowing for differences in technique. \par \par -PET/CT 6/25/20:  Compared to FDG-PET/CT scan dated 1/15/2020: \par 1. Focus of mild FDG activity in left upper lobe paramediastinal region is unchanged. This may reflect posttreatment change. \par 2. Non-FDG-avid 7 mm nodular density within posterior left upper lobe, also unchanged. \par 3. Nonspecific hypermetabolism in distal esophagus and fundus/body of stomach, mildly decreased. Correlate clinically for esophagitis/gastritis and with endoscopy, as indicated. \par 4. Remainder of study is unchanged, demonstrating no evidence of FDG-avid recurrent or metastatic disease. \par \par -PET/CT 9/1/20:  Compared to FDG-PET/CT scan dated 6/25/2020: \par 1. Resolution of focus of mild FDG activity in left upper lobe paramediastinal region. \par 2. Non-FDG-avid 7 mm nodular density within posterior left upper lobe, unchanged. \par 3. Nonspecific hypermetabolism in distal esophagus and fundus/body, unchanged. \par 4. No evidence of FDG-avid recurrent or metastatic disease. \par \par -MRI Brain 12/14/20:  No evidence acute hemorrhage, mass mass effect or abnormal enhancement seen.  Improved aeration of the right mastoid and middle ear region.\par \par -PET/CT 12/14/20:  Compared to FDG-PET/CT scan on 9/1/2020:\par 1. Nonspecific minimal increased FDG avidity in the gastric fundus/body. Please correlate clinically or with endoscopy as indicated.\par 2. Non-FDG avid 7 mm nodular density within the posterior left upper lobe, unchanged.\par 3. New focal cutaneous FDG avidity along the left lower aspect of the face, probably focal inflammation. Please correlate with physical examination.\par 4. Nonspecific new focal mild hypermetabolism in the anterior aspect of the hyoid bone just to the right of the midline without CT correlate. Unchanged non-FDG avid low-attenuation density in the precricoid region. Please correlate clinically.\par \par -PET/CT 3/5/21:  Compared to FDG-PET/CT scan dated 12/14/2020:\par 1. Few new FDG avid left axillary lymph nodes are nonspecific. Suggest correlation with ultrasound and possible percutaneous FNA biopsy as indicated.\par 2. Nonspecific FDG avidity in mid to distal esophagus, gastric fundus/body, slightly more prominent, with new FDG avidity in the pylorus. Please correlate clinically or with endoscopy as indicated.\par 3. Nonspecific non-FDG avid groundglass opacity in the anterior right upper lobe, more conspicuous.\par 4. Interval resolution of focal mild hypermetabolism in the anterior aspect of the hyoid bone and focal cutaneous FDG avidity in the left lower face. Unchanged non-FDG avid low-attenuation density in the cricoarytenoid region.\par \par -Esophagram: Small hiatal hernia without demonstrated gastroesophageal reflux. There is esophagoesophageal reflux.\par \par -Pet/CT 6/20/21:  Compared to FDG-PET/CT scan dated 3/5/2021:\par 1. Interval resolution of a few mildly FDG avid left axillary lymph nodes and small mildly FDG avid foci in bilateral hilar regions.\par 2. Nonspecific FDG avidity in mid to distal esophagus, gastric boy and pylorus, unchanged. Please correlate clinically or with endoscopy as indicated.\par 3. Unchanged nonspecific non-FDG avid groundglass opacity in the anterior right upper lobe.\par 4. Non-FDG avid low-attenuation density in the pretracheal region, decreased in size.\par \par -MRI Brain 6/21/21:  There is no intraparenchymal hematoma, mass effect or midline shift.  No evidence of intracranial metastatic disease. Slight interval increase in density within the right mastoid and middle ear regions when compared most recent prior examination.\par \par -PET/CT 9/13/21:  Compared to FDG-PET/CT scan dated 6/20/2021:\par 1. New cluster of FDG-avid nodules in left upper lobe. Primary consideration is infection/mucoid impacted distal airways. Please correlate clinically and follow-up with dedicated CT of chest in 1 month.\par 2. Nonspecific FDG avidity in mid and distal esophagus, unchanged. Resolution of gastric hypermetabolism.\par 3. Nonspecific, non-FDG avid right upper lobe groundglass opacity, unchanged.\par 4. Non-FDG avid low-attenuation density in the pretracheal region, unchanged.\par \par -MRI Brain 12/1/21:  No significant change when allowing for differences in technique.\par \par -PET/CT 12/6/21:  Compared to FDG-PET/CT scan dated 6/20/2021:\par 1. Mildly FDG-avid cluster of nodules in left upper lobe, unchanged on CT, and decreased in metabolism. An infectious/inflammatory etiology is favored. Please correlate clinically. A dedicated CT of chest may be obtained for further characterization.\par 2. Nonspecific FDG avidity in mid and distal esophagus, unchanged.\par 3. Nonspecific, non-FDG avid right upper lobe groundglass opacity, unchanged.\par 4. Non-FDG avid low-attenuation density in the pretracheal region, unchanged.\par \par -PET/CT 3/7/22:  Compared to FDG-PET/CT scan dated 12/6/2021:\par 1. FDG-avid, centrally photopenic left upper lobe lung mass and adjacent FDG-avid nodules, markedly increased in size and metabolism as compared to prior study, are compatible with recurrent disease.\par 2. Nonspecific FDG avidity in mid and distal esophagus, unchanged.\par 3. Nonspecific, non-FDG avid right upper lobe groundglass opacity, unchanged.\par 4. Non-FDG avid low-attenuation density in the pretracheal region, unchanged.\par \par -Brain MRI 3/30/22: \par 1. Age appropriate involutional changes.\par 2. No abnormal intraparenchymal or leptomeningeal enhancement. No evidence of intracranial metastases or leptomeningeal carcinomatosis.\par 3. Presence of fluid again appreciated within the right-sided mastoid air cells, slightly increased compared with prior. Correlate clinically for mastoiditis.\par \par –CT Chest 5/9/22:  In comparison with 3/7/2022, interval decrease of left upper lobe mass. No new or enlarging nodule.\par \par -CT Head 6/14/22: No evidence of acute hemorrhage, mass or mass effect.\par \par Images Reviewed/Interpreted:\par \par –Cardiac MRI 7/26/22:  At the base and midcavity, the combination of subendocardial late gadolinium enhancement, myocardial wall thinning and wall motion abnormalities are most likely secondary to prior myocardial infarction rather than myocarditis.\par \par -PET/CT 8/15/22:  Compared with FDG PET/CT scan 3/7/2022 as well as additional correlations as above:\par 1. Since prior PET/CT, LEFT UPPER lobe lung mass shows marked decrease in activity, with serial anatomic improvement on most recent chest CT as well as the current study.\par 2. Esophageal uptake stable, likely related to patient's reported prior immunotherapy-related esophagitis.\par 3. Nonspecific, non-FDG avid right upper lobe groundglass opacity, unchanged.\par 4. Non-FDG avid low-attenuation density in the pretracheal region, unchanged.\par 5. No NEW suspicious findings otherwise noted.\par \par Images Reviewed/Interpreted:\par \par –CT Neck Angio 10/16/22:  \par 1. Severe stenosis/near occlusion of the left proximal cervical ICA.\par 2. A 1.3 x 1.1 x 1.1 cm soft tissue attenuation lesion in the midline anterior neck abutting the strap muscles and the inferior aspect of the hyoid bone. Recommend CT neck with contrast for further evaluation.\par 3. Redemonstration of a left upper lobe pulmonary mass.\par \par

## 2022-10-27 NOTE — HISTORY OF PRESENT ILLNESS
[Disease: _____________________] : Disease: [unfilled] [AJCC Stage: ____] : AJCC Stage: [unfilled] [de-identified] : The patient is a former smoker with a history of CAD who developed chest pain in March 2019 and had a cardiac stent placed. When his chest pain persisted, he was sent for a CT scan of his chest in late March 2019 that revealed a left upper lobe lung mass with evidence of metastatic disease. 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 CT scan revealed evidence of extensive bony and liver metastases. MRI of his brain was negative for brain metastases however there was extensive calvarial and T-spine metastases with possible evidence of epidural involvement. He was treated in [place] by an outside medical oncologist and started on carboplatin, etoposide and atezolizumab in early April 2019. He received 4 cycles of triplet combination therapy followed by immunotherapy maintenance. A restaging PET/CT scan in June 2019 revealed a dramatic response. He moved from [place] and transferred his care to [facility].  \par Treated with immunotherapy maintenance from June 2019 through May 2021 with sustained response.  \par Developed severe dysphagia and odynophagia.  He was treated for H-pylori and symptoms continued; H Pylori stool Ag was negative.  PET/CT with findings consistent with esophagitis/gastritis.  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  Restaging PET/CT March 2022 with disease progression.  Started chemotherapy with Carboplatin/Etoposide in March 2022; Durvalumab was added with C4. Patient completed C5 with Carbo (retreat)/Etoposide/Durvalumab in early June 2022.  Hospitalized with STEMI in June 2022.  Esophagitis symptoms returned in June 2022.  Upon clinical improvement with restarting steroids, durvalumab was continued. [de-identified] : -Lung, left hilar FNA 3/29/19: Positive for malignant cells. Poorly differentiated neuroendocrine carcinoma. IHC is positive for MCK, TTF-1 and synaptophysin while negative for desmoglein3, P40, NapsinA, CD20, PAX5, CD3.  Ki-67 is markedly high. [de-identified] : *Wife provided translation where needed\par He presents today prior to planned continued treatment with single agent Durvalumab.\par He has continued on steroids for esophagitis Sx and he notes an increase in symptomatology when trying to decrease Prednisone below 40mg daily; he is currently on 40mg daily.  Continues on PPI.  He is able to eat and drink without difficulty.\par Follows with Cardiology and Vascular for his CAD and Vascular/carotid disease. \par He was sent for CT Neck Angio as outlined; incidentally noted is a soft tissue lesion in the mid-anterior neck of unclear etiology.  \par \par

## 2022-10-27 NOTE — ASSESSMENT
[FreeTextEntry1] : Extensive stage small cell lung cancer diagnosed late March 2019 who was treated with chemotherapy plus immunotherapy from April-June 2019; achieved CR. Treated with immunotherapy maintenance from June 2019 through May 2021 with sustained response.  Developed immunotherapy related esophagitis; started on steroids in June 2021 with clinical response.  Restaging PET/CT March 2022 with disease progression.  Started re-treatment with Carboplatin/Etoposide chemotherapy March 2022; achieved [place]. Durvalumab added with D4. Completed 5 cycles through early June 2022.  Hospitalized in June for STEMI.  Developed recurrence of esophagitis symptoms in June 2022 that responded to reintroduction of Prednisone.  Durvalumab held with eventual resumption of treatment in conjunction with steroids.  \par Restaging PET/CT Aug 2022 with sustained response.  \par Recommend: \par –Continue Durvalumab as scheduled; for treatment today.    \par -Esophagitis:  continue Prednisone.  Currently on 40mg daily; he has been unable to decrease below this dosing unfortunatly. Continue Omeprazole for GI prophylaxis while on steroids\par -Continue Cardiology and Vascular management.  To have TCAR-trans carotid stent performed in November \par -Continue symptom management under care of Palliative Care Dr. Moreno\par -Repeat labs today.  Continue levothyroxine; monitor periodic TFT's \par -Thrombocytopenia:  mild and possibly secondary to immunotherapy.  Monitor.  \par -Avoided addition of bone modifying agent given the dramatic response in the bones\par -Mediport in place\par -F/U prior to next cycle with with restaging scan obtained beforehand, or sooner if needed.  Will start with the PET/CT to further evaluate the cervical soft tissue finding noted on CT Neck.  \par -Wife inquired about role of surgical resection in the management of his disease; discussed that this was unlikely given the disease and histology, but can explore this at tumor board discussion if needed

## 2022-11-14 ENCOUNTER — APPOINTMENT (OUTPATIENT)
Dept: NUCLEAR MEDICINE | Facility: IMAGING CENTER | Age: 67
End: 2022-11-14

## 2022-11-14 ENCOUNTER — OUTPATIENT (OUTPATIENT)
Dept: OUTPATIENT SERVICES | Facility: HOSPITAL | Age: 67
LOS: 1 days | End: 2022-11-14
Payer: COMMERCIAL

## 2022-11-14 DIAGNOSIS — C34.12 MALIGNANT NEOPLASM OF UPPER LOBE, LEFT BRONCHUS OR LUNG: ICD-10-CM

## 2022-11-14 PROCEDURE — 78815 PET IMAGE W/CT SKULL-THIGH: CPT | Mod: 26,PS

## 2022-11-14 PROCEDURE — 78815 PET IMAGE W/CT SKULL-THIGH: CPT

## 2022-11-14 PROCEDURE — A9552: CPT

## 2022-11-18 ENCOUNTER — APPOINTMENT (OUTPATIENT)
Dept: INFUSION THERAPY | Facility: HOSPITAL | Age: 67
End: 2022-11-18

## 2022-11-18 ENCOUNTER — APPOINTMENT (OUTPATIENT)
Dept: CARDIOLOGY | Facility: CLINIC | Age: 67
End: 2022-11-18

## 2022-11-18 ENCOUNTER — OUTPATIENT (OUTPATIENT)
Dept: OUTPATIENT SERVICES | Facility: HOSPITAL | Age: 67
LOS: 1 days | End: 2022-11-18

## 2022-11-18 ENCOUNTER — APPOINTMENT (OUTPATIENT)
Dept: HEMATOLOGY ONCOLOGY | Facility: CLINIC | Age: 67
End: 2022-11-18

## 2022-11-18 VITALS
HEIGHT: 71.5 IN | WEIGHT: 207.9 LBS | TEMPERATURE: 98 F | DIASTOLIC BLOOD PRESSURE: 72 MMHG | SYSTOLIC BLOOD PRESSURE: 121 MMHG | OXYGEN SATURATION: 98 % | HEART RATE: 77 BPM | RESPIRATION RATE: 15 BRPM

## 2022-11-18 VITALS
TEMPERATURE: 97.2 F | WEIGHT: 211.2 LBS | DIASTOLIC BLOOD PRESSURE: 71 MMHG | HEIGHT: 70 IN | HEART RATE: 83 BPM | BODY MASS INDEX: 30.24 KG/M2 | RESPIRATION RATE: 16 BRPM | SYSTOLIC BLOOD PRESSURE: 109 MMHG | OXYGEN SATURATION: 97 %

## 2022-11-18 DIAGNOSIS — Z91.89 OTHER SPECIFIED PERSONAL RISK FACTORS, NOT ELSEWHERE CLASSIFIED: ICD-10-CM

## 2022-11-18 DIAGNOSIS — Z98.890 OTHER SPECIFIED POSTPROCEDURAL STATES: Chronic | ICD-10-CM

## 2022-11-18 DIAGNOSIS — E03.9 HYPOTHYROIDISM, UNSPECIFIED: ICD-10-CM

## 2022-11-18 DIAGNOSIS — I65.29 OCCLUSION AND STENOSIS OF UNSPECIFIED CAROTID ARTERY: ICD-10-CM

## 2022-11-18 DIAGNOSIS — R06.09 OTHER FORMS OF DYSPNEA: ICD-10-CM

## 2022-11-18 DIAGNOSIS — I25.10 ATHEROSCLEROTIC HEART DISEASE OF NATIVE CORONARY ARTERY WITHOUT ANGINA PECTORIS: ICD-10-CM

## 2022-11-18 DIAGNOSIS — Z95.1 PRESENCE OF AORTOCORONARY BYPASS GRAFT: Chronic | ICD-10-CM

## 2022-11-18 DIAGNOSIS — I65.22 OCCLUSION AND STENOSIS OF LEFT CAROTID ARTERY: ICD-10-CM

## 2022-11-18 LAB
ALBUMIN SERPL ELPH-MCNC: 4.3 G/DL — SIGNIFICANT CHANGE UP (ref 3.3–5)
ALP SERPL-CCNC: 47 U/L — SIGNIFICANT CHANGE UP (ref 40–120)
ALT FLD-CCNC: 28 U/L — SIGNIFICANT CHANGE UP (ref 4–41)
ANION GAP SERPL CALC-SCNC: 11 MMOL/L — SIGNIFICANT CHANGE UP (ref 7–14)
AST SERPL-CCNC: 12 U/L — SIGNIFICANT CHANGE UP (ref 4–40)
BILIRUB SERPL-MCNC: 0.7 MG/DL — SIGNIFICANT CHANGE UP (ref 0.2–1.2)
BLD GP AB SCN SERPL QL: NEGATIVE — SIGNIFICANT CHANGE UP
BUN SERPL-MCNC: 16 MG/DL — SIGNIFICANT CHANGE UP (ref 7–23)
CALCIUM SERPL-MCNC: 10.2 MG/DL — SIGNIFICANT CHANGE UP (ref 8.4–10.5)
CHLORIDE SERPL-SCNC: 102 MMOL/L — SIGNIFICANT CHANGE UP (ref 98–107)
CO2 SERPL-SCNC: 25 MMOL/L — SIGNIFICANT CHANGE UP (ref 22–31)
CREAT SERPL-MCNC: 0.68 MG/DL — SIGNIFICANT CHANGE UP (ref 0.5–1.3)
EGFR: 101 ML/MIN/1.73M2 — SIGNIFICANT CHANGE UP
GLUCOSE SERPL-MCNC: 135 MG/DL — HIGH (ref 70–99)
POTASSIUM SERPL-MCNC: 4.8 MMOL/L — SIGNIFICANT CHANGE UP (ref 3.5–5.3)
POTASSIUM SERPL-SCNC: 4.8 MMOL/L — SIGNIFICANT CHANGE UP (ref 3.5–5.3)
PROT SERPL-MCNC: 7.2 G/DL — SIGNIFICANT CHANGE UP (ref 6–8.3)
RH IG SCN BLD-IMP: POSITIVE — SIGNIFICANT CHANGE UP
SODIUM SERPL-SCNC: 138 MMOL/L — SIGNIFICANT CHANGE UP (ref 135–145)

## 2022-11-18 PROCEDURE — 99214 OFFICE O/P EST MOD 30 MIN: CPT

## 2022-11-18 RX ORDER — TAMSULOSIN HYDROCHLORIDE 0.4 MG/1
1 CAPSULE ORAL
Qty: 0 | Refills: 0 | DISCHARGE

## 2022-11-18 NOTE — H&P PST ADULT - PROBLEM SELECTOR PLAN 3
Patient with PMH of CAD s/p Drug eluding stent x2 in 2019, s/p CABG and PCI- 06/2022, metastatic small cell lung cancer, Ischemic cardiomyopathy-     Patient had recent STEMI in 06/2022- s/p cath PCI to mLCX with Drug eluding stent, with mild CASH- requesting cardiology evaluation prior to surgery. Patient had appointment with Dr Bowers on 11/18/2022 for clearance.  EKG, ECHO results in chart.    Patient instructed to take metoprolol, Entresto with a sip of water on the morning of procedure.  As per patient Dr Grissom- instructed pt to continue aspirin and Brilinta- Emailed surgeon to verify the pre op recommendations.

## 2022-11-18 NOTE — H&P PST ADULT - NSICDXPASTMEDICALHX_GEN_ALL_CORE_FT
PAST MEDICAL HISTORY:  CAD (coronary artery disease)     Dysphagia     H/O candidiasis of mouth     H/O Helicobacter infection     HTN (hypertension)     Hyperlipidemia     Hypothyroidism     Lung cancer     Status post chemotherapy     STEMI (ST elevation myocardial infarction) 06/14/2022

## 2022-11-18 NOTE — H&P PST ADULT - ACTIVITY
walks, ADLs, can climb 1 flight with no chest pain or dyspnea walks, ADLs, can climb 1 flight with no chest pain - mild dyspnea

## 2022-11-18 NOTE — H&P PST ADULT - LAST CARDIAC ANGIOGRAM/IMAGING
06/14/2022- S/p PCI  to mLCX with GORGE 06/14/2022- S/p PCI  to mLCX with Drug elluding stent cardiac MRI 07/2022- Subendocardial LGE compatible with prior MI

## 2022-11-18 NOTE — H&P PST ADULT - PROBLEM SELECTOR PLAN 1
Patient is tentatively scheduled for surgery- Left Transcarotid artery revascularization with Dr Grissom on 12/06/2022.    Pre-op instructions provided. Pt given verbal and written instructions with teach back on pre op instructions, pt takes own pantoprazole. Pt verbalized understanding with return demonstration.    Routine Covid PCR test ordered .Instructions regarding covid PCR test and locations for covid testing site provided. Pt verbalized understanding

## 2022-11-18 NOTE — H&P PST ADULT - NEGATIVE ENMT SYMPTOMS
no hearing difficulty/no ear pain/no tinnitus/no vertigo/no sinus symptoms/no nasal congestion no hearing difficulty/no ear pain/no tinnitus/no vertigo/no sinus symptoms/no nasal congestion/no post-nasal discharge/no throat pain/no dysphagia

## 2022-11-18 NOTE — H&P PST ADULT - NSICDXPASTSURGICALHX_GEN_ALL_CORE_FT
PAST SURGICAL HISTORY:  H/O coronary angiogram 2019, 2022- s/p Drug elluding stent X3     PAST SURGICAL HISTORY:  H/O coronary angiogram 2019, 2022- s/p Drug elluding stent X3    S/P CABG x 3 2008

## 2022-11-18 NOTE — H&P PST ADULT - HISTORY OF PRESENT ILLNESS
67 year old male with PMH of CAD, s/p CABG and PCI- 06/2022, metastatic small cell lung cancer, Ischemic cardiomyopathy, presents to PST, with pre op diagnosis of occlusion and stenosis of left ICA, for pre op evaluation prior to scheduled surgery- Left Transcarotid artery revascularization with Dr Grissom.

## 2022-11-19 NOTE — REASON FOR VISIT
[Spouse] : spouse [FreeTextEntry3] : Dr. 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-1 inhibitor immunotherapy here for follow up of ischemic cardiomyopathy, coronary artery disease, and left internal carotid artery stenosis.\par \par Prior Cancer Treatments:\par ------------------------------------------------------------------------\par Chemo/targeted therapy:\par 4/2019: carboplatin, etoposide and atezolizumab x 4 followed by immunotherapy maintenance\par 3/2022: carboplatin/etoposide, durvalumab added May 2022\par 8/2022: resumed durvalumab

## 2022-11-19 NOTE — HISTORY OF PRESENT ILLNESS
[FreeTextEntry1] : Interval History:\par Had severe LICA stenosis and was referred to Dr. Grissom for TECAR because of high risk for transfemoral stenting or surgical endarterectomy. TECAR scheduled December 6th and he comes for pre-operative clearance today. The most recent PET/CT demonstrated inflammatory changes in the lungs, possibly immunotherapy related pneumonitis, so his scheduled treatment was cancelled today and he will be treated with antibiotics empirically.\par \par He complains of significant fatigue, but denies any chest pain, shortness of breath or changes in his functional capacity. He is able to walk and complete > 4 METS. He is adherent to his medications. He also remains on corticosteroids for ICI related esophagitis, which he is currently tapering.\par \par \par \par History:\par After second cycle of durvalumab (6/9/22), developed inferior STEMI. He presented to the ED on 6/14/2022 with acute retrosternal chest pain which began while watching TV at home.  EKG showed inferior STEMI. LHC with 100 % mCx at prior intervention site, treated with GORGE. About 2 weeks prior had similar episode of chest pain, which resolved with ibuprofen, but this time the pain persisted.\par \par Echo showed LVEF 45%, moderate-severe MR, hypokinesis of inferolateral wall and basal inferior wall, RV enlargement with decreased RVSF, and moderate pulmonary hypertension. Started on medical therapy and dismissed 6/16/2022. Troponin T peaked at 3210 ng/L and was 3123 ng/L on 6/16.\par \par Had issues with postural dizziness in the past.\par \par Since return home, he wife (who is a physician) notes pulse rate is at time irregular. The patient endorses dyspnea after a few steps, such as when using the bathroom at home. He denies any chest pain.\par \par Previously followed for many years by Dr. Misha Casas. \par \par 8/5/2022:\par cMRI no evidence of myocarditis\par L ICA with  moderate to severe calcified plaque\par Resumed duvalumab 7/22\par \par 9/30/2022:\par Dyspnea improved after Jardiance and increased metoprolol.\par Seen by Dr. Valenzuela for advice on carotid and coronary disease\par Returned from [place] and feeling overall well, denies chest pain, sometimes dizziness after standing up quickly. Dry skin on palms.\par Remains on maintenance durvalumab, with prednisone because of immune checkpoint inhibitor related esophagitis\par \par \par Cardiovascular Testing:\par ---------------------------------------\par ECG:\par 9/30/2022: sinus 65 bpm, inferior/posterior infarct (no change from prior)\par 7/1/2022: sinus 75 bpm with PVCs, inferior/posterior infarct\par 9/20/2021: sinus, minimal inferior Q wave, anterior TWI\par ---------------------------------------\par Echo:\par 10/3/2022: LVEF 49 %, mild MR, GLS -15.9\par 7/11/2022: EF 42 %, moderate MR, basal inferior and inferolateral hypokinesis\par 6/14/2022: EF 45 %, Moderate-severe MR, mild AI, mild segmental LV systolic dysfunction (inferolateral and basal inferior), moderate pulmonary HTN\par 5/14/2020: LVEF 65%\par ---------------------------------------\par Stress:\par 10/7/2021: MPI no ischemia\par ---------------------------------------\par Cath/PCI\par 6/14/2022: PCI to mLCX ISR with GORGE\par 3/18/2019: GORGE x2 to LCx, GORGE to LAD, GORGE to Ramus (staged PCI)\par 1/16/2008: CABG ([facility] [place] LIMA-LAD, DONTRELL-Ramus, SVG-RCA)\par 2007: BMS for chest pain with ISR\par ---------------------------------------\par Vascular:\par 10/16/2022: CT-A neck: severe stenosis and occlusion of L prox cervical ICA\par 7/11/2022: moderate to severe calcified plaque in L ICA, > 70 %, R ICA 16-49 %\par 5/4/2020: moderate heterogeneous plaque left carotid 50-69 %\par --------------------------------------\par cMRI:\par 7/26/2022: base and mid-cavity subendocardial LGE compatible with prior MI

## 2022-11-19 NOTE — REVIEW OF SYSTEMS
[Feeling Fatigued] : feeling fatigued [Negative] : Heme/Lymph [Leg Claudication] : no intermittent leg claudication

## 2022-11-19 NOTE — ASSESSMENT
[FreeTextEntry1] : 67 year old man with multivessel coronary disease with occluded bypass grafts, seen initially after acute inferior STEMI while on PD1- inhibitor immunotherapy. Immune-related adverse event less likely in setting of patient with prior tolerance to immunotherapy for over a year. Increased atherosclerotic plaque progression is noted in the setting of immunotherapy, and in the setting of underlying disease may contribute, but not a contraindication to therapy if metastatic SCC is responding. Cardiac MRI no evidence of myocarditis and troponin I and T both negative.\par  \par He is at increased risk for complications from anesthesia, but optimized to proceed as scheduled from the cardiovascular point of view. It would be good to clarify the inflammatory changes noted on the recent scan since he is at increased risk for infection given the need for chronic steroids for ICI related esophagitis. However, he has no symptoms (except for generalized fatigue - not new). \par \par \par # 3 Vessel CAD:\par - continue rosuvastatin 40\par - continue ezetimibe 10\par - Continue Entresto 24/26 BID, will not increase further given borderline BP and known carotid disease\par - aspirin and ticagrelor given recent STEMI and GORGE\par - would continue DAPT given extent of CAD and multiple prior interventions and risk factors.\par - Toprol 50 mg daily\par - LDL at goal\par \par # HF with reduced EF due to ischemic cardiomyopathy, LVEF improved to 49 %\par - Entresto 24/26 BID\par - empaglifozin 10 mg daily\par - Toprol 50 mg daily\par \par # PVCs, due to ischemic cardiomyopathy:\par - continue Toprol\par \par Pre-operative cardiology recommendations for TECAR and anesthesia:\par 1. Hold Jardiance 3 days prior to surgery\par 2. Continue Toprol XL including day of procedure\par 3. Continue Brilinta and aspirin without interruption\par 4. Can hold AM Entresto dose day of surgery to reduce risk of hypotension during anesthesia\par 5. Stress dose steroids to be considered given chronic use for esophagitis\par \par \par \par Above recommendations discussed with the patient and his wife and all questions were answered to the best of my ability and to their apparent satisfaction. Advised to call with any question or concern.\par \par \par

## 2022-11-28 NOTE — PHYSICAL EXAM
[Restricted in physically strenuous activity but ambulatory and able to carry out work of a light or sedentary nature] : Status 1- Restricted in physically strenuous activity but ambulatory and able to carry out work of a light or sedentary nature, e.g., light house work, office work [Normal] : affect appropriate [de-identified] : No icterus  [de-identified] : MMM O/P Clear, No visible mucositis or erythema [de-identified] : Supple No LAD  [de-identified] : Clear  [de-identified] : S1 S2  [de-identified] : No edema  [de-identified] : No spine/CVA tenderness [de-identified] : Mediport in place

## 2022-11-28 NOTE — RESULTS/DATA
[FreeTextEntry1] : -CT chest 3/25/19: Infiltrative left hilar mass extending to the left upper lobe and left mediastinum compatible with neoplasm with encasement of the pulmonary artery and left upper lobe bronchial structures with collapse of the left upper lobe. Lytic lesions in the upper thoracic spine. Innumerable hypodense masses throughout the liver compatible with metastases. Small left pleural effusion.\par \par -CT C/A/P 4/5/19:  Large mass in the left hilum extending into the left upper lobe consistent with bronchogenic malignancy with possible metastatic lymphadenopathy. Significant compression of the left lobe pulmonary artery with obstruction of the left upper lobe bronchus with resultant atelectasis. Innumerable lesions throughout the liver. Upper abdominal lymphadenopathy. Bony metastases of the spine, pelvis and bilateral hips.\par \par -MRI brain/T-spine in 3/28/19: Negative for metastatic disease involving the brain. Of the central canal at T9-T10 concerning for epidural metastasis.\par \par -Nuclear medicine bone scan 4/5/19: Focal uptake in one of the right lower lateral ribs, possibly the 10th rib, likely represents a small fracture. Metastatic disease is less likely. No additional areas of abnormal uptake are noted.\par \par -PET/CT 6/26/19: Markedly improved positive treatment response. The previously seen a large left-sided upper lobe lung mass with hilar extension is markedly reduced in size with mild or residual ill-defined soft tissue density at the left hilar region now demonstrating only partial invasion of the left upper lobe bronchus. No separate mediastinal lymphadenopathy is noted. Markedly improved appearance of both the numerous metastatic liver lesions and hepatomegaly. Interval resolution of previously seen upper abdominal lymphadenopathy. No residual active osseous metastatic disease.\par \par -PET/CT 10/17/19:  FDG-PET/CT scan demonstrates: \par 1. Minimally FDG-avid, small, difficult to delineate left upper lobe/perihilar soft tissue, unchanged as compared to prior study dated 6/25/2019. Small focus of residual disease is not excluded. \par 2. Non-FDG-avid low-attenuation density in pretracheal cricoid region, unchanged. \par 3. Remainder of study is unremarkable, demonstrating no evidence of FDG-avid disease. \par \par -MRI Brain 10/20/19: No evidence of metastasis\par \par -PET/CT 1/15/20:  FDG-PET/CT scan demonstrates: \par 1. New, indeterminate linear focus of mild FDG activity in left suprahilar/paramediastinal region (SUV 4.1). \par 2. Minimally FDG-avid, small, difficult to delineate left upper lobe/perihilar soft tissue, unchanged as compared to prior study dated 6/25/2019. Small focus of residual disease is not excluded. \par 3. Remainder of study is unchanged, demonstrating no evidence of FDG-avid disease. \par \par -CT Abdomen/Pelvis 2/19/20:  No evidence of focal bowel wall thickening or distention.  Right inguinal hernia \par \par -CT Chest 4/8/20: Stable disease\par \par -MRI Brain 3/5/20: New opacification involving the right mastoid and middle ear region as described above, otherwise no significant change when allowing for differences in technique. \par \par -PET/CT 6/25/20:  Compared to FDG-PET/CT scan dated 1/15/2020: \par 1. Focus of mild FDG activity in left upper lobe paramediastinal region is unchanged. This may reflect posttreatment change. \par 2. Non-FDG-avid 7 mm nodular density within posterior left upper lobe, also unchanged. \par 3. Nonspecific hypermetabolism in distal esophagus and fundus/body of stomach, mildly decreased. Correlate clinically for esophagitis/gastritis and with endoscopy, as indicated. \par 4. Remainder of study is unchanged, demonstrating no evidence of FDG-avid recurrent or metastatic disease. \par \par -PET/CT 9/1/20:  Compared to FDG-PET/CT scan dated 6/25/2020: \par 1. Resolution of focus of mild FDG activity in left upper lobe paramediastinal region. \par 2. Non-FDG-avid 7 mm nodular density within posterior left upper lobe, unchanged. \par 3. Nonspecific hypermetabolism in distal esophagus and fundus/body, unchanged. \par 4. No evidence of FDG-avid recurrent or metastatic disease. \par \par -MRI Brain 12/14/20:  No evidence acute hemorrhage, mass mass effect or abnormal enhancement seen.  Improved aeration of the right mastoid and middle ear region.\par \par -PET/CT 12/14/20:  Compared to FDG-PET/CT scan on 9/1/2020:\par 1. Nonspecific minimal increased FDG avidity in the gastric fundus/body. Please correlate clinically or with endoscopy as indicated.\par 2. Non-FDG avid 7 mm nodular density within the posterior left upper lobe, unchanged.\par 3. New focal cutaneous FDG avidity along the left lower aspect of the face, probably focal inflammation. Please correlate with physical examination.\par 4. Nonspecific new focal mild hypermetabolism in the anterior aspect of the hyoid bone just to the right of the midline without CT correlate. Unchanged non-FDG avid low-attenuation density in the precricoid region. Please correlate clinically.\par \par -PET/CT 3/5/21:  Compared to FDG-PET/CT scan dated 12/14/2020:\par 1. Few new FDG avid left axillary lymph nodes are nonspecific. Suggest correlation with ultrasound and possible percutaneous FNA biopsy as indicated.\par 2. Nonspecific FDG avidity in mid to distal esophagus, gastric fundus/body, slightly more prominent, with new FDG avidity in the pylorus. Please correlate clinically or with endoscopy as indicated.\par 3. Nonspecific non-FDG avid groundglass opacity in the anterior right upper lobe, more conspicuous.\par 4. Interval resolution of focal mild hypermetabolism in the anterior aspect of the hyoid bone and focal cutaneous FDG avidity in the left lower face. Unchanged non-FDG avid low-attenuation density in the cricoarytenoid region.\par \par -Esophagram: Small hiatal hernia without demonstrated gastroesophageal reflux. There is esophagoesophageal reflux.\par \par -Pet/CT 6/20/21:  Compared to FDG-PET/CT scan dated 3/5/2021:\par 1. Interval resolution of a few mildly FDG avid left axillary lymph nodes and small mildly FDG avid foci in bilateral hilar regions.\par 2. Nonspecific FDG avidity in mid to distal esophagus, gastric boy and pylorus, unchanged. Please correlate clinically or with endoscopy as indicated.\par 3. Unchanged nonspecific non-FDG avid groundglass opacity in the anterior right upper lobe.\par 4. Non-FDG avid low-attenuation density in the pretracheal region, decreased in size.\par \par -MRI Brain 6/21/21:  There is no intraparenchymal hematoma, mass effect or midline shift.  No evidence of intracranial metastatic disease. Slight interval increase in density within the right mastoid and middle ear regions when compared most recent prior examination.\par \par -PET/CT 9/13/21:  Compared to FDG-PET/CT scan dated 6/20/2021:\par 1. New cluster of FDG-avid nodules in left upper lobe. Primary consideration is infection/mucoid impacted distal airways. Please correlate clinically and follow-up with dedicated CT of chest in 1 month.\par 2. Nonspecific FDG avidity in mid and distal esophagus, unchanged. Resolution of gastric hypermetabolism.\par 3. Nonspecific, non-FDG avid right upper lobe groundglass opacity, unchanged.\par 4. Non-FDG avid low-attenuation density in the pretracheal region, unchanged.\par \par -MRI Brain 12/1/21:  No significant change when allowing for differences in technique.\par \par -PET/CT 12/6/21:  Compared to FDG-PET/CT scan dated 6/20/2021:\par 1. Mildly FDG-avid cluster of nodules in left upper lobe, unchanged on CT, and decreased in metabolism. An infectious/inflammatory etiology is favored. Please correlate clinically. A dedicated CT of chest may be obtained for further characterization.\par 2. Nonspecific FDG avidity in mid and distal esophagus, unchanged.\par 3. Nonspecific, non-FDG avid right upper lobe groundglass opacity, unchanged.\par 4. Non-FDG avid low-attenuation density in the pretracheal region, unchanged.\par \par -PET/CT 3/7/22:  Compared to FDG-PET/CT scan dated 12/6/2021:\par 1. FDG-avid, centrally photopenic left upper lobe lung mass and adjacent FDG-avid nodules, markedly increased in size and metabolism as compared to prior study, are compatible with recurrent disease.\par 2. Nonspecific FDG avidity in mid and distal esophagus, unchanged.\par 3. Nonspecific, non-FDG avid right upper lobe groundglass opacity, unchanged.\par 4. Non-FDG avid low-attenuation density in the pretracheal region, unchanged.\par \par -Brain MRI 3/30/22: \par 1. Age appropriate involutional changes.\par 2. No abnormal intraparenchymal or leptomeningeal enhancement. No evidence of intracranial metastases or leptomeningeal carcinomatosis.\par 3. Presence of fluid again appreciated within the right-sided mastoid air cells, slightly increased compared with prior. Correlate clinically for mastoiditis.\par \par –CT Chest 5/9/22:  In comparison with 3/7/2022, interval decrease of left upper lobe mass. No new or enlarging nodule.\par \par -CT Head 6/14/22: No evidence of acute hemorrhage, mass or mass effect.\par \par Images Reviewed/Interpreted:\par \par –Cardiac MRI 7/26/22:  At the base and midcavity, the combination of subendocardial late gadolinium enhancement, myocardial wall thinning and wall motion abnormalities are most likely secondary to prior myocardial infarction rather than myocarditis.\par \par -PET/CT 8/15/22:  Compared with FDG PET/CT scan 3/7/2022 as well as additional correlations as above:\par 1. Since prior PET/CT, LEFT UPPER lobe lung mass shows marked decrease in activity, with serial anatomic improvement on most recent chest CT as well as the current study.\par 2. Esophageal uptake stable, likely related to patient's reported prior immunotherapy-related esophagitis.\par 3. Nonspecific, non-FDG avid right upper lobe groundglass opacity, unchanged.\par 4. Non-FDG avid low-attenuation density in the pretracheal region, unchanged.\par 5. No NEW suspicious findings otherwise noted.\par \par –CT Neck Angio 10/16/22:  \par 1. Severe stenosis/near occlusion of the left proximal cervical ICA.\par 2. A 1.3 x 1.1 x 1.1 cm soft tissue attenuation lesion in the midline anterior neck abutting the strap muscles and the inferior aspect of the hyoid bone. Recommend CT neck with contrast for further evaluation.\par 3. Redemonstration of a left upper lobe pulmonary mass.\par \par Images Reviewed/Interpreted:\par \par -PET/CT 11/14/22:  Since prior FDG-PET/CT scan 8/15/2022:\par 1. Multiple NEW ill-defined BILATERAL hypermetabolic pulmonary parenchymal changes. These are suspicious for inflammatory changes and may be related to immunotherapy. There is also a nonspecific, non-FDG avid right upper lobe groundglass opacity, unchanged.\par 2. Left UPPER lobe lung mass shows stable size and activity.\par 3. Esophageal uptake stable, likely related to patient's reported prior immunotherapy-related esophagitis.\par 4. Non-FDG avid low-attenuation density in the pretracheal region, unchanged. This has been seen on multiple prior PET and CT exams.\par 5. No NEW suspicious findings otherwise noted.\par \par

## 2022-11-28 NOTE — ASSESSMENT
[FreeTextEntry1] : Extensive stage small cell lung cancer diagnosed late March 2019 who was treated with chemotherapy plus immunotherapy from April-June 2019; achieved CR. Treated with immunotherapy maintenance from June 2019 through May 2021 with sustained response.  Developed immunotherapy related esophagitis; started on steroids in June 2021 with clinical response.  Restaging PET/CT March 2022 with disease progression.  Started re-treatment with Carboplatin/Etoposide chemotherapy March 2022; achieved DE. Durvalumab added with D4. Completed 5 cycles through early June 2022.  Hospitalized in June for STEMI.  Developed recurrence of esophagitis symptoms in June 2022 that responded to reintroduction of Prednisone.  Durvalumab held with eventual resumption of treatment in conjunction with steroids.  \par Restaging PET/CT Nov 2022 with new B/L lung opacities concerning for inflammation/pneumonitis.  Patient is not symptomatic.    \par Recommend: \par –Hold Durvalumab treatment due to concern for pneumonitis; discussed that pneumonitis could be life threatening.  \par -Recommend treatment for pneumonia given scan findings; Abx prescribed. \par -Recommend he see Pulmonary for evaluation     \par -Esophagitis:  on Prednisone.  Currently on 10mg daily. Continue Omeprazole for GI prophylaxis while on steroids\par -Continue Cardiology and Vascular management.  To have TCAR-trans carotid stent performed \par -Continue symptom management under care of Palliative Care Dr. Moreno\par -Thrombocytopenia:  mild and possibly secondary to immunotherapy.  Monitor.  \par -Avoided addition of bone modifying agent given the dramatic response in the bones\par -Mediport in place\par -Loose stool:  utilized Lomotil PRN.  \par -Will review his case at [facility] and sort out his f/u

## 2022-11-28 NOTE — HISTORY OF PRESENT ILLNESS
[Disease: _____________________] : Disease: [unfilled] [AJCC Stage: ____] : AJCC Stage: [unfilled] [de-identified] : The patient is a former smoker with a history of CAD who developed chest pain in March 2019 and had a cardiac stent placed. When his chest pain persisted, he was sent for a CT scan of his chest in late March 2019 that revealed a left upper lobe lung mass with evidence of metastatic disease. 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 CT scan revealed evidence of extensive bony and liver metastases. MRI of his brain was negative for brain metastases however there was extensive calvarial and T-spine metastases with possible evidence of epidural involvement. He was treated in [place] by an outside medical oncologist and started on carboplatin, etoposide and atezolizumab in early April 2019. He received 4 cycles of triplet combination therapy followed by immunotherapy maintenance. A restaging PET/CT scan in June 2019 revealed a dramatic response. He moved from [place] and transferred his care to [facility].  \par Treated with immunotherapy maintenance from June 2019 through May 2021 with sustained response.  \par Developed severe dysphagia and odynophagia.  He was treated for H-pylori and symptoms continued; H Pylori stool Ag was negative.  PET/CT with findings consistent with esophagitis/gastritis.  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  Restaging PET/CT March 2022 with disease progression.  Started chemotherapy with Carboplatin/Etoposide in March 2022; Durvalumab was added with C4. Patient completed C5 with Carbo (retreat)/Etoposide/Durvalumab in early June 2022.  Hospitalized with STEMI in June 2022.  Esophagitis symptoms returned in June 2022.  Upon clinical improvement with restarting steroids, durvalumab was continued. [de-identified] : -Lung, left hilar FNA 3/29/19: Positive for malignant cells. Poorly differentiated neuroendocrine carcinoma. IHC is positive for MCK, TTF-1 and synaptophysin while negative for desmoglein3, P40, NapsinA, CD20, PAX5, CD3.  Ki-67 is markedly high. [de-identified] : *Wife provided translation where needed\par He presents today prior to planned continued treatment with single agent Durvalumab.\par He has continued on steroids for esophagitis Sx.  He has been able to titrate down to 10mg daily however he took 50mg prior to scheduled treatment today.  Denies any F/C, SOB or cough.  Has loose stool once/daily for which Lomotil helps.  \par Reports esophagitis Sx are improved and controlled.  Continues on PPI.  He is able to eat and drink without difficulty.  Follows with Cardiology and Vascular for his CAD and Vascular/carotid disease. \par \par Restaging PET/CT with new B/L opacities concerning for inflammation/pneumonitis.

## 2022-11-30 ENCOUNTER — APPOINTMENT (OUTPATIENT)
Dept: PULMONOLOGY | Facility: CLINIC | Age: 67
End: 2022-11-30

## 2022-11-30 PROBLEM — Z86.19 PERSONAL HISTORY OF OTHER INFECTIOUS AND PARASITIC DISEASES: Chronic | Status: ACTIVE | Noted: 2022-11-18

## 2022-11-30 PROBLEM — E03.9 HYPOTHYROIDISM, UNSPECIFIED: Chronic | Status: ACTIVE | Noted: 2022-11-18

## 2022-11-30 PROBLEM — E78.5 HYPERLIPIDEMIA, UNSPECIFIED: Chronic | Status: ACTIVE | Noted: 2022-11-18

## 2022-11-30 PROBLEM — I21.3 ST ELEVATION (STEMI) MYOCARDIAL INFARCTION OF UNSPECIFIED SITE: Chronic | Status: ACTIVE | Noted: 2022-11-18

## 2022-11-30 PROBLEM — R13.10 DYSPHAGIA, UNSPECIFIED: Chronic | Status: ACTIVE | Noted: 2022-11-18

## 2022-11-30 PROBLEM — Z92.21 PERSONAL HISTORY OF ANTINEOPLASTIC CHEMOTHERAPY: Chronic | Status: ACTIVE | Noted: 2022-11-18

## 2022-11-30 PROCEDURE — 99205 OFFICE O/P NEW HI 60 MIN: CPT | Mod: 95

## 2022-11-30 NOTE — HISTORY OF PRESENT ILLNESS
[Home] : at home, [unfilled] , at the time of the visit. [Medical Office: ([facility])___] : at the medical office located in  [Spouse] : spouse [Verbal consent obtained from patient] : the patient, [unfilled] [FreeTextEntry1] : 67-year-old male referred for consultation by oncology, Dr. costello referred to the [facility] program for COVID infection.  Patient's wife, who is a physician, is present at the visit.\par \par This is a 67-year-old man with a history of advanced stage small cell lung cancer as well as significant coronary artery disease, status post stents for MI in June, chronic heart failure, and who was scheduled for carotid stenting next week.  He is also recently being treated for possible pneumonitis related to his immunotherapy versus pneumonia.  As well as esophagitis.\par \par 2 days ago patient developed fever and a runny nose.  His wife had the same symptoms.  They both tested positive for COVID.  Patient reports fatigue, low-grade fevers, weakness, on top of his baseline.  He denies shortness of breath.  He does have a new cough and runny nose.\par \par Patient is vaccinated x3 only, his last booster was in October 2021.\par \par His current medications include Augmentin, which he is on now following the doxycycline, prednisone 30 daily for the esophagitis, Brilinta, Crestor, Entresto, baby aspirin, Synthroid, Flomax, omeprazole, ezetimibe, metoprolol, and Jardiance.\par \par The patient on video, has an occasional cough and nasal congestion.  But he speaks clearly and easily without respiratory effort and is not in distress.  Wife checked his blood pressure, was 100/70, heart rate is 89 on a pulse ox.\par \par His creatinine is 0.7.\par \par Very complex case with history as above.\par COVID day 2.  Incompletely vaccinated, with his last vaccine more than a year ago.\par Immunosuppressed on prednisone, and with severe underlying lung and heart disease.  But also with many medications with drug interactions for paxlovid.  Monoclonal antibodies are no longer an option. Molnupiravir not particularly effective against COVID.  And currently we do not have an outpatient treatment center available for remdesivir IV.\par Case was discussed with patient's cardiologist, who also spoke with interventional cardiology.\par Given the risk of progression to severe COVID in his case, will change the Brilinta (which is contraindicated together with the antiviral) to Plavix 25-day course of treatment.  And will hold the Crestor for treatment.  Flomax to be changed to every other day.  Entresto advised to hold if his systolic blood pressure is under 100 or he has dizziness consistent with orthostatics.  Patient's wife wrote down the instructions and read them back to me.\par Prescriptions for paxlovid and the plavix sent\par Maintain fluid intake.\par Monitor for signs of any respiratory distress, call if occur.

## 2022-12-01 ENCOUNTER — NON-APPOINTMENT (OUTPATIENT)
Age: 67
End: 2022-12-01

## 2022-12-05 ENCOUNTER — APPOINTMENT (OUTPATIENT)
Dept: PULMONOLOGY | Facility: CLINIC | Age: 67
End: 2022-12-05

## 2022-12-05 ENCOUNTER — OUTPATIENT (OUTPATIENT)
Dept: OUTPATIENT SERVICES | Facility: HOSPITAL | Age: 67
LOS: 1 days | End: 2022-12-05
Payer: COMMERCIAL

## 2022-12-05 ENCOUNTER — APPOINTMENT (OUTPATIENT)
Dept: CT IMAGING | Facility: IMAGING CENTER | Age: 67
End: 2022-12-05

## 2022-12-05 VITALS
DIASTOLIC BLOOD PRESSURE: 67 MMHG | HEIGHT: 70 IN | RESPIRATION RATE: 16 BRPM | TEMPERATURE: 98 F | WEIGHT: 203 LBS | OXYGEN SATURATION: 94 % | SYSTOLIC BLOOD PRESSURE: 100 MMHG | BODY MASS INDEX: 29.06 KG/M2 | HEART RATE: 79 BPM

## 2022-12-05 DIAGNOSIS — B37.0 CANDIDAL STOMATITIS: ICD-10-CM

## 2022-12-05 DIAGNOSIS — Z95.1 PRESENCE OF AORTOCORONARY BYPASS GRAFT: Chronic | ICD-10-CM

## 2022-12-05 DIAGNOSIS — Z98.890 OTHER SPECIFIED POSTPROCEDURAL STATES: Chronic | ICD-10-CM

## 2022-12-05 DIAGNOSIS — R50.9 FEVER, UNSPECIFIED: ICD-10-CM

## 2022-12-05 DIAGNOSIS — U07.1 COVID-19: ICD-10-CM

## 2022-12-05 PROCEDURE — 71250 CT THORAX DX C-: CPT

## 2022-12-05 PROCEDURE — 99215 OFFICE O/P EST HI 40 MIN: CPT

## 2022-12-05 PROCEDURE — 71250 CT THORAX DX C-: CPT | Mod: 26

## 2022-12-05 NOTE — HISTORY OF PRESENT ILLNESS
[TextBox_4] : Patient here today for an acute visit.  His wife called me this morning and stated that he felt warm and had a fever of 102 this morning.\par This morning was his last dose of Paxlovid\par He has not had a significant fever like that all along.  He had not even had low-grade fevers for the last 5 days.\par So he is now here for evaluation in person.\par \par Overall, he still has fatigue and feels weak..  He has a lingering cough with some sputum.  But not worse.  He is not short of breath.  He complains of thrush but no odynophagia or sore throat.  He developed to cold sores on his lip last week, but they are already crusting over.  He has no chest pain.  No dysuria.  No chills, no headache, no sinus pain, no ear pain.  No pain or swelling in his legs\par He just finished his course of Augmentin, which finished a course of doxycycline just prior to that.\par \par He had no issues with the oral antiviral.  He did not have to hold his Entresto tolerating it well.  Has been on the Plavix during the duration of treatment, plans to switch back to the Brilinta

## 2022-12-05 NOTE — PHYSICAL EXAM
[No Acute Distress] : no acute distress [Normal Oropharynx] : normal oropharynx [Normal Appearance] : normal appearance [No Neck Mass] : no neck mass [Normal Rate/Rhythm] : normal rate/rhythm [Normal S1, S2] : normal s1, s2 [No Murmurs] : no murmurs [No Resp Distress] : no resp distress [Clear to Auscultation Bilaterally] : clear to auscultation bilaterally [No Abnormalities] : no abnormalities [Benign] : benign [Normal Gait] : normal gait [No Clubbing] : no clubbing [No Cyanosis] : no cyanosis [No Edema] : no edema [FROM] : FROM [Normal Color/ Pigmentation] : normal color/ pigmentation [No Focal Deficits] : no focal deficits [Oriented x3] : oriented x3 [Normal Affect] : normal affect [TextBox_11] : Oral thrush.  2 herpetic lip lesions, starting to crust

## 2022-12-05 NOTE — DISCUSSION/SUMMARY
[FreeTextEntry1] : 67-year-old man, complex medical history including advanced stage lung cancer and heart failure.  Recent prior diagnosis of possible drug-induced pneumonitis versus pneumonia and had completed back-to-back course of doxycycline followed by Augmentin which she just completed, as well as steroids for esophagitis.  COVID symptoms started 7 days ago, completing treatment today, took his last dose of Paxlovid this morning with many medications just months.\par Had essentially been afebrile throughout.  This morning had a fever of 102.  No other new symptoms.  Lingering fatigue and cough.  Some thrush t, 2apparent herpetic lesions on the lip already improving\par \par In the office temperature is 97.7, despite not having taken antipyretics\par He looks well in no distress and not ill-appearing oxygen saturation is 96% on room air.\par Sending labs in the office, CBC, procalcitonin, CMP, and blood culture\par And have arranged for the patient to go across the street for a noncontrast CT chest following visit.\par Further evaluation, treatment based on results

## 2022-12-06 ENCOUNTER — NON-APPOINTMENT (OUTPATIENT)
Age: 67
End: 2022-12-06

## 2022-12-06 LAB
ALBUMIN SERPL ELPH-MCNC: 4.1 G/DL
ALP BLD-CCNC: 49 U/L
ALT SERPL-CCNC: 20 U/L
ANION GAP SERPL CALC-SCNC: 16 MMOL/L
AST SERPL-CCNC: 16 U/L
BASOPHILS # BLD AUTO: 0.02 K/UL
BASOPHILS NFR BLD AUTO: 0.4 %
BILIRUB SERPL-MCNC: 0.4 MG/DL
BUN SERPL-MCNC: 13 MG/DL
CALCIUM SERPL-MCNC: 10.3 MG/DL
CHLORIDE SERPL-SCNC: 98 MMOL/L
CO2 SERPL-SCNC: 22 MMOL/L
CREAT SERPL-MCNC: 0.73 MG/DL
CRP SERPL-MCNC: 43 MG/L
EGFR: 98 ML/MIN/1.73M2
EOSINOPHIL # BLD AUTO: 0.01 K/UL
EOSINOPHIL NFR BLD AUTO: 0.2 %
FERRITIN SERPL-MCNC: 484 NG/ML
GLUCOSE SERPL-MCNC: 104 MG/DL
HCT VFR BLD CALC: 38.1 %
HGB BLD-MCNC: 12.3 G/DL
IMM GRANULOCYTES NFR BLD AUTO: 1.3 %
LYMPHOCYTES # BLD AUTO: 0.61 K/UL
LYMPHOCYTES NFR BLD AUTO: 11.4 %
MAN DIFF?: NORMAL
MCHC RBC-ENTMCNC: 30.1 PG
MCHC RBC-ENTMCNC: 32.3 GM/DL
MCV RBC AUTO: 93.4 FL
MONOCYTES # BLD AUTO: 0.33 K/UL
MONOCYTES NFR BLD AUTO: 6.2 %
NEUTROPHILS # BLD AUTO: 4.29 K/UL
NEUTROPHILS NFR BLD AUTO: 80.5 %
PLATELET # BLD AUTO: 226 K/UL
POTASSIUM SERPL-SCNC: 4.6 MMOL/L
PROCALCITONIN SERPL-MCNC: 0.08 NG/ML
PROT SERPL-MCNC: 6.3 G/DL
RBC # BLD: 4.08 M/UL
RBC # FLD: 14.1 %
SODIUM SERPL-SCNC: 136 MMOL/L
WBC # FLD AUTO: 5.33 K/UL

## 2022-12-09 ENCOUNTER — NON-APPOINTMENT (OUTPATIENT)
Age: 67
End: 2022-12-09

## 2022-12-12 ENCOUNTER — LABORATORY RESULT (OUTPATIENT)
Age: 67
End: 2022-12-12

## 2022-12-12 LAB — BACTERIA BLD CULT: NORMAL

## 2022-12-13 ENCOUNTER — NON-APPOINTMENT (OUTPATIENT)
Age: 67
End: 2022-12-13

## 2022-12-13 LAB
ALBUMIN SERPL ELPH-MCNC: 3.9 G/DL
ALBUMIN SERPL ELPH-MCNC: 4.1 G/DL
ALP BLD-CCNC: 46 U/L
ALP BLD-CCNC: 47 U/L
ALT SERPL-CCNC: 25 U/L
ALT SERPL-CCNC: 25 U/L
ANION GAP SERPL CALC-SCNC: 13 MMOL/L
ANION GAP SERPL CALC-SCNC: 13 MMOL/L
APPEARANCE: CLEAR
AST SERPL-CCNC: 18 U/L
AST SERPL-CCNC: 19 U/L
BASOPHILS # BLD AUTO: 0.01 K/UL
BASOPHILS NFR BLD AUTO: 0.2 %
BILIRUB SERPL-MCNC: 0.3 MG/DL
BILIRUB SERPL-MCNC: 0.3 MG/DL
BILIRUBIN URINE: NEGATIVE
BLOOD URINE: ABNORMAL
BUN SERPL-MCNC: 16 MG/DL
BUN SERPL-MCNC: 16 MG/DL
CALCIUM SERPL-MCNC: 10.5 MG/DL
CALCIUM SERPL-MCNC: 10.6 MG/DL
CHLORIDE SERPL-SCNC: 100 MMOL/L
CHLORIDE SERPL-SCNC: 101 MMOL/L
CO2 SERPL-SCNC: 25 MMOL/L
CO2 SERPL-SCNC: 25 MMOL/L
COLOR: NORMAL
CREAT SERPL-MCNC: 0.78 MG/DL
CREAT SERPL-MCNC: 0.79 MG/DL
EGFR: 96 ML/MIN/1.73M2
EGFR: 97 ML/MIN/1.73M2
EOSINOPHIL # BLD AUTO: 0.02 K/UL
EOSINOPHIL NFR BLD AUTO: 0.5 %
GLUCOSE QUALITATIVE U: ABNORMAL
GLUCOSE SERPL-MCNC: 106 MG/DL
GLUCOSE SERPL-MCNC: 107 MG/DL
HCT VFR BLD CALC: 37.9 %
HGB BLD-MCNC: 12.1 G/DL
IMM GRANULOCYTES NFR BLD AUTO: 1.4 %
KETONES URINE: NEGATIVE
LEUKOCYTE ESTERASE URINE: NEGATIVE
LYMPHOCYTES # BLD AUTO: 0.68 K/UL
LYMPHOCYTES NFR BLD AUTO: 16.1 %
MAN DIFF?: NORMAL
MCHC RBC-ENTMCNC: 29.9 PG
MCHC RBC-ENTMCNC: 31.9 GM/DL
MCV RBC AUTO: 93.6 FL
MONOCYTES # BLD AUTO: 0.26 K/UL
MONOCYTES NFR BLD AUTO: 6.2 %
NEUTROPHILS # BLD AUTO: 3.19 K/UL
NEUTROPHILS NFR BLD AUTO: 75.6 %
NITRITE URINE: NEGATIVE
NT-PROBNP SERPL-MCNC: 243 PG/ML
PH URINE: 6.5
PLATELET # BLD AUTO: 177 K/UL
POTASSIUM SERPL-SCNC: 5.2 MMOL/L
POTASSIUM SERPL-SCNC: 5.4 MMOL/L
PROCALCITONIN SERPL-MCNC: 0.09 NG/ML
PROT SERPL-MCNC: 6.2 G/DL
PROT SERPL-MCNC: 6.4 G/DL
PROTEIN URINE: ABNORMAL
RAPID RVP RESULT: DETECTED
RBC # BLD: 4.05 M/UL
RBC # FLD: 14 %
SARS-COV-2 RNA PNL RESP NAA+PROBE: DETECTED
SODIUM SERPL-SCNC: 138 MMOL/L
SODIUM SERPL-SCNC: 139 MMOL/L
SPECIFIC GRAVITY URINE: 1.04
UROBILINOGEN URINE: NORMAL
WBC # FLD AUTO: 4.22 K/UL

## 2022-12-16 ENCOUNTER — OUTPATIENT (OUTPATIENT)
Dept: OUTPATIENT SERVICES | Facility: HOSPITAL | Age: 67
LOS: 1 days | End: 2022-12-16

## 2022-12-16 ENCOUNTER — APPOINTMENT (OUTPATIENT)
Dept: HEMATOLOGY ONCOLOGY | Facility: CLINIC | Age: 67
End: 2022-12-16

## 2022-12-16 ENCOUNTER — APPOINTMENT (OUTPATIENT)
Dept: INFUSION THERAPY | Facility: HOSPITAL | Age: 67
End: 2022-12-16

## 2022-12-16 VITALS
DIASTOLIC BLOOD PRESSURE: 70 MMHG | SYSTOLIC BLOOD PRESSURE: 112 MMHG | WEIGHT: 207.9 LBS | HEART RATE: 69 BPM | HEIGHT: 71 IN | OXYGEN SATURATION: 96 % | TEMPERATURE: 96 F | RESPIRATION RATE: 18 BRPM

## 2022-12-16 DIAGNOSIS — I65.22 OCCLUSION AND STENOSIS OF LEFT CAROTID ARTERY: ICD-10-CM

## 2022-12-16 DIAGNOSIS — Z98.890 OTHER SPECIFIED POSTPROCEDURAL STATES: Chronic | ICD-10-CM

## 2022-12-16 DIAGNOSIS — I65.29 OCCLUSION AND STENOSIS OF UNSPECIFIED CAROTID ARTERY: ICD-10-CM

## 2022-12-16 DIAGNOSIS — Z95.1 PRESENCE OF AORTOCORONARY BYPASS GRAFT: Chronic | ICD-10-CM

## 2022-12-16 DIAGNOSIS — Z95.5 PRESENCE OF CORONARY ANGIOPLASTY IMPLANT AND GRAFT: Chronic | ICD-10-CM

## 2022-12-16 DIAGNOSIS — E11.9 TYPE 2 DIABETES MELLITUS WITHOUT COMPLICATIONS: ICD-10-CM

## 2022-12-16 DIAGNOSIS — Z95.5 PRESENCE OF CORONARY ANGIOPLASTY IMPLANT AND GRAFT: ICD-10-CM

## 2022-12-16 LAB
A1C WITH ESTIMATED AVERAGE GLUCOSE RESULT: 6.4 % — HIGH (ref 4–5.6)
BLD GP AB SCN SERPL QL: NEGATIVE — SIGNIFICANT CHANGE UP
ESTIMATED AVERAGE GLUCOSE: 137 — SIGNIFICANT CHANGE UP
RH IG SCN BLD-IMP: POSITIVE — SIGNIFICANT CHANGE UP

## 2022-12-16 RX ORDER — SODIUM CHLORIDE 9 MG/ML
1000 INJECTION, SOLUTION INTRAVENOUS
Refills: 0 | Status: DISCONTINUED | OUTPATIENT
Start: 2023-01-03 | End: 2023-01-03

## 2022-12-16 NOTE — H&P PST ADULT - CIGARETTE, PACK YRS
CC:  Mable VAN Tinymadelyn is here today for PO 1 month Phaco IOL OS 6/12/19 and H & P for upcoming IOL OD. Target refraction plano. Patient denies any VA or comfort complaint. New drops sent to [facility] per patient request      Medications, allergies,  reviewed and updated where needed in the EMR.    Ocular Medications:  Prednisolone BID OS    Denies known Latex allergy or symptoms of Latex sensitivity.   20

## 2022-12-16 NOTE — H&P PST ADULT - GASTROINTESTINAL
Increase lisinopril to 20 mg qd. Continue HCTZ 12.5 mg qd.   Weight loss advised  Maintain low sodium diet of less than 2.5 grams daily.   Goal blood pressure < 130/80.     BP Readings from Last 3 Encounters:   12/09/22 144/90   06/03/22 120/84   05/13/22 117/71       negative soft/nontender

## 2022-12-16 NOTE — H&P PST ADULT - NSICDXPASTSURGICALHX_GEN_ALL_CORE_FT
PAST SURGICAL HISTORY:  H/O coronary angiogram 2019, 2022- s/p Drug elluding stent X3    S/P CABG x 3 2008    Stented coronary artery

## 2022-12-16 NOTE — H&P PST ADULT - ASSESSMENT
Pt. is a 68 yo male whose wife came during the PST visit.  Pt. initially was disgruntled with being questioned.  Pt's. wife came to the room to assist with answering the questions.  Pt. has left carotid stenosis.     Pt's. last surgery was cancelled due to having COVID 11/2022.

## 2022-12-16 NOTE — H&P PST ADULT - LAST CARDIAC ANGIOGRAM/IMAGING
06/14/2022- S/p PCI  to mLCX with Drug elluding stent cardiac MRI 07/2022- Subendocardial LGE compatible with prior MI

## 2022-12-16 NOTE — H&P PST ADULT - NSICDXPASTMEDICALHX_GEN_ALL_CORE_FT
PAST MEDICAL HISTORY:  CAD (coronary artery disease)     Dysphagia     H/O candidiasis of mouth     H/O Helicobacter infection     HTN (hypertension)     Hyperlipidemia     Hypothyroidism     Left carotid artery stenosis     Lung cancer     Status post chemotherapy     STEMI (ST elevation myocardial infarction) 06/14/2022     PAST MEDICAL HISTORY:  CAD (coronary artery disease)     Dysphagia     Esophagitis     H/O candidiasis of mouth     H/O Helicobacter infection     HTN (hypertension)     Hyperlipidemia     Hypothyroidism     Left carotid artery stenosis     Lung cancer     Status post chemotherapy     STEMI (ST elevation myocardial infarction) 06/14/2022

## 2022-12-16 NOTE — H&P PST ADULT - PROBLEM SELECTOR PLAN 3
Pt's. wife states the Surgeon instructed pt. to continue ASA and Plavix.  Pt's. last coronary stents were placed 6/2022.

## 2022-12-19 PROBLEM — K20.90 ESOPHAGITIS, UNSPECIFIED WITHOUT BLEEDING: Chronic | Status: ACTIVE | Noted: 2022-12-16

## 2022-12-19 PROBLEM — I65.22 OCCLUSION AND STENOSIS OF LEFT CAROTID ARTERY: Chronic | Status: ACTIVE | Noted: 2022-12-16

## 2022-12-20 ENCOUNTER — APPOINTMENT (OUTPATIENT)
Dept: CARDIOLOGY | Facility: HOSPITAL | Age: 67
End: 2022-12-20

## 2022-12-20 PROCEDURE — 99215 OFFICE O/P EST HI 40 MIN: CPT | Mod: 95

## 2022-12-20 RX ORDER — TICAGRELOR 90 MG/1
90 TABLET ORAL
Qty: 180 | Refills: 1 | Status: DISCONTINUED | COMMUNITY
End: 2022-12-20

## 2022-12-20 NOTE — DISCUSSION/SUMMARY
[Procedure Intermediate Risk] : the procedure risk is intermediate [Patient High Risk] : the patient is a high surgical risk [Optimized for Surgery] : the patient is optimized for surgery [As per surgery] : as per surgery [Continue] : Continue medications as currently directed [FreeTextEntry3] : aspirin, clopidogrel, Toprol, rosuvastatin, Entresto, prednisone at current dose [FreeTextEntry1] : Hold Jardiance for 3 days prior to procedure or as otherwise instructed\par Continue aspirin and clopidogrel uninterrupted\par Continue Toprol XL 75 to 100 mg daily as directed\par Continue Entresto (hold AM dose day of procedure)\par Continue Rosuvastatin 40 mg daily\par Patient should be monitored for signs of adrenal crisis given chronic corticosteroid requirement for esophagitis (prednisone 10 mg daily).

## 2022-12-20 NOTE — HISTORY OF PRESENT ILLNESS
[Preoperative Visit] : for a medical evaluation prior to surgery [Scheduled Procedure ___] : a [unfilled] [Date of Surgery ___] : on [unfilled] [Surgeon Name ___] : surgeon: [unfilled] [Stable] : Stable [Cardiovascular Disease] : cardiovascular disease [Pulmonary Disease] : pulmonary disease [Anti-Platelet Agents] : anti-platelet agents [GI Disease] : gastrointestinal disease [Impaired Immunity] : impaired immunity [Electrocardiogram] : ~T an ECG ~C was performed [Echocardiogram] : ~T an echocardiogram ~C was performed [Cardiac Catheterization  (Diagnostic)] : cardiac catheterization ~T ~C was performed [Good] : Good [de-identified] : He is s/p COVID, treated with Paxlovid. And now asymptomatic. [FreeTextEntry1] : EDUIN SANCHEZ is a 67 year man with a history of cigarette smoking, coronary artery disease s/p CABG and PCI, and metastatic small cell lung cancer on PD-1 inhibitor immunotherapy which was complicated by by immune checkpoint inhibitor esophagitis treated with corticosteroids.\par \par In June 2022 he had acute STEMI and a drug-eluting stent was deployed to the circumflex artery. Echo showed LVEF 45%, moderate-severe MR, hypokinesis of inferolateral wall and basal inferior wall, RV enlargement with decreased RVSF, and moderate pulmonary hypertension. Started on medical therapy and dismissed 6/16/2022.\par \par Most recent echo done October 3, 2022 LVEF was 49%, improved from prior.  He is done well on maximally tolerated medical therapy, and recently resumed swimming in the local pool 30 minutes daily.  He denies any dyspnea with exertion or chest pain.  He is able to complete greater than 4 METS without symptoms.\par \par Because of severe LICA stenosis he was referred to Dr. Grissom for TECAR because of high risk for transfemoral stenting or surgical endarterectomy.\par \par Ticagrelor was changed to clopidogrel because of dyspnea, which subsequently completely resolved.\par

## 2022-12-23 ENCOUNTER — RX RENEWAL (OUTPATIENT)
Age: 67
End: 2022-12-23

## 2022-12-29 RX ORDER — OMEPRAZOLE 40 MG/1
40 CAPSULE, DELAYED RELEASE ORAL
Qty: 1 | Refills: 3 | Status: DISCONTINUED | COMMUNITY
Start: 2021-04-27 | End: 2022-12-29

## 2022-12-30 NOTE — ASU PATIENT PROFILE, ADULT - FALL HARM RISK - UNIVERSAL INTERVENTIONS
Bed in lowest position, wheels locked, appropriate side rails in place/Call bell, personal items and telephone in reach/Non-slip footwear when patient is out of bed/[place] to call system/Physically safe environment - no spills, clutter or unnecessary equipment/Purposeful Proactive Rounding/Room/bathroom lighting operational, light cord in reach

## 2023-01-01 ENCOUNTER — RESULT REVIEW (OUTPATIENT)
Age: 68
End: 2023-01-01

## 2023-01-01 ENCOUNTER — APPOINTMENT (OUTPATIENT)
Dept: INFUSION THERAPY | Facility: HOSPITAL | Age: 68
End: 2023-01-01

## 2023-01-01 ENCOUNTER — APPOINTMENT (OUTPATIENT)
Dept: HEMATOLOGY ONCOLOGY | Facility: CLINIC | Age: 68
End: 2023-01-01
Payer: COMMERCIAL

## 2023-01-01 ENCOUNTER — APPOINTMENT (OUTPATIENT)
Dept: HEMATOLOGY ONCOLOGY | Facility: CLINIC | Age: 68
End: 2023-01-01

## 2023-01-01 ENCOUNTER — TRANSCRIPTION ENCOUNTER (OUTPATIENT)
Age: 68
End: 2023-01-01

## 2023-01-01 ENCOUNTER — APPOINTMENT (OUTPATIENT)
Dept: VASCULAR SURGERY | Facility: CLINIC | Age: 68
End: 2023-01-01
Payer: COMMERCIAL

## 2023-01-01 ENCOUNTER — APPOINTMENT (OUTPATIENT)
Dept: VASCULAR SURGERY | Facility: CLINIC | Age: 68
End: 2023-01-01

## 2023-01-01 ENCOUNTER — OUTPATIENT (OUTPATIENT)
Dept: OUTPATIENT SERVICES | Facility: HOSPITAL | Age: 68
LOS: 1 days | Discharge: HOME | End: 2023-01-01

## 2023-01-01 ENCOUNTER — NON-APPOINTMENT (OUTPATIENT)
Age: 68
End: 2023-01-01

## 2023-01-01 ENCOUNTER — LABORATORY RESULT (OUTPATIENT)
Age: 68
End: 2023-01-01

## 2023-01-01 ENCOUNTER — OUTPATIENT (OUTPATIENT)
Dept: OUTPATIENT SERVICES | Facility: HOSPITAL | Age: 68
LOS: 1 days | End: 2023-01-01
Payer: COMMERCIAL

## 2023-01-01 ENCOUNTER — INPATIENT (INPATIENT)
Facility: HOSPITAL | Age: 68
LOS: 7 days | Discharge: HOME | DRG: 812 | End: 2023-09-23
Attending: STUDENT IN AN ORGANIZED HEALTH CARE EDUCATION/TRAINING PROGRAM | Admitting: STUDENT IN AN ORGANIZED HEALTH CARE EDUCATION/TRAINING PROGRAM
Payer: COMMERCIAL

## 2023-01-01 ENCOUNTER — APPOINTMENT (OUTPATIENT)
Dept: GASTROENTEROLOGY | Facility: CLINIC | Age: 68
End: 2023-01-01
Payer: COMMERCIAL

## 2023-01-01 ENCOUNTER — APPOINTMENT (OUTPATIENT)
Dept: PHYSICAL MEDICINE AND REHAB | Facility: CLINIC | Age: 68
End: 2023-01-01
Payer: COMMERCIAL

## 2023-01-01 ENCOUNTER — RX RENEWAL (OUTPATIENT)
Age: 68
End: 2023-01-01

## 2023-01-01 ENCOUNTER — APPOINTMENT (OUTPATIENT)
Dept: MRI IMAGING | Facility: CLINIC | Age: 68
End: 2023-01-01
Payer: COMMERCIAL

## 2023-01-01 ENCOUNTER — APPOINTMENT (OUTPATIENT)
Dept: NUCLEAR MEDICINE | Facility: IMAGING CENTER | Age: 68
End: 2023-01-01
Payer: COMMERCIAL

## 2023-01-01 ENCOUNTER — OUTPATIENT (OUTPATIENT)
Dept: OUTPATIENT SERVICES | Facility: HOSPITAL | Age: 68
LOS: 1 days | Discharge: HOME | End: 2023-01-01
Payer: COMMERCIAL

## 2023-01-01 ENCOUNTER — APPOINTMENT (OUTPATIENT)
Dept: CARDIOLOGY | Facility: CLINIC | Age: 68
End: 2023-01-01
Payer: COMMERCIAL

## 2023-01-01 ENCOUNTER — APPOINTMENT (OUTPATIENT)
Dept: RADIOLOGY | Facility: IMAGING CENTER | Age: 68
End: 2023-01-01
Payer: COMMERCIAL

## 2023-01-01 ENCOUNTER — APPOINTMENT (OUTPATIENT)
Dept: CT IMAGING | Facility: IMAGING CENTER | Age: 68
End: 2023-01-01
Payer: COMMERCIAL

## 2023-01-01 ENCOUNTER — APPOINTMENT (OUTPATIENT)
Dept: RADIATION ONCOLOGY | Facility: CLINIC | Age: 68
End: 2023-01-01
Payer: COMMERCIAL

## 2023-01-01 ENCOUNTER — APPOINTMENT (OUTPATIENT)
Dept: ENDOCRINOLOGY | Facility: CLINIC | Age: 68
End: 2023-01-01
Payer: COMMERCIAL

## 2023-01-01 ENCOUNTER — APPOINTMENT (OUTPATIENT)
Dept: CT IMAGING | Facility: IMAGING CENTER | Age: 68
End: 2023-01-01

## 2023-01-01 ENCOUNTER — APPOINTMENT (OUTPATIENT)
Dept: INTERNAL MEDICINE | Facility: CLINIC | Age: 68
End: 2023-01-01
Payer: COMMERCIAL

## 2023-01-01 ENCOUNTER — APPOINTMENT (OUTPATIENT)
Dept: INTERNAL MEDICINE | Facility: CLINIC | Age: 68
End: 2023-01-01

## 2023-01-01 VITALS
TEMPERATURE: 96.8 F | HEART RATE: 70 BPM | RESPIRATION RATE: 16 BRPM | DIASTOLIC BLOOD PRESSURE: 72 MMHG | BODY MASS INDEX: 31.15 KG/M2 | SYSTOLIC BLOOD PRESSURE: 113 MMHG | OXYGEN SATURATION: 96 % | WEIGHT: 217.6 LBS | HEIGHT: 70 IN

## 2023-01-01 VITALS
WEIGHT: 209.44 LBS | HEIGHT: 70 IN | BODY MASS INDEX: 29.98 KG/M2 | OXYGEN SATURATION: 96 % | HEART RATE: 79 BPM | RESPIRATION RATE: 16 BRPM | TEMPERATURE: 97.2 F | DIASTOLIC BLOOD PRESSURE: 61 MMHG | SYSTOLIC BLOOD PRESSURE: 100 MMHG

## 2023-01-01 VITALS
RESPIRATION RATE: 17 BRPM | DIASTOLIC BLOOD PRESSURE: 78 MMHG | HEIGHT: 72 IN | WEIGHT: 222.99 LBS | BODY MASS INDEX: 30.2 KG/M2 | SYSTOLIC BLOOD PRESSURE: 135 MMHG | OXYGEN SATURATION: 98 % | HEART RATE: 70 BPM | TEMPERATURE: 95.18 F

## 2023-01-01 VITALS — OXYGEN SATURATION: 98 % | DIASTOLIC BLOOD PRESSURE: 71 MMHG | SYSTOLIC BLOOD PRESSURE: 131 MMHG | HEART RATE: 86 BPM

## 2023-01-01 VITALS
SYSTOLIC BLOOD PRESSURE: 108 MMHG | DIASTOLIC BLOOD PRESSURE: 69 MMHG | HEART RATE: 89 BPM | WEIGHT: 209.22 LBS | BODY MASS INDEX: 29.95 KG/M2 | OXYGEN SATURATION: 98 % | HEIGHT: 70 IN | RESPIRATION RATE: 16 BRPM | TEMPERATURE: 97.8 F

## 2023-01-01 VITALS
SYSTOLIC BLOOD PRESSURE: 120 MMHG | OXYGEN SATURATION: 99 % | HEART RATE: 98 BPM | HEIGHT: 71 IN | WEIGHT: 207 LBS | DIASTOLIC BLOOD PRESSURE: 64 MMHG | RESPIRATION RATE: 18 BRPM | BODY MASS INDEX: 28.98 KG/M2 | TEMPERATURE: 98 F

## 2023-01-01 VITALS
SYSTOLIC BLOOD PRESSURE: 111 MMHG | TEMPERATURE: 97.7 F | BODY MASS INDEX: 28.37 KG/M2 | HEIGHT: 72 IN | WEIGHT: 209.44 LBS | HEART RATE: 51 BPM | RESPIRATION RATE: 17 BRPM | DIASTOLIC BLOOD PRESSURE: 67 MMHG | OXYGEN SATURATION: 98 %

## 2023-01-01 VITALS
HEART RATE: 85 BPM | DIASTOLIC BLOOD PRESSURE: 67 MMHG | TEMPERATURE: 97 F | SYSTOLIC BLOOD PRESSURE: 102 MMHG | RESPIRATION RATE: 18 BRPM | OXYGEN SATURATION: 98 %

## 2023-01-01 VITALS
DIASTOLIC BLOOD PRESSURE: 72 MMHG | RESPIRATION RATE: 16 BRPM | BODY MASS INDEX: 29.54 KG/M2 | SYSTOLIC BLOOD PRESSURE: 109 MMHG | HEART RATE: 75 BPM | WEIGHT: 217.81 LBS | TEMPERATURE: 96.4 F | OXYGEN SATURATION: 98 %

## 2023-01-01 VITALS — HEART RATE: 65 BPM | DIASTOLIC BLOOD PRESSURE: 69 MMHG | SYSTOLIC BLOOD PRESSURE: 102 MMHG

## 2023-01-01 VITALS
SYSTOLIC BLOOD PRESSURE: 100 MMHG | DIASTOLIC BLOOD PRESSURE: 65 MMHG | OXYGEN SATURATION: 98 % | HEART RATE: 91 BPM | RESPIRATION RATE: 17 BRPM | WEIGHT: 208.99 LBS | TEMPERATURE: 97.3 F | BODY MASS INDEX: 28.34 KG/M2

## 2023-01-01 VITALS
BODY MASS INDEX: 30.11 KG/M2 | DIASTOLIC BLOOD PRESSURE: 62 MMHG | SYSTOLIC BLOOD PRESSURE: 101 MMHG | RESPIRATION RATE: 15 BRPM | TEMPERATURE: 97 F | HEART RATE: 71 BPM | WEIGHT: 222 LBS | OXYGEN SATURATION: 98 %

## 2023-01-01 VITALS
RESPIRATION RATE: 17 BRPM | SYSTOLIC BLOOD PRESSURE: 97 MMHG | HEART RATE: 73 BPM | BODY MASS INDEX: 31.17 KG/M2 | TEMPERATURE: 98.2 F | WEIGHT: 217.7 LBS | OXYGEN SATURATION: 97 % | HEIGHT: 70 IN | DIASTOLIC BLOOD PRESSURE: 64 MMHG

## 2023-01-01 VITALS
DIASTOLIC BLOOD PRESSURE: 76 MMHG | TEMPERATURE: 97.3 F | HEART RATE: 74 BPM | BODY MASS INDEX: 27.9 KG/M2 | WEIGHT: 206 LBS | OXYGEN SATURATION: 98 % | HEIGHT: 72 IN | SYSTOLIC BLOOD PRESSURE: 128 MMHG

## 2023-01-01 VITALS
BODY MASS INDEX: 28.71 KG/M2 | OXYGEN SATURATION: 98 % | HEIGHT: 72 IN | SYSTOLIC BLOOD PRESSURE: 110 MMHG | HEART RATE: 89 BPM | DIASTOLIC BLOOD PRESSURE: 70 MMHG | WEIGHT: 212 LBS

## 2023-01-01 VITALS
TEMPERATURE: 96.4 F | HEIGHT: 72 IN | DIASTOLIC BLOOD PRESSURE: 61 MMHG | HEART RATE: 74 BPM | BODY MASS INDEX: 28.79 KG/M2 | SYSTOLIC BLOOD PRESSURE: 102 MMHG | WEIGHT: 212.52 LBS | OXYGEN SATURATION: 96 %

## 2023-01-01 VITALS
BODY MASS INDEX: 28.98 KG/M2 | WEIGHT: 206.99 LBS | HEART RATE: 96 BPM | TEMPERATURE: 98.6 F | OXYGEN SATURATION: 97 % | HEIGHT: 71 IN | DIASTOLIC BLOOD PRESSURE: 67 MMHG | SYSTOLIC BLOOD PRESSURE: 132 MMHG | RESPIRATION RATE: 16 BRPM

## 2023-01-01 VITALS
DIASTOLIC BLOOD PRESSURE: 62 MMHG | HEART RATE: 79 BPM | BODY MASS INDEX: 28.37 KG/M2 | TEMPERATURE: 98.3 F | WEIGHT: 209.44 LBS | SYSTOLIC BLOOD PRESSURE: 112 MMHG | HEIGHT: 72 IN

## 2023-01-01 VITALS
OXYGEN SATURATION: 98 % | SYSTOLIC BLOOD PRESSURE: 137 MMHG | HEART RATE: 73 BPM | RESPIRATION RATE: 18 BRPM | WEIGHT: 220.57 LBS | BODY MASS INDEX: 29.92 KG/M2 | DIASTOLIC BLOOD PRESSURE: 84 MMHG

## 2023-01-01 VITALS
DIASTOLIC BLOOD PRESSURE: 76 MMHG | HEART RATE: 64 BPM | TEMPERATURE: 97.8 F | SYSTOLIC BLOOD PRESSURE: 116 MMHG | WEIGHT: 209.44 LBS | HEIGHT: 70 IN | BODY MASS INDEX: 29.98 KG/M2

## 2023-01-01 VITALS
TEMPERATURE: 98 F | WEIGHT: 207.9 LBS | HEART RATE: 87 BPM | OXYGEN SATURATION: 97 % | DIASTOLIC BLOOD PRESSURE: 79 MMHG | RESPIRATION RATE: 20 BRPM | HEIGHT: 72 IN | SYSTOLIC BLOOD PRESSURE: 129 MMHG

## 2023-01-01 VITALS — DIASTOLIC BLOOD PRESSURE: 73 MMHG | SYSTOLIC BLOOD PRESSURE: 114 MMHG | HEART RATE: 71 BPM

## 2023-01-01 VITALS
RESPIRATION RATE: 19 BRPM | OXYGEN SATURATION: 98 % | TEMPERATURE: 96.4 F | BODY MASS INDEX: 29.03 KG/M2 | SYSTOLIC BLOOD PRESSURE: 119 MMHG | WEIGHT: 213.85 LBS | HEART RATE: 90 BPM | DIASTOLIC BLOOD PRESSURE: 68 MMHG

## 2023-01-01 VITALS
WEIGHT: 206.99 LBS | OXYGEN SATURATION: 98 % | HEART RATE: 77 BPM | TEMPERATURE: 97.4 F | DIASTOLIC BLOOD PRESSURE: 61 MMHG | RESPIRATION RATE: 16 BRPM | HEIGHT: 71 IN | BODY MASS INDEX: 28.98 KG/M2 | SYSTOLIC BLOOD PRESSURE: 96 MMHG

## 2023-01-01 VITALS
HEART RATE: 76 BPM | TEMPERATURE: 97.7 F | BODY MASS INDEX: 30.1 KG/M2 | HEIGHT: 72 IN | DIASTOLIC BLOOD PRESSURE: 78 MMHG | RESPIRATION RATE: 17 BRPM | SYSTOLIC BLOOD PRESSURE: 137 MMHG | OXYGEN SATURATION: 98 % | WEIGHT: 222.22 LBS

## 2023-01-01 VITALS
TEMPERATURE: 97.9 F | WEIGHT: 207.23 LBS | DIASTOLIC BLOOD PRESSURE: 67 MMHG | BODY MASS INDEX: 28.13 KG/M2 | RESPIRATION RATE: 19 BRPM | HEART RATE: 93 BPM | OXYGEN SATURATION: 98 % | SYSTOLIC BLOOD PRESSURE: 106 MMHG

## 2023-01-01 VITALS
OXYGEN SATURATION: 98 % | RESPIRATION RATE: 16 BRPM | HEIGHT: 71.97 IN | SYSTOLIC BLOOD PRESSURE: 99 MMHG | WEIGHT: 214.29 LBS | TEMPERATURE: 96.8 F | BODY MASS INDEX: 29.02 KG/M2 | DIASTOLIC BLOOD PRESSURE: 62 MMHG | HEART RATE: 84 BPM

## 2023-01-01 DIAGNOSIS — Z51.89 ENCOUNTER FOR OTHER SPECIFIED AFTERCARE: ICD-10-CM

## 2023-01-01 DIAGNOSIS — Z98.890 OTHER SPECIFIED POSTPROCEDURAL STATES: Chronic | ICD-10-CM

## 2023-01-01 DIAGNOSIS — Z95.1 PRESENCE OF AORTOCORONARY BYPASS GRAFT: Chronic | ICD-10-CM

## 2023-01-01 DIAGNOSIS — C34.90 MALIGNANT NEOPLASM OF UNSPECIFIED PART OF UNSPECIFIED BRONCHUS OR LUNG: ICD-10-CM

## 2023-01-01 DIAGNOSIS — N40.0 BENIGN PROSTATIC HYPERPLASIA WITHOUT LOWER URINARY TRACT SYMPMS: ICD-10-CM

## 2023-01-01 DIAGNOSIS — I50.21 ACUTE SYSTOLIC (CONGESTIVE) HEART FAILURE: ICD-10-CM

## 2023-01-01 DIAGNOSIS — H92.13 OTORRHEA, BILATERAL: ICD-10-CM

## 2023-01-01 DIAGNOSIS — E03.9 HYPOTHYROIDISM, UNSPECIFIED: ICD-10-CM

## 2023-01-01 DIAGNOSIS — T38.0X5A ADVERSE EFFECT OF GLUCOCORTICOIDS AND SYNTHETIC ANALOGUES, INITIAL ENCOUNTER: ICD-10-CM

## 2023-01-01 DIAGNOSIS — E78.5 HYPERLIPIDEMIA, UNSPECIFIED: ICD-10-CM

## 2023-01-01 DIAGNOSIS — D69.6 THROMBOCYTOPENIA, UNSPECIFIED: ICD-10-CM

## 2023-01-01 DIAGNOSIS — Z51.11 ENCOUNTER FOR ANTINEOPLASTIC CHEMOTHERAPY: ICD-10-CM

## 2023-01-01 DIAGNOSIS — D46.9 MYELODYSPLASTIC SYNDROME, UNSPECIFIED: ICD-10-CM

## 2023-01-01 DIAGNOSIS — Z95.5 PRESENCE OF CORONARY ANGIOPLASTY IMPLANT AND GRAFT: Chronic | ICD-10-CM

## 2023-01-01 DIAGNOSIS — Z86.79 PERSONAL HISTORY OF OTHER DISEASES OF THE CIRCULATORY SYSTEM: ICD-10-CM

## 2023-01-01 DIAGNOSIS — R06.02 SHORTNESS OF BREATH: ICD-10-CM

## 2023-01-01 DIAGNOSIS — H62.41 SUPERFICIAL MYCOSIS, UNSPECIFIED: ICD-10-CM

## 2023-01-01 DIAGNOSIS — I34.0 NONRHEUMATIC MITRAL (VALVE) INSUFFICIENCY: ICD-10-CM

## 2023-01-01 DIAGNOSIS — C34.12 MALIGNANT NEOPLASM OF UPPER LOBE, LEFT BRONCHUS OR LUNG: ICD-10-CM

## 2023-01-01 DIAGNOSIS — R11.2 NAUSEA WITH VOMITING, UNSPECIFIED: ICD-10-CM

## 2023-01-01 DIAGNOSIS — C79.51 SECONDARY MALIGNANT NEOPLASM OF BONE: ICD-10-CM

## 2023-01-01 DIAGNOSIS — Z95.5 PRESENCE OF CORONARY ANGIOPLASTY IMPLANT AND GRAFT: ICD-10-CM

## 2023-01-01 DIAGNOSIS — Z95.1 PRESENCE OF AORTOCORONARY BYPASS GRAFT: ICD-10-CM

## 2023-01-01 DIAGNOSIS — L29.9 PRURITUS, UNSPECIFIED: ICD-10-CM

## 2023-01-01 DIAGNOSIS — Z29.9 ENCOUNTER FOR PROPHYLACTIC MEASURES, UNSPECIFIED: ICD-10-CM

## 2023-01-01 DIAGNOSIS — K20.90 ESOPHAGITIS, UNSPECIFIED WITHOUT BLEEDING: ICD-10-CM

## 2023-01-01 DIAGNOSIS — C78.7 SECONDARY MALIGNANT NEOPLASM OF LIVER AND INTRAHEPATIC BILE DUCT: ICD-10-CM

## 2023-01-01 DIAGNOSIS — Z01.812 ENCOUNTER FOR PREPROCEDURAL LABORATORY EXAMINATION: ICD-10-CM

## 2023-01-01 DIAGNOSIS — Z12.11 ENCOUNTER FOR SCREENING FOR MALIGNANT NEOPLASM OF COLON: ICD-10-CM

## 2023-01-01 DIAGNOSIS — I25.10 ATHEROSCLEROTIC HEART DISEASE OF NATIVE CORONARY ARTERY W/OUT ANGINA PECTORIS: ICD-10-CM

## 2023-01-01 DIAGNOSIS — K29.70 GASTRITIS, UNSPECIFIED, W/OUT BLEEDING: ICD-10-CM

## 2023-01-01 DIAGNOSIS — Z86.39 PERSONAL HISTORY OF OTHER ENDOCRINE, NUTRITIONAL AND METABOLIC DISEASE: ICD-10-CM

## 2023-01-01 DIAGNOSIS — B96.81 GASTRITIS, UNSPECIFIED, W/OUT BLEEDING: ICD-10-CM

## 2023-01-01 DIAGNOSIS — Z51.12 ENCOUNTER FOR ANTINEOPLASTIC IMMUNOTHERAPY: ICD-10-CM

## 2023-01-01 DIAGNOSIS — I25.2 OLD MYOCARDIAL INFARCTION: ICD-10-CM

## 2023-01-01 DIAGNOSIS — C77.1 SECONDARY AND UNSPECIFIED MALIGNANT NEOPLASM OF INTRATHORACIC LYMPH NODES: ICD-10-CM

## 2023-01-01 DIAGNOSIS — I25.10 ATHEROSCLEROTIC HEART DISEASE OF NATIVE CORONARY ARTERY WITHOUT ANGINA PECTORIS: ICD-10-CM

## 2023-01-01 DIAGNOSIS — U07.1 COVID-19: ICD-10-CM

## 2023-01-01 DIAGNOSIS — Z87.01 PERSONAL HISTORY OF PNEUMONIA (RECURRENT): ICD-10-CM

## 2023-01-01 DIAGNOSIS — R63.4 ABNORMAL WEIGHT LOSS: ICD-10-CM

## 2023-01-01 DIAGNOSIS — M79.18 MYALGIA, OTHER SITE: ICD-10-CM

## 2023-01-01 DIAGNOSIS — C77.2 SECONDARY AND UNSPECIFIED MALIGNANT NEOPLASM OF INTRA-ABDOMINAL LYMPH NODES: ICD-10-CM

## 2023-01-01 DIAGNOSIS — Z01.810 ENCOUNTER FOR PREPROCEDURAL CARDIOVASCULAR EXAMINATION: ICD-10-CM

## 2023-01-01 DIAGNOSIS — I65.22 OCCLUSION AND STENOSIS OF LEFT CAROTID ARTERY: ICD-10-CM

## 2023-01-01 DIAGNOSIS — I10 ESSENTIAL (PRIMARY) HYPERTENSION: ICD-10-CM

## 2023-01-01 DIAGNOSIS — Z87.898 PERSONAL HISTORY OF OTHER SPECIFIED CONDITIONS: ICD-10-CM

## 2023-01-01 DIAGNOSIS — M54.50 LOW BACK PAIN, UNSPECIFIED: ICD-10-CM

## 2023-01-01 DIAGNOSIS — Z87.2 PERSONAL HISTORY OF DISEASES OF THE SKIN AND SUBCUTANEOUS TISSUE: ICD-10-CM

## 2023-01-01 DIAGNOSIS — Z20.822 ENCOUNTER FOR PREPROCEDURAL LABORATORY EXAMINATION: ICD-10-CM

## 2023-01-01 DIAGNOSIS — Z85.118 PERSONAL HISTORY OF OTHER MALIGNANT NEOPLASM OF BRONCHUS AND LUNG: ICD-10-CM

## 2023-01-01 DIAGNOSIS — R73.03 PREDIABETES.: ICD-10-CM

## 2023-01-01 DIAGNOSIS — B36.9 SUPERFICIAL MYCOSIS, UNSPECIFIED: ICD-10-CM

## 2023-01-01 DIAGNOSIS — I95.1 ORTHOSTATIC HYPOTENSION: ICD-10-CM

## 2023-01-01 DIAGNOSIS — Z92.89 PERSONAL HISTORY OF OTHER MEDICAL TREATMENT: ICD-10-CM

## 2023-01-01 DIAGNOSIS — Z23 ENCOUNTER FOR IMMUNIZATION: ICD-10-CM

## 2023-01-01 DIAGNOSIS — G47.00 INSOMNIA, UNSPECIFIED: ICD-10-CM

## 2023-01-01 DIAGNOSIS — K11.7 DISTURBANCES OF SALIVARY SECRETION: ICD-10-CM

## 2023-01-01 DIAGNOSIS — R19.7 DIARRHEA, UNSPECIFIED: ICD-10-CM

## 2023-01-01 LAB
24R-OH-CALCIDIOL SERPL-MCNC: 55.7 NG/ML — SIGNIFICANT CHANGE UP (ref 30–80)
24R-OH-CALCIDIOL SERPL-MCNC: 79.6 NG/ML — SIGNIFICANT CHANGE UP (ref 30–80)
24R-OH-CALCIDIOL SERPL-MCNC: 90.7 NG/ML — HIGH (ref 30–80)
A1C WITH ESTIMATED AVERAGE GLUCOSE RESULT: 6.2 % — HIGH (ref 4–5.6)
ALBUMIN MFR SERPL ELPH: 64.7 %
ALBUMIN SERPL ELPH-MCNC: 3.8 G/DL — SIGNIFICANT CHANGE UP (ref 3.3–5)
ALBUMIN SERPL ELPH-MCNC: 3.8 G/DL — SIGNIFICANT CHANGE UP (ref 3.3–5)
ALBUMIN SERPL ELPH-MCNC: 3.9 G/DL — SIGNIFICANT CHANGE UP (ref 3.3–5)
ALBUMIN SERPL ELPH-MCNC: 4 G/DL — SIGNIFICANT CHANGE UP (ref 3.3–5)
ALBUMIN SERPL ELPH-MCNC: 4.1 G/DL
ALBUMIN SERPL ELPH-MCNC: 4.1 G/DL — SIGNIFICANT CHANGE UP (ref 3.3–5)
ALBUMIN SERPL ELPH-MCNC: 4.2 G/DL — SIGNIFICANT CHANGE UP (ref 3.3–5)
ALBUMIN SERPL ELPH-MCNC: 4.2 G/DL — SIGNIFICANT CHANGE UP (ref 3.3–5)
ALBUMIN SERPL ELPH-MCNC: 4.3 G/DL
ALBUMIN SERPL ELPH-MCNC: 4.3 G/DL — SIGNIFICANT CHANGE UP (ref 3.3–5)
ALBUMIN SERPL ELPH-MCNC: 4.3 G/DL — SIGNIFICANT CHANGE UP (ref 3.3–5)
ALBUMIN SERPL ELPH-MCNC: 4.4 G/DL — SIGNIFICANT CHANGE UP (ref 3.3–5)
ALBUMIN SERPL ELPH-MCNC: 4.5 G/DL
ALBUMIN SERPL ELPH-MCNC: 4.5 G/DL — SIGNIFICANT CHANGE UP (ref 3.3–5)
ALBUMIN SERPL ELPH-MCNC: 4.7 G/DL
ALBUMIN SERPL-MCNC: 3.9 G/DL
ALBUMIN/GLOB SERPL: 1.8 RATIO
ALDOLASE SERPL-CCNC: 7.4 U/L — SIGNIFICANT CHANGE UP (ref 3.3–10.3)
ALDOLASE SERPL-CCNC: 7.4 U/L — SIGNIFICANT CHANGE UP (ref 3.3–10.3)
ALP BLD-CCNC: 55 U/L
ALP BLD-CCNC: 64 U/L
ALP BLD-CCNC: 70 U/L
ALP BLD-CCNC: 81 U/L
ALP SERPL-CCNC: 48 U/L — SIGNIFICANT CHANGE UP (ref 40–120)
ALP SERPL-CCNC: 48 U/L — SIGNIFICANT CHANGE UP (ref 40–120)
ALP SERPL-CCNC: 49 U/L — SIGNIFICANT CHANGE UP (ref 40–120)
ALP SERPL-CCNC: 50 U/L — SIGNIFICANT CHANGE UP (ref 40–120)
ALP SERPL-CCNC: 51 U/L — SIGNIFICANT CHANGE UP (ref 40–120)
ALP SERPL-CCNC: 51 U/L — SIGNIFICANT CHANGE UP (ref 40–120)
ALP SERPL-CCNC: 53 U/L — SIGNIFICANT CHANGE UP (ref 40–120)
ALP SERPL-CCNC: 53 U/L — SIGNIFICANT CHANGE UP (ref 40–120)
ALP SERPL-CCNC: 57 U/L — SIGNIFICANT CHANGE UP (ref 40–120)
ALP SERPL-CCNC: 58 U/L — SIGNIFICANT CHANGE UP (ref 40–120)
ALP SERPL-CCNC: 62 U/L — SIGNIFICANT CHANGE UP (ref 40–120)
ALP SERPL-CCNC: 64 U/L — SIGNIFICANT CHANGE UP (ref 40–120)
ALP SERPL-CCNC: 65 U/L — SIGNIFICANT CHANGE UP (ref 40–120)
ALPHA1 GLOB MFR SERPL ELPH: 5.5 %
ALPHA1 GLOB SERPL ELPH-MCNC: 0.3 G/DL
ALPHA2 GLOB MFR SERPL ELPH: 7.5 %
ALPHA2 GLOB SERPL ELPH-MCNC: 0.5 G/DL
ALT FLD-CCNC: 11 U/L — SIGNIFICANT CHANGE UP (ref 10–45)
ALT FLD-CCNC: 12 U/L — SIGNIFICANT CHANGE UP (ref 10–45)
ALT FLD-CCNC: 15 U/L — SIGNIFICANT CHANGE UP (ref 10–45)
ALT FLD-CCNC: 17 U/L — SIGNIFICANT CHANGE UP (ref 10–45)
ALT FLD-CCNC: 20 U/L — SIGNIFICANT CHANGE UP (ref 10–45)
ALT FLD-CCNC: 21 U/L — SIGNIFICANT CHANGE UP (ref 10–45)
ALT FLD-CCNC: 30 U/L — SIGNIFICANT CHANGE UP (ref 10–45)
ALT FLD-CCNC: 32 U/L — SIGNIFICANT CHANGE UP (ref 10–45)
ALT FLD-CCNC: 33 U/L — SIGNIFICANT CHANGE UP (ref 10–45)
ALT FLD-CCNC: 33 U/L — SIGNIFICANT CHANGE UP (ref 10–45)
ALT FLD-CCNC: 34 U/L — SIGNIFICANT CHANGE UP (ref 10–45)
ALT FLD-CCNC: 34 U/L — SIGNIFICANT CHANGE UP (ref 10–45)
ALT FLD-CCNC: 40 U/L — SIGNIFICANT CHANGE UP (ref 10–45)
ALT FLD-CCNC: <5 U/L — LOW (ref 10–45)
ALT FLD-CCNC: <5 U/L — LOW (ref 10–45)
ALT SERPL-CCNC: 14 U/L
ALT SERPL-CCNC: 20 U/L
ALT SERPL-CCNC: 21 U/L
ALT SERPL-CCNC: 40 U/L
ANION GAP SERPL CALC-SCNC: 10 MMOL/L
ANION GAP SERPL CALC-SCNC: 10 MMOL/L — SIGNIFICANT CHANGE UP (ref 5–17)
ANION GAP SERPL CALC-SCNC: 11 MMOL/L
ANION GAP SERPL CALC-SCNC: 11 MMOL/L
ANION GAP SERPL CALC-SCNC: 11 MMOL/L — SIGNIFICANT CHANGE UP (ref 5–17)
ANION GAP SERPL CALC-SCNC: 12 MMOL/L
ANION GAP SERPL CALC-SCNC: 12 MMOL/L — SIGNIFICANT CHANGE UP (ref 5–17)
ANION GAP SERPL CALC-SCNC: 12 MMOL/L — SIGNIFICANT CHANGE UP (ref 5–17)
ANION GAP SERPL CALC-SCNC: 13 MMOL/L — SIGNIFICANT CHANGE UP (ref 5–17)
ANION GAP SERPL CALC-SCNC: 13 MMOL/L — SIGNIFICANT CHANGE UP (ref 5–17)
ANION GAP SERPL CALC-SCNC: 16 MMOL/L — SIGNIFICANT CHANGE UP (ref 5–17)
ANION GAP SERPL CALC-SCNC: 8 MMOL/L — SIGNIFICANT CHANGE UP (ref 5–17)
ANION GAP SERPL CALC-SCNC: 9 MMOL/L — SIGNIFICANT CHANGE UP (ref 5–17)
ANION GAP SERPL CALC-SCNC: 9 MMOL/L — SIGNIFICANT CHANGE UP (ref 5–17)
ANISOCYTOSIS BLD QL: SLIGHT — SIGNIFICANT CHANGE UP
APTT BLD: 26 SEC — SIGNIFICANT CHANGE UP (ref 24.5–35.6)
APTT BLD: 26.5 SEC — SIGNIFICANT CHANGE UP (ref 24.5–35.6)
APTT BLD: 26.5 SEC — SIGNIFICANT CHANGE UP (ref 24.5–35.6)
APTT BLD: 27.7 SEC — SIGNIFICANT CHANGE UP (ref 24.5–35.6)
APTT BLD: 51.3 SEC — HIGH (ref 24.5–35.6)
AST SERPL-CCNC: 13 U/L — SIGNIFICANT CHANGE UP (ref 10–40)
AST SERPL-CCNC: 14 U/L — SIGNIFICANT CHANGE UP (ref 10–40)
AST SERPL-CCNC: 15 U/L
AST SERPL-CCNC: 15 U/L — SIGNIFICANT CHANGE UP (ref 10–40)
AST SERPL-CCNC: 18 U/L
AST SERPL-CCNC: 18 U/L — SIGNIFICANT CHANGE UP (ref 10–40)
AST SERPL-CCNC: 18 U/L — SIGNIFICANT CHANGE UP (ref 10–40)
AST SERPL-CCNC: 24 U/L — SIGNIFICANT CHANGE UP (ref 10–40)
B-GLOBULIN MFR SERPL ELPH: 11.2 %
B-GLOBULIN SERPL ELPH-MCNC: 0.7 G/DL
BASOPHILS # BLD AUTO: 0 K/UL — SIGNIFICANT CHANGE UP (ref 0–0.2)
BASOPHILS # BLD AUTO: 0.01 K/UL — SIGNIFICANT CHANGE UP (ref 0–0.2)
BASOPHILS NFR BLD AUTO: 0 % — SIGNIFICANT CHANGE UP (ref 0–2)
BASOPHILS NFR BLD AUTO: 0.1 % — SIGNIFICANT CHANGE UP (ref 0–2)
BASOPHILS NFR BLD AUTO: 0.2 % — SIGNIFICANT CHANGE UP (ref 0–2)
BASOPHILS NFR BLD AUTO: 0.3 % — SIGNIFICANT CHANGE UP (ref 0–2)
BCR/ABL BY RT - PCR QUANTITATIVE: SIGNIFICANT CHANGE UP
BCR/ABL BY RT - PCR QUANTITATIVE: SIGNIFICANT CHANGE UP
BILIRUB SERPL-MCNC: 0.3 MG/DL — SIGNIFICANT CHANGE UP (ref 0.2–1.2)
BILIRUB SERPL-MCNC: 0.4 MG/DL — SIGNIFICANT CHANGE UP (ref 0.2–1.2)
BILIRUB SERPL-MCNC: 0.5 MG/DL — SIGNIFICANT CHANGE UP (ref 0.2–1.2)
BILIRUB SERPL-MCNC: 0.6 MG/DL — SIGNIFICANT CHANGE UP (ref 0.2–1.2)
BILIRUB SERPL-MCNC: 0.7 MG/DL
BILIRUB SERPL-MCNC: 0.7 MG/DL
BILIRUB SERPL-MCNC: 0.8 MG/DL
BILIRUB SERPL-MCNC: 0.8 MG/DL
BILIRUB SERPL-MCNC: 0.8 MG/DL — SIGNIFICANT CHANGE UP (ref 0.2–1.2)
BILIRUB SERPL-MCNC: 0.8 MG/DL — SIGNIFICANT CHANGE UP (ref 0.2–1.2)
BLASTS # FLD: 1 % — HIGH (ref 0–0)
BLASTS # FLD: 1.7 % — HIGH (ref 0–0)
BLASTS # FLD: 1.8 % — HIGH (ref 0–0)
BLASTS # FLD: 10 % — HIGH (ref 0–0)
BLASTS # FLD: 10 % — HIGH (ref 0–0)
BLASTS # FLD: 13 % — HIGH (ref 0–0)
BLASTS # FLD: 16 % — HIGH (ref 0–0)
BLASTS # FLD: 16 % — HIGH (ref 0–0)
BLASTS # FLD: 17 % — HIGH (ref 0–0)
BLASTS # FLD: 17 % — HIGH (ref 0–0)
BLASTS # FLD: 18.5 % — HIGH (ref 0–0)
BLASTS # FLD: 18.5 % — HIGH (ref 0–0)
BLASTS # FLD: 19 % — HIGH (ref 0–0)
BLASTS # FLD: 19 % — HIGH (ref 0–0)
BLASTS # FLD: 2 % — HIGH (ref 0–0)
BLASTS # FLD: 3 % — HIGH (ref 0–0)
BLASTS # FLD: 4 % — HIGH (ref 0–0)
BLASTS # FLD: 5 % — HIGH (ref 0–0)
BLASTS # FLD: 5 % — HIGH (ref 0–0)
BLASTS # FLD: 7 % — HIGH (ref 0–0)
BLASTS # FLD: 7 % — HIGH (ref 0–0)
BLASTS # FLD: 9 % — HIGH (ref 0–0)
BLASTS # FLD: 9 % — HIGH (ref 0–0)
BLASTS # FLD: 9.5 % — HIGH (ref 0–0)
BLASTS # FLD: 9.5 % — HIGH (ref 0–0)
BLD GP AB SCN SERPL QL: NEGATIVE — SIGNIFICANT CHANGE UP
BUN SERPL-MCNC: 12 MG/DL — SIGNIFICANT CHANGE UP (ref 7–23)
BUN SERPL-MCNC: 12 MG/DL — SIGNIFICANT CHANGE UP (ref 7–23)
BUN SERPL-MCNC: 14 MG/DL
BUN SERPL-MCNC: 15 MG/DL — SIGNIFICANT CHANGE UP (ref 7–23)
BUN SERPL-MCNC: 16 MG/DL — SIGNIFICANT CHANGE UP (ref 7–23)
BUN SERPL-MCNC: 17 MG/DL
BUN SERPL-MCNC: 17 MG/DL — SIGNIFICANT CHANGE UP (ref 7–23)
BUN SERPL-MCNC: 18 MG/DL — SIGNIFICANT CHANGE UP (ref 7–23)
BUN SERPL-MCNC: 19 MG/DL
BUN SERPL-MCNC: 19 MG/DL — SIGNIFICANT CHANGE UP (ref 7–23)
BUN SERPL-MCNC: 20 MG/DL
BUN SERPL-MCNC: 20 MG/DL — SIGNIFICANT CHANGE UP (ref 7–23)
BUN SERPL-MCNC: 22 MG/DL — SIGNIFICANT CHANGE UP (ref 7–23)
BUN SERPL-MCNC: 9 MG/DL — SIGNIFICANT CHANGE UP (ref 7–23)
CALCIUM SERPL-MCNC: 10 MG/DL
CALCIUM SERPL-MCNC: 10 MG/DL — SIGNIFICANT CHANGE UP (ref 8.4–10.5)
CALCIUM SERPL-MCNC: 10.1 MG/DL
CALCIUM SERPL-MCNC: 10.2 MG/DL — SIGNIFICANT CHANGE UP (ref 8.4–10.5)
CALCIUM SERPL-MCNC: 10.3 MG/DL — SIGNIFICANT CHANGE UP (ref 8.4–10.5)
CALCIUM SERPL-MCNC: 10.4 MG/DL
CALCIUM SERPL-MCNC: 7.2 MG/DL — LOW (ref 8.4–10.5)
CALCIUM SERPL-MCNC: 9.4 MG/DL — SIGNIFICANT CHANGE UP (ref 8.4–10.5)
CALCIUM SERPL-MCNC: 9.4 MG/DL — SIGNIFICANT CHANGE UP (ref 8.4–10.5)
CALCIUM SERPL-MCNC: 9.5 MG/DL — SIGNIFICANT CHANGE UP (ref 8.4–10.5)
CALCIUM SERPL-MCNC: 9.5 MG/DL — SIGNIFICANT CHANGE UP (ref 8.4–10.5)
CALCIUM SERPL-MCNC: 9.6 MG/DL — SIGNIFICANT CHANGE UP (ref 8.4–10.5)
CALCIUM SERPL-MCNC: 9.8 MG/DL
CALCIUM SERPL-MCNC: 9.8 MG/DL — SIGNIFICANT CHANGE UP (ref 8.4–10.5)
CALCIUM SERPL-MCNC: 9.9 MG/DL — SIGNIFICANT CHANGE UP (ref 8.4–10.5)
CALCIUM SERPL-MCNC: 9.9 MG/DL — SIGNIFICANT CHANGE UP (ref 8.4–10.5)
CHLORIDE SERPL-SCNC: 101 MMOL/L
CHLORIDE SERPL-SCNC: 101 MMOL/L — SIGNIFICANT CHANGE UP (ref 96–108)
CHLORIDE SERPL-SCNC: 102 MMOL/L — SIGNIFICANT CHANGE UP (ref 96–108)
CHLORIDE SERPL-SCNC: 102 MMOL/L — SIGNIFICANT CHANGE UP (ref 96–108)
CHLORIDE SERPL-SCNC: 103 MMOL/L — SIGNIFICANT CHANGE UP (ref 96–108)
CHLORIDE SERPL-SCNC: 104 MMOL/L
CHLORIDE SERPL-SCNC: 104 MMOL/L — SIGNIFICANT CHANGE UP (ref 96–108)
CHLORIDE SERPL-SCNC: 105 MMOL/L
CHLORIDE SERPL-SCNC: 105 MMOL/L — SIGNIFICANT CHANGE UP (ref 96–108)
CHLORIDE SERPL-SCNC: 106 MMOL/L
CHLORIDE SERPL-SCNC: 115 MMOL/L — HIGH (ref 96–108)
CHOLEST SERPL-MCNC: 174 MG/DL — SIGNIFICANT CHANGE UP
CHROM ANALY INTERPHASE BLD FISH-IMP: SIGNIFICANT CHANGE UP
CHROM ANALY OVERALL INTERP SPEC-IMP: SIGNIFICANT CHANGE UP
CK SERPL-CCNC: 12 U/L — LOW (ref 30–200)
CK SERPL-CCNC: 12 U/L — LOW (ref 30–200)
CO2 SERPL-SCNC: 19 MMOL/L — LOW (ref 22–31)
CO2 SERPL-SCNC: 22 MMOL/L
CO2 SERPL-SCNC: 23 MMOL/L
CO2 SERPL-SCNC: 23 MMOL/L — SIGNIFICANT CHANGE UP (ref 22–31)
CO2 SERPL-SCNC: 24 MMOL/L — SIGNIFICANT CHANGE UP (ref 22–31)
CO2 SERPL-SCNC: 25 MMOL/L
CO2 SERPL-SCNC: 25 MMOL/L — SIGNIFICANT CHANGE UP (ref 22–31)
CO2 SERPL-SCNC: 26 MMOL/L
CO2 SERPL-SCNC: 26 MMOL/L — SIGNIFICANT CHANGE UP (ref 22–31)
CO2 SERPL-SCNC: 27 MMOL/L — SIGNIFICANT CHANGE UP (ref 22–31)
CO2 SERPL-SCNC: 27 MMOL/L — SIGNIFICANT CHANGE UP (ref 22–31)
CREAT SERPL-MCNC: 0.41 MG/DL — LOW (ref 0.5–1.3)
CREAT SERPL-MCNC: 0.51 MG/DL — SIGNIFICANT CHANGE UP (ref 0.5–1.3)
CREAT SERPL-MCNC: 0.52 MG/DL — SIGNIFICANT CHANGE UP (ref 0.5–1.3)
CREAT SERPL-MCNC: 0.53 MG/DL — SIGNIFICANT CHANGE UP (ref 0.5–1.3)
CREAT SERPL-MCNC: 0.53 MG/DL — SIGNIFICANT CHANGE UP (ref 0.5–1.3)
CREAT SERPL-MCNC: 0.57 MG/DL — SIGNIFICANT CHANGE UP (ref 0.5–1.3)
CREAT SERPL-MCNC: 0.58 MG/DL — SIGNIFICANT CHANGE UP (ref 0.5–1.3)
CREAT SERPL-MCNC: 0.58 MG/DL — SIGNIFICANT CHANGE UP (ref 0.5–1.3)
CREAT SERPL-MCNC: 0.63 MG/DL — SIGNIFICANT CHANGE UP (ref 0.5–1.3)
CREAT SERPL-MCNC: 0.64 MG/DL — SIGNIFICANT CHANGE UP (ref 0.5–1.3)
CREAT SERPL-MCNC: 0.64 MG/DL — SIGNIFICANT CHANGE UP (ref 0.5–1.3)
CREAT SERPL-MCNC: 0.69 MG/DL — SIGNIFICANT CHANGE UP (ref 0.5–1.3)
CREAT SERPL-MCNC: 0.71 MG/DL — SIGNIFICANT CHANGE UP (ref 0.5–1.3)
CREAT SERPL-MCNC: 0.74 MG/DL — SIGNIFICANT CHANGE UP (ref 0.5–1.3)
CREAT SERPL-MCNC: 0.75 MG/DL
CREAT SERPL-MCNC: 0.79 MG/DL
CREAT SERPL-MCNC: 0.8 MG/DL — SIGNIFICANT CHANGE UP (ref 0.5–1.3)
CREAT SERPL-MCNC: 0.81 MG/DL — SIGNIFICANT CHANGE UP (ref 0.5–1.3)
CREAT SERPL-MCNC: 0.88 MG/DL
CREAT SERPL-MCNC: 0.89 MG/DL
CREAT SERPL-MCNC: 1.02 MG/DL — SIGNIFICANT CHANGE UP (ref 0.5–1.3)
CREAT SERPL-MCNC: 1.11 MG/DL — SIGNIFICANT CHANGE UP (ref 0.5–1.3)
CULTURE RESULTS: SIGNIFICANT CHANGE UP
D DIMER BLD IA.RAPID-MCNC: 219 NG/ML DDU — SIGNIFICANT CHANGE UP
D DIMER BLD IA.RAPID-MCNC: 256 NG/ML DDU — HIGH
D DIMER BLD IA.RAPID-MCNC: 274 NG/ML DDU — HIGH
D DIMER BLD IA.RAPID-MCNC: 275 NG/ML DDU — HIGH
D DIMER BLD IA.RAPID-MCNC: 275 NG/ML DDU — HIGH
DACRYOCYTES BLD QL SMEAR: SLIGHT — SIGNIFICANT CHANGE UP
DEPRECATED KAPPA LC FREE/LAMBDA SER: 1.39 RATIO
EGFR: 100 ML/MIN/1.73M2 — SIGNIFICANT CHANGE UP
EGFR: 102 ML/MIN/1.73M2 — SIGNIFICANT CHANGE UP
EGFR: 105 ML/MIN/1.73M2 — SIGNIFICANT CHANGE UP
EGFR: 108 ML/MIN/1.73M2 — SIGNIFICANT CHANGE UP
EGFR: 109 ML/MIN/1.73M2 — SIGNIFICANT CHANGE UP
EGFR: 116 ML/MIN/1.73M2 — SIGNIFICANT CHANGE UP
EGFR: 71 ML/MIN/1.73M2 — SIGNIFICANT CHANGE UP
EGFR: 80 ML/MIN/1.73M2 — SIGNIFICANT CHANGE UP
EGFR: 92 ML/MIN/1.73M2
EGFR: 93 ML/MIN/1.73M2
EGFR: 95 ML/MIN/1.73M2 — SIGNIFICANT CHANGE UP
EGFR: 95 ML/MIN/1.73M2 — SIGNIFICANT CHANGE UP
EGFR: 96 ML/MIN/1.73M2
EGFR: 97 ML/MIN/1.73M2
EGFR: 97 ML/MIN/1.73M2 — SIGNIFICANT CHANGE UP
EGFR: 99 ML/MIN/1.73M2 — SIGNIFICANT CHANGE UP
ELLIPTOCYTES BLD QL SMEAR: SLIGHT — SIGNIFICANT CHANGE UP
EOSINOPHIL # BLD AUTO: 0 K/UL — SIGNIFICANT CHANGE UP (ref 0–0.5)
EOSINOPHIL # BLD AUTO: 0.01 K/UL — SIGNIFICANT CHANGE UP (ref 0–0.5)
EOSINOPHIL # BLD AUTO: 0.02 K/UL — SIGNIFICANT CHANGE UP (ref 0–0.5)
EOSINOPHIL # BLD AUTO: 0.03 K/UL — SIGNIFICANT CHANGE UP (ref 0–0.5)
EOSINOPHIL # BLD AUTO: 0.05 K/UL — SIGNIFICANT CHANGE UP (ref 0–0.5)
EOSINOPHIL # BLD AUTO: 0.05 K/UL — SIGNIFICANT CHANGE UP (ref 0–0.5)
EOSINOPHIL # BLD AUTO: 0.06 K/UL — SIGNIFICANT CHANGE UP (ref 0–0.5)
EOSINOPHIL # BLD AUTO: 0.06 K/UL — SIGNIFICANT CHANGE UP (ref 0–0.5)
EOSINOPHIL # BLD AUTO: 0.08 K/UL — SIGNIFICANT CHANGE UP (ref 0–0.5)
EOSINOPHIL # BLD AUTO: 0.08 K/UL — SIGNIFICANT CHANGE UP (ref 0–0.5)
EOSINOPHIL NFR BLD AUTO: 0 % — SIGNIFICANT CHANGE UP (ref 0–6)
EOSINOPHIL NFR BLD AUTO: 0.3 % — SIGNIFICANT CHANGE UP (ref 0–6)
EOSINOPHIL NFR BLD AUTO: 0.4 % — SIGNIFICANT CHANGE UP (ref 0–6)
EOSINOPHIL NFR BLD AUTO: 0.5 % — SIGNIFICANT CHANGE UP (ref 0–6)
EOSINOPHIL NFR BLD AUTO: 0.9 % — SIGNIFICANT CHANGE UP (ref 0–6)
EOSINOPHIL NFR BLD AUTO: 0.9 % — SIGNIFICANT CHANGE UP (ref 0–6)
EOSINOPHIL NFR BLD AUTO: 1 % — SIGNIFICANT CHANGE UP (ref 0–6)
EOSINOPHIL NFR BLD AUTO: 1.8 % — SIGNIFICANT CHANGE UP (ref 0–6)
EOSINOPHIL NFR BLD AUTO: 1.8 % — SIGNIFICANT CHANGE UP (ref 0–6)
EOSINOPHIL NFR BLD AUTO: 2 % — SIGNIFICANT CHANGE UP (ref 0–6)
EOSINOPHIL NFR BLD AUTO: 2.5 % — SIGNIFICANT CHANGE UP (ref 0–6)
EOSINOPHIL NFR BLD AUTO: 2.5 % — SIGNIFICANT CHANGE UP (ref 0–6)
EOSINOPHIL NFR BLD AUTO: 5 % — SIGNIFICANT CHANGE UP (ref 0–6)
EOSINOPHIL NFR BLD AUTO: 5 % — SIGNIFICANT CHANGE UP (ref 0–6)
ESTIMATED AVERAGE GLUCOSE: 131 MG/DL — HIGH (ref 68–114)
FERRITIN SERPL-MCNC: 202 NG/ML — SIGNIFICANT CHANGE UP (ref 30–400)
FERRITIN SERPL-MCNC: 482 NG/ML — HIGH (ref 30–400)
FERRITIN SERPL-MCNC: 809 NG/ML
FIBRINOGEN PPP-MCNC: 290 MG/DL — SIGNIFICANT CHANGE UP (ref 200–445)
FIBRINOGEN PPP-MCNC: 313 MG/DL — SIGNIFICANT CHANGE UP (ref 200–445)
FOLATE SERPL-MCNC: 14.9 NG/ML — SIGNIFICANT CHANGE UP
FOLATE SERPL-MCNC: 16 NG/ML
FOLATE SERPL-MCNC: 9.8 NG/ML — SIGNIFICANT CHANGE UP
G6PD RBC-CCNC: 12.6 U/G HGB — SIGNIFICANT CHANGE UP (ref 7–20.5)
GAMMA GLOB FLD ELPH-MCNC: 0.7 G/DL
GAMMA GLOB MFR SERPL ELPH: 11.1 %
GIANT PLATELETS BLD QL SMEAR: PRESENT — SIGNIFICANT CHANGE UP
GLUCOSE SERPL-MCNC: 100 MG/DL — HIGH (ref 70–99)
GLUCOSE SERPL-MCNC: 104 MG/DL — HIGH (ref 70–99)
GLUCOSE SERPL-MCNC: 104 MG/DL — HIGH (ref 70–99)
GLUCOSE SERPL-MCNC: 106 MG/DL — HIGH (ref 70–99)
GLUCOSE SERPL-MCNC: 107 MG/DL — HIGH (ref 70–99)
GLUCOSE SERPL-MCNC: 116 MG/DL — HIGH (ref 70–99)
GLUCOSE SERPL-MCNC: 118 MG/DL
GLUCOSE SERPL-MCNC: 128 MG/DL — HIGH (ref 70–99)
GLUCOSE SERPL-MCNC: 141 MG/DL — HIGH (ref 70–99)
GLUCOSE SERPL-MCNC: 142 MG/DL
GLUCOSE SERPL-MCNC: 144 MG/DL — HIGH (ref 70–99)
GLUCOSE SERPL-MCNC: 144 MG/DL — HIGH (ref 70–99)
GLUCOSE SERPL-MCNC: 198 MG/DL
GLUCOSE SERPL-MCNC: 204 MG/DL
GLUCOSE SERPL-MCNC: 74 MG/DL — SIGNIFICANT CHANGE UP (ref 70–99)
GLUCOSE SERPL-MCNC: 77 MG/DL — SIGNIFICANT CHANGE UP (ref 70–99)
GLUCOSE SERPL-MCNC: 80 MG/DL — SIGNIFICANT CHANGE UP (ref 70–99)
GLUCOSE SERPL-MCNC: 93 MG/DL — SIGNIFICANT CHANGE UP (ref 70–99)
GLUCOSE SERPL-MCNC: 95 MG/DL — SIGNIFICANT CHANGE UP (ref 70–99)
GLUCOSE SERPL-MCNC: 97 MG/DL — SIGNIFICANT CHANGE UP (ref 70–99)
GLUCOSE SERPL-MCNC: 98 MG/DL — SIGNIFICANT CHANGE UP (ref 70–99)
GLUCOSE SERPL-MCNC: 98 MG/DL — SIGNIFICANT CHANGE UP (ref 70–99)
HAPTOGLOB SERPL-MCNC: <20 MG/DL — LOW (ref 34–200)
HBV CORE AB SER-ACNC: SIGNIFICANT CHANGE UP
HBV SURFACE AB SER-ACNC: <3 MIU/ML — LOW
HCT VFR BLD CALC: 19.7 % — CRITICAL LOW (ref 39–50)
HCT VFR BLD CALC: 19.7 % — CRITICAL LOW (ref 39–50)
HCT VFR BLD CALC: 20.7 % — CRITICAL LOW (ref 39–50)
HCT VFR BLD CALC: 20.7 % — CRITICAL LOW (ref 39–50)
HCT VFR BLD CALC: 21 % — CRITICAL LOW (ref 39–50)
HCT VFR BLD CALC: 21.1 % — LOW (ref 39–50)
HCT VFR BLD CALC: 21.1 % — LOW (ref 39–50)
HCT VFR BLD CALC: 21.9 % — LOW (ref 39–50)
HCT VFR BLD CALC: 21.9 % — LOW (ref 39–50)
HCT VFR BLD CALC: 22.2 % — LOW (ref 39–50)
HCT VFR BLD CALC: 22.4 % — LOW (ref 39–50)
HCT VFR BLD CALC: 22.4 % — LOW (ref 39–50)
HCT VFR BLD CALC: 22.8 % — LOW (ref 39–50)
HCT VFR BLD CALC: 22.8 % — LOW (ref 39–50)
HCT VFR BLD CALC: 23.1 % — LOW (ref 39–50)
HCT VFR BLD CALC: 23.1 % — LOW (ref 39–50)
HCT VFR BLD CALC: 23.2 % — LOW (ref 39–50)
HCT VFR BLD CALC: 23.3 % — LOW (ref 39–50)
HCT VFR BLD CALC: 23.5 % — LOW (ref 39–50)
HCT VFR BLD CALC: 23.5 % — LOW (ref 39–50)
HCT VFR BLD CALC: 23.7 % — LOW (ref 39–50)
HCT VFR BLD CALC: 23.8 % — LOW (ref 39–50)
HCT VFR BLD CALC: 24.2 % — LOW (ref 39–50)
HCT VFR BLD CALC: 24.2 % — LOW (ref 39–50)
HCT VFR BLD CALC: 24.5 % — LOW (ref 39–50)
HCT VFR BLD CALC: 24.5 % — LOW (ref 39–50)
HCT VFR BLD CALC: 24.6 % — LOW (ref 39–50)
HCT VFR BLD CALC: 24.6 % — LOW (ref 39–50)
HCT VFR BLD CALC: 24.8 % — LOW (ref 39–50)
HCT VFR BLD CALC: 24.9 % — LOW (ref 39–50)
HCT VFR BLD CALC: 25.4 % — LOW (ref 39–50)
HCT VFR BLD CALC: 25.5 % — LOW (ref 39–50)
HCT VFR BLD CALC: 25.5 % — LOW (ref 39–50)
HCT VFR BLD CALC: 25.7 % — LOW (ref 39–50)
HCT VFR BLD CALC: 26 % — LOW (ref 39–50)
HCT VFR BLD CALC: 26.2 % — LOW (ref 39–50)
HCT VFR BLD CALC: 26.2 % — LOW (ref 39–50)
HCT VFR BLD CALC: 26.8 % — LOW (ref 39–50)
HCT VFR BLD CALC: 26.8 % — LOW (ref 39–50)
HCT VFR BLD CALC: 27.3 % — LOW (ref 39–50)
HCT VFR BLD CALC: 27.3 % — LOW (ref 39–50)
HCT VFR BLD CALC: 27.7 % — LOW (ref 39–50)
HCT VFR BLD CALC: 28 % — LOW (ref 39–50)
HCT VFR BLD CALC: 28 % — LOW (ref 39–50)
HCT VFR BLD CALC: 28.2 % — LOW (ref 39–50)
HCT VFR BLD CALC: 28.4 % — LOW (ref 39–50)
HCT VFR BLD CALC: 28.7 % — LOW (ref 39–50)
HCT VFR BLD CALC: 31.2 % — LOW (ref 39–50)
HCT VFR BLD CALC: 36.2 % — LOW (ref 39–50)
HCT VFR BLD CALC: 36.7 % — LOW (ref 39–50)
HCT VFR BLD CALC: 38.6 % — LOW (ref 39–50)
HCT VFR BLD CALC: 40.4 % — SIGNIFICANT CHANGE UP (ref 39–50)
HCT VFR BLD CALC: 41.7 % — SIGNIFICANT CHANGE UP (ref 39–50)
HCT VFR BLD CALC: 42.6 % — SIGNIFICANT CHANGE UP (ref 39–50)
HDLC SERPL-MCNC: 98 MG/DL — SIGNIFICANT CHANGE UP
HEMATOPATHOLOGY REPORT: SIGNIFICANT CHANGE UP
HGB BLD-MCNC: 10.7 G/DL — LOW (ref 13–17)
HGB BLD-MCNC: 12 G/DL — LOW (ref 13–17)
HGB BLD-MCNC: 12.2 G/DL — LOW (ref 13–17)
HGB BLD-MCNC: 12.8 G/DL — LOW (ref 13–17)
HGB BLD-MCNC: 13.4 G/DL — SIGNIFICANT CHANGE UP (ref 13–17)
HGB BLD-MCNC: 13.7 G/DL — SIGNIFICANT CHANGE UP (ref 13–17)
HGB BLD-MCNC: 14.4 G/DL — SIGNIFICANT CHANGE UP (ref 13–17)
HGB BLD-MCNC: 6.9 G/DL — CRITICAL LOW (ref 13–17)
HGB BLD-MCNC: 7.2 G/DL — LOW (ref 13–17)
HGB BLD-MCNC: 7.5 G/DL — LOW (ref 13–17)
HGB BLD-MCNC: 7.5 G/DL — LOW (ref 13–17)
HGB BLD-MCNC: 7.6 G/DL — LOW (ref 13–17)
HGB BLD-MCNC: 7.8 G/DL — LOW (ref 13–17)
HGB BLD-MCNC: 7.8 G/DL — LOW (ref 13–17)
HGB BLD-MCNC: 7.9 G/DL — LOW (ref 13–17)
HGB BLD-MCNC: 8 G/DL — LOW (ref 13–17)
HGB BLD-MCNC: 8.2 G/DL — LOW (ref 13–17)
HGB BLD-MCNC: 8.2 G/DL — LOW (ref 13–17)
HGB BLD-MCNC: 8.3 G/DL — LOW (ref 13–17)
HGB BLD-MCNC: 8.3 G/DL — LOW (ref 13–17)
HGB BLD-MCNC: 8.4 G/DL — LOW (ref 13–17)
HGB BLD-MCNC: 8.4 G/DL — LOW (ref 13–17)
HGB BLD-MCNC: 8.5 G/DL — LOW (ref 13–17)
HGB BLD-MCNC: 8.6 G/DL — LOW (ref 13–17)
HGB BLD-MCNC: 8.7 G/DL — LOW (ref 13–17)
HGB BLD-MCNC: 8.8 G/DL — LOW (ref 13–17)
HGB BLD-MCNC: 8.8 G/DL — LOW (ref 13–17)
HGB BLD-MCNC: 8.9 G/DL — LOW (ref 13–17)
HGB BLD-MCNC: 8.9 G/DL — LOW (ref 13–17)
HGB BLD-MCNC: 9.2 G/DL — LOW (ref 13–17)
HGB BLD-MCNC: 9.2 G/DL — LOW (ref 13–17)
HGB BLD-MCNC: 9.3 G/DL — LOW (ref 13–17)
HGB BLD-MCNC: 9.5 G/DL — LOW (ref 13–17)
HGB BLD-MCNC: 9.6 G/DL — LOW (ref 13–17)
HGB BLD-MCNC: 9.7 G/DL — LOW (ref 13–17)
HIV 1+2 AB+HIV1 P24 AG SERPL QL IA: SIGNIFICANT CHANGE UP
HLX FLT3 FINAL REPORT: SIGNIFICANT CHANGE UP
HLX FLT3 FINAL REPORT: SIGNIFICANT CHANGE UP
HYPOCHROMIA BLD QL: SLIGHT — SIGNIFICANT CHANGE UP
IGA SER QL IEP: 124 MG/DL
IGG SER QL IEP: 645 MG/DL
IGM SER QL IEP: 99 MG/DL
IMM GRANULOCYTES NFR BLD AUTO: 0.3 % — SIGNIFICANT CHANGE UP (ref 0–0.9)
IMM GRANULOCYTES NFR BLD AUTO: 0.6 % — SIGNIFICANT CHANGE UP (ref 0–0.9)
IMM GRANULOCYTES NFR BLD AUTO: 0.6 % — SIGNIFICANT CHANGE UP (ref 0–0.9)
IMM GRANULOCYTES NFR BLD AUTO: 0.9 % — SIGNIFICANT CHANGE UP (ref 0–0.9)
IMM GRANULOCYTES NFR BLD AUTO: 1.3 % — HIGH (ref 0–0.9)
IMM GRANULOCYTES NFR BLD AUTO: 1.3 % — HIGH (ref 0–0.9)
IMM GRANULOCYTES NFR BLD AUTO: 1.5 % — HIGH (ref 0–0.9)
INR BLD: 0.94 RATIO — SIGNIFICANT CHANGE UP (ref 0.85–1.18)
INR BLD: 0.94 RATIO — SIGNIFICANT CHANGE UP (ref 0.85–1.18)
INR BLD: 0.95 RATIO — SIGNIFICANT CHANGE UP (ref 0.85–1.18)
INR BLD: 0.98 RATIO — SIGNIFICANT CHANGE UP (ref 0.85–1.18)
INR BLD: 1.03 RATIO — SIGNIFICANT CHANGE UP (ref 0.85–1.18)
INTERPRETATION SERPL IEP-IMP: NORMAL
IRON SATN MFR SERPL: 23 % — SIGNIFICANT CHANGE UP (ref 16–55)
IRON SATN MFR SERPL: 37 % — SIGNIFICANT CHANGE UP (ref 16–55)
IRON SATN MFR SERPL: 66 %
IRON SATN MFR SERPL: 73 UG/DL — SIGNIFICANT CHANGE UP (ref 45–165)
IRON SATN MFR SERPL: 98 UG/DL — SIGNIFICANT CHANGE UP (ref 45–165)
IRON SERPL-MCNC: 196 UG/DL
JAK2 P.V617F BLD/T QL: SIGNIFICANT CHANGE UP
KAPPA LC CSF-MCNC: 1.14 MG/DL
KAPPA LC SERPL-MCNC: 1.58 MG/DL
LDH SERPL L TO P-CCNC: 160 U/L — SIGNIFICANT CHANGE UP (ref 50–242)
LDH SERPL L TO P-CCNC: 180 U/L — SIGNIFICANT CHANGE UP (ref 50–242)
LDH SERPL L TO P-CCNC: 199 U/L — SIGNIFICANT CHANGE UP (ref 50–242)
LDH SERPL L TO P-CCNC: 208 U/L — SIGNIFICANT CHANGE UP (ref 50–242)
LDH SERPL L TO P-CCNC: 236 U/L — SIGNIFICANT CHANGE UP (ref 50–242)
LDH SERPL L TO P-CCNC: 251 U/L — HIGH (ref 50–242)
LDH SERPL L TO P-CCNC: 326 U/L — HIGH (ref 50–242)
LDH SERPL L TO P-CCNC: 326 U/L — HIGH (ref 50–242)
LDH SERPL L TO P-CCNC: 371 U/L — HIGH (ref 50–242)
LDH SERPL L TO P-CCNC: 378 U/L — HIGH (ref 50–242)
LDH SERPL L TO P-CCNC: 387 U/L — HIGH (ref 50–242)
LDH SERPL L TO P-CCNC: 395 U/L — HIGH (ref 50–242)
LDH SERPL L TO P-CCNC: 403 U/L — HIGH (ref 50–242)
LDH SERPL L TO P-CCNC: 436 U/L — HIGH (ref 50–242)
LDH SERPL-CCNC: 317 U/L
LDH SERPL-CCNC: 398 U/L
LDH SERPL-CCNC: 537 U/L
LIPID PNL WITH DIRECT LDL SERPL: 57 MG/DL — SIGNIFICANT CHANGE UP
LYMPHOCYTES # BLD AUTO: 0.22 K/UL — LOW (ref 1–3.3)
LYMPHOCYTES # BLD AUTO: 0.22 K/UL — LOW (ref 1–3.3)
LYMPHOCYTES # BLD AUTO: 0.25 K/UL — LOW (ref 1–3.3)
LYMPHOCYTES # BLD AUTO: 0.25 K/UL — LOW (ref 1–3.3)
LYMPHOCYTES # BLD AUTO: 0.26 K/UL — LOW (ref 1–3.3)
LYMPHOCYTES # BLD AUTO: 0.27 K/UL — LOW (ref 1–3.3)
LYMPHOCYTES # BLD AUTO: 0.27 K/UL — LOW (ref 1–3.3)
LYMPHOCYTES # BLD AUTO: 0.29 K/UL — LOW (ref 1–3.3)
LYMPHOCYTES # BLD AUTO: 0.3 K/UL — LOW (ref 1–3.3)
LYMPHOCYTES # BLD AUTO: 0.31 K/UL — LOW (ref 1–3.3)
LYMPHOCYTES # BLD AUTO: 0.33 K/UL — LOW (ref 1–3.3)
LYMPHOCYTES # BLD AUTO: 0.33 K/UL — LOW (ref 1–3.3)
LYMPHOCYTES # BLD AUTO: 0.35 K/UL — LOW (ref 1–3.3)
LYMPHOCYTES # BLD AUTO: 0.36 K/UL — LOW (ref 1–3.3)
LYMPHOCYTES # BLD AUTO: 0.37 K/UL — LOW (ref 1–3.3)
LYMPHOCYTES # BLD AUTO: 0.37 K/UL — LOW (ref 1–3.3)
LYMPHOCYTES # BLD AUTO: 0.39 K/UL — LOW (ref 1–3.3)
LYMPHOCYTES # BLD AUTO: 0.4 K/UL — LOW (ref 1–3.3)
LYMPHOCYTES # BLD AUTO: 0.41 K/UL — LOW (ref 1–3.3)
LYMPHOCYTES # BLD AUTO: 0.43 K/UL — LOW (ref 1–3.3)
LYMPHOCYTES # BLD AUTO: 0.43 K/UL — LOW (ref 1–3.3)
LYMPHOCYTES # BLD AUTO: 0.44 K/UL — LOW (ref 1–3.3)
LYMPHOCYTES # BLD AUTO: 0.44 K/UL — LOW (ref 1–3.3)
LYMPHOCYTES # BLD AUTO: 0.45 K/UL — LOW (ref 1–3.3)
LYMPHOCYTES # BLD AUTO: 0.48 K/UL — LOW (ref 1–3.3)
LYMPHOCYTES # BLD AUTO: 0.48 K/UL — LOW (ref 1–3.3)
LYMPHOCYTES # BLD AUTO: 0.55 K/UL — LOW (ref 1–3.3)
LYMPHOCYTES # BLD AUTO: 0.55 K/UL — LOW (ref 1–3.3)
LYMPHOCYTES # BLD AUTO: 0.57 K/UL — LOW (ref 1–3.3)
LYMPHOCYTES # BLD AUTO: 0.59 K/UL — LOW (ref 1–3.3)
LYMPHOCYTES # BLD AUTO: 0.6 K/UL — LOW (ref 1–3.3)
LYMPHOCYTES # BLD AUTO: 0.6 K/UL — LOW (ref 1–3.3)
LYMPHOCYTES # BLD AUTO: 0.61 K/UL — LOW (ref 1–3.3)
LYMPHOCYTES # BLD AUTO: 0.61 K/UL — LOW (ref 1–3.3)
LYMPHOCYTES # BLD AUTO: 0.62 K/UL — LOW (ref 1–3.3)
LYMPHOCYTES # BLD AUTO: 0.66 K/UL — LOW (ref 1–3.3)
LYMPHOCYTES # BLD AUTO: 0.66 K/UL — LOW (ref 1–3.3)
LYMPHOCYTES # BLD AUTO: 0.67 K/UL — LOW (ref 1–3.3)
LYMPHOCYTES # BLD AUTO: 0.71 K/UL — LOW (ref 1–3.3)
LYMPHOCYTES # BLD AUTO: 0.75 K/UL — LOW (ref 1–3.3)
LYMPHOCYTES # BLD AUTO: 0.85 K/UL — LOW (ref 1–3.3)
LYMPHOCYTES # BLD AUTO: 1.06 K/UL — SIGNIFICANT CHANGE UP (ref 1–3.3)
LYMPHOCYTES # BLD AUTO: 1.3 K/UL — SIGNIFICANT CHANGE UP (ref 1–3.3)
LYMPHOCYTES # BLD AUTO: 11 % — LOW (ref 13–44)
LYMPHOCYTES # BLD AUTO: 11.9 % — LOW (ref 13–44)
LYMPHOCYTES # BLD AUTO: 12 % — LOW (ref 13–44)
LYMPHOCYTES # BLD AUTO: 12.6 % — LOW (ref 13–44)
LYMPHOCYTES # BLD AUTO: 15 % — SIGNIFICANT CHANGE UP (ref 13–44)
LYMPHOCYTES # BLD AUTO: 15 % — SIGNIFICANT CHANGE UP (ref 13–44)
LYMPHOCYTES # BLD AUTO: 15.8 % — SIGNIFICANT CHANGE UP (ref 13–44)
LYMPHOCYTES # BLD AUTO: 16 % — SIGNIFICANT CHANGE UP (ref 13–44)
LYMPHOCYTES # BLD AUTO: 16.4 % — SIGNIFICANT CHANGE UP (ref 13–44)
LYMPHOCYTES # BLD AUTO: 17 % — SIGNIFICANT CHANGE UP (ref 13–44)
LYMPHOCYTES # BLD AUTO: 17 % — SIGNIFICANT CHANGE UP (ref 13–44)
LYMPHOCYTES # BLD AUTO: 18 % — SIGNIFICANT CHANGE UP (ref 13–44)
LYMPHOCYTES # BLD AUTO: 20 % — SIGNIFICANT CHANGE UP (ref 13–44)
LYMPHOCYTES # BLD AUTO: 20 % — SIGNIFICANT CHANGE UP (ref 13–44)
LYMPHOCYTES # BLD AUTO: 20.8 % — SIGNIFICANT CHANGE UP (ref 13–44)
LYMPHOCYTES # BLD AUTO: 21 % — SIGNIFICANT CHANGE UP (ref 13–44)
LYMPHOCYTES # BLD AUTO: 22 % — SIGNIFICANT CHANGE UP (ref 13–44)
LYMPHOCYTES # BLD AUTO: 22 % — SIGNIFICANT CHANGE UP (ref 13–44)
LYMPHOCYTES # BLD AUTO: 22.3 % — SIGNIFICANT CHANGE UP (ref 13–44)
LYMPHOCYTES # BLD AUTO: 23 % — SIGNIFICANT CHANGE UP (ref 13–44)
LYMPHOCYTES # BLD AUTO: 24 % — SIGNIFICANT CHANGE UP (ref 13–44)
LYMPHOCYTES # BLD AUTO: 24 % — SIGNIFICANT CHANGE UP (ref 13–44)
LYMPHOCYTES # BLD AUTO: 24.6 % — SIGNIFICANT CHANGE UP (ref 13–44)
LYMPHOCYTES # BLD AUTO: 25 % — SIGNIFICANT CHANGE UP (ref 13–44)
LYMPHOCYTES # BLD AUTO: 25 % — SIGNIFICANT CHANGE UP (ref 13–44)
LYMPHOCYTES # BLD AUTO: 26.5 % — SIGNIFICANT CHANGE UP (ref 13–44)
LYMPHOCYTES # BLD AUTO: 26.5 % — SIGNIFICANT CHANGE UP (ref 13–44)
LYMPHOCYTES # BLD AUTO: 26.7 % — SIGNIFICANT CHANGE UP (ref 13–44)
LYMPHOCYTES # BLD AUTO: 27 % — SIGNIFICANT CHANGE UP (ref 13–44)
LYMPHOCYTES # BLD AUTO: 28 % — SIGNIFICANT CHANGE UP (ref 13–44)
LYMPHOCYTES # BLD AUTO: 28 % — SIGNIFICANT CHANGE UP (ref 13–44)
LYMPHOCYTES # BLD AUTO: 28.5 % — SIGNIFICANT CHANGE UP (ref 13–44)
LYMPHOCYTES # BLD AUTO: 28.5 % — SIGNIFICANT CHANGE UP (ref 13–44)
LYMPHOCYTES # BLD AUTO: 29 % — SIGNIFICANT CHANGE UP (ref 13–44)
LYMPHOCYTES # BLD AUTO: 29 % — SIGNIFICANT CHANGE UP (ref 13–44)
LYMPHOCYTES # BLD AUTO: 30.5 % — SIGNIFICANT CHANGE UP (ref 13–44)
LYMPHOCYTES # BLD AUTO: 31 % — SIGNIFICANT CHANGE UP (ref 13–44)
LYMPHOCYTES # BLD AUTO: 32 % — SIGNIFICANT CHANGE UP (ref 13–44)
LYMPHOCYTES # BLD AUTO: 32.7 % — SIGNIFICANT CHANGE UP (ref 13–44)
LYMPHOCYTES # BLD AUTO: 32.7 % — SIGNIFICANT CHANGE UP (ref 13–44)
LYMPHOCYTES # BLD AUTO: 33 % — SIGNIFICANT CHANGE UP (ref 13–44)
LYMPHOCYTES # BLD AUTO: 33 % — SIGNIFICANT CHANGE UP (ref 13–44)
LYMPHOCYTES # BLD AUTO: 35 % — SIGNIFICANT CHANGE UP (ref 13–44)
LYMPHOCYTES # BLD AUTO: 35 % — SIGNIFICANT CHANGE UP (ref 13–44)
LYMPHOCYTES # BLD AUTO: 39 % — SIGNIFICANT CHANGE UP (ref 13–44)
LYMPHOCYTES # BLD AUTO: 39 % — SIGNIFICANT CHANGE UP (ref 13–44)
LYMPHOCYTES # BLD AUTO: 42 % — SIGNIFICANT CHANGE UP (ref 13–44)
LYMPHOCYTES # BLD AUTO: 42 % — SIGNIFICANT CHANGE UP (ref 13–44)
LYMPHOCYTES # BLD AUTO: 50 % — HIGH (ref 13–44)
LYMPHOCYTES # BLD AUTO: 50 % — HIGH (ref 13–44)
LYMPHOCYTES # BLD AUTO: 69 % — HIGH (ref 13–44)
LYMPHOCYTES # BLD AUTO: 69 % — HIGH (ref 13–44)
LYMPHOCYTES # BLD AUTO: 7.1 % — LOW (ref 13–44)
LYMPHOCYTES # BLD AUTO: 7.6 % — LOW (ref 13–44)
M PROTEIN SPEC IFE-MCNC: NORMAL
MAGNESIUM SERPL-MCNC: 1.8 MG/DL — SIGNIFICANT CHANGE UP (ref 1.6–2.6)
MAGNESIUM SERPL-MCNC: 1.9 MG/DL
MAGNESIUM SERPL-MCNC: 1.9 MG/DL — SIGNIFICANT CHANGE UP (ref 1.6–2.6)
MAGNESIUM SERPL-MCNC: 2 MG/DL — SIGNIFICANT CHANGE UP (ref 1.6–2.6)
MAGNESIUM SERPL-MCNC: 2 MG/DL — SIGNIFICANT CHANGE UP (ref 1.6–2.6)
MAGNESIUM SERPL-MCNC: 2.1 MG/DL
MAGNESIUM SERPL-MCNC: 2.1 MG/DL — SIGNIFICANT CHANGE UP (ref 1.6–2.6)
MAGNESIUM SERPL-MCNC: 2.1 MG/DL — SIGNIFICANT CHANGE UP (ref 1.6–2.6)
MANUAL DIF COMMENT BLD-IMP: SIGNIFICANT CHANGE UP
MANUAL SMEAR VERIFICATION: SIGNIFICANT CHANGE UP
MCHC RBC-ENTMCNC: 27.9 PG — SIGNIFICANT CHANGE UP (ref 27–34)
MCHC RBC-ENTMCNC: 28 PG — SIGNIFICANT CHANGE UP (ref 27–34)
MCHC RBC-ENTMCNC: 28 PG — SIGNIFICANT CHANGE UP (ref 27–34)
MCHC RBC-ENTMCNC: 28.1 PG — SIGNIFICANT CHANGE UP (ref 27–34)
MCHC RBC-ENTMCNC: 28.3 PG — SIGNIFICANT CHANGE UP (ref 27–34)
MCHC RBC-ENTMCNC: 28.4 PG — SIGNIFICANT CHANGE UP (ref 27–34)
MCHC RBC-ENTMCNC: 28.4 PG — SIGNIFICANT CHANGE UP (ref 27–34)
MCHC RBC-ENTMCNC: 28.5 PG — SIGNIFICANT CHANGE UP (ref 27–34)
MCHC RBC-ENTMCNC: 28.6 PG — SIGNIFICANT CHANGE UP (ref 27–34)
MCHC RBC-ENTMCNC: 28.7 PG — SIGNIFICANT CHANGE UP (ref 27–34)
MCHC RBC-ENTMCNC: 28.7 PG — SIGNIFICANT CHANGE UP (ref 27–34)
MCHC RBC-ENTMCNC: 28.8 PG — SIGNIFICANT CHANGE UP (ref 27–34)
MCHC RBC-ENTMCNC: 28.9 PG — SIGNIFICANT CHANGE UP (ref 27–34)
MCHC RBC-ENTMCNC: 29 PG — SIGNIFICANT CHANGE UP (ref 27–34)
MCHC RBC-ENTMCNC: 29.1 PG — SIGNIFICANT CHANGE UP (ref 27–34)
MCHC RBC-ENTMCNC: 29.2 PG — SIGNIFICANT CHANGE UP (ref 27–34)
MCHC RBC-ENTMCNC: 29.3 PG — SIGNIFICANT CHANGE UP (ref 27–34)
MCHC RBC-ENTMCNC: 29.4 PG — SIGNIFICANT CHANGE UP (ref 27–34)
MCHC RBC-ENTMCNC: 29.4 PG — SIGNIFICANT CHANGE UP (ref 27–34)
MCHC RBC-ENTMCNC: 29.5 PG — SIGNIFICANT CHANGE UP (ref 27–34)
MCHC RBC-ENTMCNC: 29.6 PG — SIGNIFICANT CHANGE UP (ref 27–34)
MCHC RBC-ENTMCNC: 29.6 PG — SIGNIFICANT CHANGE UP (ref 27–34)
MCHC RBC-ENTMCNC: 29.7 PG — SIGNIFICANT CHANGE UP (ref 27–34)
MCHC RBC-ENTMCNC: 29.8 PG — SIGNIFICANT CHANGE UP (ref 27–34)
MCHC RBC-ENTMCNC: 29.8 PG — SIGNIFICANT CHANGE UP (ref 27–34)
MCHC RBC-ENTMCNC: 29.9 PG — SIGNIFICANT CHANGE UP (ref 27–34)
MCHC RBC-ENTMCNC: 30.1 PG — SIGNIFICANT CHANGE UP (ref 27–34)
MCHC RBC-ENTMCNC: 30.2 PG — SIGNIFICANT CHANGE UP (ref 27–34)
MCHC RBC-ENTMCNC: 30.3 PG — SIGNIFICANT CHANGE UP (ref 27–34)
MCHC RBC-ENTMCNC: 30.4 PG — SIGNIFICANT CHANGE UP (ref 27–34)
MCHC RBC-ENTMCNC: 32.9 G/DL — SIGNIFICANT CHANGE UP (ref 32–36)
MCHC RBC-ENTMCNC: 33.1 G/DL — SIGNIFICANT CHANGE UP (ref 32–36)
MCHC RBC-ENTMCNC: 33.2 G/DL — SIGNIFICANT CHANGE UP (ref 32–36)
MCHC RBC-ENTMCNC: 33.3 G/DL — SIGNIFICANT CHANGE UP (ref 32–36)
MCHC RBC-ENTMCNC: 33.3 G/DL — SIGNIFICANT CHANGE UP (ref 32–36)
MCHC RBC-ENTMCNC: 33.5 GM/DL — SIGNIFICANT CHANGE UP (ref 32–36)
MCHC RBC-ENTMCNC: 33.6 G/DL — SIGNIFICANT CHANGE UP (ref 32–36)
MCHC RBC-ENTMCNC: 33.6 GM/DL — SIGNIFICANT CHANGE UP (ref 32–36)
MCHC RBC-ENTMCNC: 33.7 G/DL — SIGNIFICANT CHANGE UP (ref 32–36)
MCHC RBC-ENTMCNC: 33.7 G/DL — SIGNIFICANT CHANGE UP (ref 32–36)
MCHC RBC-ENTMCNC: 33.7 GM/DL — SIGNIFICANT CHANGE UP (ref 32–36)
MCHC RBC-ENTMCNC: 33.8 G/DL — SIGNIFICANT CHANGE UP (ref 32–36)
MCHC RBC-ENTMCNC: 33.8 GM/DL — SIGNIFICANT CHANGE UP (ref 32–36)
MCHC RBC-ENTMCNC: 33.8 GM/DL — SIGNIFICANT CHANGE UP (ref 32–36)
MCHC RBC-ENTMCNC: 33.9 G/DL — SIGNIFICANT CHANGE UP (ref 32–36)
MCHC RBC-ENTMCNC: 33.9 GM/DL — SIGNIFICANT CHANGE UP (ref 32–36)
MCHC RBC-ENTMCNC: 34 G/DL — SIGNIFICANT CHANGE UP (ref 32–36)
MCHC RBC-ENTMCNC: 34.1 G/DL — SIGNIFICANT CHANGE UP (ref 32–36)
MCHC RBC-ENTMCNC: 34.1 G/DL — SIGNIFICANT CHANGE UP (ref 32–36)
MCHC RBC-ENTMCNC: 34.2 G/DL — SIGNIFICANT CHANGE UP (ref 32–36)
MCHC RBC-ENTMCNC: 34.3 G/DL — SIGNIFICANT CHANGE UP (ref 32–36)
MCHC RBC-ENTMCNC: 34.3 GM/DL — SIGNIFICANT CHANGE UP (ref 32–36)
MCHC RBC-ENTMCNC: 34.4 G/DL — SIGNIFICANT CHANGE UP (ref 32–36)
MCHC RBC-ENTMCNC: 34.5 G/DL — SIGNIFICANT CHANGE UP (ref 32–36)
MCHC RBC-ENTMCNC: 34.5 G/DL — SIGNIFICANT CHANGE UP (ref 32–36)
MCHC RBC-ENTMCNC: 34.5 GM/DL — SIGNIFICANT CHANGE UP (ref 32–36)
MCHC RBC-ENTMCNC: 34.6 G/DL — SIGNIFICANT CHANGE UP (ref 32–36)
MCHC RBC-ENTMCNC: 34.7 G/DL — SIGNIFICANT CHANGE UP (ref 32–36)
MCHC RBC-ENTMCNC: 35 G/DL — SIGNIFICANT CHANGE UP (ref 32–36)
MCHC RBC-ENTMCNC: 35.1 G/DL — SIGNIFICANT CHANGE UP (ref 32–36)
MCHC RBC-ENTMCNC: 35.1 G/DL — SIGNIFICANT CHANGE UP (ref 32–36)
MCHC RBC-ENTMCNC: 35.4 G/DL — SIGNIFICANT CHANGE UP (ref 32–36)
MCV RBC AUTO: 81.4 FL — SIGNIFICANT CHANGE UP (ref 80–100)
MCV RBC AUTO: 81.4 FL — SIGNIFICANT CHANGE UP (ref 80–100)
MCV RBC AUTO: 82 FL — SIGNIFICANT CHANGE UP (ref 80–100)
MCV RBC AUTO: 82 FL — SIGNIFICANT CHANGE UP (ref 80–100)
MCV RBC AUTO: 82.3 FL — SIGNIFICANT CHANGE UP (ref 80–100)
MCV RBC AUTO: 82.3 FL — SIGNIFICANT CHANGE UP (ref 80–100)
MCV RBC AUTO: 82.4 FL — SIGNIFICANT CHANGE UP (ref 80–100)
MCV RBC AUTO: 82.5 FL — SIGNIFICANT CHANGE UP (ref 80–100)
MCV RBC AUTO: 82.5 FL — SIGNIFICANT CHANGE UP (ref 80–100)
MCV RBC AUTO: 82.7 FL — SIGNIFICANT CHANGE UP (ref 80–100)
MCV RBC AUTO: 82.7 FL — SIGNIFICANT CHANGE UP (ref 80–100)
MCV RBC AUTO: 82.9 FL — SIGNIFICANT CHANGE UP (ref 80–100)
MCV RBC AUTO: 83.1 FL — SIGNIFICANT CHANGE UP (ref 80–100)
MCV RBC AUTO: 83.1 FL — SIGNIFICANT CHANGE UP (ref 80–100)
MCV RBC AUTO: 83.5 FL — SIGNIFICANT CHANGE UP (ref 80–100)
MCV RBC AUTO: 83.8 FL — SIGNIFICANT CHANGE UP (ref 80–100)
MCV RBC AUTO: 83.8 FL — SIGNIFICANT CHANGE UP (ref 80–100)
MCV RBC AUTO: 83.9 FL — SIGNIFICANT CHANGE UP (ref 80–100)
MCV RBC AUTO: 83.9 FL — SIGNIFICANT CHANGE UP (ref 80–100)
MCV RBC AUTO: 84 FL — SIGNIFICANT CHANGE UP (ref 80–100)
MCV RBC AUTO: 84 FL — SIGNIFICANT CHANGE UP (ref 80–100)
MCV RBC AUTO: 84.1 FL — SIGNIFICANT CHANGE UP (ref 80–100)
MCV RBC AUTO: 84.1 FL — SIGNIFICANT CHANGE UP (ref 80–100)
MCV RBC AUTO: 84.3 FL — SIGNIFICANT CHANGE UP (ref 80–100)
MCV RBC AUTO: 84.4 FL — SIGNIFICANT CHANGE UP (ref 80–100)
MCV RBC AUTO: 84.4 FL — SIGNIFICANT CHANGE UP (ref 80–100)
MCV RBC AUTO: 84.7 FL — SIGNIFICANT CHANGE UP (ref 80–100)
MCV RBC AUTO: 84.9 FL — SIGNIFICANT CHANGE UP (ref 80–100)
MCV RBC AUTO: 85 FL — SIGNIFICANT CHANGE UP (ref 80–100)
MCV RBC AUTO: 85.1 FL — SIGNIFICANT CHANGE UP (ref 80–100)
MCV RBC AUTO: 85.2 FL — SIGNIFICANT CHANGE UP (ref 80–100)
MCV RBC AUTO: 85.2 FL — SIGNIFICANT CHANGE UP (ref 80–100)
MCV RBC AUTO: 85.5 FL — SIGNIFICANT CHANGE UP (ref 80–100)
MCV RBC AUTO: 85.6 FL — SIGNIFICANT CHANGE UP (ref 80–100)
MCV RBC AUTO: 85.7 FL — SIGNIFICANT CHANGE UP (ref 80–100)
MCV RBC AUTO: 86 FL — SIGNIFICANT CHANGE UP (ref 80–100)
MCV RBC AUTO: 86.1 FL — SIGNIFICANT CHANGE UP (ref 80–100)
MCV RBC AUTO: 86.1 FL — SIGNIFICANT CHANGE UP (ref 80–100)
MCV RBC AUTO: 86.2 FL — SIGNIFICANT CHANGE UP (ref 80–100)
MCV RBC AUTO: 86.5 FL — SIGNIFICANT CHANGE UP (ref 80–100)
MCV RBC AUTO: 86.5 FL — SIGNIFICANT CHANGE UP (ref 80–100)
MCV RBC AUTO: 86.7 FL — SIGNIFICANT CHANGE UP (ref 80–100)
MCV RBC AUTO: 87 FL — SIGNIFICANT CHANGE UP (ref 80–100)
MCV RBC AUTO: 87 FL — SIGNIFICANT CHANGE UP (ref 80–100)
MCV RBC AUTO: 87.6 FL — SIGNIFICANT CHANGE UP (ref 80–100)
MCV RBC AUTO: 87.6 FL — SIGNIFICANT CHANGE UP (ref 80–100)
MCV RBC AUTO: 87.7 FL — SIGNIFICANT CHANGE UP (ref 80–100)
MCV RBC AUTO: 87.7 FL — SIGNIFICANT CHANGE UP (ref 80–100)
MCV RBC AUTO: 88.6 FL — SIGNIFICANT CHANGE UP (ref 80–100)
MCV RBC AUTO: 90.5 FL — SIGNIFICANT CHANGE UP (ref 80–100)
MCV RBC AUTO: 90.6 FL — SIGNIFICANT CHANGE UP (ref 80–100)
MCV RBC AUTO: 91.2 FL — SIGNIFICANT CHANGE UP (ref 80–100)
MCV RBC AUTO: 91.5 FL — SIGNIFICANT CHANGE UP (ref 80–100)
MCV RBC AUTO: 91.6 FL — SIGNIFICANT CHANGE UP (ref 80–100)
METAMYELOCYTES # FLD: 0.5 % — HIGH (ref 0–0)
METAMYELOCYTES # FLD: 0.9 % — HIGH (ref 0–0)
METAMYELOCYTES # FLD: 1 % — HIGH (ref 0–0)
MICROCYTES BLD QL: SLIGHT — SIGNIFICANT CHANGE UP
MICROCYTES BLD QL: SLIGHT — SIGNIFICANT CHANGE UP
MONOCYTES # BLD AUTO: 0.02 K/UL — SIGNIFICANT CHANGE UP (ref 0–0.9)
MONOCYTES # BLD AUTO: 0.03 K/UL — SIGNIFICANT CHANGE UP (ref 0–0.9)
MONOCYTES # BLD AUTO: 0.05 K/UL — SIGNIFICANT CHANGE UP (ref 0–0.9)
MONOCYTES # BLD AUTO: 0.07 K/UL — SIGNIFICANT CHANGE UP (ref 0–0.9)
MONOCYTES # BLD AUTO: 0.09 K/UL — SIGNIFICANT CHANGE UP (ref 0–0.9)
MONOCYTES # BLD AUTO: 0.1 K/UL — SIGNIFICANT CHANGE UP (ref 0–0.9)
MONOCYTES # BLD AUTO: 0.1 K/UL — SIGNIFICANT CHANGE UP (ref 0–0.9)
MONOCYTES # BLD AUTO: 0.11 K/UL — SIGNIFICANT CHANGE UP (ref 0–0.9)
MONOCYTES # BLD AUTO: 0.11 K/UL — SIGNIFICANT CHANGE UP (ref 0–0.9)
MONOCYTES # BLD AUTO: 0.12 K/UL — SIGNIFICANT CHANGE UP (ref 0–0.9)
MONOCYTES # BLD AUTO: 0.13 K/UL — SIGNIFICANT CHANGE UP (ref 0–0.9)
MONOCYTES # BLD AUTO: 0.14 K/UL — SIGNIFICANT CHANGE UP (ref 0–0.9)
MONOCYTES # BLD AUTO: 0.15 K/UL — SIGNIFICANT CHANGE UP (ref 0–0.9)
MONOCYTES # BLD AUTO: 0.16 K/UL — SIGNIFICANT CHANGE UP (ref 0–0.9)
MONOCYTES # BLD AUTO: 0.16 K/UL — SIGNIFICANT CHANGE UP (ref 0–0.9)
MONOCYTES # BLD AUTO: 0.17 K/UL — SIGNIFICANT CHANGE UP (ref 0–0.9)
MONOCYTES # BLD AUTO: 0.23 K/UL — SIGNIFICANT CHANGE UP (ref 0–0.9)
MONOCYTES # BLD AUTO: 0.24 K/UL — SIGNIFICANT CHANGE UP (ref 0–0.9)
MONOCYTES # BLD AUTO: 0.26 K/UL — SIGNIFICANT CHANGE UP (ref 0–0.9)
MONOCYTES # BLD AUTO: 0.36 K/UL — SIGNIFICANT CHANGE UP (ref 0–0.9)
MONOCYTES # BLD AUTO: 0.38 K/UL — SIGNIFICANT CHANGE UP (ref 0–0.9)
MONOCYTES # BLD AUTO: 0.54 K/UL — SIGNIFICANT CHANGE UP (ref 0–0.9)
MONOCYTES # BLD AUTO: 0.59 K/UL — SIGNIFICANT CHANGE UP (ref 0–0.9)
MONOCYTES # BLD AUTO: 0.62 K/UL — SIGNIFICANT CHANGE UP (ref 0–0.9)
MONOCYTES # BLD AUTO: 0.68 K/UL — SIGNIFICANT CHANGE UP (ref 0–0.9)
MONOCYTES NFR BLD AUTO: 1 % — LOW (ref 2–14)
MONOCYTES NFR BLD AUTO: 10 % — SIGNIFICANT CHANGE UP (ref 2–14)
MONOCYTES NFR BLD AUTO: 10 % — SIGNIFICANT CHANGE UP (ref 2–14)
MONOCYTES NFR BLD AUTO: 10.5 % — SIGNIFICANT CHANGE UP (ref 2–14)
MONOCYTES NFR BLD AUTO: 10.5 % — SIGNIFICANT CHANGE UP (ref 2–14)
MONOCYTES NFR BLD AUTO: 11 % — SIGNIFICANT CHANGE UP (ref 2–14)
MONOCYTES NFR BLD AUTO: 16.4 % — HIGH (ref 2–14)
MONOCYTES NFR BLD AUTO: 19 % — HIGH (ref 2–14)
MONOCYTES NFR BLD AUTO: 19 % — HIGH (ref 2–14)
MONOCYTES NFR BLD AUTO: 19.7 % — HIGH (ref 2–14)
MONOCYTES NFR BLD AUTO: 2 % — SIGNIFICANT CHANGE UP (ref 2–14)
MONOCYTES NFR BLD AUTO: 2.7 % — SIGNIFICANT CHANGE UP (ref 2–14)
MONOCYTES NFR BLD AUTO: 20 % — HIGH (ref 2–14)
MONOCYTES NFR BLD AUTO: 20 % — HIGH (ref 2–14)
MONOCYTES NFR BLD AUTO: 3 % — SIGNIFICANT CHANGE UP (ref 2–14)
MONOCYTES NFR BLD AUTO: 3.6 % — SIGNIFICANT CHANGE UP (ref 2–14)
MONOCYTES NFR BLD AUTO: 3.6 % — SIGNIFICANT CHANGE UP (ref 2–14)
MONOCYTES NFR BLD AUTO: 4 % — SIGNIFICANT CHANGE UP (ref 2–14)
MONOCYTES NFR BLD AUTO: 4 % — SIGNIFICANT CHANGE UP (ref 2–14)
MONOCYTES NFR BLD AUTO: 4.5 % — SIGNIFICANT CHANGE UP (ref 2–14)
MONOCYTES NFR BLD AUTO: 5 % — SIGNIFICANT CHANGE UP (ref 2–14)
MONOCYTES NFR BLD AUTO: 5.4 % — SIGNIFICANT CHANGE UP (ref 2–14)
MONOCYTES NFR BLD AUTO: 5.5 % — SIGNIFICANT CHANGE UP (ref 2–14)
MONOCYTES NFR BLD AUTO: 5.5 % — SIGNIFICANT CHANGE UP (ref 2–14)
MONOCYTES NFR BLD AUTO: 5.6 % — SIGNIFICANT CHANGE UP (ref 2–14)
MONOCYTES NFR BLD AUTO: 6 % — SIGNIFICANT CHANGE UP (ref 2–14)
MONOCYTES NFR BLD AUTO: 6.1 % — SIGNIFICANT CHANGE UP (ref 2–14)
MONOCYTES NFR BLD AUTO: 7 % — SIGNIFICANT CHANGE UP (ref 2–14)
MONOCYTES NFR BLD AUTO: 7.5 % — SIGNIFICANT CHANGE UP (ref 2–14)
MONOCYTES NFR BLD AUTO: 7.5 % — SIGNIFICANT CHANGE UP (ref 2–14)
MONOCYTES NFR BLD AUTO: 8 % — SIGNIFICANT CHANGE UP (ref 2–14)
MONOCYTES NFR BLD AUTO: 8 % — SIGNIFICANT CHANGE UP (ref 2–14)
MONOCYTES NFR BLD AUTO: 8.6 % — SIGNIFICANT CHANGE UP (ref 2–14)
MONOCYTES NFR BLD AUTO: 8.8 % — SIGNIFICANT CHANGE UP (ref 2–14)
MONOCYTES NFR BLD AUTO: 9 % — SIGNIFICANT CHANGE UP (ref 2–14)
MONOCYTES NFR BLD AUTO: 9.2 % — SIGNIFICANT CHANGE UP (ref 2–14)
MONOCYTES NFR BLD AUTO: 9.5 % — SIGNIFICANT CHANGE UP (ref 2–14)
MRSA PCR RESULT.: SIGNIFICANT CHANGE UP
MYELOCYTES NFR BLD: 0.5 % — HIGH (ref 0–0)
MYELOCYTES NFR BLD: 0.9 % — HIGH (ref 0–0)
MYELOCYTES NFR BLD: 1 % — HIGH (ref 0–0)
MYELOCYTES NFR BLD: 1.5 % — HIGH (ref 0–0)
NEUTROPHILS # BLD AUTO: 0.09 K/UL — LOW (ref 1.8–7.4)
NEUTROPHILS # BLD AUTO: 0.09 K/UL — LOW (ref 1.8–7.4)
NEUTROPHILS # BLD AUTO: 0.29 K/UL — LOW (ref 1.8–7.4)
NEUTROPHILS # BLD AUTO: 0.29 K/UL — LOW (ref 1.8–7.4)
NEUTROPHILS # BLD AUTO: 0.33 K/UL — LOW (ref 1.8–7.4)
NEUTROPHILS # BLD AUTO: 0.33 K/UL — LOW (ref 1.8–7.4)
NEUTROPHILS # BLD AUTO: 0.36 K/UL — LOW (ref 1.8–7.4)
NEUTROPHILS # BLD AUTO: 0.36 K/UL — LOW (ref 1.8–7.4)
NEUTROPHILS # BLD AUTO: 0.45 K/UL — LOW (ref 1.8–7.4)
NEUTROPHILS # BLD AUTO: 0.45 K/UL — LOW (ref 1.8–7.4)
NEUTROPHILS # BLD AUTO: 0.48 K/UL — LOW (ref 1.8–7.4)
NEUTROPHILS # BLD AUTO: 0.48 K/UL — LOW (ref 1.8–7.4)
NEUTROPHILS # BLD AUTO: 0.59 K/UL — LOW (ref 1.8–7.4)
NEUTROPHILS # BLD AUTO: 0.59 K/UL — LOW (ref 1.8–7.4)
NEUTROPHILS # BLD AUTO: 0.66 K/UL — LOW (ref 1.8–7.4)
NEUTROPHILS # BLD AUTO: 0.66 K/UL — LOW (ref 1.8–7.4)
NEUTROPHILS # BLD AUTO: 0.7 K/UL — LOW (ref 1.8–7.4)
NEUTROPHILS # BLD AUTO: 0.7 K/UL — LOW (ref 1.8–7.4)
NEUTROPHILS # BLD AUTO: 0.72 K/UL — LOW (ref 1.8–7.4)
NEUTROPHILS # BLD AUTO: 0.72 K/UL — LOW (ref 1.8–7.4)
NEUTROPHILS # BLD AUTO: 0.74 K/UL — LOW (ref 1.8–7.4)
NEUTROPHILS # BLD AUTO: 0.78 K/UL — LOW (ref 1.8–7.4)
NEUTROPHILS # BLD AUTO: 0.83 K/UL — LOW (ref 1.8–7.4)
NEUTROPHILS # BLD AUTO: 0.97 K/UL — LOW (ref 1.8–7.4)
NEUTROPHILS # BLD AUTO: 0.97 K/UL — LOW (ref 1.8–7.4)
NEUTROPHILS # BLD AUTO: 1.09 K/UL — LOW (ref 1.8–7.4)
NEUTROPHILS # BLD AUTO: 1.09 K/UL — LOW (ref 1.8–7.4)
NEUTROPHILS # BLD AUTO: 1.1 K/UL — LOW (ref 1.8–7.4)
NEUTROPHILS # BLD AUTO: 1.13 K/UL — LOW (ref 1.8–7.4)
NEUTROPHILS # BLD AUTO: 1.13 K/UL — LOW (ref 1.8–7.4)
NEUTROPHILS # BLD AUTO: 1.24 K/UL — LOW (ref 1.8–7.4)
NEUTROPHILS # BLD AUTO: 1.26 K/UL — LOW (ref 1.8–7.4)
NEUTROPHILS # BLD AUTO: 1.26 K/UL — LOW (ref 1.8–7.4)
NEUTROPHILS # BLD AUTO: 1.27 K/UL — LOW (ref 1.8–7.4)
NEUTROPHILS # BLD AUTO: 1.27 K/UL — LOW (ref 1.8–7.4)
NEUTROPHILS # BLD AUTO: 1.28 K/UL — LOW (ref 1.8–7.4)
NEUTROPHILS # BLD AUTO: 1.3 K/UL — LOW (ref 1.8–7.4)
NEUTROPHILS # BLD AUTO: 1.3 K/UL — LOW (ref 1.8–7.4)
NEUTROPHILS # BLD AUTO: 1.33 K/UL — LOW (ref 1.8–7.4)
NEUTROPHILS # BLD AUTO: 1.33 K/UL — LOW (ref 1.8–7.4)
NEUTROPHILS # BLD AUTO: 1.37 K/UL — LOW (ref 1.8–7.4)
NEUTROPHILS # BLD AUTO: 1.58 K/UL — LOW (ref 1.8–7.4)
NEUTROPHILS # BLD AUTO: 1.66 K/UL — LOW (ref 1.8–7.4)
NEUTROPHILS # BLD AUTO: 1.66 K/UL — LOW (ref 1.8–7.4)
NEUTROPHILS # BLD AUTO: 1.71 K/UL — LOW (ref 1.8–7.4)
NEUTROPHILS # BLD AUTO: 1.81 K/UL — SIGNIFICANT CHANGE UP (ref 1.8–7.4)
NEUTROPHILS # BLD AUTO: 1.85 K/UL — SIGNIFICANT CHANGE UP (ref 1.8–7.4)
NEUTROPHILS # BLD AUTO: 1.88 K/UL — SIGNIFICANT CHANGE UP (ref 1.8–7.4)
NEUTROPHILS # BLD AUTO: 1.91 K/UL — SIGNIFICANT CHANGE UP (ref 1.8–7.4)
NEUTROPHILS # BLD AUTO: 2.07 K/UL — SIGNIFICANT CHANGE UP (ref 1.8–7.4)
NEUTROPHILS # BLD AUTO: 2.19 K/UL — SIGNIFICANT CHANGE UP (ref 1.8–7.4)
NEUTROPHILS # BLD AUTO: 2.46 K/UL — SIGNIFICANT CHANGE UP (ref 1.8–7.4)
NEUTROPHILS # BLD AUTO: 4 K/UL — SIGNIFICANT CHANGE UP (ref 1.8–7.4)
NEUTROPHILS # BLD AUTO: 4.24 K/UL — SIGNIFICANT CHANGE UP (ref 1.8–7.4)
NEUTROPHILS # BLD AUTO: 4.93 K/UL — SIGNIFICANT CHANGE UP (ref 1.8–7.4)
NEUTROPHILS # BLD AUTO: 4.99 K/UL — SIGNIFICANT CHANGE UP (ref 1.8–7.4)
NEUTROPHILS # BLD AUTO: 5.09 K/UL — SIGNIFICANT CHANGE UP (ref 1.8–7.4)
NEUTROPHILS NFR BLD AUTO: 18 % — LOW (ref 43–77)
NEUTROPHILS NFR BLD AUTO: 18 % — LOW (ref 43–77)
NEUTROPHILS NFR BLD AUTO: 30 % — LOW (ref 43–77)
NEUTROPHILS NFR BLD AUTO: 30 % — LOW (ref 43–77)
NEUTROPHILS NFR BLD AUTO: 34 % — LOW (ref 43–77)
NEUTROPHILS NFR BLD AUTO: 34 % — LOW (ref 43–77)
NEUTROPHILS NFR BLD AUTO: 35 % — LOW (ref 43–77)
NEUTROPHILS NFR BLD AUTO: 35 % — LOW (ref 43–77)
NEUTROPHILS NFR BLD AUTO: 42 % — LOW (ref 43–77)
NEUTROPHILS NFR BLD AUTO: 42 % — LOW (ref 43–77)
NEUTROPHILS NFR BLD AUTO: 44 % — SIGNIFICANT CHANGE UP (ref 43–77)
NEUTROPHILS NFR BLD AUTO: 44 % — SIGNIFICANT CHANGE UP (ref 43–77)
NEUTROPHILS NFR BLD AUTO: 47 % — SIGNIFICANT CHANGE UP (ref 43–77)
NEUTROPHILS NFR BLD AUTO: 48.5 % — SIGNIFICANT CHANGE UP (ref 43–77)
NEUTROPHILS NFR BLD AUTO: 48.5 % — SIGNIFICANT CHANGE UP (ref 43–77)
NEUTROPHILS NFR BLD AUTO: 53.6 % — SIGNIFICANT CHANGE UP (ref 43–77)
NEUTROPHILS NFR BLD AUTO: 55 % — SIGNIFICANT CHANGE UP (ref 43–77)
NEUTROPHILS NFR BLD AUTO: 55.2 % — SIGNIFICANT CHANGE UP (ref 43–77)
NEUTROPHILS NFR BLD AUTO: 57 % — SIGNIFICANT CHANGE UP (ref 43–77)
NEUTROPHILS NFR BLD AUTO: 57 % — SIGNIFICANT CHANGE UP (ref 43–77)
NEUTROPHILS NFR BLD AUTO: 58.5 % — SIGNIFICANT CHANGE UP (ref 43–77)
NEUTROPHILS NFR BLD AUTO: 58.5 % — SIGNIFICANT CHANGE UP (ref 43–77)
NEUTROPHILS NFR BLD AUTO: 59 % — SIGNIFICANT CHANGE UP (ref 43–77)
NEUTROPHILS NFR BLD AUTO: 60 % — SIGNIFICANT CHANGE UP (ref 43–77)
NEUTROPHILS NFR BLD AUTO: 60 % — SIGNIFICANT CHANGE UP (ref 43–77)
NEUTROPHILS NFR BLD AUTO: 62 % — SIGNIFICANT CHANGE UP (ref 43–77)
NEUTROPHILS NFR BLD AUTO: 62 % — SIGNIFICANT CHANGE UP (ref 43–77)
NEUTROPHILS NFR BLD AUTO: 63.7 % — SIGNIFICANT CHANGE UP (ref 43–77)
NEUTROPHILS NFR BLD AUTO: 63.7 % — SIGNIFICANT CHANGE UP (ref 43–77)
NEUTROPHILS NFR BLD AUTO: 65.5 % — SIGNIFICANT CHANGE UP (ref 43–77)
NEUTROPHILS NFR BLD AUTO: 66 % — SIGNIFICANT CHANGE UP (ref 43–77)
NEUTROPHILS NFR BLD AUTO: 67 % — SIGNIFICANT CHANGE UP (ref 43–77)
NEUTROPHILS NFR BLD AUTO: 67 % — SIGNIFICANT CHANGE UP (ref 43–77)
NEUTROPHILS NFR BLD AUTO: 67.9 % — SIGNIFICANT CHANGE UP (ref 43–77)
NEUTROPHILS NFR BLD AUTO: 68 % — SIGNIFICANT CHANGE UP (ref 43–77)
NEUTROPHILS NFR BLD AUTO: 68 % — SIGNIFICANT CHANGE UP (ref 43–77)
NEUTROPHILS NFR BLD AUTO: 69 % — SIGNIFICANT CHANGE UP (ref 43–77)
NEUTROPHILS NFR BLD AUTO: 69 % — SIGNIFICANT CHANGE UP (ref 43–77)
NEUTROPHILS NFR BLD AUTO: 70 % — SIGNIFICANT CHANGE UP (ref 43–77)
NEUTROPHILS NFR BLD AUTO: 71 % — SIGNIFICANT CHANGE UP (ref 43–77)
NEUTROPHILS NFR BLD AUTO: 71 % — SIGNIFICANT CHANGE UP (ref 43–77)
NEUTROPHILS NFR BLD AUTO: 71.4 % — SIGNIFICANT CHANGE UP (ref 43–77)
NEUTROPHILS NFR BLD AUTO: 72 % — SIGNIFICANT CHANGE UP (ref 43–77)
NEUTROPHILS NFR BLD AUTO: 72 % — SIGNIFICANT CHANGE UP (ref 43–77)
NEUTROPHILS NFR BLD AUTO: 73 % — SIGNIFICANT CHANGE UP (ref 43–77)
NEUTROPHILS NFR BLD AUTO: 73 % — SIGNIFICANT CHANGE UP (ref 43–77)
NEUTROPHILS NFR BLD AUTO: 74 % — SIGNIFICANT CHANGE UP (ref 43–77)
NEUTROPHILS NFR BLD AUTO: 74 % — SIGNIFICANT CHANGE UP (ref 43–77)
NEUTROPHILS NFR BLD AUTO: 74.1 % — SIGNIFICANT CHANGE UP (ref 43–77)
NEUTROPHILS NFR BLD AUTO: 74.3 % — SIGNIFICANT CHANGE UP (ref 43–77)
NEUTROPHILS NFR BLD AUTO: 74.5 % — SIGNIFICANT CHANGE UP (ref 43–77)
NEUTROPHILS NFR BLD AUTO: 78.2 % — HIGH (ref 43–77)
NEUTROPHILS NFR BLD AUTO: 79.3 % — HIGH (ref 43–77)
NEUTROPHILS NFR BLD AUTO: 81.5 % — HIGH (ref 43–77)
NEUTROPHILS NFR BLD AUTO: 84 % — HIGH (ref 43–77)
NEUTROPHILS NFR BLD AUTO: 85.6 % — HIGH (ref 43–77)
NEUTROPHILS NFR BLD AUTO: 85.8 % — HIGH (ref 43–77)
NEUTS BAND # BLD: 0.9 % — SIGNIFICANT CHANGE UP (ref 0–8)
NEUTS BAND # BLD: 0.9 % — SIGNIFICANT CHANGE UP (ref 0–8)
NEUTS BAND # BLD: 2 % — SIGNIFICANT CHANGE UP (ref 0–8)
NON HDL CHOLESTEROL: 76 MG/DL — SIGNIFICANT CHANGE UP
NRBC # BLD: 0 /100 WBCS — SIGNIFICANT CHANGE UP (ref 0–0)
NRBC # BLD: 10 /100 — HIGH (ref 0–0)
NRBC # BLD: 11 /100 WBCS — HIGH (ref 0–0)
NRBC # BLD: 11 /100 WBCS — HIGH (ref 0–0)
NRBC # BLD: 11 /100 — HIGH (ref 0–0)
NRBC # BLD: 12 /100 WBCS — HIGH (ref 0–0)
NRBC # BLD: 12 /100 WBCS — HIGH (ref 0–0)
NRBC # BLD: 12 /100 — HIGH (ref 0–0)
NRBC # BLD: 15 /100 — HIGH (ref 0–0)
NRBC # BLD: 17 /100 — HIGH (ref 0–0)
NRBC # BLD: 17 /100 — HIGH (ref 0–0)
NRBC # BLD: 19 /100 — HIGH (ref 0–0)
NRBC # BLD: 19 /100 — HIGH (ref 0–0)
NRBC # BLD: 2 /100 — HIGH (ref 0–0)
NRBC # BLD: 20 /100 WBCS — HIGH (ref 0–0)
NRBC # BLD: 20 /100 WBCS — HIGH (ref 0–0)
NRBC # BLD: 23 /100 — HIGH (ref 0–0)
NRBC # BLD: 24 /100 — HIGH (ref 0–0)
NRBC # BLD: 24 /100 — HIGH (ref 0–0)
NRBC # BLD: 25 /100 — HIGH (ref 0–0)
NRBC # BLD: 25 /100 — HIGH (ref 0–0)
NRBC # BLD: 26 /100 — HIGH (ref 0–0)
NRBC # BLD: 26 /100 — HIGH (ref 0–0)
NRBC # BLD: 27 /100 — HIGH (ref 0–0)
NRBC # BLD: 27 /100 — HIGH (ref 0–0)
NRBC # BLD: 28 /100 — HIGH (ref 0–0)
NRBC # BLD: 28 /100 — HIGH (ref 0–0)
NRBC # BLD: 3 /100 — HIGH (ref 0–0)
NRBC # BLD: 3 /100 — HIGH (ref 0–0)
NRBC # BLD: 36 /100 — HIGH (ref 0–0)
NRBC # BLD: 36 /100 — HIGH (ref 0–0)
NRBC # BLD: 4 /100 — HIGH (ref 0–0)
NRBC # BLD: 5 /100 — HIGH (ref 0–0)
NRBC # BLD: 5 /100 — HIGH (ref 0–0)
NRBC # BLD: 6 /100 WBCS — HIGH (ref 0–0)
NRBC # BLD: 6 /100 — HIGH (ref 0–0)
NRBC # BLD: 62 /100 — HIGH (ref 0–0)
NRBC # BLD: 62 /100 — HIGH (ref 0–0)
NRBC # BLD: 7 /100 WBCS — HIGH (ref 0–0)
NRBC # BLD: 8 /100 — HIGH (ref 0–0)
NRBC # BLD: 9 /100 WBCS — HIGH (ref 0–0)
NRBC # BLD: 9 /100 — HIGH (ref 0–0)
NRBC # BLD: 9 /100 — HIGH (ref 0–0)
NRBC # BLD: SIGNIFICANT CHANGE UP /100 WBCS (ref 0–0)
NT-PROBNP SERPL-SCNC: 488 PG/ML — HIGH (ref 0–300)
NT-PROBNP SERPL-SCNC: 488 PG/ML — HIGH (ref 0–300)
ONKOSIGHT MYELOID SEQUENCE: (no result)
ONKOSIGHT MYELOID SEQUENCE: (no result)
OVALOCYTES BLD QL SMEAR: SLIGHT — SIGNIFICANT CHANGE UP
PHOSPHATE SERPL-MCNC: 2.9 MG/DL — SIGNIFICANT CHANGE UP (ref 2.5–4.5)
PHOSPHATE SERPL-MCNC: 2.9 MG/DL — SIGNIFICANT CHANGE UP (ref 2.5–4.5)
PHOSPHATE SERPL-MCNC: 3 MG/DL — SIGNIFICANT CHANGE UP (ref 2.5–4.5)
PHOSPHATE SERPL-MCNC: 3.1 MG/DL — SIGNIFICANT CHANGE UP (ref 2.5–4.5)
PHOSPHATE SERPL-MCNC: 3.2 MG/DL — SIGNIFICANT CHANGE UP (ref 2.5–4.5)
PHOSPHATE SERPL-MCNC: 3.5 MG/DL
PHOSPHATE SERPL-MCNC: 3.5 MG/DL
PHOSPHATE SERPL-MCNC: 3.7 MG/DL — SIGNIFICANT CHANGE UP (ref 2.5–4.5)
PHOSPHATE SERPL-MCNC: 3.7 MG/DL — SIGNIFICANT CHANGE UP (ref 2.5–4.5)
PLAT MORPH BLD: NORMAL — SIGNIFICANT CHANGE UP
PLATELET # BLD AUTO: 10 K/UL — CRITICAL LOW (ref 150–400)
PLATELET # BLD AUTO: 11 K/UL — CRITICAL LOW (ref 150–400)
PLATELET # BLD AUTO: 12 K/UL — CRITICAL LOW (ref 150–400)
PLATELET # BLD AUTO: 127 K/UL — LOW (ref 150–400)
PLATELET # BLD AUTO: 13 K/UL — CRITICAL LOW (ref 150–400)
PLATELET # BLD AUTO: 132 K/UL — LOW (ref 150–400)
PLATELET # BLD AUTO: 137 K/UL — LOW (ref 150–400)
PLATELET # BLD AUTO: 14 K/UL — CRITICAL LOW (ref 150–400)
PLATELET # BLD AUTO: 15 K/UL — CRITICAL LOW (ref 150–400)
PLATELET # BLD AUTO: 152 K/UL — SIGNIFICANT CHANGE UP (ref 150–400)
PLATELET # BLD AUTO: 157 K/UL — SIGNIFICANT CHANGE UP (ref 150–400)
PLATELET # BLD AUTO: 16 K/UL — CRITICAL LOW (ref 150–400)
PLATELET # BLD AUTO: 162 K/UL — SIGNIFICANT CHANGE UP (ref 150–400)
PLATELET # BLD AUTO: 17 K/UL — CRITICAL LOW (ref 150–400)
PLATELET # BLD AUTO: 18 K/UL — CRITICAL LOW (ref 150–400)
PLATELET # BLD AUTO: 19 K/UL — CRITICAL LOW (ref 150–400)
PLATELET # BLD AUTO: 20 K/UL — CRITICAL LOW (ref 150–400)
PLATELET # BLD AUTO: 21 K/UL — LOW (ref 150–400)
PLATELET # BLD AUTO: 24 K/UL — LOW (ref 150–400)
PLATELET # BLD AUTO: 31 K/UL — LOW (ref 150–400)
PLATELET # BLD AUTO: 34 K/UL — LOW (ref 150–400)
PLATELET # BLD AUTO: 34 K/UL — LOW (ref 150–400)
PLATELET # BLD AUTO: 35 K/UL — LOW (ref 150–400)
PLATELET # BLD AUTO: 6 K/UL — CRITICAL LOW (ref 150–400)
PLATELET # BLD AUTO: 9 K/UL — CRITICAL LOW (ref 150–400)
PLATELET COUNT - ESTIMATE: ABNORMAL
POIKILOCYTOSIS BLD QL AUTO: SLIGHT — SIGNIFICANT CHANGE UP
POLYCHROMASIA BLD QL SMEAR: SLIGHT — SIGNIFICANT CHANGE UP
POTASSIUM SERPL-MCNC: 2.9 MMOL/L — CRITICAL LOW (ref 3.5–5.3)
POTASSIUM SERPL-MCNC: 3.6 MMOL/L — SIGNIFICANT CHANGE UP (ref 3.5–5.3)
POTASSIUM SERPL-MCNC: 3.7 MMOL/L — SIGNIFICANT CHANGE UP (ref 3.5–5.3)
POTASSIUM SERPL-MCNC: 3.7 MMOL/L — SIGNIFICANT CHANGE UP (ref 3.5–5.3)
POTASSIUM SERPL-MCNC: 3.8 MMOL/L — SIGNIFICANT CHANGE UP (ref 3.5–5.3)
POTASSIUM SERPL-MCNC: 3.9 MMOL/L — SIGNIFICANT CHANGE UP (ref 3.5–5.3)
POTASSIUM SERPL-MCNC: 4.2 MMOL/L — SIGNIFICANT CHANGE UP (ref 3.5–5.3)
POTASSIUM SERPL-MCNC: 4.2 MMOL/L — SIGNIFICANT CHANGE UP (ref 3.5–5.3)
POTASSIUM SERPL-MCNC: 4.3 MMOL/L — SIGNIFICANT CHANGE UP (ref 3.5–5.3)
POTASSIUM SERPL-MCNC: 4.5 MMOL/L — SIGNIFICANT CHANGE UP (ref 3.5–5.3)
POTASSIUM SERPL-MCNC: 4.5 MMOL/L — SIGNIFICANT CHANGE UP (ref 3.5–5.3)
POTASSIUM SERPL-MCNC: 4.6 MMOL/L — SIGNIFICANT CHANGE UP (ref 3.5–5.3)
POTASSIUM SERPL-MCNC: 4.6 MMOL/L — SIGNIFICANT CHANGE UP (ref 3.5–5.3)
POTASSIUM SERPL-MCNC: 4.7 MMOL/L — SIGNIFICANT CHANGE UP (ref 3.5–5.3)
POTASSIUM SERPL-MCNC: 4.7 MMOL/L — SIGNIFICANT CHANGE UP (ref 3.5–5.3)
POTASSIUM SERPL-MCNC: 5.2 MMOL/L — SIGNIFICANT CHANGE UP (ref 3.5–5.3)
POTASSIUM SERPL-SCNC: 2.9 MMOL/L — CRITICAL LOW (ref 3.5–5.3)
POTASSIUM SERPL-SCNC: 3.6 MMOL/L — SIGNIFICANT CHANGE UP (ref 3.5–5.3)
POTASSIUM SERPL-SCNC: 3.7 MMOL/L — SIGNIFICANT CHANGE UP (ref 3.5–5.3)
POTASSIUM SERPL-SCNC: 3.7 MMOL/L — SIGNIFICANT CHANGE UP (ref 3.5–5.3)
POTASSIUM SERPL-SCNC: 3.8 MMOL/L — SIGNIFICANT CHANGE UP (ref 3.5–5.3)
POTASSIUM SERPL-SCNC: 3.9 MMOL/L — SIGNIFICANT CHANGE UP (ref 3.5–5.3)
POTASSIUM SERPL-SCNC: 4.2 MMOL/L — SIGNIFICANT CHANGE UP (ref 3.5–5.3)
POTASSIUM SERPL-SCNC: 4.2 MMOL/L — SIGNIFICANT CHANGE UP (ref 3.5–5.3)
POTASSIUM SERPL-SCNC: 4.3 MMOL/L — SIGNIFICANT CHANGE UP (ref 3.5–5.3)
POTASSIUM SERPL-SCNC: 4.5 MMOL/L — SIGNIFICANT CHANGE UP (ref 3.5–5.3)
POTASSIUM SERPL-SCNC: 4.5 MMOL/L — SIGNIFICANT CHANGE UP (ref 3.5–5.3)
POTASSIUM SERPL-SCNC: 4.6 MMOL/L
POTASSIUM SERPL-SCNC: 4.6 MMOL/L — SIGNIFICANT CHANGE UP (ref 3.5–5.3)
POTASSIUM SERPL-SCNC: 4.6 MMOL/L — SIGNIFICANT CHANGE UP (ref 3.5–5.3)
POTASSIUM SERPL-SCNC: 4.7 MMOL/L
POTASSIUM SERPL-SCNC: 4.7 MMOL/L — SIGNIFICANT CHANGE UP (ref 3.5–5.3)
POTASSIUM SERPL-SCNC: 4.7 MMOL/L — SIGNIFICANT CHANGE UP (ref 3.5–5.3)
POTASSIUM SERPL-SCNC: 5.2 MMOL/L — SIGNIFICANT CHANGE UP (ref 3.5–5.3)
POTASSIUM SERPL-SCNC: 5.4 MMOL/L
POTASSIUM SERPL-SCNC: 5.4 MMOL/L
PROT SERPL-MCNC: 5.8 G/DL — LOW (ref 6–8.3)
PROT SERPL-MCNC: 5.8 G/DL — LOW (ref 6–8.3)
PROT SERPL-MCNC: 5.9 G/DL — LOW (ref 6–8.3)
PROT SERPL-MCNC: 5.9 G/DL — LOW (ref 6–8.3)
PROT SERPL-MCNC: 6 G/DL
PROT SERPL-MCNC: 6 G/DL — SIGNIFICANT CHANGE UP (ref 6–8.3)
PROT SERPL-MCNC: 6 G/DL — SIGNIFICANT CHANGE UP (ref 6–8.3)
PROT SERPL-MCNC: 6.1 G/DL
PROT SERPL-MCNC: 6.1 G/DL
PROT SERPL-MCNC: 6.2 G/DL — SIGNIFICANT CHANGE UP (ref 6–8.3)
PROT SERPL-MCNC: 6.3 G/DL
PROT SERPL-MCNC: 6.4 G/DL — SIGNIFICANT CHANGE UP (ref 6–8.3)
PROT SERPL-MCNC: 6.6 G/DL — SIGNIFICANT CHANGE UP (ref 6–8.3)
PROT SERPL-MCNC: 6.7 G/DL
PROT SERPL-MCNC: 6.7 G/DL — SIGNIFICANT CHANGE UP (ref 6–8.3)
PROT SERPL-MCNC: 6.9 G/DL
PROTHROM AB SERPL-ACNC: 10.3 SEC — SIGNIFICANT CHANGE UP (ref 9.5–13)
PROTHROM AB SERPL-ACNC: 10.4 SEC — SIGNIFICANT CHANGE UP (ref 9.5–13)
PROTHROM AB SERPL-ACNC: 10.4 SEC — SIGNIFICANT CHANGE UP (ref 9.5–13)
PROTHROM AB SERPL-ACNC: 10.5 SEC — SIGNIFICANT CHANGE UP (ref 9.5–13)
PROTHROM AB SERPL-ACNC: 10.8 SEC — SIGNIFICANT CHANGE UP (ref 9.5–13)
PSA FLD-MCNC: 0.81 NG/ML — SIGNIFICANT CHANGE UP (ref 0–4)
PSA FREE FLD-MCNC: 0.19 NG/ML — SIGNIFICANT CHANGE UP
PSA FREE FLD-MCNC: 16 % — SIGNIFICANT CHANGE UP
PSA SERPL-MCNC: 1.21 NG/ML — SIGNIFICANT CHANGE UP (ref 0–4)
RBC # BLD: 2.37 M/UL — LOW (ref 4.2–5.8)
RBC # BLD: 2.37 M/UL — LOW (ref 4.2–5.8)
RBC # BLD: 2.41 M/UL — LOW (ref 4.2–5.8)
RBC # BLD: 2.41 M/UL — LOW (ref 4.2–5.8)
RBC # BLD: 2.42 M/UL — LOW (ref 4.2–5.8)
RBC # BLD: 2.42 M/UL — LOW (ref 4.2–5.8)
RBC # BLD: 2.58 M/UL — LOW (ref 4.2–5.8)
RBC # BLD: 2.61 M/UL — LOW (ref 4.2–5.8)
RBC # BLD: 2.61 M/UL — LOW (ref 4.2–5.8)
RBC # BLD: 2.66 M/UL — LOW (ref 4.2–5.8)
RBC # BLD: 2.68 M/UL — LOW (ref 4.2–5.8)
RBC # BLD: 2.68 M/UL — LOW (ref 4.2–5.8)
RBC # BLD: 2.71 M/UL — LOW (ref 4.2–5.8)
RBC # BLD: 2.71 M/UL — LOW (ref 4.2–5.8)
RBC # BLD: 2.72 M/UL — LOW (ref 4.2–5.8)
RBC # BLD: 2.72 M/UL — LOW (ref 4.2–5.8)
RBC # BLD: 2.73 M/UL — LOW (ref 4.2–5.8)
RBC # BLD: 2.73 M/UL — LOW (ref 4.2–5.8)
RBC # BLD: 2.74 M/UL — LOW (ref 4.2–5.8)
RBC # BLD: 2.75 M/UL — LOW (ref 4.2–5.8)
RBC # BLD: 2.75 M/UL — LOW (ref 4.2–5.8)
RBC # BLD: 2.78 M/UL — LOW (ref 4.2–5.8)
RBC # BLD: 2.81 M/UL — LOW (ref 4.2–5.8)
RBC # BLD: 2.83 M/UL — LOW (ref 4.2–5.8)
RBC # BLD: 2.83 M/UL — LOW (ref 4.2–5.8)
RBC # BLD: 2.85 M/UL — LOW (ref 4.2–5.8)
RBC # BLD: 2.85 M/UL — LOW (ref 4.2–5.8)
RBC # BLD: 2.88 M/UL — LOW (ref 4.2–5.8)
RBC # BLD: 2.88 M/UL — LOW (ref 4.2–5.8)
RBC # BLD: 2.89 M/UL — LOW (ref 4.2–5.8)
RBC # BLD: 2.89 M/UL — LOW (ref 4.2–5.8)
RBC # BLD: 2.91 M/UL — LOW (ref 4.2–5.8)
RBC # BLD: 2.91 M/UL — LOW (ref 4.2–5.8)
RBC # BLD: 2.95 M/UL — LOW (ref 4.2–5.8)
RBC # BLD: 2.96 M/UL — LOW (ref 4.2–5.8)
RBC # BLD: 2.96 M/UL — LOW (ref 4.2–5.8)
RBC # BLD: 2.98 M/UL — LOW (ref 4.2–5.8)
RBC # BLD: 2.99 M/UL — LOW (ref 4.2–5.8)
RBC # BLD: 2.99 M/UL — LOW (ref 4.2–5.8)
RBC # BLD: 3 M/UL — LOW (ref 4.2–5.8)
RBC # BLD: 3 M/UL — LOW (ref 4.2–5.8)
RBC # BLD: 3.02 M/UL — LOW (ref 4.2–5.8)
RBC # BLD: 3.03 M/UL — LOW (ref 4.2–5.8)
RBC # BLD: 3.03 M/UL — LOW (ref 4.2–5.8)
RBC # BLD: 3.07 M/UL — LOW (ref 4.2–5.8)
RBC # BLD: 3.08 M/UL — LOW (ref 4.2–5.8)
RBC # BLD: 3.08 M/UL — LOW (ref 4.2–5.8)
RBC # BLD: 3.09 M/UL — LOW (ref 4.2–5.8)
RBC # BLD: 3.09 M/UL — LOW (ref 4.2–5.8)
RBC # BLD: 3.14 M/UL — LOW (ref 4.2–5.8)
RBC # BLD: 3.14 M/UL — LOW (ref 4.2–5.8)
RBC # BLD: 3.22 M/UL — LOW (ref 4.2–5.8)
RBC # BLD: 3.29 M/UL — LOW (ref 4.2–5.8)
RBC # BLD: 3.29 M/UL — LOW (ref 4.2–5.8)
RBC # BLD: 3.32 M/UL — LOW (ref 4.2–5.8)
RBC # BLD: 3.33 M/UL — LOW (ref 4.2–5.8)
RBC # BLD: 3.37 M/UL — LOW (ref 4.2–5.8)
RBC # BLD: 3.37 M/UL — LOW (ref 4.2–5.8)
RBC # BLD: 3.67 M/UL — LOW (ref 4.2–5.8)
RBC # BLD: 3.97 M/UL — LOW (ref 4.2–5.8)
RBC # BLD: 4.05 M/UL — LOW (ref 4.2–5.8)
RBC # BLD: 4.22 M/UL — SIGNIFICANT CHANGE UP (ref 4.2–5.8)
RBC # BLD: 4.41 M/UL — SIGNIFICANT CHANGE UP (ref 4.2–5.8)
RBC # BLD: 4.61 M/UL — SIGNIFICANT CHANGE UP (ref 4.2–5.8)
RBC # BLD: 4.81 M/UL — SIGNIFICANT CHANGE UP (ref 4.2–5.8)
RBC # FLD: 13.4 % — SIGNIFICANT CHANGE UP (ref 10.3–14.5)
RBC # FLD: 13.5 % — SIGNIFICANT CHANGE UP (ref 10.3–14.5)
RBC # FLD: 13.6 % — SIGNIFICANT CHANGE UP (ref 10.3–14.5)
RBC # FLD: 13.7 % — SIGNIFICANT CHANGE UP (ref 10.3–14.5)
RBC # FLD: 13.8 % — SIGNIFICANT CHANGE UP (ref 10.3–14.5)
RBC # FLD: 13.9 % — SIGNIFICANT CHANGE UP (ref 10.3–14.5)
RBC # FLD: 13.9 % — SIGNIFICANT CHANGE UP (ref 10.3–14.5)
RBC # FLD: 14 % — SIGNIFICANT CHANGE UP (ref 10.3–14.5)
RBC # FLD: 14.1 % — SIGNIFICANT CHANGE UP (ref 10.3–14.5)
RBC # FLD: 14.2 % — SIGNIFICANT CHANGE UP (ref 10.3–14.5)
RBC # FLD: 14.3 % — SIGNIFICANT CHANGE UP (ref 10.3–14.5)
RBC # FLD: 14.4 % — SIGNIFICANT CHANGE UP (ref 10.3–14.5)
RBC # FLD: 14.5 % — SIGNIFICANT CHANGE UP (ref 10.3–14.5)
RBC # FLD: 14.5 % — SIGNIFICANT CHANGE UP (ref 10.3–14.5)
RBC # FLD: 14.6 % — HIGH (ref 10.3–14.5)
RBC # FLD: 14.7 % — HIGH (ref 10.3–14.5)
RBC # FLD: 14.8 % — HIGH (ref 10.3–14.5)
RBC # FLD: 15 % — HIGH (ref 10.3–14.5)
RBC # FLD: 15 % — HIGH (ref 10.3–14.5)
RBC # FLD: 15.1 % — HIGH (ref 10.3–14.5)
RBC # FLD: 15.1 % — HIGH (ref 10.3–14.5)
RBC # FLD: 15.7 % — HIGH (ref 10.3–14.5)
RBC # FLD: 15.7 % — HIGH (ref 10.3–14.5)
RBC # FLD: 15.8 % — HIGH (ref 10.3–14.5)
RBC # FLD: 15.8 % — HIGH (ref 10.3–14.5)
RBC BLD AUTO: ABNORMAL
RBC BLD AUTO: SIGNIFICANT CHANGE UP
RETICS #: 16.5 K/UL — LOW (ref 25–125)
RETICS #: 23.1 K/UL — LOW (ref 25–125)
RETICS/RBC NFR: 0.5 % — SIGNIFICANT CHANGE UP (ref 0.5–2.5)
RETICS/RBC NFR: 0.8 % — SIGNIFICANT CHANGE UP (ref 0.5–2.5)
RH IG SCN BLD-IMP: POSITIVE — SIGNIFICANT CHANGE UP
S AUREUS DNA NOSE QL NAA+PROBE: SIGNIFICANT CHANGE UP
SCHISTOCYTES BLD QL AUTO: SLIGHT — SIGNIFICANT CHANGE UP
SODIUM SERPL-SCNC: 134 MMOL/L
SODIUM SERPL-SCNC: 135 MMOL/L — SIGNIFICANT CHANGE UP (ref 135–145)
SODIUM SERPL-SCNC: 135 MMOL/L — SIGNIFICANT CHANGE UP (ref 135–145)
SODIUM SERPL-SCNC: 137 MMOL/L — SIGNIFICANT CHANGE UP (ref 135–145)
SODIUM SERPL-SCNC: 138 MMOL/L — SIGNIFICANT CHANGE UP (ref 135–145)
SODIUM SERPL-SCNC: 139 MMOL/L
SODIUM SERPL-SCNC: 139 MMOL/L — SIGNIFICANT CHANGE UP (ref 135–145)
SODIUM SERPL-SCNC: 139 MMOL/L — SIGNIFICANT CHANGE UP (ref 135–145)
SODIUM SERPL-SCNC: 140 MMOL/L
SODIUM SERPL-SCNC: 140 MMOL/L — SIGNIFICANT CHANGE UP (ref 135–145)
SODIUM SERPL-SCNC: 142 MMOL/L — SIGNIFICANT CHANGE UP (ref 135–145)
SODIUM SERPL-SCNC: 143 MMOL/L
SODIUM SERPL-SCNC: 144 MMOL/L — SIGNIFICANT CHANGE UP (ref 135–145)
SPECIMEN SOURCE: SIGNIFICANT CHANGE UP
SPHEROCYTES BLD QL SMEAR: SLIGHT — SIGNIFICANT CHANGE UP
SPHEROCYTES BLD QL SMEAR: SLIGHT — SIGNIFICANT CHANGE UP
T4 FREE SERPL-MCNC: 1.1 NG/DL — SIGNIFICANT CHANGE UP (ref 0.9–1.8)
T4 FREE SERPL-MCNC: 1.2 NG/DL — SIGNIFICANT CHANGE UP (ref 0.9–1.8)
TIBC SERPL-MCNC: 264 UG/DL — SIGNIFICANT CHANGE UP (ref 220–430)
TIBC SERPL-MCNC: 296 UG/DL
TIBC SERPL-MCNC: 319 UG/DL — SIGNIFICANT CHANGE UP (ref 220–430)
TM INTERPRETATION: SIGNIFICANT CHANGE UP
TM INTERPRETATION: SIGNIFICANT CHANGE UP
TRIGL SERPL-MCNC: 109 MG/DL — SIGNIFICANT CHANGE UP
TSH SERPL-MCNC: 0.59 UIU/ML — SIGNIFICANT CHANGE UP (ref 0.27–4.2)
TSH SERPL-MCNC: 0.84 UIU/ML — SIGNIFICANT CHANGE UP (ref 0.27–4.2)
TSH SERPL-MCNC: 1.46 UIU/ML — SIGNIFICANT CHANGE UP (ref 0.27–4.2)
TSH SERPL-MCNC: 1.87 UIU/ML — SIGNIFICANT CHANGE UP (ref 0.27–4.2)
TSH SERPL-MCNC: 2.21 UIU/ML — SIGNIFICANT CHANGE UP (ref 0.27–4.2)
TSH SERPL-MCNC: 2.21 UIU/ML — SIGNIFICANT CHANGE UP (ref 0.27–4.2)
TSH SERPL-MCNC: 2.75 UIU/ML — SIGNIFICANT CHANGE UP (ref 0.27–4.2)
UIBC SERPL-MCNC: 167 UG/DL — SIGNIFICANT CHANGE UP (ref 110–370)
UIBC SERPL-MCNC: 246 UG/DL — SIGNIFICANT CHANGE UP (ref 110–370)
UIBC SERPL-MCNC: 99 UG/DL
URATE SERPL-MCNC: 3.2 MG/DL — LOW (ref 3.4–8.8)
URATE SERPL-MCNC: 3.3 MG/DL — LOW (ref 3.4–8.8)
URATE SERPL-MCNC: 3.5 MG/DL — SIGNIFICANT CHANGE UP (ref 3.4–8.8)
URATE SERPL-MCNC: 3.6 MG/DL — SIGNIFICANT CHANGE UP (ref 3.4–8.8)
URATE SERPL-MCNC: 3.8 MG/DL — SIGNIFICANT CHANGE UP (ref 3.4–8.8)
URATE SERPL-MCNC: 4.1 MG/DL — SIGNIFICANT CHANGE UP (ref 3.4–8.8)
VARIANT LYMPHS # BLD: 0.9 % — SIGNIFICANT CHANGE UP (ref 0–6)
VIT B12 SERPL-MCNC: 225 PG/ML
VIT B12 SERPL-MCNC: 242 PG/ML — SIGNIFICANT CHANGE UP (ref 232–1245)
VIT B12 SERPL-MCNC: 495 PG/ML — SIGNIFICANT CHANGE UP (ref 232–1245)
VIT D25+D1,25 OH+D1,25 PNL SERPL-MCNC: 78.5 PG/ML — SIGNIFICANT CHANGE UP (ref 19.9–79.3)
VIT D25+D1,25 OH+D1,25 PNL SERPL-MCNC: 83.7 PG/ML — HIGH (ref 19.9–79.3)
VIT D25+D1,25 OH+D1,25 PNL SERPL-MCNC: 84.3 PG/ML — HIGH (ref 19.9–79.3)
WBC # BLD: 0.51 K/UL — CRITICAL LOW (ref 3.8–10.5)
WBC # BLD: 0.51 K/UL — CRITICAL LOW (ref 3.8–10.5)
WBC # BLD: 0.75 K/UL — CRITICAL LOW (ref 3.8–10.5)
WBC # BLD: 0.75 K/UL — CRITICAL LOW (ref 3.8–10.5)
WBC # BLD: 0.96 K/UL — CRITICAL LOW (ref 3.8–10.5)
WBC # BLD: 0.97 K/UL — CRITICAL LOW (ref 3.8–10.5)
WBC # BLD: 0.97 K/UL — CRITICAL LOW (ref 3.8–10.5)
WBC # BLD: 1.02 K/UL — LOW (ref 3.8–10.5)
WBC # BLD: 1.02 K/UL — LOW (ref 3.8–10.5)
WBC # BLD: 1.06 K/UL — LOW (ref 3.8–10.5)
WBC # BLD: 1.07 K/UL — LOW (ref 3.8–10.5)
WBC # BLD: 1.07 K/UL — LOW (ref 3.8–10.5)
WBC # BLD: 1.22 K/UL — LOW (ref 3.8–10.5)
WBC # BLD: 1.22 K/UL — LOW (ref 3.8–10.5)
WBC # BLD: 1.23 K/UL — LOW (ref 3.8–10.5)
WBC # BLD: 1.23 K/UL — LOW (ref 3.8–10.5)
WBC # BLD: 1.25 K/UL — LOW (ref 3.8–10.5)
WBC # BLD: 1.33 K/UL — LOW (ref 3.8–10.5)
WBC # BLD: 1.33 K/UL — LOW (ref 3.8–10.5)
WBC # BLD: 1.49 K/UL — LOW (ref 3.8–10.5)
WBC # BLD: 1.51 K/UL — LOW (ref 3.8–10.5)
WBC # BLD: 1.51 K/UL — LOW (ref 3.8–10.5)
WBC # BLD: 1.61 K/UL — LOW (ref 3.8–10.5)
WBC # BLD: 1.61 K/UL — LOW (ref 3.8–10.5)
WBC # BLD: 1.66 K/UL — LOW (ref 3.8–10.5)
WBC # BLD: 1.66 K/UL — LOW (ref 3.8–10.5)
WBC # BLD: 1.77 K/UL — LOW (ref 3.8–10.5)
WBC # BLD: 1.77 K/UL — LOW (ref 3.8–10.5)
WBC # BLD: 1.78 K/UL — LOW (ref 3.8–10.5)
WBC # BLD: 1.78 K/UL — LOW (ref 3.8–10.5)
WBC # BLD: 1.82 K/UL — LOW (ref 3.8–10.5)
WBC # BLD: 2.14 K/UL — LOW (ref 3.8–10.5)
WBC # BLD: 2.17 K/UL — LOW (ref 3.8–10.5)
WBC # BLD: 2.26 K/UL — LOW (ref 3.8–10.5)
WBC # BLD: 2.31 K/UL — LOW (ref 3.8–10.5)
WBC # BLD: 2.32 K/UL — LOW (ref 3.8–10.5)
WBC # BLD: 2.32 K/UL — LOW (ref 3.8–10.5)
WBC # BLD: 2.4 K/UL — LOW (ref 3.8–10.5)
WBC # BLD: 2.4 K/UL — LOW (ref 3.8–10.5)
WBC # BLD: 2.43 K/UL — LOW (ref 3.8–10.5)
WBC # BLD: 2.45 K/UL — LOW (ref 3.8–10.5)
WBC # BLD: 2.47 K/UL — LOW (ref 3.8–10.5)
WBC # BLD: 2.51 K/UL — LOW (ref 3.8–10.5)
WBC # BLD: 2.54 K/UL — LOW (ref 3.8–10.5)
WBC # BLD: 2.56 K/UL — LOW (ref 3.8–10.5)
WBC # BLD: 2.57 K/UL — LOW (ref 3.8–10.5)
WBC # BLD: 2.76 K/UL — LOW (ref 3.8–10.5)
WBC # BLD: 3.02 K/UL — LOW (ref 3.8–10.5)
WBC # BLD: 3.45 K/UL — LOW (ref 3.8–10.5)
WBC # BLD: 4.67 K/UL — SIGNIFICANT CHANGE UP (ref 3.8–10.5)
WBC # BLD: 5.93 K/UL — SIGNIFICANT CHANGE UP (ref 3.8–10.5)
WBC # BLD: 6.24 K/UL — SIGNIFICANT CHANGE UP (ref 3.8–10.5)
WBC # BLD: 6.3 K/UL — SIGNIFICANT CHANGE UP (ref 3.8–10.5)
WBC # BLD: 6.72 K/UL — SIGNIFICANT CHANGE UP (ref 3.8–10.5)
WBC # FLD AUTO: 0.51 K/UL — CRITICAL LOW (ref 3.8–10.5)
WBC # FLD AUTO: 0.51 K/UL — CRITICAL LOW (ref 3.8–10.5)
WBC # FLD AUTO: 0.75 K/UL — CRITICAL LOW (ref 3.8–10.5)
WBC # FLD AUTO: 0.75 K/UL — CRITICAL LOW (ref 3.8–10.5)
WBC # FLD AUTO: 0.96 K/UL — CRITICAL LOW (ref 3.8–10.5)
WBC # FLD AUTO: 0.97 K/UL — CRITICAL LOW (ref 3.8–10.5)
WBC # FLD AUTO: 0.97 K/UL — CRITICAL LOW (ref 3.8–10.5)
WBC # FLD AUTO: 1.02 K/UL — LOW (ref 3.8–10.5)
WBC # FLD AUTO: 1.02 K/UL — LOW (ref 3.8–10.5)
WBC # FLD AUTO: 1.06 K/UL — LOW (ref 3.8–10.5)
WBC # FLD AUTO: 1.07 K/UL — LOW (ref 3.8–10.5)
WBC # FLD AUTO: 1.07 K/UL — LOW (ref 3.8–10.5)
WBC # FLD AUTO: 1.22 K/UL — LOW (ref 3.8–10.5)
WBC # FLD AUTO: 1.22 K/UL — LOW (ref 3.8–10.5)
WBC # FLD AUTO: 1.23 K/UL — LOW (ref 3.8–10.5)
WBC # FLD AUTO: 1.23 K/UL — LOW (ref 3.8–10.5)
WBC # FLD AUTO: 1.25 K/UL — LOW (ref 3.8–10.5)
WBC # FLD AUTO: 1.33 K/UL — LOW (ref 3.8–10.5)
WBC # FLD AUTO: 1.33 K/UL — LOW (ref 3.8–10.5)
WBC # FLD AUTO: 1.49 K/UL — LOW (ref 3.8–10.5)
WBC # FLD AUTO: 1.51 K/UL — LOW (ref 3.8–10.5)
WBC # FLD AUTO: 1.51 K/UL — LOW (ref 3.8–10.5)
WBC # FLD AUTO: 1.61 K/UL — LOW (ref 3.8–10.5)
WBC # FLD AUTO: 1.61 K/UL — LOW (ref 3.8–10.5)
WBC # FLD AUTO: 1.66 K/UL — LOW (ref 3.8–10.5)
WBC # FLD AUTO: 1.66 K/UL — LOW (ref 3.8–10.5)
WBC # FLD AUTO: 1.77 K/UL — LOW (ref 3.8–10.5)
WBC # FLD AUTO: 1.77 K/UL — LOW (ref 3.8–10.5)
WBC # FLD AUTO: 1.78 K/UL — LOW (ref 3.8–10.5)
WBC # FLD AUTO: 1.78 K/UL — LOW (ref 3.8–10.5)
WBC # FLD AUTO: 1.82 K/UL — LOW (ref 3.8–10.5)
WBC # FLD AUTO: 2.14 K/UL — LOW (ref 3.8–10.5)
WBC # FLD AUTO: 2.17 K/UL — LOW (ref 3.8–10.5)
WBC # FLD AUTO: 2.26 K/UL — LOW (ref 3.8–10.5)
WBC # FLD AUTO: 2.31 K/UL — LOW (ref 3.8–10.5)
WBC # FLD AUTO: 2.32 K/UL — LOW (ref 3.8–10.5)
WBC # FLD AUTO: 2.32 K/UL — LOW (ref 3.8–10.5)
WBC # FLD AUTO: 2.4 K/UL — LOW (ref 3.8–10.5)
WBC # FLD AUTO: 2.4 K/UL — LOW (ref 3.8–10.5)
WBC # FLD AUTO: 2.43 K/UL — LOW (ref 3.8–10.5)
WBC # FLD AUTO: 2.45 K/UL — LOW (ref 3.8–10.5)
WBC # FLD AUTO: 2.47 K/UL — LOW (ref 3.8–10.5)
WBC # FLD AUTO: 2.51 K/UL — LOW (ref 3.8–10.5)
WBC # FLD AUTO: 2.54 K/UL — LOW (ref 3.8–10.5)
WBC # FLD AUTO: 2.56 K/UL — LOW (ref 3.8–10.5)
WBC # FLD AUTO: 2.57 K/UL — LOW (ref 3.8–10.5)
WBC # FLD AUTO: 2.76 K/UL — LOW (ref 3.8–10.5)
WBC # FLD AUTO: 3.02 K/UL — LOW (ref 3.8–10.5)
WBC # FLD AUTO: 3.45 K/UL — LOW (ref 3.8–10.5)
WBC # FLD AUTO: 4.67 K/UL — SIGNIFICANT CHANGE UP (ref 3.8–10.5)
WBC # FLD AUTO: 5.93 K/UL — SIGNIFICANT CHANGE UP (ref 3.8–10.5)
WBC # FLD AUTO: 6.24 K/UL — SIGNIFICANT CHANGE UP (ref 3.8–10.5)
WBC # FLD AUTO: 6.3 K/UL — SIGNIFICANT CHANGE UP (ref 3.8–10.5)
WBC # FLD AUTO: 6.72 K/UL — SIGNIFICANT CHANGE UP (ref 3.8–10.5)

## 2023-01-01 PROCEDURE — 86900 BLOOD TYPING SEROLOGIC ABO: CPT

## 2023-01-01 PROCEDURE — 81206 BCR/ABL1 GENE MAJOR BP: CPT

## 2023-01-01 PROCEDURE — 87640 STAPH A DNA AMP PROBE: CPT

## 2023-01-01 PROCEDURE — 83735 ASSAY OF MAGNESIUM: CPT

## 2023-01-01 PROCEDURE — 99232 SBSQ HOSP IP/OBS MODERATE 35: CPT

## 2023-01-01 PROCEDURE — 99214 OFFICE O/P EST MOD 30 MIN: CPT

## 2023-01-01 PROCEDURE — 78815 PET IMAGE W/CT SKULL-THIGH: CPT | Mod: 26,PS

## 2023-01-01 PROCEDURE — 88189 FLOWCYTOMETRY/READ 16 & >: CPT

## 2023-01-01 PROCEDURE — 86850 RBC ANTIBODY SCREEN: CPT

## 2023-01-01 PROCEDURE — 81450 HL NEO GSAP 5-50DNA/DNA&RNA: CPT

## 2023-01-01 PROCEDURE — 77387B: CUSTOM | Mod: 26

## 2023-01-01 PROCEDURE — 86704 HEP B CORE ANTIBODY TOTAL: CPT

## 2023-01-01 PROCEDURE — 88313 SPECIAL STAINS GROUP 2: CPT

## 2023-01-01 PROCEDURE — 82746 ASSAY OF FOLIC ACID SERUM: CPT

## 2023-01-01 PROCEDURE — P9037: CPT

## 2023-01-01 PROCEDURE — 83010 ASSAY OF HAPTOGLOBIN QUANT: CPT

## 2023-01-01 PROCEDURE — 93306 TTE W/DOPPLER COMPLETE: CPT | Mod: 26

## 2023-01-01 PROCEDURE — 71045 X-RAY EXAM CHEST 1 VIEW: CPT

## 2023-01-01 PROCEDURE — 99215 OFFICE O/P EST HI 40 MIN: CPT

## 2023-01-01 PROCEDURE — 93010 ELECTROCARDIOGRAM REPORT: CPT

## 2023-01-01 PROCEDURE — 77334 RADIATION TREATMENT AID(S): CPT | Mod: 26

## 2023-01-01 PROCEDURE — 99223 1ST HOSP IP/OBS HIGH 75: CPT

## 2023-01-01 PROCEDURE — 99233 SBSQ HOSP IP/OBS HIGH 50: CPT

## 2023-01-01 PROCEDURE — 93000 ELECTROCARDIOGRAM COMPLETE: CPT

## 2023-01-01 PROCEDURE — 86901 BLOOD TYPING SEROLOGIC RH(D): CPT

## 2023-01-01 PROCEDURE — 85027 COMPLETE CBC AUTOMATED: CPT

## 2023-01-01 PROCEDURE — 87389 HIV-1 AG W/HIV-1&-2 AB AG IA: CPT

## 2023-01-01 PROCEDURE — 93306 TTE W/DOPPLER COMPLETE: CPT

## 2023-01-01 PROCEDURE — 85379 FIBRIN DEGRADATION QUANT: CPT

## 2023-01-01 PROCEDURE — 99233 SBSQ HOSP IP/OBS HIGH 50: CPT | Mod: GC

## 2023-01-01 PROCEDURE — 88291 CYTO/MOLECULAR REPORT: CPT | Mod: 59

## 2023-01-01 PROCEDURE — 77333 RADIATION TREATMENT AID(S): CPT | Mod: 26,59

## 2023-01-01 PROCEDURE — 99239 HOSP IP/OBS DSCHRG MGMT >30: CPT

## 2023-01-01 PROCEDURE — 83615 LACTATE (LD) (LDH) ENZYME: CPT

## 2023-01-01 PROCEDURE — 77427 RADIATION TX MANAGEMENT X5: CPT

## 2023-01-01 PROCEDURE — 99024 POSTOP FOLLOW-UP VISIT: CPT

## 2023-01-01 PROCEDURE — 86923 COMPATIBILITY TEST ELECTRIC: CPT

## 2023-01-01 PROCEDURE — 83540 ASSAY OF IRON: CPT

## 2023-01-01 PROCEDURE — 93880 EXTRACRANIAL BILAT STUDY: CPT

## 2023-01-01 PROCEDURE — 77263 THER RADIOLOGY TX PLNG CPLX: CPT

## 2023-01-01 PROCEDURE — 81207 BCR/ABL1 GENE MINOR BP: CPT

## 2023-01-01 PROCEDURE — 99204 OFFICE O/P NEW MOD 45 MIN: CPT

## 2023-01-01 PROCEDURE — 81451 HL NEO GSAP 5-50 RNA ALYS: CPT

## 2023-01-01 PROCEDURE — 77014: CPT | Mod: 26

## 2023-01-01 PROCEDURE — 82607 VITAMIN B-12: CPT

## 2023-01-01 PROCEDURE — A9552: CPT

## 2023-01-01 PROCEDURE — 80053 COMPREHEN METABOLIC PANEL: CPT

## 2023-01-01 PROCEDURE — 88342 IMHCHEM/IMCYTCHM 1ST ANTB: CPT

## 2023-01-01 PROCEDURE — 93356 MYOCRD STRAIN IMG SPCKL TRCK: CPT

## 2023-01-01 PROCEDURE — 71260 CT THORAX DX C+: CPT

## 2023-01-01 PROCEDURE — 81270 JAK2 GENE: CPT

## 2023-01-01 PROCEDURE — 85384 FIBRINOGEN ACTIVITY: CPT

## 2023-01-01 PROCEDURE — 87040 BLOOD CULTURE FOR BACTERIA: CPT

## 2023-01-01 PROCEDURE — 74177 CT ABD & PELVIS W/CONTRAST: CPT | Mod: 26

## 2023-01-01 PROCEDURE — 84100 ASSAY OF PHOSPHORUS: CPT

## 2023-01-01 PROCEDURE — 88313 SPECIAL STAINS GROUP 2: CPT | Mod: 26

## 2023-01-01 PROCEDURE — 85097 BONE MARROW INTERPRETATION: CPT

## 2023-01-01 PROCEDURE — 88237 TISSUE CULTURE BONE MARROW: CPT

## 2023-01-01 PROCEDURE — 87641 MR-STAPH DNA AMP PROBE: CPT

## 2023-01-01 PROCEDURE — 99215 OFFICE O/P EST HI 40 MIN: CPT | Mod: 25

## 2023-01-01 PROCEDURE — 88341 IMHCHEM/IMCYTCHM EA ADD ANTB: CPT | Mod: 26,59

## 2023-01-01 PROCEDURE — 84550 ASSAY OF BLOOD/URIC ACID: CPT

## 2023-01-01 PROCEDURE — 88280 CHROMOSOME KARYOTYPE STUDY: CPT

## 2023-01-01 PROCEDURE — 88264 CHROMOSOME ANALYSIS 20-25: CPT

## 2023-01-01 PROCEDURE — 88305 TISSUE EXAM BY PATHOLOGIST: CPT

## 2023-01-01 PROCEDURE — P9100: CPT

## 2023-01-01 PROCEDURE — 74177 CT ABD & PELVIS W/CONTRAST: CPT

## 2023-01-01 PROCEDURE — 99285 EMERGENCY DEPT VISIT HI MDM: CPT

## 2023-01-01 PROCEDURE — 80048 BASIC METABOLIC PNL TOTAL CA: CPT

## 2023-01-01 PROCEDURE — 36415 COLL VENOUS BLD VENIPUNCTURE: CPT

## 2023-01-01 PROCEDURE — 88360 TUMOR IMMUNOHISTOCHEM/MANUAL: CPT | Mod: 26,59

## 2023-01-01 PROCEDURE — 78815 PET IMAGE W/CT SKULL-THIGH: CPT

## 2023-01-01 PROCEDURE — 88341 IMHCHEM/IMCYTCHM EA ADD ANTB: CPT

## 2023-01-01 PROCEDURE — 88360 TUMOR IMMUNOHISTOCHEM/MANUAL: CPT

## 2023-01-01 PROCEDURE — 36430 TRANSFUSION BLD/BLD COMPNT: CPT

## 2023-01-01 PROCEDURE — 85730 THROMBOPLASTIN TIME PARTIAL: CPT

## 2023-01-01 PROCEDURE — 71275 CT ANGIOGRAPHY CHEST: CPT

## 2023-01-01 PROCEDURE — 81245 FLT3 GENE: CPT

## 2023-01-01 PROCEDURE — 99417 PROLNG OP E/M EACH 15 MIN: CPT

## 2023-01-01 PROCEDURE — 71045 X-RAY EXAM CHEST 1 VIEW: CPT | Mod: 26

## 2023-01-01 PROCEDURE — 88342 IMHCHEM/IMCYTCHM 1ST ANTB: CPT | Mod: 26,59

## 2023-01-01 PROCEDURE — 71260 CT THORAX DX C+: CPT | Mod: 26

## 2023-01-01 PROCEDURE — 83550 IRON BINDING TEST: CPT

## 2023-01-01 PROCEDURE — 77080 DXA BONE DENSITY AXIAL: CPT

## 2023-01-01 PROCEDURE — 99205 OFFICE O/P NEW HI 60 MIN: CPT | Mod: GC,25

## 2023-01-01 PROCEDURE — 88305 TISSUE EXAM BY PATHOLOGIST: CPT | Mod: 26

## 2023-01-01 PROCEDURE — 72158 MRI LUMBAR SPINE W/O & W/DYE: CPT | Mod: 26

## 2023-01-01 PROCEDURE — P9073: CPT

## 2023-01-01 PROCEDURE — 88185 FLOWCYTOMETRY/TC ADD-ON: CPT

## 2023-01-01 PROCEDURE — 85610 PROTHROMBIN TIME: CPT

## 2023-01-01 PROCEDURE — 77080 DXA BONE DENSITY AXIAL: CPT | Mod: 26

## 2023-01-01 PROCEDURE — 71275 CT ANGIOGRAPHY CHEST: CPT | Mod: 26

## 2023-01-01 PROCEDURE — 82728 ASSAY OF FERRITIN: CPT

## 2023-01-01 PROCEDURE — G0452: CPT | Mod: 26

## 2023-01-01 PROCEDURE — 85025 COMPLETE CBC W/AUTO DIFF WBC: CPT

## 2023-01-01 PROCEDURE — 88271 CYTOGENETICS DNA PROBE: CPT

## 2023-01-01 PROCEDURE — G0452: CPT | Mod: 26,59

## 2023-01-01 PROCEDURE — 81246 FLT3 GENE ANALYSIS: CPT

## 2023-01-01 PROCEDURE — 77300 RADIATION THERAPY DOSE PLAN: CPT | Mod: 26

## 2023-01-01 PROCEDURE — 77338 DESIGN MLC DEVICE FOR IMRT: CPT | Mod: 26

## 2023-01-01 PROCEDURE — A9585: CPT

## 2023-01-01 PROCEDURE — 86706 HEP B SURFACE ANTIBODY: CPT

## 2023-01-01 PROCEDURE — 87205 SMEAR GRAM STAIN: CPT

## 2023-01-01 PROCEDURE — 88275 CYTOGENETICS 100-300: CPT

## 2023-01-01 PROCEDURE — 85060 BLOOD SMEAR INTERPRETATION: CPT

## 2023-01-01 PROCEDURE — 77301 RADIOTHERAPY DOSE PLAN IMRT: CPT | Mod: 26

## 2023-01-01 PROCEDURE — 99222 1ST HOSP IP/OBS MODERATE 55: CPT

## 2023-01-01 PROCEDURE — 85045 AUTOMATED RETICULOCYTE COUNT: CPT

## 2023-01-01 PROCEDURE — 82955 ASSAY OF G6PD ENZYME: CPT

## 2023-01-01 PROCEDURE — 88184 FLOWCYTOMETRY/ TC 1 MARKER: CPT

## 2023-01-01 PROCEDURE — 72158 MRI LUMBAR SPINE W/O & W/DYE: CPT

## 2023-01-01 RX ORDER — OXYCODONE HYDROCHLORIDE 5 MG/1
5 TABLET ORAL ONCE
Refills: 0 | Status: DISCONTINUED | OUTPATIENT
Start: 2023-01-01 | End: 2023-01-01

## 2023-01-01 RX ORDER — ATORVASTATIN CALCIUM 80 MG/1
80 TABLET, FILM COATED ORAL
Qty: 90 | Refills: 1 | Status: COMPLETED | COMMUNITY
Start: 2023-01-01 | End: 2023-01-01

## 2023-01-01 RX ORDER — AMOXICILLIN AND CLAVULANATE POTASSIUM 875; 125 MG/1; MG/1
875-125 TABLET, COATED ORAL
Qty: 60 | Refills: 2 | Status: ACTIVE | COMMUNITY
Start: 2023-01-01 | End: 1900-01-01

## 2023-01-01 RX ORDER — DIPHENOXYLATE HYDROCHLORIDE AND ATROPINE SULFATE 2.5; .025 MG/1; MG/1
2.5-0.025 TABLET ORAL
Qty: 120 | Refills: 2 | Status: COMPLETED | COMMUNITY
Start: 2022-11-18 | End: 2023-01-01

## 2023-01-01 RX ORDER — EMPAGLIFLOZIN 10 MG/1
10 TABLET, FILM COATED ORAL
Qty: 90 | Refills: 1 | Status: ACTIVE | COMMUNITY
Start: 2022-08-05 | End: 1900-01-01

## 2023-01-01 RX ORDER — ATOVAQUONE 750 MG/5ML
10 SUSPENSION ORAL
Qty: 300 | Refills: 0
Start: 2023-01-01 | End: 2023-01-01

## 2023-01-01 RX ORDER — ACETAMINOPHEN 500 MG
650 TABLET ORAL EVERY 6 HOURS
Refills: 0 | Status: DISCONTINUED | OUTPATIENT
Start: 2023-01-01 | End: 2023-01-01

## 2023-01-01 RX ORDER — SENNA PLUS 8.6 MG/1
2 TABLET ORAL AT BEDTIME
Refills: 0 | Status: DISCONTINUED | OUTPATIENT
Start: 2023-01-01 | End: 2023-01-01

## 2023-01-01 RX ORDER — PREDNISONE 2.5 MG/1
2.5 TABLET ORAL
Qty: 360 | Refills: 3 | Status: DISCONTINUED | COMMUNITY
Start: 2023-01-01 | End: 2023-01-01

## 2023-01-01 RX ORDER — DOXYCYCLINE 100 MG/1
100 TABLET, FILM COATED ORAL
Qty: 14 | Refills: 0 | Status: DISCONTINUED | COMMUNITY
Start: 2022-11-18 | End: 2023-01-01

## 2023-01-01 RX ORDER — TAMSULOSIN HYDROCHLORIDE 0.4 MG/1
0.4 CAPSULE ORAL DAILY
Qty: 180 | Refills: 0 | Status: ACTIVE | COMMUNITY
Start: 2019-10-31 | End: 1900-01-01

## 2023-01-01 RX ORDER — SENNA PLUS 8.6 MG/1
2 TABLET ORAL
Qty: 0 | Refills: 0 | DISCHARGE
Start: 2023-01-01

## 2023-01-01 RX ORDER — SACUBITRIL AND VALSARTAN 24; 26 MG/1; MG/1
1 TABLET, FILM COATED ORAL
Refills: 0 | Status: DISCONTINUED | OUTPATIENT
Start: 2023-01-01 | End: 2023-01-01

## 2023-01-01 RX ORDER — CALCIUM CARBONATE 500(1250)
1 TABLET ORAL ONCE
Refills: 0 | Status: COMPLETED | OUTPATIENT
Start: 2023-01-01 | End: 2023-01-01

## 2023-01-01 RX ORDER — ZOLPIDEM TARTRATE 5 MG/1
5 TABLET ORAL
Qty: 30 | Refills: 5 | Status: ACTIVE | COMMUNITY
Start: 2023-01-01 | End: 1900-01-01

## 2023-01-01 RX ORDER — TRAMADOL HYDROCHLORIDE 50 MG/1
50 TABLET, COATED ORAL
Qty: 60 | Refills: 0 | Status: ACTIVE | COMMUNITY
Start: 2023-01-01 | End: 1900-01-01

## 2023-01-01 RX ORDER — CHLORHEXIDINE GLUCONATE 213 G/1000ML
1 SOLUTION TOPICAL DAILY
Refills: 0 | Status: DISCONTINUED | OUTPATIENT
Start: 2023-01-01 | End: 2023-01-01

## 2023-01-01 RX ORDER — CEDAZURIDINE AND DECITABINE 100; 35 MG/1; MG/1
35-100 TABLET, FILM COATED ORAL
Qty: 5 | Refills: 2 | Status: ACTIVE | COMMUNITY
Start: 2023-01-01 | End: 1900-01-01

## 2023-01-01 RX ORDER — EVOLOCUMAB 140 MG/ML
140 INJECTION, SOLUTION SUBCUTANEOUS
Qty: 90 | Refills: 3 | Status: ACTIVE | COMMUNITY
Start: 2023-01-01 | End: 1900-01-01

## 2023-01-01 RX ORDER — NIRMATRELVIR AND RITONAVIR 300-100 MG
20 X 150 MG & KIT ORAL
Qty: 30 | Refills: 0 | Status: DISCONTINUED | COMMUNITY
Start: 2022-11-30 | End: 2023-01-01

## 2023-01-01 RX ORDER — ASPIRIN ENTERIC COATED TABLETS 81 MG 81 MG/1
81 TABLET, DELAYED RELEASE ORAL
Qty: 90 | Refills: 3 | Status: DISCONTINUED | COMMUNITY
End: 2023-01-01

## 2023-01-01 RX ORDER — CLOPIDOGREL BISULFATE 75 MG/1
75 TABLET, FILM COATED ORAL DAILY
Qty: 7 | Refills: 0 | Status: DISCONTINUED | COMMUNITY
Start: 2022-11-30 | End: 2023-01-01

## 2023-01-01 RX ORDER — LEVOTHYROXINE SODIUM 125 MCG
25 TABLET ORAL DAILY
Refills: 0 | Status: DISCONTINUED | OUTPATIENT
Start: 2023-01-01 | End: 2023-01-01

## 2023-01-01 RX ORDER — LANSOPRAZOLE 30 MG/1
30 CAPSULE, DELAYED RELEASE ORAL DAILY
Qty: 90 | Refills: 3 | Status: ACTIVE | COMMUNITY
Start: 2023-01-01 | End: 1900-01-01

## 2023-01-01 RX ORDER — CLOPIDOGREL BISULFATE 75 MG/1
75 TABLET, FILM COATED ORAL
Qty: 90 | Refills: 3 | Status: DISCONTINUED | COMMUNITY
Start: 2022-12-13 | End: 2023-01-01

## 2023-01-01 RX ORDER — EVOLOCUMAB 140 MG/ML
140 INJECTION, SOLUTION SUBCUTANEOUS
Qty: 6 | Refills: 3 | Status: ACTIVE | COMMUNITY
Start: 2023-01-01 | End: 1900-01-01

## 2023-01-01 RX ORDER — TAMSULOSIN HYDROCHLORIDE 0.4 MG/1
0.8 CAPSULE ORAL AT BEDTIME
Refills: 0 | Status: DISCONTINUED | OUTPATIENT
Start: 2023-01-01 | End: 2023-01-01

## 2023-01-01 RX ORDER — FAMOTIDINE 20 MG/1
20 TABLET, FILM COATED ORAL
Qty: 60 | Refills: 6 | Status: DISCONTINUED | COMMUNITY
Start: 2022-12-29 | End: 2023-01-01

## 2023-01-01 RX ORDER — ATOVAQUONE 750 MG/5ML
1500 SUSPENSION ORAL DAILY
Refills: 0 | Status: DISCONTINUED | OUTPATIENT
Start: 2023-01-01 | End: 2023-01-01

## 2023-01-01 RX ORDER — OXYCODONE 5 MG/1
5 TABLET ORAL
Qty: 52 | Refills: 0 | Status: DISCONTINUED | COMMUNITY
Start: 2021-06-07 | End: 2023-01-01

## 2023-01-01 RX ORDER — ACYCLOVIR 400 MG/1
400 TABLET ORAL TWICE DAILY
Qty: 60 | Refills: 2 | Status: ACTIVE | COMMUNITY
Start: 2023-01-01 | End: 1900-01-01

## 2023-01-01 RX ORDER — LEVOTHYROXINE SODIUM 0.03 MG/1
25 TABLET ORAL DAILY
Qty: 90 | Refills: 3 | Status: ACTIVE | COMMUNITY
Start: 2020-06-16 | End: 1900-01-01

## 2023-01-01 RX ORDER — POTASSIUM CHLORIDE 20 MEQ
40 PACKET (EA) ORAL EVERY 4 HOURS
Refills: 0 | Status: DISCONTINUED | OUTPATIENT
Start: 2023-01-01 | End: 2023-01-01

## 2023-01-01 RX ORDER — EZETIMIBE 10 MG/1
10 TABLET ORAL DAILY
Refills: 0 | Status: DISCONTINUED | OUTPATIENT
Start: 2023-01-01 | End: 2023-01-01

## 2023-01-01 RX ORDER — LEVOFLOXACIN 500 MG/1
500 TABLET, FILM COATED ORAL DAILY
Qty: 30 | Refills: 2 | Status: DISCONTINUED | COMMUNITY
Start: 2023-01-01 | End: 2023-01-01

## 2023-01-01 RX ORDER — ATEZOLIZUMAB 840 MG/14ML
INJECTION, SOLUTION INTRAVENOUS
Refills: 0 | Status: COMPLETED | COMMUNITY
End: 2023-01-01

## 2023-01-01 RX ORDER — PREDNISONE 10 MG/1
10 TABLET ORAL
Qty: 120 | Refills: 5 | Status: ACTIVE | COMMUNITY
Start: 2022-07-08 | End: 1900-01-01

## 2023-01-01 RX ORDER — POLYETHYLENE GLYCOL 3350 17 G/17G
17 POWDER, FOR SOLUTION ORAL
Refills: 0 | Status: DISCONTINUED | OUTPATIENT
Start: 2023-01-01 | End: 2023-01-01

## 2023-01-01 RX ORDER — POLYETHYLENE GLYCOL 3350 17 G/17G
17 POWDER, FOR SOLUTION ORAL
Qty: 0 | Refills: 0 | DISCHARGE
Start: 2023-01-01

## 2023-01-01 RX ORDER — DIPHENHYDRAMINE HYDROCHLORIDE AND LIDOCAINE HYDROCHLORIDE AND ALUMINUM HYDROXIDE AND MAGNESIUM HYDRO
KIT
Qty: 1 | Refills: 5 | Status: DISCONTINUED | COMMUNITY
Start: 2023-01-01 | End: 2023-01-01

## 2023-01-01 RX ORDER — METOPROLOL TARTRATE 50 MG
50 TABLET ORAL DAILY
Refills: 0 | Status: DISCONTINUED | OUTPATIENT
Start: 2023-01-01 | End: 2023-01-01

## 2023-01-01 RX ORDER — POLYETHYLENE GLYCOL 3350 AND ELECTROLYTES WITH LEMON FLAVOR 236; 22.74; 6.74; 5.86; 2.97 G/4L; G/4L; G/4L; G/4L; G/4L
236 POWDER, FOR SOLUTION ORAL
Qty: 1 | Refills: 0 | Status: COMPLETED | COMMUNITY
Start: 2023-01-01 | End: 2023-01-01

## 2023-01-01 RX ORDER — ROSUVASTATIN CALCIUM 10 MG/1
10 TABLET, FILM COATED ORAL
Qty: 30 | Refills: 3 | Status: COMPLETED | COMMUNITY
Start: 2020-07-13 | End: 2023-01-01

## 2023-01-01 RX ORDER — POLYETHYLENE GLYCOL 3350 17 G
17 POWDER IN PACKET (EA) ORAL DAILY
Qty: 2 | Refills: 0 | Status: ACTIVE | COMMUNITY
Start: 2023-01-01 | End: 1900-01-01

## 2023-01-01 RX ORDER — NYSTATIN 100000 [USP'U]/ML
100000 SUSPENSION ORAL 3 TIMES DAILY
Qty: 300 | Refills: 1 | Status: DISCONTINUED | COMMUNITY
Start: 2022-12-05 | End: 2023-01-01

## 2023-01-01 RX ORDER — SACUBITRIL AND VALSARTAN 24; 26 MG/1; MG/1
24-26 TABLET, FILM COATED ORAL
Qty: 180 | Refills: 0 | Status: ACTIVE | COMMUNITY
Start: 2022-07-01 | End: 1900-01-01

## 2023-01-01 RX ORDER — METOPROLOL SUCCINATE 50 MG/1
50 TABLET, EXTENDED RELEASE ORAL
Qty: 90 | Refills: 1 | Status: ACTIVE | COMMUNITY
Start: 2023-01-01 | End: 1900-01-01

## 2023-01-01 RX ORDER — METOPROLOL TARTRATE 50 MG
25 TABLET ORAL DAILY
Refills: 0 | Status: DISCONTINUED | OUTPATIENT
Start: 2023-01-01 | End: 2023-01-01

## 2023-01-01 RX ORDER — FLUCONAZOLE 200 MG/1
200 TABLET ORAL DAILY
Qty: 30 | Refills: 3 | Status: ACTIVE | COMMUNITY
Start: 2023-01-01 | End: 1900-01-01

## 2023-01-01 RX ORDER — LIDOCAINE 4 G/100G
1 CREAM TOPICAL DAILY
Refills: 0 | Status: DISCONTINUED | OUTPATIENT
Start: 2023-01-01 | End: 2023-01-01

## 2023-01-01 RX ORDER — METOPROLOL TARTRATE 50 MG
1 TABLET ORAL
Qty: 0 | Refills: 0 | DISCHARGE

## 2023-01-01 RX ORDER — OXYCODONE HYDROCHLORIDE 5 MG/1
5 TABLET ORAL EVERY 6 HOURS
Refills: 0 | Status: DISCONTINUED | OUTPATIENT
Start: 2023-01-01 | End: 2023-01-01

## 2023-01-01 RX ORDER — ATOVAQUONE 750 MG/5ML
750 SUSPENSION ORAL DAILY
Qty: 300 | Refills: 2 | Status: ACTIVE | COMMUNITY
Start: 2023-01-01 | End: 1900-01-01

## 2023-01-01 RX ORDER — PANTOPRAZOLE 40 MG/1
40 TABLET, DELAYED RELEASE ORAL
Qty: 90 | Refills: 3 | Status: DISCONTINUED | COMMUNITY
Start: 2023-01-01 | End: 2023-01-01

## 2023-01-01 RX ORDER — AMOXICILLIN AND CLAVULANATE POTASSIUM 875; 125 MG/1; MG/1
875-125 TABLET, COATED ORAL
Qty: 14 | Refills: 0 | Status: DISCONTINUED | COMMUNITY
Start: 2022-11-18 | End: 2023-01-01

## 2023-01-01 RX ORDER — EMPAGLIFLOZIN 10 MG/1
1 TABLET, FILM COATED ORAL
Qty: 0 | Refills: 0 | DISCHARGE

## 2023-01-01 RX ORDER — EZETIMIBE 10 MG/1
10 TABLET ORAL
Qty: 90 | Refills: 1 | Status: ACTIVE | COMMUNITY
Start: 2020-06-16 | End: 1900-01-01

## 2023-01-01 RX ORDER — FLUCONAZOLE 200 MG/1
200 TABLET ORAL
Qty: 60 | Refills: 1 | Status: ACTIVE | COMMUNITY
Start: 2023-01-01 | End: 1900-01-01

## 2023-01-01 RX ORDER — PREGABALIN 50 MG/1
50 CAPSULE ORAL
Qty: 60 | Refills: 1 | Status: DISCONTINUED | COMMUNITY
Start: 2023-01-01 | End: 2023-01-01

## 2023-01-01 RX ORDER — ROSUVASTATIN CALCIUM 5 MG/1
40 TABLET ORAL AT BEDTIME
Refills: 0 | Status: DISCONTINUED | OUTPATIENT
Start: 2023-01-01 | End: 2023-01-01

## 2023-01-01 RX ADMIN — ATOVAQUONE 1500 MILLIGRAM(S): 750 SUSPENSION ORAL at 12:59

## 2023-01-01 RX ADMIN — OXYCODONE HYDROCHLORIDE 5 MILLIGRAM(S): 5 TABLET ORAL at 23:00

## 2023-01-01 RX ADMIN — OXYCODONE HYDROCHLORIDE 5 MILLIGRAM(S): 5 TABLET ORAL at 20:03

## 2023-01-01 RX ADMIN — Medication 25 MICROGRAM(S): at 05:56

## 2023-01-01 RX ADMIN — ROSUVASTATIN CALCIUM 40 MILLIGRAM(S): 5 TABLET ORAL at 22:12

## 2023-01-01 RX ADMIN — Medication 50 MILLIGRAM(S): at 05:34

## 2023-01-01 RX ADMIN — ATOVAQUONE 1500 MILLIGRAM(S): 750 SUSPENSION ORAL at 18:03

## 2023-01-01 RX ADMIN — Medication 20 MILLIGRAM(S): at 08:45

## 2023-01-01 RX ADMIN — SACUBITRIL AND VALSARTAN 1 TABLET(S): 24; 26 TABLET, FILM COATED ORAL at 17:23

## 2023-01-01 RX ADMIN — EZETIMIBE 10 MILLIGRAM(S): 10 TABLET ORAL at 11:34

## 2023-01-01 RX ADMIN — LIDOCAINE 1 PATCH: 4 CREAM TOPICAL at 18:32

## 2023-01-01 RX ADMIN — Medication 50 MILLIGRAM(S): at 05:29

## 2023-01-01 RX ADMIN — EZETIMIBE 10 MILLIGRAM(S): 10 TABLET ORAL at 12:21

## 2023-01-01 RX ADMIN — OXYCODONE HYDROCHLORIDE 5 MILLIGRAM(S): 5 TABLET ORAL at 07:00

## 2023-01-01 RX ADMIN — TAMSULOSIN HYDROCHLORIDE 0.8 MILLIGRAM(S): 0.4 CAPSULE ORAL at 22:12

## 2023-01-01 RX ADMIN — Medication 25 MICROGRAM(S): at 05:34

## 2023-01-01 RX ADMIN — LIDOCAINE 1 PATCH: 4 CREAM TOPICAL at 15:09

## 2023-01-01 RX ADMIN — Medication 650 MILLIGRAM(S): at 06:13

## 2023-01-01 RX ADMIN — CHLORHEXIDINE GLUCONATE 1 APPLICATION(S): 213 SOLUTION TOPICAL at 12:21

## 2023-01-01 RX ADMIN — TAMSULOSIN HYDROCHLORIDE 0.8 MILLIGRAM(S): 0.4 CAPSULE ORAL at 21:00

## 2023-01-01 RX ADMIN — ATOVAQUONE 1500 MILLIGRAM(S): 750 SUSPENSION ORAL at 11:34

## 2023-01-01 RX ADMIN — OXYCODONE HYDROCHLORIDE 5 MILLIGRAM(S): 5 TABLET ORAL at 05:35

## 2023-01-01 RX ADMIN — Medication 25 MICROGRAM(S): at 05:29

## 2023-01-01 RX ADMIN — Medication 650 MILLIGRAM(S): at 18:31

## 2023-01-01 RX ADMIN — TAMSULOSIN HYDROCHLORIDE 0.8 MILLIGRAM(S): 0.4 CAPSULE ORAL at 21:52

## 2023-01-01 RX ADMIN — OXYCODONE HYDROCHLORIDE 5 MILLIGRAM(S): 5 TABLET ORAL at 05:29

## 2023-01-01 RX ADMIN — TAMSULOSIN HYDROCHLORIDE 0.8 MILLIGRAM(S): 0.4 CAPSULE ORAL at 21:10

## 2023-01-01 RX ADMIN — Medication 650 MILLIGRAM(S): at 19:31

## 2023-01-01 RX ADMIN — SACUBITRIL AND VALSARTAN 1 TABLET(S): 24; 26 TABLET, FILM COATED ORAL at 17:38

## 2023-01-01 RX ADMIN — OXYCODONE HYDROCHLORIDE 5 MILLIGRAM(S): 5 TABLET ORAL at 13:39

## 2023-01-01 RX ADMIN — EZETIMIBE 10 MILLIGRAM(S): 10 TABLET ORAL at 12:58

## 2023-01-01 RX ADMIN — Medication 30 MILLIGRAM(S): at 08:28

## 2023-01-01 RX ADMIN — POLYETHYLENE GLYCOL 3350 17 GRAM(S): 17 POWDER, FOR SOLUTION ORAL at 07:09

## 2023-01-01 RX ADMIN — Medication 650 MILLIGRAM(S): at 09:54

## 2023-01-01 RX ADMIN — Medication 650 MILLIGRAM(S): at 05:43

## 2023-01-01 RX ADMIN — CHLORHEXIDINE GLUCONATE 1 APPLICATION(S): 213 SOLUTION TOPICAL at 11:35

## 2023-01-01 RX ADMIN — SACUBITRIL AND VALSARTAN 1 TABLET(S): 24; 26 TABLET, FILM COATED ORAL at 05:55

## 2023-01-01 RX ADMIN — Medication 650 MILLIGRAM(S): at 12:48

## 2023-01-01 RX ADMIN — Medication 1 TABLET(S): at 12:51

## 2023-01-01 RX ADMIN — Medication 650 MILLIGRAM(S): at 14:47

## 2023-01-01 RX ADMIN — OXYCODONE HYDROCHLORIDE 5 MILLIGRAM(S): 5 TABLET ORAL at 13:18

## 2023-01-01 RX ADMIN — TAMSULOSIN HYDROCHLORIDE 0.8 MILLIGRAM(S): 0.4 CAPSULE ORAL at 21:20

## 2023-01-01 RX ADMIN — Medication 650 MILLIGRAM(S): at 16:34

## 2023-01-01 RX ADMIN — SENNA PLUS 2 TABLET(S): 8.6 TABLET ORAL at 21:49

## 2023-01-01 RX ADMIN — SACUBITRIL AND VALSARTAN 1 TABLET(S): 24; 26 TABLET, FILM COATED ORAL at 18:31

## 2023-01-01 RX ADMIN — ROSUVASTATIN CALCIUM 40 MILLIGRAM(S): 5 TABLET ORAL at 21:53

## 2023-01-01 RX ADMIN — EZETIMIBE 10 MILLIGRAM(S): 10 TABLET ORAL at 12:19

## 2023-01-01 RX ADMIN — Medication 25 MICROGRAM(S): at 05:07

## 2023-01-01 RX ADMIN — Medication 20 MILLIGRAM(S): at 05:34

## 2023-01-01 RX ADMIN — Medication 25 MILLIGRAM(S): at 06:43

## 2023-01-01 RX ADMIN — TAMSULOSIN HYDROCHLORIDE 0.8 MILLIGRAM(S): 0.4 CAPSULE ORAL at 21:49

## 2023-01-01 RX ADMIN — OXYCODONE HYDROCHLORIDE 5 MILLIGRAM(S): 5 TABLET ORAL at 13:04

## 2023-01-01 RX ADMIN — OXYCODONE HYDROCHLORIDE 5 MILLIGRAM(S): 5 TABLET ORAL at 05:06

## 2023-01-01 RX ADMIN — OXYCODONE HYDROCHLORIDE 5 MILLIGRAM(S): 5 TABLET ORAL at 22:10

## 2023-01-01 RX ADMIN — Medication 650 MILLIGRAM(S): at 08:53

## 2023-01-01 RX ADMIN — POLYETHYLENE GLYCOL 3350 17 GRAM(S): 17 POWDER, FOR SOLUTION ORAL at 21:50

## 2023-01-01 RX ADMIN — ATOVAQUONE 1500 MILLIGRAM(S): 750 SUSPENSION ORAL at 12:20

## 2023-01-01 RX ADMIN — SACUBITRIL AND VALSARTAN 1 TABLET(S): 24; 26 TABLET, FILM COATED ORAL at 05:16

## 2023-01-01 RX ADMIN — ROSUVASTATIN CALCIUM 40 MILLIGRAM(S): 5 TABLET ORAL at 21:35

## 2023-01-01 RX ADMIN — Medication 40 MILLIEQUIVALENT(S): at 09:24

## 2023-01-01 RX ADMIN — ROSUVASTATIN CALCIUM 40 MILLIGRAM(S): 5 TABLET ORAL at 21:20

## 2023-01-01 RX ADMIN — Medication 650 MILLIGRAM(S): at 10:54

## 2023-01-01 RX ADMIN — Medication 50 MILLIGRAM(S): at 05:44

## 2023-01-01 RX ADMIN — OXYCODONE HYDROCHLORIDE 5 MILLIGRAM(S): 5 TABLET ORAL at 06:24

## 2023-01-01 RX ADMIN — OXYCODONE HYDROCHLORIDE 5 MILLIGRAM(S): 5 TABLET ORAL at 05:59

## 2023-01-01 RX ADMIN — OXYCODONE HYDROCHLORIDE 5 MILLIGRAM(S): 5 TABLET ORAL at 20:35

## 2023-01-01 RX ADMIN — EZETIMIBE 10 MILLIGRAM(S): 10 TABLET ORAL at 12:39

## 2023-01-01 RX ADMIN — Medication 20 MILLIGRAM(S): at 05:44

## 2023-01-01 RX ADMIN — CHLORHEXIDINE GLUCONATE 1 APPLICATION(S): 213 SOLUTION TOPICAL at 12:30

## 2023-01-01 RX ADMIN — Medication 650 MILLIGRAM(S): at 18:30

## 2023-01-01 RX ADMIN — OXYCODONE HYDROCHLORIDE 5 MILLIGRAM(S): 5 TABLET ORAL at 14:09

## 2023-01-01 RX ADMIN — LIDOCAINE 1 PATCH: 4 CREAM TOPICAL at 20:29

## 2023-01-01 RX ADMIN — Medication 30 MILLIGRAM(S): at 05:16

## 2023-01-01 RX ADMIN — ROSUVASTATIN CALCIUM 40 MILLIGRAM(S): 5 TABLET ORAL at 21:10

## 2023-01-01 RX ADMIN — ROSUVASTATIN CALCIUM 40 MILLIGRAM(S): 5 TABLET ORAL at 21:00

## 2023-01-01 RX ADMIN — TAMSULOSIN HYDROCHLORIDE 0.8 MILLIGRAM(S): 0.4 CAPSULE ORAL at 21:35

## 2023-01-01 RX ADMIN — LIDOCAINE 1 PATCH: 4 CREAM TOPICAL at 03:09

## 2023-01-01 RX ADMIN — Medication 25 MICROGRAM(S): at 05:44

## 2023-01-01 RX ADMIN — EZETIMIBE 10 MILLIGRAM(S): 10 TABLET ORAL at 12:30

## 2023-01-01 RX ADMIN — SACUBITRIL AND VALSARTAN 1 TABLET(S): 24; 26 TABLET, FILM COATED ORAL at 17:45

## 2023-01-01 RX ADMIN — Medication 650 MILLIGRAM(S): at 07:53

## 2023-01-01 RX ADMIN — Medication 650 MILLIGRAM(S): at 18:56

## 2023-01-01 RX ADMIN — OXYCODONE HYDROCHLORIDE 5 MILLIGRAM(S): 5 TABLET ORAL at 14:18

## 2023-01-01 RX ADMIN — Medication 650 MILLIGRAM(S): at 11:48

## 2023-01-01 RX ADMIN — OXYCODONE HYDROCHLORIDE 5 MILLIGRAM(S): 5 TABLET ORAL at 13:40

## 2023-01-01 RX ADMIN — Medication 650 MILLIGRAM(S): at 15:56

## 2023-01-01 RX ADMIN — ATOVAQUONE 1500 MILLIGRAM(S): 750 SUSPENSION ORAL at 11:40

## 2023-01-01 RX ADMIN — SACUBITRIL AND VALSARTAN 1 TABLET(S): 24; 26 TABLET, FILM COATED ORAL at 05:30

## 2023-01-01 RX ADMIN — ROSUVASTATIN CALCIUM 40 MILLIGRAM(S): 5 TABLET ORAL at 21:49

## 2023-01-01 RX ADMIN — SACUBITRIL AND VALSARTAN 1 TABLET(S): 24; 26 TABLET, FILM COATED ORAL at 06:26

## 2023-01-01 RX ADMIN — Medication 20 MILLIGRAM(S): at 08:14

## 2023-01-01 RX ADMIN — Medication 50 MILLIGRAM(S): at 12:39

## 2023-01-01 RX ADMIN — Medication 25 MICROGRAM(S): at 05:16

## 2023-01-01 RX ADMIN — CHLORHEXIDINE GLUCONATE 1 APPLICATION(S): 213 SOLUTION TOPICAL at 11:43

## 2023-01-01 RX ADMIN — EZETIMIBE 10 MILLIGRAM(S): 10 TABLET ORAL at 11:41

## 2023-01-01 RX ADMIN — ATOVAQUONE 1500 MILLIGRAM(S): 750 SUSPENSION ORAL at 12:21

## 2023-01-01 RX ADMIN — Medication 300 UNIT(S): at 14:51

## 2023-01-01 RX ADMIN — LIDOCAINE 1 PATCH: 4 CREAM TOPICAL at 20:24

## 2023-01-01 RX ADMIN — CHLORHEXIDINE GLUCONATE 1 APPLICATION(S): 213 SOLUTION TOPICAL at 13:47

## 2023-01-01 RX ADMIN — Medication 20 MILLIGRAM(S): at 08:09

## 2023-01-01 RX ADMIN — LIDOCAINE 1 PATCH: 4 CREAM TOPICAL at 06:18

## 2023-01-01 RX ADMIN — ATOVAQUONE 1500 MILLIGRAM(S): 750 SUSPENSION ORAL at 12:30

## 2023-01-01 RX ADMIN — Medication 50 MILLIGRAM(S): at 12:21

## 2023-01-03 ENCOUNTER — INPATIENT (INPATIENT)
Facility: HOSPITAL | Age: 68
LOS: 1 days | Discharge: HOME | End: 2023-01-05
Attending: SURGERY | Admitting: SURGERY
Payer: COMMERCIAL

## 2023-01-03 ENCOUNTER — APPOINTMENT (OUTPATIENT)
Dept: VASCULAR SURGERY | Facility: HOSPITAL | Age: 68
End: 2023-01-03

## 2023-01-03 VITALS
DIASTOLIC BLOOD PRESSURE: 52 MMHG | HEIGHT: 71 IN | SYSTOLIC BLOOD PRESSURE: 93 MMHG | HEART RATE: 60 BPM | OXYGEN SATURATION: 97 % | TEMPERATURE: 98 F | RESPIRATION RATE: 16 BRPM | WEIGHT: 207.9 LBS

## 2023-01-03 DIAGNOSIS — Z95.1 PRESENCE OF AORTOCORONARY BYPASS GRAFT: Chronic | ICD-10-CM

## 2023-01-03 DIAGNOSIS — I65.29 OCCLUSION AND STENOSIS OF UNSPECIFIED CAROTID ARTERY: ICD-10-CM

## 2023-01-03 DIAGNOSIS — Z95.5 PRESENCE OF CORONARY ANGIOPLASTY IMPLANT AND GRAFT: Chronic | ICD-10-CM

## 2023-01-03 DIAGNOSIS — Z98.890 OTHER SPECIFIED POSTPROCEDURAL STATES: Chronic | ICD-10-CM

## 2023-01-03 LAB
ANION GAP SERPL CALC-SCNC: 15 MMOL/L — HIGH (ref 7–14)
BASOPHILS # BLD AUTO: 0.01 K/UL — SIGNIFICANT CHANGE UP (ref 0–0.2)
BASOPHILS NFR BLD AUTO: 0.1 % — SIGNIFICANT CHANGE UP (ref 0–2)
BLOOD GAS ARTERIAL - LYTES,HGB,ICA,LACT RESULT: SIGNIFICANT CHANGE UP
BUN SERPL-MCNC: 16 MG/DL — SIGNIFICANT CHANGE UP (ref 7–23)
CALCIUM SERPL-MCNC: 9.7 MG/DL — SIGNIFICANT CHANGE UP (ref 8.4–10.5)
CHLORIDE SERPL-SCNC: 100 MMOL/L — SIGNIFICANT CHANGE UP (ref 98–107)
CO2 SERPL-SCNC: 22 MMOL/L — SIGNIFICANT CHANGE UP (ref 22–31)
CREAT SERPL-MCNC: 0.58 MG/DL — SIGNIFICANT CHANGE UP (ref 0.5–1.3)
EGFR: 106 ML/MIN/1.73M2 — SIGNIFICANT CHANGE UP
EOSINOPHIL # BLD AUTO: 0 K/UL — SIGNIFICANT CHANGE UP (ref 0–0.5)
EOSINOPHIL NFR BLD AUTO: 0 % — SIGNIFICANT CHANGE UP (ref 0–6)
GAS PNL BLDA: SIGNIFICANT CHANGE UP
GLUCOSE BLDC GLUCOMTR-MCNC: 129 MG/DL — HIGH (ref 70–99)
GLUCOSE BLDC GLUCOMTR-MCNC: 147 MG/DL — HIGH (ref 70–99)
GLUCOSE BLDC GLUCOMTR-MCNC: 155 MG/DL — HIGH (ref 70–99)
GLUCOSE SERPL-MCNC: 149 MG/DL — HIGH (ref 70–99)
HCT VFR BLD CALC: 37.4 % — LOW (ref 39–50)
HGB BLD-MCNC: 12.1 G/DL — LOW (ref 13–17)
IANC: 7.81 K/UL — HIGH (ref 1.8–7.4)
IMM GRANULOCYTES NFR BLD AUTO: 1.2 % — HIGH (ref 0–0.9)
LYMPHOCYTES # BLD AUTO: 0.62 K/UL — LOW (ref 1–3.3)
LYMPHOCYTES # BLD AUTO: 7 % — LOW (ref 13–44)
MAGNESIUM SERPL-MCNC: 1.6 MG/DL — SIGNIFICANT CHANGE UP (ref 1.6–2.6)
MCHC RBC-ENTMCNC: 29.2 PG — SIGNIFICANT CHANGE UP (ref 27–34)
MCHC RBC-ENTMCNC: 32.4 GM/DL — SIGNIFICANT CHANGE UP (ref 32–36)
MCV RBC AUTO: 90.3 FL — SIGNIFICANT CHANGE UP (ref 80–100)
MONOCYTES # BLD AUTO: 0.26 K/UL — SIGNIFICANT CHANGE UP (ref 0–0.9)
MONOCYTES NFR BLD AUTO: 3 % — SIGNIFICANT CHANGE UP (ref 2–14)
NEUTROPHILS # BLD AUTO: 7.81 K/UL — HIGH (ref 1.8–7.4)
NEUTROPHILS NFR BLD AUTO: 88.7 % — HIGH (ref 43–77)
NRBC # BLD: 0 /100 WBCS — SIGNIFICANT CHANGE UP (ref 0–0)
NRBC # FLD: 0 K/UL — SIGNIFICANT CHANGE UP (ref 0–0)
PHOSPHATE SERPL-MCNC: 3.7 MG/DL — SIGNIFICANT CHANGE UP (ref 2.5–4.5)
PLATELET # BLD AUTO: 136 K/UL — LOW (ref 150–400)
POTASSIUM SERPL-MCNC: 4.1 MMOL/L — SIGNIFICANT CHANGE UP (ref 3.5–5.3)
POTASSIUM SERPL-SCNC: 4.1 MMOL/L — SIGNIFICANT CHANGE UP (ref 3.5–5.3)
RBC # BLD: 4.14 M/UL — LOW (ref 4.2–5.8)
RBC # FLD: 14.6 % — HIGH (ref 10.3–14.5)
SODIUM SERPL-SCNC: 137 MMOL/L — SIGNIFICANT CHANGE UP (ref 135–145)
WBC # BLD: 8.81 K/UL — SIGNIFICANT CHANGE UP (ref 3.8–10.5)
WBC # FLD AUTO: 8.81 K/UL — SIGNIFICANT CHANGE UP (ref 3.8–10.5)

## 2023-01-03 PROCEDURE — 99223 1ST HOSP IP/OBS HIGH 75: CPT

## 2023-01-03 PROCEDURE — 37215 TRANSCATH STENT CCA W/EPS: CPT | Mod: LT

## 2023-01-03 PROCEDURE — 99221 1ST HOSP IP/OBS SF/LOW 40: CPT

## 2023-01-03 PROCEDURE — 76937 US GUIDE VASCULAR ACCESS: CPT | Mod: 26

## 2023-01-03 DEVICE — GWIRE ENROUTE 0.014X95CM: Type: IMPLANTABLE DEVICE | Site: LEFT | Status: FUNCTIONAL

## 2023-01-03 DEVICE — SYS TRANSCAROTID NEUROPROTECTION: Type: IMPLANTABLE DEVICE | Site: LEFT | Status: FUNCTIONAL

## 2023-01-03 DEVICE — KIT INTRODUCER MICRO 21GAX7CM 7FR: Type: IMPLANTABLE DEVICE | Site: LEFT | Status: FUNCTIONAL

## 2023-01-03 DEVICE — IMPLANTABLE DEVICE: Type: IMPLANTABLE DEVICE | Site: LEFT | Status: FUNCTIONAL

## 2023-01-03 DEVICE — GUIDEWIRE AMPLATZ SUPER-STIFF SHORT TAPER .035" X 260CM: Type: IMPLANTABLE DEVICE | Site: LEFT | Status: FUNCTIONAL

## 2023-01-03 DEVICE — BLLN STERLING MR 5X30MMX80CM: Type: IMPLANTABLE DEVICE | Site: LEFT | Status: FUNCTIONAL

## 2023-01-03 DEVICE — STENT TRANSCAROTID ENROUTE 10X40MM: Type: IMPLANTABLE DEVICE | Site: LEFT | Status: FUNCTIONAL

## 2023-01-03 DEVICE — SURGIFOAM PAD 8CM X 12.5CM X 2MM (100C): Type: IMPLANTABLE DEVICE | Site: LEFT | Status: FUNCTIONAL

## 2023-01-03 RX ORDER — ONDANSETRON 8 MG/1
4 TABLET, FILM COATED ORAL ONCE
Refills: 0 | Status: DISCONTINUED | OUTPATIENT
Start: 2023-01-03 | End: 2023-01-03

## 2023-01-03 RX ORDER — HEPARIN SODIUM 5000 [USP'U]/ML
5000 INJECTION INTRAVENOUS; SUBCUTANEOUS EVERY 8 HOURS
Refills: 0 | Status: DISCONTINUED | OUTPATIENT
Start: 2023-01-03 | End: 2023-01-05

## 2023-01-03 RX ORDER — ALBUMIN HUMAN 25 %
250 VIAL (ML) INTRAVENOUS ONCE
Refills: 0 | Status: COMPLETED | OUTPATIENT
Start: 2023-01-03 | End: 2023-01-03

## 2023-01-03 RX ORDER — HYDROMORPHONE HYDROCHLORIDE 2 MG/ML
0.5 INJECTION INTRAMUSCULAR; INTRAVENOUS; SUBCUTANEOUS
Refills: 0 | Status: DISCONTINUED | OUTPATIENT
Start: 2023-01-03 | End: 2023-01-03

## 2023-01-03 RX ORDER — SODIUM CHLORIDE 9 MG/ML
1000 INJECTION, SOLUTION INTRAVENOUS
Refills: 0 | Status: DISCONTINUED | OUTPATIENT
Start: 2023-01-03 | End: 2023-01-04

## 2023-01-03 RX ORDER — DEXTROSE 50 % IN WATER 50 %
25 SYRINGE (ML) INTRAVENOUS ONCE
Refills: 0 | Status: DISCONTINUED | OUTPATIENT
Start: 2023-01-03 | End: 2023-01-05

## 2023-01-03 RX ORDER — ASPIRIN/CALCIUM CARB/MAGNESIUM 324 MG
81 TABLET ORAL DAILY
Refills: 0 | Status: DISCONTINUED | OUTPATIENT
Start: 2023-01-03 | End: 2023-01-05

## 2023-01-03 RX ORDER — PHENYLEPHRINE HYDROCHLORIDE 10 MG/ML
0.1 INJECTION INTRAVENOUS
Qty: 160 | Refills: 0 | Status: DISCONTINUED | OUTPATIENT
Start: 2023-01-03 | End: 2023-01-04

## 2023-01-03 RX ORDER — SODIUM CHLORIDE 9 MG/ML
1000 INJECTION, SOLUTION INTRAVENOUS
Refills: 0 | Status: DISCONTINUED | OUTPATIENT
Start: 2023-01-03 | End: 2023-01-05

## 2023-01-03 RX ORDER — PHENYLEPHRINE HYDROCHLORIDE 10 MG/ML
0.5 INJECTION INTRAVENOUS
Qty: 40 | Refills: 0 | Status: DISCONTINUED | OUTPATIENT
Start: 2023-01-03 | End: 2023-01-03

## 2023-01-03 RX ORDER — INSULIN LISPRO 100/ML
VIAL (ML) SUBCUTANEOUS
Refills: 0 | Status: DISCONTINUED | OUTPATIENT
Start: 2023-01-03 | End: 2023-01-05

## 2023-01-03 RX ORDER — SODIUM CHLORIDE 9 MG/ML
1000 INJECTION INTRAMUSCULAR; INTRAVENOUS; SUBCUTANEOUS
Refills: 0 | Status: DISCONTINUED | OUTPATIENT
Start: 2023-01-03 | End: 2023-01-03

## 2023-01-03 RX ORDER — TAMSULOSIN HYDROCHLORIDE 0.4 MG/1
0.8 CAPSULE ORAL AT BEDTIME
Refills: 0 | Status: DISCONTINUED | OUTPATIENT
Start: 2023-01-03 | End: 2023-01-05

## 2023-01-03 RX ORDER — DEXTROSE 50 % IN WATER 50 %
12.5 SYRINGE (ML) INTRAVENOUS ONCE
Refills: 0 | Status: DISCONTINUED | OUTPATIENT
Start: 2023-01-03 | End: 2023-01-05

## 2023-01-03 RX ORDER — ACETAMINOPHEN 500 MG
975 TABLET ORAL EVERY 6 HOURS
Refills: 0 | Status: DISCONTINUED | OUTPATIENT
Start: 2023-01-03 | End: 2023-01-05

## 2023-01-03 RX ORDER — GLUCAGON INJECTION, SOLUTION 0.5 MG/.1ML
1 INJECTION, SOLUTION SUBCUTANEOUS ONCE
Refills: 0 | Status: DISCONTINUED | OUTPATIENT
Start: 2023-01-03 | End: 2023-01-05

## 2023-01-03 RX ORDER — INSULIN LISPRO 100/ML
VIAL (ML) SUBCUTANEOUS
Refills: 0 | Status: DISCONTINUED | OUTPATIENT
Start: 2023-01-03 | End: 2023-01-03

## 2023-01-03 RX ORDER — CLOPIDOGREL BISULFATE 75 MG/1
75 TABLET, FILM COATED ORAL DAILY
Refills: 0 | Status: DISCONTINUED | OUTPATIENT
Start: 2023-01-04 | End: 2023-01-05

## 2023-01-03 RX ORDER — DEXTROSE 50 % IN WATER 50 %
15 SYRINGE (ML) INTRAVENOUS ONCE
Refills: 0 | Status: DISCONTINUED | OUTPATIENT
Start: 2023-01-03 | End: 2023-01-05

## 2023-01-03 RX ORDER — OXYCODONE HYDROCHLORIDE 5 MG/1
5 TABLET ORAL EVERY 6 HOURS
Refills: 0 | Status: DISCONTINUED | OUTPATIENT
Start: 2023-01-03 | End: 2023-01-04

## 2023-01-03 RX ORDER — ATORVASTATIN CALCIUM 80 MG/1
80 TABLET, FILM COATED ORAL AT BEDTIME
Refills: 0 | Status: DISCONTINUED | OUTPATIENT
Start: 2023-01-03 | End: 2023-01-05

## 2023-01-03 RX ORDER — LEVOTHYROXINE SODIUM 125 MCG
25 TABLET ORAL DAILY
Refills: 0 | Status: DISCONTINUED | OUTPATIENT
Start: 2023-01-04 | End: 2023-01-05

## 2023-01-03 RX ORDER — SACUBITRIL AND VALSARTAN 24; 26 MG/1; MG/1
1 TABLET, FILM COATED ORAL
Refills: 0 | Status: DISCONTINUED | OUTPATIENT
Start: 2023-01-03 | End: 2023-01-03

## 2023-01-03 RX ORDER — FENTANYL CITRATE 50 UG/ML
50 INJECTION INTRAVENOUS
Refills: 0 | Status: DISCONTINUED | OUTPATIENT
Start: 2023-01-03 | End: 2023-01-03

## 2023-01-03 RX ORDER — OXYCODONE HYDROCHLORIDE 5 MG/1
5 TABLET ORAL ONCE
Refills: 0 | Status: DISCONTINUED | OUTPATIENT
Start: 2023-01-03 | End: 2023-01-03

## 2023-01-03 RX ORDER — CHLORHEXIDINE GLUCONATE 213 G/1000ML
1 SOLUTION TOPICAL DAILY
Refills: 0 | Status: DISCONTINUED | OUTPATIENT
Start: 2023-01-03 | End: 2023-01-05

## 2023-01-03 RX ADMIN — PHENYLEPHRINE HYDROCHLORIDE 17.7 MICROGRAM(S)/KG/MIN: 10 INJECTION INTRAVENOUS at 12:09

## 2023-01-03 RX ADMIN — HEPARIN SODIUM 5000 UNIT(S): 5000 INJECTION INTRAVENOUS; SUBCUTANEOUS at 23:09

## 2023-01-03 RX ADMIN — Medication 500 MILLILITER(S): at 14:11

## 2023-01-03 RX ADMIN — PHENYLEPHRINE HYDROCHLORIDE 17.7 MICROGRAM(S)/KG/MIN: 10 INJECTION INTRAVENOUS at 12:50

## 2023-01-03 RX ADMIN — TAMSULOSIN HYDROCHLORIDE 0.8 MILLIGRAM(S): 0.4 CAPSULE ORAL at 23:21

## 2023-01-03 RX ADMIN — Medication 125 MILLILITER(S): at 13:13

## 2023-01-03 RX ADMIN — PHENYLEPHRINE HYDROCHLORIDE 1.77 MICROGRAM(S)/KG/MIN: 10 INJECTION INTRAVENOUS at 21:08

## 2023-01-03 RX ADMIN — SODIUM CHLORIDE 100 MILLILITER(S): 9 INJECTION INTRAMUSCULAR; INTRAVENOUS; SUBCUTANEOUS at 12:46

## 2023-01-03 RX ADMIN — SODIUM CHLORIDE 30 MILLILITER(S): 9 INJECTION, SOLUTION INTRAVENOUS at 20:07

## 2023-01-03 RX ADMIN — SODIUM CHLORIDE 100 MILLILITER(S): 9 INJECTION, SOLUTION INTRAVENOUS at 16:39

## 2023-01-03 RX ADMIN — PHENYLEPHRINE HYDROCHLORIDE 17.7 MICROGRAM(S)/KG/MIN: 10 INJECTION INTRAVENOUS at 16:45

## 2023-01-03 RX ADMIN — Medication 81 MILLIGRAM(S): at 13:03

## 2023-01-03 RX ADMIN — ATORVASTATIN CALCIUM 80 MILLIGRAM(S): 80 TABLET, FILM COATED ORAL at 23:09

## 2023-01-03 RX ADMIN — HEPARIN SODIUM 5000 UNIT(S): 5000 INJECTION INTRAVENOUS; SUBCUTANEOUS at 18:09

## 2023-01-03 NOTE — CONSULT NOTE ADULT - ASSESSMENT
ASSESSMENT:  67y year old Male w/ PMH diabetes, HTN, HLD, hypothyroid, ischemic cardiomyopathy, CAD s/p stents x 4 (3/2019) and CABG (1/2008), PCI (06/2022), Stage IVB small cell lung cancer on chemo (last round on 6/7/22), former 30 pack year smoker (quit 15 years ago), esophagitis 2/2 chemotherapy (on steroids @ home) POD#0 s/p LEFT TCAR (1/3). SICU consulted for management of hypotension s/p L TCAR and ongoing pressor requirements.    NEUROLOGIC   - A&O x3  - Pain control: Tylenol, oxy PRN    RESPIRATORY   - Saturating well on room air  - Monitor SpO2 goal >92%    CARDIOVASCULAR   - Monitor hemodynamics   - MAP >65  - c/w [place], wean as tolerated    GASTROINTESTINAL   - Diet: DASH/TLC carb consistent diet  - c/w Zofran prn    /RENAL   - Monitor BMP qd  - Maintain mahajan catheter, strict Is/Os  - Monitor electrolytes, replete PRN    HEMATOLOGIC  - Monitor H/H   - DVT ppx: SQH & SCD's    INFECTIOUS DISEASE  - Monitor fever / WBC    ENDOCRINE  - Low dose ISS    LINES  - A line  - Mahajan  - PIV     DISPO: SICU  --------------------------------------------------------------------------------------    Critical Care Diagnoses: s/p L TCAR, post-procedural hypotension, lactic acidosis
67 year old man with small cell lung cancer on PD-1 inhibitor maintenance therapy, CAD, ischemic cardiomyopathy with LVEF ~ 50, seen post TECAR today and doing well.    Recommendations:  1. Note baseline blood pressure runs low ~ 90s-110 systolic  2. continue outpatient CAD and heart failure regimen: aspirin, clopidogrel, statin, Entresto, Toprol  3. continue outpatient corticosteroids  4. Call with questions or if I may be of assistance.  5. Patient has outpatient follow up appointment with me lyric Figueroa MD [facility]  Cardio-Oncology

## 2023-01-03 NOTE — CONSULT NOTE ADULT - ATTENDING COMMENTS
Vasoplegia requiring vasopressor support, at risk for hypotension associated cerebral ischemia and hyperperfusion injury from hypertension.  Admit to SICU service for tight management of SBP for optimal cerebral perfusion.  Monitor access sites for bleeding.

## 2023-01-03 NOTE — CONSULT NOTE ADULT - SUBJECTIVE AND OBJECTIVE BOX
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 Jaciel georges. Seen in PACU post-procedure. On low dose phenylephrine post-procedure.    PAST MEDICAL & SURGICAL HISTORY:  CAD (coronary artery disease)      HTN (hypertension)      Lung cancer      Status post chemotherapy      STEMI (ST elevation myocardial infarction)  06/14/2022      Dysphagia      H/O candidiasis of mouth      H/O Helicobacter infection      Hypothyroidism      Hyperlipidemia      Left carotid artery stenosis      Esophagitis      H/O coronary angiogram  2019, 2022- s/p Drug elluding stent X3      S/P CABG x 3  2008      Stented coronary artery          FAMILY HISTORY:  No pertinent family history in first degree relatives        Home Medications:  Entresto 24 mg-26 mg oral tablet: 1 tab(s) orally 2 times a day (03 Jan 2023 09:23)  Flomax 0.4 mg oral capsule: 2 cap(s) orally once a day am (03 Jan 2023 09:23)  Jardiance 10 mg oral tablet: 1 tab(s) orally once a day (in the morning) (03 Jan 2023 09:23)  metoprolol succinate 50 mg oral tablet, extended release: 1 tab(s) orally once a day am (03 Jan 2023 09:23)  Metoprolol Succinate ER 25 mg oral tablet, extended release: 1 tab(s) orally once a day (at bedtime) (03 Jan 2023 12:23)  Plavix 75 mg oral tablet: 1 tab(s) orally once a day am (03 Jan 2023 09:23)  prednisoLONE: 20 milligram(s) orally once a day am (03 Jan 2023 09:23)  rosuvastatin 40 mg oral tablet: 1 tab(s) orally once a day pm (03 Jan 2023 09:23)  Synthroid 25 mcg (0.025 mg) oral tablet: 1 tab(s) orally once a day am (03 Jan 2023 09:23)  Zetia 10 mg oral tablet: 1 tab(s) orally once a day am (03 Jan 2023 09:23)      Social History:      Medications:  acetaminophen     Tablet .. 975 milliGRAM(s) Oral every 6 hours PRN  aspirin enteric coated 81 milliGRAM(s) Oral daily  atorvastatin 80 milliGRAM(s) Oral at bedtime  chlorhexidine 2% Cloths 1 Application(s) Topical daily  dextrose 5%. 1000 milliLiter(s) IV Continuous <Continuous>  dextrose 5%. 1000 milliLiter(s) IV Continuous <Continuous>  dextrose 50% Injectable 25 Gram(s) IV Push once  dextrose 50% Injectable 12.5 Gram(s) IV Push once  dextrose 50% Injectable 25 Gram(s) IV Push once  dextrose Oral Gel 15 Gram(s) Oral once PRN  fentaNYL    Injectable 50 MICROGram(s) IV Push every 10 minutes PRN  glucagon  Injectable 1 milliGRAM(s) IntraMuscular once  heparin   Injectable 5000 Unit(s) SubCutaneous every 8 hours  HYDROmorphone  Injectable 0.5 milliGRAM(s) IV Push every 15 minutes PRN  insulin lispro (ADMELOG) corrective regimen sliding scale   SubCutaneous three times a day before meals  lactated ringers. 1000 milliLiter(s) IV Continuous <Continuous>  ondansetron Injectable 4 milliGRAM(s) IV Push once PRN  oxyCODONE    IR 5 milliGRAM(s) Oral once PRN  oxyCODONE    IR 5 milliGRAM(s) Oral every 6 hours PRN  phenylephrine    Infusion 0.5 MICROgram(s)/kG/Min IV Continuous <Continuous>  tamsulosin 0.8 milliGRAM(s) Oral at bedtime      Review of systems:  10 point review of systems completed and are negative unless noted in HPI    Vitals:  T(C): 36.1 (01-03-23 @ 17:00), Max: 36.7 (01-03-23 @ 15:00)  HR: 54 (01-03-23 @ 17:45) (54 - 84)  BP: 110/60 (01-03-23 @ 17:45) (84/44 - 137/61)  BP(mean): 72 (01-03-23 @ 17:45) (53 - 97)  RR: 13 (01-03-23 @ 17:45) (11 - 20)  SpO2: 98% (01-03-23 @ 17:45) (92% - 100%)    Daily Height in cm: 180.34 (03 Jan 2023 08:47)    Daily     Weight (kg): 94.3 (01-03 @ 08:47)    I&O's Summary    03 Jan 2023 07:01  -  03 Jan 2023 17:55  --------------------------------------------------------  IN: 355.7 mL / OUT: 1175 mL / NET: -819.3 mL        Physical Exam:  Appearance: alert and oriented  HEENT: moist mucosa, anicteric sclerae.   Cardiovascular: regular rate and rhythm.  Respiratory: normal effort and expansion  Gastrointestinal: Soft, Non-tender, benign	  Skin: no clubbing or cyanosis.   Neurologic: No focal deficit  Extremities: warm.     Labs:                        12.1   8.81  )-----------( 136      ( 03 Jan 2023 15:49 )             37.4     01-03    137  |  100  |  16  ----------------------------<  149<H>  4.1   |  22  |  0.58    Ca    9.7      03 Jan 2023 15:49  Phos  3.7     01-03  Mg     1.60     01-03        TELEMETRY: sinus    Echocardiogram:      EKG:     CXR/Radiology:  
                                                           =====================================================                                                             SICU Consultation Note                                                             =====================================================  67y year old Male PMH diabetes, HTN, HLD, hypothyroid, ischemic cardiomyopathy, CAD s/p stents x 4 (3/2019) and CABG (1/2008), PCI (06/2022), Stage IVB small cell lung cancer on chemo (last round on 6/7/22), former 30 pack year smoker (quit 15 years ago), esophagitis 2/2 chemotherapy (on steroids @ home) POD#0 s/p LEFT TCAR (1/3). SICU consulted for management of hypotension s/p L TCAR and ongoing pressor requirements.    Surgery Information:   Case Duration:      EBL:       IV Fluids:       Blood Products: None  Complications: post-op hypotension    PAST MEDICAL & SURGICAL HISTORY:  CAD (coronary artery disease)  HTN (hypertension)  Lung cancer  Status post chemotherapy  STEMI (ST elevation myocardial infarction)  06/14/2022  Dysphagia  H/O candidiasis of mouth  H/O Helicobacter infection  Hypothyroidism  Hyperlipidemia  Left carotid artery stenosis  Esophagitis  H/O coronary angiogram 2019, 2022- s/p Drug eluting stent X3  S/P CABG x 3  2008  Stented coronary artery    FAMILY HISTORY:  No pertinent family history in first degree relatives    ALLERGIES  No Known Allergies    HOME MEDICATIONS:   aspirin enteric coated 81 milliGRAM(s) Oral daily  atorvastatin 80 milliGRAM(s) Oral at bedtime  dextrose 5%. 1000 milliLiter(s) IV Continuous <Continuous>  dextrose 5%. 1000 milliLiter(s) IV Continuous <Continuous>  dextrose 50% Injectable 25 Gram(s) IV Push once  dextrose 50% Injectable 12.5 Gram(s) IV Push once  dextrose 50% Injectable 25 Gram(s) IV Push once  dextrose Oral Gel 15 Gram(s) Oral once PRN  fentaNYL    Injectable 50 MICROGram(s) IV Push every 10 minutes PRN  glucagon  Injectable 1 milliGRAM(s) IntraMuscular once  heparin   Injectable 5000 Unit(s) SubCutaneous every 8 hours  HYDROmorphone  Injectable 0.5 milliGRAM(s) IV Push every 15 minutes PRN  insulin lispro (ADMELOG) corrective regimen sliding scale   SubCutaneous three times a day before meals  lactated ringers. 1000 milliLiter(s) IV Continuous <Continuous>  ondansetron Injectable 4 milliGRAM(s) IV Push once PRN  oxyCODONE    IR 5 milliGRAM(s) Oral once PRN  phenylephrine    Infusion 0.5 MICROgram(s)/kG/Min IV Continuous <Continuous>  sacubitril 24 mG/valsartan 26 mG 1 Tablet(s) Oral two times a day  tamsulosin 0.8 milliGRAM(s) Oral at bedtime      CURRENT MEDICATIONS:   --------------------------------------------------------------------------------------  Neurologic Medications  fentaNYL    Injectable 50 MICROGram(s) IV Push every 10 minutes PRN Severe Pain (7 - 10)  HYDROmorphone  Injectable 0.5 milliGRAM(s) IV Push every 15 minutes PRN Moderate Pain (4 - 6)  ondansetron Injectable 4 milliGRAM(s) IV Push once PRN Nausea and/or Vomiting  oxyCODONE    IR 5 milliGRAM(s) Oral once PRN Moderate Pain (4 - 6)    Respiratory Medications    Cardiovascular Medications  phenylephrine    Infusion 0.5 MICROgram(s)/kG/Min IV Continuous <Continuous>  sacubitril 24 mG/valsartan 26 mG 1 Tablet(s) Oral two times a day    Gastrointestinal Medications  dextrose 5%. 1000 milliLiter(s) IV Continuous <Continuous>  dextrose 5%. 1000 milliLiter(s) IV Continuous <Continuous>  lactated ringers. 1000 milliLiter(s) IV Continuous <Continuous>    Genitourinary Medications  tamsulosin 0.8 milliGRAM(s) Oral at bedtime    Hematologic/Oncologic Medications  aspirin enteric coated 81 milliGRAM(s) Oral daily  heparin   Injectable 5000 Unit(s) SubCutaneous every 8 hours    Antimicrobial/Immunologic Medications    Endocrine/Metabolic Medications  atorvastatin 80 milliGRAM(s) Oral at bedtime  dextrose 50% Injectable 25 Gram(s) IV Push once  dextrose 50% Injectable 12.5 Gram(s) IV Push once  dextrose 50% Injectable 25 Gram(s) IV Push once  dextrose Oral Gel 15 Gram(s) Oral once PRN Blood Glucose LESS THAN 70 milliGRAM(s)/deciliter  glucagon  Injectable 1 milliGRAM(s) IntraMuscular once  insulin lispro (ADMELOG) corrective regimen sliding scale   SubCutaneous three times a day before meals    VITAL SIGNS, INS/OUTS (last 24 hours):    T(C): 36.1 (01-03-23 @ 17:00), Max: 36.7 (01-03-23 @ 15:00)  HR: 74 (01-03-23 @ 17:00) (55 - 84)  BP: 115/68 (01-03-23 @ 17:00) (84/44 - 137/61)  ABP: 135/60 (01-03-23 @ 17:00) (82/46 - 146/64)  ABP(mean): 86 (01-03-23 @ 17:00) (59 - 94)  RR: 16 (01-03-23 @ 17:00) (11 - 20)  SpO2: 100% (01-03-23 @ 17:00) (92% - 100%)  --------------------------------------------------------------------------------------  01-03-23 @ 07:01  -  01-03-23 @ 17:04  --------------------------------------------------------  IN: 355.7 mL / OUT: 925 mL / NET: -569.3 mL    --------------------------------------------------------------------------------------    EXAM  NEUROLOGY  RASS:   	GCS:    Exam: Normal, NAD, alert, oriented x 3, no focal deficits.     HEENT  Exam: Normocephalic. Left bandage overlying L carotid, no drainage, mass, or hematoma appreciated    RESPIRATORY  Exam: Unlabored respirations, Normal expansion/effort.      CARDIOVASCULAR  Exam: Regular rate and rhythm.     GI/NUTRITION  Exam: Abdomen soft, Non-tender, Non-distended.     VASCULAR  Exam: Extremities warm, pink, well-perfused.     MUSCULOSKELETAL  Exam: All extremities moving spontaneously without limitations.      SKIN:  Exam: Good skin turgor, no skin breakdown.      METABOLIC/FLUIDS/ELECTROLYTES  dextrose 5%. 1000 milliLiter(s) IV Continuous <Continuous>  dextrose 5%. 1000 milliLiter(s) IV Continuous <Continuous>  lactated ringers. 1000 milliLiter(s) IV Continuous <Continuous>      HEMATOLOGIC  [x] DVT Prophylaxis: aspirin enteric coated 81 milliGRAM(s) Oral daily  heparin   Injectable 5000 Unit(s) SubCutaneous every 8 hours    Transfusions:	[] PRBC	[] Platelets		[] FFP	[] Cryoprecipitate    Tubes/Lines/Drains  [x] Peripheral IV  [] Central Venous Line     	[] R	[] L	[] IJ	[] Fem	[] SC	Date Placed:   [] Arterial Line		[] R	[] L	[] Fem	[] Rad	[] Ax	Date Placed:   [] PICC:         	[] Midline		[] Mediport  [x] Urinary Catheter		Date Placed:     LABS  --------------------------------------------------------------------------------------                                            12.1                  Neurophils% (auto):   88.7   (01-03 @ 15:49):    8.81 )-----------(136          Lymphocytes% (auto):  7.0                                           37.4                   Eosinphils% (auto):   0.0      Manual%: Neutrophils x    ; Lymphocytes x    ; Eosinophils x    ; Bands%: x    ; Blasts x          01-03    137  |  100  |  16  ----------------------------<  149<H>  4.1   |  22  |  0.58    Ca    9.7      03 Jan 2023 15:49  Phos  3.7     01-03  Mg     1.60     01-03      ABG - ( 03 Jan 2023 15:49 )  pH: 7.40  /  pCO2: 40    /  pO2: 88    / HCO3: 25    / Base Excess: 0.0   /  SaO2: 97.6  / Lactate: x          --------------------------------------------------------------------------------------

## 2023-01-04 LAB
ANION GAP SERPL CALC-SCNC: 13 MMOL/L — SIGNIFICANT CHANGE UP (ref 7–14)
BUN SERPL-MCNC: 15 MG/DL — SIGNIFICANT CHANGE UP (ref 7–23)
CALCIUM SERPL-MCNC: 10.5 MG/DL — SIGNIFICANT CHANGE UP (ref 8.4–10.5)
CHLORIDE SERPL-SCNC: 105 MMOL/L — SIGNIFICANT CHANGE UP (ref 98–107)
CO2 SERPL-SCNC: 22 MMOL/L — SIGNIFICANT CHANGE UP (ref 22–31)
CREAT SERPL-MCNC: 0.61 MG/DL — SIGNIFICANT CHANGE UP (ref 0.5–1.3)
EGFR: 104 ML/MIN/1.73M2 — SIGNIFICANT CHANGE UP
GLUCOSE BLDC GLUCOMTR-MCNC: 132 MG/DL — HIGH (ref 70–99)
GLUCOSE BLDC GLUCOMTR-MCNC: 134 MG/DL — HIGH (ref 70–99)
GLUCOSE BLDC GLUCOMTR-MCNC: 137 MG/DL — HIGH (ref 70–99)
GLUCOSE BLDC GLUCOMTR-MCNC: 178 MG/DL — HIGH (ref 70–99)
GLUCOSE SERPL-MCNC: 138 MG/DL — HIGH (ref 70–99)
HCT VFR BLD CALC: 38.7 % — LOW (ref 39–50)
HGB BLD-MCNC: 12.5 G/DL — LOW (ref 13–17)
MAGNESIUM SERPL-MCNC: 1.9 MG/DL — SIGNIFICANT CHANGE UP (ref 1.6–2.6)
MCHC RBC-ENTMCNC: 28.9 PG — SIGNIFICANT CHANGE UP (ref 27–34)
MCHC RBC-ENTMCNC: 32.3 GM/DL — SIGNIFICANT CHANGE UP (ref 32–36)
MCV RBC AUTO: 89.4 FL — SIGNIFICANT CHANGE UP (ref 80–100)
NRBC # BLD: 0 /100 WBCS — SIGNIFICANT CHANGE UP (ref 0–0)
NRBC # FLD: 0 K/UL — SIGNIFICANT CHANGE UP (ref 0–0)
PHOSPHATE SERPL-MCNC: 3.1 MG/DL — SIGNIFICANT CHANGE UP (ref 2.5–4.5)
PLATELET # BLD AUTO: 179 K/UL — SIGNIFICANT CHANGE UP (ref 150–400)
POTASSIUM SERPL-MCNC: 4.2 MMOL/L — SIGNIFICANT CHANGE UP (ref 3.5–5.3)
POTASSIUM SERPL-SCNC: 4.2 MMOL/L — SIGNIFICANT CHANGE UP (ref 3.5–5.3)
RBC # BLD: 4.33 M/UL — SIGNIFICANT CHANGE UP (ref 4.2–5.8)
RBC # FLD: 14.6 % — HIGH (ref 10.3–14.5)
SODIUM SERPL-SCNC: 140 MMOL/L — SIGNIFICANT CHANGE UP (ref 135–145)
WBC # BLD: 10.42 K/UL — SIGNIFICANT CHANGE UP (ref 3.8–10.5)
WBC # FLD AUTO: 10.42 K/UL — SIGNIFICANT CHANGE UP (ref 3.8–10.5)

## 2023-01-04 PROCEDURE — 99291 CRITICAL CARE FIRST HOUR: CPT

## 2023-01-04 RX ORDER — PHENYLEPHRINE HYDROCHLORIDE 10 MG/ML
0.1 INJECTION INTRAVENOUS
Qty: 160 | Refills: 0 | Status: DISCONTINUED | OUTPATIENT
Start: 2023-01-04 | End: 2023-01-05

## 2023-01-04 RX ORDER — HYDROCORTISONE 20 MG
150 TABLET ORAL ONCE
Refills: 0 | Status: COMPLETED | OUTPATIENT
Start: 2023-01-04 | End: 2023-01-04

## 2023-01-04 RX ORDER — SODIUM CHLORIDE 9 MG/ML
3 INJECTION INTRAMUSCULAR; INTRAVENOUS; SUBCUTANEOUS EVERY 8 HOURS
Refills: 0 | Status: DISCONTINUED | OUTPATIENT
Start: 2023-01-04 | End: 2023-01-04

## 2023-01-04 RX ORDER — HYDROCORTISONE 20 MG
50 TABLET ORAL EVERY 6 HOURS
Refills: 0 | Status: DISCONTINUED | OUTPATIENT
Start: 2023-01-04 | End: 2023-01-05

## 2023-01-04 RX ORDER — PHENYLEPHRINE HYDROCHLORIDE 10 MG/ML
0.1 INJECTION INTRAVENOUS
Qty: 160 | Refills: 0 | Status: DISCONTINUED | OUTPATIENT
Start: 2023-01-04 | End: 2023-01-04

## 2023-01-04 RX ADMIN — CHLORHEXIDINE GLUCONATE 1 APPLICATION(S): 213 SOLUTION TOPICAL at 11:54

## 2023-01-04 RX ADMIN — HEPARIN SODIUM 5000 UNIT(S): 5000 INJECTION INTRAVENOUS; SUBCUTANEOUS at 13:09

## 2023-01-04 RX ADMIN — TAMSULOSIN HYDROCHLORIDE 0.8 MILLIGRAM(S): 0.4 CAPSULE ORAL at 23:01

## 2023-01-04 RX ADMIN — Medication 25 MICROGRAM(S): at 06:19

## 2023-01-04 RX ADMIN — HEPARIN SODIUM 5000 UNIT(S): 5000 INJECTION INTRAVENOUS; SUBCUTANEOUS at 06:21

## 2023-01-04 RX ADMIN — Medication 50 MILLIGRAM(S): at 11:54

## 2023-01-04 RX ADMIN — PHENYLEPHRINE HYDROCHLORIDE 1.77 MICROGRAM(S)/KG/MIN: 10 INJECTION INTRAVENOUS at 23:01

## 2023-01-04 RX ADMIN — CLOPIDOGREL BISULFATE 75 MILLIGRAM(S): 75 TABLET, FILM COATED ORAL at 11:54

## 2023-01-04 RX ADMIN — SODIUM CHLORIDE 100 MILLILITER(S): 9 INJECTION, SOLUTION INTRAVENOUS at 08:08

## 2023-01-04 RX ADMIN — Medication 1: at 11:53

## 2023-01-04 RX ADMIN — PHENYLEPHRINE HYDROCHLORIDE 1.77 MICROGRAM(S)/KG/MIN: 10 INJECTION INTRAVENOUS at 08:08

## 2023-01-04 RX ADMIN — ATORVASTATIN CALCIUM 80 MILLIGRAM(S): 80 TABLET, FILM COATED ORAL at 23:02

## 2023-01-04 RX ADMIN — Medication 81 MILLIGRAM(S): at 11:53

## 2023-01-04 RX ADMIN — Medication 150 MILLIGRAM(S): at 06:22

## 2023-01-04 RX ADMIN — Medication 50 MILLIGRAM(S): at 17:16

## 2023-01-04 RX ADMIN — HEPARIN SODIUM 5000 UNIT(S): 5000 INJECTION INTRAVENOUS; SUBCUTANEOUS at 23:02

## 2023-01-04 NOTE — PATIENT PROFILE ADULT - OVER THE PAST TWO WEEKS HAVE YOU FELT DOWN, DEPRESSED OR HOPELESS?
Patient states she received a call from her supervisor today notifying her that her leave has .  Patient was under the impression that paperwork was sent extending her leave until her next appointment on 20.  [employer] will be faxing long term leave paperwork to Dr. Velasquez today.     no

## 2023-01-04 NOTE — PROGRESS NOTE ADULT - SUBJECTIVE AND OBJECTIVE BOX
SICU Daily Progress Note  =====================================================  Interval events:  - transferred to SICU, requiring 2.4 of donovan gtt   - no acute overnight events    HPI: 67y year old Male PMH diabetes, HTN, HLD, hypothyroid, ischemic cardiomyopathy, CAD s/p stents x 4 (3/2019) and CABG (1/2008), PCI (06/2022), Stage IVB small cell lung cancer on chemo (last round on 6/7/22), former 30 pack year smoker (quit 15 years ago), esophagitis 2/2 chemotherapy (on steroids @ home) POD#0 s/p LEFT TCAR (1/3). SICU consulted for management of hypotension s/p L TCAR and ongoing pressor requirements.    MEDICATIONS:   --------------------------------------------------------------------------------------  Neurologic Medications  acetaminophen     Tablet .. 975 milliGRAM(s) Oral every 6 hours PRN Mild Pain (1 - 3), Moderate Pain (4 - 6)  oxyCODONE    IR 5 milliGRAM(s) Oral every 6 hours PRN Severe Pain (7 - 10)    Cardiovascular Medications  phenylephrine    Infusion 0.1 MICROgram(s)/kG/Min IV Continuous <Continuous>    Gastrointestinal Medications  dextrose 5%. 1000 milliLiter(s) IV Continuous <Continuous>  dextrose 5%. 1000 milliLiter(s) IV Continuous <Continuous>  lactated ringers. 1000 milliLiter(s) IV Continuous <Continuous>    Genitourinary Medications  tamsulosin 0.8 milliGRAM(s) Oral at bedtime    Hematologic/Oncologic Medications  aspirin enteric coated 81 milliGRAM(s) Oral daily  clopidogrel Tablet 75 milliGRAM(s) Oral daily  heparin   Injectable 5000 Unit(s) SubCutaneous every 8 hours    Endocrine/Metabolic Medications  atorvastatin 80 milliGRAM(s) Oral at bedtime  dextrose 50% Injectable 25 Gram(s) IV Push once  dextrose 50% Injectable 12.5 Gram(s) IV Push once  dextrose 50% Injectable 25 Gram(s) IV Push once  dextrose Oral Gel 15 Gram(s) Oral once PRN Blood Glucose LESS THAN 70 milliGRAM(s)/deciliter  glucagon  Injectable 1 milliGRAM(s) IntraMuscular once  insulin lispro (ADMELOG) corrective regimen sliding scale   SubCutaneous Before meals and at bedtime  levothyroxine 25 MICROGram(s) Oral daily  predniSONE   Tablet 30 milliGRAM(s) Oral daily    Topical/Other Medications  chlorhexidine 2% Cloths 1 Application(s) Topical daily    --------------------------------------------------------------------------------------    VITAL SIGNS, INS/OUTS (last 24 hours):  --------------------------------------------------------------------------------------  Vital Signs Last 24 Hrs  T(C): 36.4 (03 Jan 2023 21:00), Max: 36.9 (03 Jan 2023 20:00)  T(F): 97.5 (03 Jan 2023 21:00), Max: 98.4 (03 Jan 2023 20:00)  HR: 56 (03 Jan 2023 23:15) (54 - 102)  BP: 103/49 (03 Jan 2023 21:00) (84/44 - 137/61)  BP(mean): 65 (03 Jan 2023 21:00) (53 - 97)  RR: 13 (03 Jan 2023 23:15) (11 - 20)  SpO2: 100% (03 Jan 2023 23:15) (92% - 100%)    Parameters below as of 03 Jan 2023 23:15  Patient On (Oxygen Delivery Method): room air      03 Jan 2023 07:01  -  04 Jan 2023 00:20  --------------------------------------------------------  IN:    Lactated Ringers: 60 mL    Oral Fluid: 100 mL    Phenylephrine: 9.1 mL    Phenylephrine: 2.3 mL    sodium chloride 0.9%: 300 mL  Total IN: 471.4 mL    OUT:    Indwelling Catheter - Urethral (mL): 1610 mL  Total OUT: 1610 mL    Total NET: -1138.6 mL        --------------------------------------------------------------------------------------    EXAM  NEUROLOGY  Exam: Normal, NAD, alert, oriented x3, no focal deficits.     HEENT  Exam: Normocephalic, atraumatic, surgical incision C/D/I     RESPIRATORY  Exam: nonlabored respirations on room air     CARDIOVASCULAR  Exam: RRR, occasional bradycardia & PVCs on monitor     MUSCULOSKELETAL  Exam: All extremities moving spontaneously without limitations.     METABOLIC/FLUIDS/ELECTROLYTES  dextrose 5%. 1000 milliLiter(s) IV Continuous <Continuous>  dextrose 5%. 1000 milliLiter(s) IV Continuous <Continuous>  lactated ringers. 1000 milliLiter(s) IV Continuous <Continuous>      HEMATOLOGIC  [x] VTE Prophylaxis: aspirin enteric coated 81 milliGRAM(s) Oral daily  clopidogrel Tablet 75 milliGRAM(s) Oral daily  heparin   Injectable 5000 Unit(s) SubCutaneous every 8 hours    LABS  --------------------------------------------------------------------------------------    LABS:  cret                        12.1   8.81  )-----------( 136      ( 03 Jan 2023 15:49 )             37.4     01-03    137  |  100  |  16  ----------------------------<  149<H>  4.1   |  22  |  0.58    Ca    9.7      03 Jan 2023 15:49  Phos  3.7     01-03  Mg     1.60     01-03        --------------------------------------------------------------------------------------   SICU Daily Progress Note  =====================================================  Interval events:  -transferred to SICU, requiring 2.4 of donovan gtt   -asymptomatic sinus bradycardia to upper 40s transiently; episodic PVC OVN  -stress dosed a second time this AM with Hydrocortisone 150mg      HPI: 67y year old Male PMH diabetes, HTN, HLD, hypothyroid, ischemic cardiomyopathy, CAD s/p stents x 4 (3/2019) and CABG (1/2008), PCI (06/2022), Stage IVB small cell lung cancer on chemo (last round on 6/7/22), former 30 pack year smoker (quit 15 years ago), esophagitis 2/2 chemotherapy (on steroids @ home) POD#0 s/p LEFT TCAR (1/3). SICU consulted for management of hypotension s/p L TCAR and ongoing pressor requirements.    MEDICATIONS:   --------------------------------------------------------------------------------------  Neurologic Medications  acetaminophen     Tablet .. 975 milliGRAM(s) Oral every 6 hours PRN Mild Pain (1 - 3), Moderate Pain (4 - 6)  oxyCODONE    IR 5 milliGRAM(s) Oral every 6 hours PRN Severe Pain (7 - 10)    Cardiovascular Medications  phenylephrine    Infusion 0.1 MICROgram(s)/kG/Min IV Continuous <Continuous>    Gastrointestinal Medications  dextrose 5%. 1000 milliLiter(s) IV Continuous <Continuous>  dextrose 5%. 1000 milliLiter(s) IV Continuous <Continuous>  lactated ringers. 1000 milliLiter(s) IV Continuous <Continuous>    Genitourinary Medications  tamsulosin 0.8 milliGRAM(s) Oral at bedtime    Hematologic/Oncologic Medications  aspirin enteric coated 81 milliGRAM(s) Oral daily  clopidogrel Tablet 75 milliGRAM(s) Oral daily  heparin   Injectable 5000 Unit(s) SubCutaneous every 8 hours    Endocrine/Metabolic Medications  atorvastatin 80 milliGRAM(s) Oral at bedtime  dextrose 50% Injectable 25 Gram(s) IV Push once  dextrose 50% Injectable 12.5 Gram(s) IV Push once  dextrose 50% Injectable 25 Gram(s) IV Push once  dextrose Oral Gel 15 Gram(s) Oral once PRN Blood Glucose LESS THAN 70 milliGRAM(s)/deciliter  glucagon  Injectable 1 milliGRAM(s) IntraMuscular once  insulin lispro (ADMELOG) corrective regimen sliding scale   SubCutaneous Before meals and at bedtime  levothyroxine 25 MICROGram(s) Oral daily  predniSONE   Tablet 30 milliGRAM(s) Oral daily    Topical/Other Medications  chlorhexidine 2% Cloths 1 Application(s) Topical daily    --------------------------------------------------------------------------------------    VITAL SIGNS, INS/OUTS (last 24 hours):  --------------------------------------------------------------------------------------  Vital Signs Last 24 Hrs  T(C): 36.4 (03 Jan 2023 21:00), Max: 36.9 (03 Jan 2023 20:00)  T(F): 97.5 (03 Jan 2023 21:00), Max: 98.4 (03 Jan 2023 20:00)  HR: 56 (03 Jan 2023 23:15) (54 - 102)  BP: 103/49 (03 Jan 2023 21:00) (84/44 - 137/61)  BP(mean): 65 (03 Jan 2023 21:00) (53 - 97)  RR: 13 (03 Jan 2023 23:15) (11 - 20)  SpO2: 100% (03 Jan 2023 23:15) (92% - 100%)    Parameters below as of 03 Jan 2023 23:15  Patient On (Oxygen Delivery Method): room air      03 Jan 2023 07:01  -  04 Jan 2023 00:20  --------------------------------------------------------  IN:    Lactated Ringers: 60 mL    Oral Fluid: 100 mL    Phenylephrine: 9.1 mL    Phenylephrine: 2.3 mL    sodium chloride 0.9%: 300 mL  Total IN: 471.4 mL    OUT:    Indwelling Catheter - Urethral (mL): 1610 mL  Total OUT: 1610 mL    Total NET: -1138.6 mL        --------------------------------------------------------------------------------------    EXAM  NEUROLOGY  Exam: Normal, NAD, alert, oriented x3, no focal deficits.     HEENT  Exam: Normocephalic, atraumatic, surgical incision C/D/I     RESPIRATORY  Exam: nonlabored respirations on room air     CARDIOVASCULAR  Exam: RRR, occasional bradycardia & PVCs on monitor     MUSCULOSKELETAL  Exam: All extremities moving spontaneously without limitations.     METABOLIC/FLUIDS/ELECTROLYTES  dextrose 5%. 1000 milliLiter(s) IV Continuous <Continuous>  dextrose 5%. 1000 milliLiter(s) IV Continuous <Continuous>  lactated ringers. 1000 milliLiter(s) IV Continuous <Continuous>      HEMATOLOGIC  [x] VTE Prophylaxis: aspirin enteric coated 81 milliGRAM(s) Oral daily  clopidogrel Tablet 75 milliGRAM(s) Oral daily  heparin   Injectable 5000 Unit(s) SubCutaneous every 8 hours    LABS  --------------------------------------------------------------------------------------    LABS:  cret                        12.1   8.81  )-----------( 136      ( 03 Jan 2023 15:49 )             37.4     01-03    137  |  100  |  16  ----------------------------<  149<H>  4.1   |  22  |  0.58    Ca    9.7      03 Jan 2023 15:49  Phos  3.7     01-03  Mg     1.60     01-03        --------------------------------------------------------------------------------------   SICU Daily Progress Note  =====================================================  Interval events:  -transferred to SICU, requiring 2.4 of donovan gtt   -asymptomatic sinus bradycardia to upper 40s transiently; episodic PVC OVN  -stress dosed a second time this AM with Hydrocortisone 150mg, c/w Q6h  - IV locked  - Diehl dc'd, TOV 4pm  - Diet advanced        HPI: 67y year old Male PMH diabetes, HTN, HLD, hypothyroid, ischemic cardiomyopathy, CAD s/p stents x 4 (3/2019) and CABG (1/2008), PCI (06/2022), Stage IVB small cell lung cancer on chemo (last round on 6/7/22), former 30 pack year smoker (quit 15 years ago), esophagitis 2/2 chemotherapy (on steroids @ home) POD#0 s/p LEFT TCAR (1/3). SICU consulted for management of hypotension s/p L TCAR and ongoing pressor requirements.    MEDICATIONS:   --------------------------------------------------------------------------------------  Neurologic Medications  acetaminophen     Tablet .. 975 milliGRAM(s) Oral every 6 hours PRN Mild Pain (1 - 3), Moderate Pain (4 - 6)  oxyCODONE    IR 5 milliGRAM(s) Oral every 6 hours PRN Severe Pain (7 - 10)    Cardiovascular Medications  phenylephrine    Infusion 0.1 MICROgram(s)/kG/Min IV Continuous <Continuous>    Gastrointestinal Medications  dextrose 5%. 1000 milliLiter(s) IV Continuous <Continuous>  dextrose 5%. 1000 milliLiter(s) IV Continuous <Continuous>  lactated ringers. 1000 milliLiter(s) IV Continuous <Continuous>    Genitourinary Medications  tamsulosin 0.8 milliGRAM(s) Oral at bedtime    Hematologic/Oncologic Medications  aspirin enteric coated 81 milliGRAM(s) Oral daily  clopidogrel Tablet 75 milliGRAM(s) Oral daily  heparin   Injectable 5000 Unit(s) SubCutaneous every 8 hours    Endocrine/Metabolic Medications  atorvastatin 80 milliGRAM(s) Oral at bedtime  dextrose 50% Injectable 25 Gram(s) IV Push once  dextrose 50% Injectable 12.5 Gram(s) IV Push once  dextrose 50% Injectable 25 Gram(s) IV Push once  dextrose Oral Gel 15 Gram(s) Oral once PRN Blood Glucose LESS THAN 70 milliGRAM(s)/deciliter  glucagon  Injectable 1 milliGRAM(s) IntraMuscular once  insulin lispro (ADMELOG) corrective regimen sliding scale   SubCutaneous Before meals and at bedtime  levothyroxine 25 MICROGram(s) Oral daily  predniSONE   Tablet 30 milliGRAM(s) Oral daily    Topical/Other Medications  chlorhexidine 2% Cloths 1 Application(s) Topical daily    --------------------------------------------------------------------------------------    VITAL SIGNS, INS/OUTS (last 24 hours):  --------------------------------------------------------------------------------------  Vital Signs Last 24 Hrs  T(C): 36.4 (03 Jan 2023 21:00), Max: 36.9 (03 Jan 2023 20:00)  T(F): 97.5 (03 Jan 2023 21:00), Max: 98.4 (03 Jan 2023 20:00)  HR: 56 (03 Jan 2023 23:15) (54 - 102)  BP: 103/49 (03 Jan 2023 21:00) (84/44 - 137/61)  BP(mean): 65 (03 Jan 2023 21:00) (53 - 97)  RR: 13 (03 Jan 2023 23:15) (11 - 20)  SpO2: 100% (03 Jan 2023 23:15) (92% - 100%)    Parameters below as of 03 Jan 2023 23:15  Patient On (Oxygen Delivery Method): room air      03 Jan 2023 07:01  -  04 Jan 2023 00:20  --------------------------------------------------------  IN:    Lactated Ringers: 60 mL    Oral Fluid: 100 mL    Phenylephrine: 9.1 mL    Phenylephrine: 2.3 mL    sodium chloride 0.9%: 300 mL  Total IN: 471.4 mL    OUT:    Indwelling Catheter - Urethral (mL): 1610 mL  Total OUT: 1610 mL    Total NET: -1138.6 mL        --------------------------------------------------------------------------------------    EXAM  NEUROLOGY  Exam: Normal, NAD, alert, oriented x3, no focal deficits.     HEENT  Exam: Normocephalic, atraumatic, surgical incision C/D/I     RESPIRATORY  Exam: nonlabored respirations on room air     CARDIOVASCULAR  Exam: RRR, occasional bradycardia & PVCs on monitor     MUSCULOSKELETAL  Exam: All extremities moving spontaneously without limitations.     METABOLIC/FLUIDS/ELECTROLYTES  dextrose 5%. 1000 milliLiter(s) IV Continuous <Continuous>  dextrose 5%. 1000 milliLiter(s) IV Continuous <Continuous>  lactated ringers. 1000 milliLiter(s) IV Continuous <Continuous>      HEMATOLOGIC  [x] VTE Prophylaxis: aspirin enteric coated 81 milliGRAM(s) Oral daily  clopidogrel Tablet 75 milliGRAM(s) Oral daily  heparin   Injectable 5000 Unit(s) SubCutaneous every 8 hours    LABS  --------------------------------------------------------------------------------------    LABS:  luisito                        12.1   8.81  )-----------( 136 ( 03 Jan 2023 15:49 )             37.4     01-03    137  |  100  |  16  ----------------------------<  149<H>  4.1   |  22  |  0.58    Ca    9.7      03 Jan 2023 15:49  Phos  3.7     01-03  Mg     1.60     01-03        --------------------------------------------------------------------------------------

## 2023-01-04 NOTE — PROGRESS NOTE ADULT - ASSESSMENT
67y year old Male w/ PMH diabetes, HTN, HLD, hypothyroid, ischemic cardiomyopathy, CAD s/p stents x 4 (3/2019) and CABG (1/2008), PCI (06/2022), Stage IVB small cell lung cancer on chemo (last round on 6/7/22), former 30 pack year smoker (quit 15 years ago), esophagitis 2/2 chemotherapy (on steroids @ home) POD#0 s/p LEFT TCAR (1/3). SICU consulted for management of hypotension s/p L TCAR and ongoing pressor requirements.      NEUROLOGIC   - A&O x3  - Pain control: Tylenol, oxy PRN    RESPIRATORY   - Saturating well on room air  - Monitor SpO2 goal >92%    CARDIOVASCULAR   - Monitor hemodynamics   - MAP >65  - c/w Dalton, wean as tolerated    GASTROINTESTINAL   - Diet: DASH/TLC carb consistent diet  - c/w Zofran prn    /RENAL   - flomax daily   - Maintain mahajan catheter, strict Is/Os  - Monitor electrolytes, replete PRN    HEMATOLOGIC  - Monitor H/H   - cont. ASA/Plavix   - DVT ppx: SQH & SCD's    INFECTIOUS DISEASE  - Monitor fever / WBC    ENDOCRINE  - Low dose ISS  - Levothyroxine 25 QD  - Prednisone 30 QD (home dose)     LINES  - A line  - Mahajan  - PIV     DISPO: SICU   67y year old Male w/ PMH diabetes, HTN, HLD, hypothyroid, ischemic cardiomyopathy, CAD s/p stents x 4 (3/2019) and CABG (1/2008), PCI (06/2022), Stage IVB small cell lung cancer on chemo (last round on 6/7/22), former 30 pack year smoker (quit 15 years ago), esophagitis 2/2 chemotherapy (on steroids @ home) POD#0 s/p LEFT TCAR (1/3). SICU consulted for management of hypotension s/p L TCAR and ongoing pressor requirements.    PLAN:  NEUROLOGIC   - A&O x3  - Pain control: Tylenol, oxy PRN    RESPIRATORY   - Saturating well on room air  - Monitor SpO2 goal >92%    CARDIOVASCULAR   - Monitor hemodynamics   - MAP >65  - c/w Dalton, wean as tolerated    GASTROINTESTINAL   - Diet: DASH/TLC carb consistent diet  - c/w Zofran prn    /RENAL   - flomax daily   - Maintain mahajan catheter, strict Is/Os  - Monitor electrolytes, replete PRN    HEMATOLOGIC  - Monitor H/H   - cont. ASA/Plavix   - DVT ppx: SQH & SCD's    INFECTIOUS DISEASE  - Monitor fever / WBC    ENDOCRINE  - Low dose ISS  - Levothyroxine 25 QD  - C/w Hydrocortisone 50mg q6h  - Hold Prednisone 30 QD (home)    LINES  - A line  - Mahajan  - PIV     DISPO: SICU   67y year old Male w/ PMH diabetes, HTN, HLD, hypothyroid, ischemic cardiomyopathy, CAD s/p stents x 4 (3/2019) and CABG (1/2008), PCI (06/2022), Stage IVB small cell lung cancer on chemo (last round on 6/7/22), former 30 pack year smoker (quit 15 years ago), esophagitis 2/2 chemotherapy (on steroids @ home) POD#0 s/p LEFT TCAR (1/3). SICU consulted for management of hypotension s/p L TCAR and ongoing pressor requirements.    PLAN:  NEUROLOGIC   - A&O x3  - Pain control: Tylenol, oxy PRN    RESPIRATORY   - Saturating well on room air  - Monitor SpO2 goal >92%    CARDIOVASCULAR   - Monitor hemodynamics   - MAP >65  - c/w Dalton, wean as tolerated    GASTROINTESTINAL   - Diet: DASH/TLC carb consistent diet  - c/w Zofran prn    /RENAL   - flomax daily   - D/c Diehl, TOBATSHEVA, strict Is/Os  - Monitor electrolytes, replete PRN    HEMATOLOGIC  - Monitor H/H   - cont. ASA/Plavix   - DVT ppx: SQH & SCD's    INFECTIOUS DISEASE  - Monitor fever / WBC    ENDOCRINE  - Low dose ISS  - Levothyroxine 25 QD  - C/w Hydrocortisone 50mg q6h  - Hold Prednisone 30 QD (home)    LINES  - A line  - PIV     DISPO: SICU

## 2023-01-04 NOTE — PROGRESS NOTE ADULT - SUBJECTIVE AND OBJECTIVE BOX
C Team Surgery Progress Note     S: Patient resting comfortably on morning rounds. Pain well-controlled currently.  No acute events overnight.       MEDICATIONS  (STANDING):  aspirin enteric coated 81 milliGRAM(s) Oral daily  atorvastatin 80 milliGRAM(s) Oral at bedtime  chlorhexidine 2% Cloths 1 Application(s) Topical daily  clopidogrel Tablet 75 milliGRAM(s) Oral daily  dextrose 5%. 1000 milliLiter(s) (50 mL/Hr) IV Continuous <Continuous>  dextrose 5%. 1000 milliLiter(s) (100 mL/Hr) IV Continuous <Continuous>  dextrose 50% Injectable 25 Gram(s) IV Push once  dextrose 50% Injectable 12.5 Gram(s) IV Push once  dextrose 50% Injectable 25 Gram(s) IV Push once  glucagon  Injectable 1 milliGRAM(s) IntraMuscular once  heparin   Injectable 5000 Unit(s) SubCutaneous every 8 hours  hydrocortisone sodium succinate Injectable 50 milliGRAM(s) IV Push every 6 hours  insulin lispro (ADMELOG) corrective regimen sliding scale   SubCutaneous Before meals and at bedtime  lactated ringers. 1000 milliLiter(s) (100 mL/Hr) IV Continuous <Continuous>  levothyroxine 25 MICROGram(s) Oral daily  phenylephrine    Infusion 0.1 MICROgram(s)/kG/Min (1.77 mL/Hr) IV Continuous <Continuous>  tamsulosin 0.8 milliGRAM(s) Oral at bedtime    MEDICATIONS  (PRN):  acetaminophen     Tablet .. 975 milliGRAM(s) Oral every 6 hours PRN Mild Pain (1 - 3), Moderate Pain (4 - 6)  dextrose Oral Gel 15 Gram(s) Oral once PRN Blood Glucose LESS THAN 70 milliGRAM(s)/deciliter  oxyCODONE    IR 5 milliGRAM(s) Oral every 6 hours PRN Severe Pain (7 - 10)      Physical Exam:  ICU Vital Signs Last 24 Hrs  T(C): 36.8 (04 Jan 2023 04:00), Max: 36.9 (03 Jan 2023 20:00)  T(F): 98.2 (04 Jan 2023 04:00), Max: 98.4 (03 Jan 2023 20:00)  HR: 50 (04 Jan 2023 07:00) (48 - 102)  BP: 121/66 (04 Jan 2023 07:00) (84/44 - 141/58)  BP(mean): 83 (04 Jan 2023 07:00) (53 - 97)  ABP: 122/52 (04 Jan 2023 07:00) (82/46 - 162/55)  ABP(mean): 74 (04 Jan 2023 07:00) (59 - 94)  RR: 14 (04 Jan 2023 07:00) (9 - 20)  SpO2: 100% (04 Jan 2023 07:00) (87% - 100%)    O2 Parameters below as of 04 Jan 2023 07:00  Patient On (Oxygen Delivery Method): room air        01-03-23 @ 07:01  -  01-04-23 @ 07:00  --------------------------------------------------------  IN: 1129.1 mL / OUT: 2710 mL / NET: -1580.9 mL    Dalton @ 4.3mcg/kg/min this am      General: NAD, AOx3  Neuro: CN grossly intact, strength equal bilaterally UE/LE. Non-focal  Neck: Incision c/d/i  access site c/d/i, no hematoma        LABS:                        12.5   10.42 )-----------( 179      ( 04 Jan 2023 00:57 )             38.7     01-04    140  |  105  |  15  ----------------------------<  138<H>  4.2   |  22  |  0.61    Ca    10.5      04 Jan 2023 00:57  Phos  3.1     01-04  Mg     1.90     01-04

## 2023-01-04 NOTE — PROGRESS NOTE ADULT - ASSESSMENT
67M w/ PMHx DM, HTN, HLD, hypothyroid, ischemic cardiomyopathy, CAD s/p stents x 4 (3/2019) and CABG (1/2008), PCI (06/2022), Stage IVB small cell lung cancer on chemo (last round on 6/7/22), former 30 pack year smoker (quit 15 years ago), esophagitis 2/2 chemotherapy (on streoids @ home) now s/p LEFT TCAR (1/3). SICU consult due to pressor requirement.     PLAN:   - Pain control as needed  - Neurological checks  - s/p stress dose steroids overnight; c/w q6h steroid dosing with eventual conversion to home dose.  - Wean Dalton as tolerated; SBP goal 110 - 160 or MAP <65  - Monitor I/Os  - c/w ASA, plavix  - DVT prophylaxis - SQH        C Team / Vascular [facility]   [id number]

## 2023-01-04 NOTE — PATIENT PROFILE ADULT - FALL HARM RISK - RISK INTERVENTIONS
Assistance OOB with selected safe patient handling equipment/Assistance with ambulation/Communicate Fall Risk and Risk Factors to all staff, patient, and family/Monitor gait and stability/Reinforce activity limits and safety measures with patient and family/Sit up slowly, dangle for a short time, stand at bedside before walking/Use of alarms - bed, chair and/or voice tab/Visual Cue: Yellow wristband/Bed in lowest position, wheels locked, appropriate side rails in place/Call bell, personal items and telephone in reach/Instruct patient to call for assistance before getting out of bed or chair/Non-slip footwear when patient is out of bed/Holiday to call system/Physically safe environment - no spills, clutter or unnecessary equipment/Purposeful Proactive Rounding/Room/bathroom lighting operational, light cord in reach

## 2023-01-05 ENCOUNTER — TRANSCRIPTION ENCOUNTER (OUTPATIENT)
Age: 68
End: 2023-01-05

## 2023-01-05 VITALS — OXYGEN SATURATION: 96 % | HEART RATE: 82 BPM | RESPIRATION RATE: 20 BRPM

## 2023-01-05 LAB
ANION GAP SERPL CALC-SCNC: 8 MMOL/L — SIGNIFICANT CHANGE UP (ref 7–14)
BUN SERPL-MCNC: 23 MG/DL — SIGNIFICANT CHANGE UP (ref 7–23)
CALCIUM SERPL-MCNC: 10 MG/DL — SIGNIFICANT CHANGE UP (ref 8.4–10.5)
CHLORIDE SERPL-SCNC: 104 MMOL/L — SIGNIFICANT CHANGE UP (ref 98–107)
CO2 SERPL-SCNC: 26 MMOL/L — SIGNIFICANT CHANGE UP (ref 22–31)
CREAT SERPL-MCNC: 0.56 MG/DL — SIGNIFICANT CHANGE UP (ref 0.5–1.3)
EGFR: 107 ML/MIN/1.73M2 — SIGNIFICANT CHANGE UP
GLUCOSE BLDC GLUCOMTR-MCNC: 129 MG/DL — HIGH (ref 70–99)
GLUCOSE BLDC GLUCOMTR-MCNC: 147 MG/DL — HIGH (ref 70–99)
GLUCOSE SERPL-MCNC: 122 MG/DL — HIGH (ref 70–99)
HCT VFR BLD CALC: 31.7 % — LOW (ref 39–50)
HGB BLD-MCNC: 10.2 G/DL — LOW (ref 13–17)
MAGNESIUM SERPL-MCNC: 1.7 MG/DL — SIGNIFICANT CHANGE UP (ref 1.6–2.6)
MCHC RBC-ENTMCNC: 29.3 PG — SIGNIFICANT CHANGE UP (ref 27–34)
MCHC RBC-ENTMCNC: 32.2 GM/DL — SIGNIFICANT CHANGE UP (ref 32–36)
MCV RBC AUTO: 91.1 FL — SIGNIFICANT CHANGE UP (ref 80–100)
NRBC # BLD: 0 /100 WBCS — SIGNIFICANT CHANGE UP (ref 0–0)
NRBC # FLD: 0 K/UL — SIGNIFICANT CHANGE UP (ref 0–0)
PHOSPHATE SERPL-MCNC: 3 MG/DL — SIGNIFICANT CHANGE UP (ref 2.5–4.5)
PLATELET # BLD AUTO: 110 K/UL — LOW (ref 150–400)
POTASSIUM SERPL-MCNC: 3.9 MMOL/L — SIGNIFICANT CHANGE UP (ref 3.5–5.3)
POTASSIUM SERPL-SCNC: 3.9 MMOL/L — SIGNIFICANT CHANGE UP (ref 3.5–5.3)
RBC # BLD: 3.48 M/UL — LOW (ref 4.2–5.8)
RBC # FLD: 14.8 % — HIGH (ref 10.3–14.5)
SODIUM SERPL-SCNC: 138 MMOL/L — SIGNIFICANT CHANGE UP (ref 135–145)
WBC # BLD: 3.86 K/UL — SIGNIFICANT CHANGE UP (ref 3.8–10.5)
WBC # FLD AUTO: 3.86 K/UL — SIGNIFICANT CHANGE UP (ref 3.8–10.5)

## 2023-01-05 PROCEDURE — 99233 SBSQ HOSP IP/OBS HIGH 50: CPT

## 2023-01-05 PROCEDURE — 93010 ELECTROCARDIOGRAM REPORT: CPT | Mod: 76

## 2023-01-05 RX ORDER — CLOPIDOGREL BISULFATE 75 MG/1
1 TABLET, FILM COATED ORAL
Qty: 0 | Refills: 0 | DISCHARGE
Start: 2023-01-05

## 2023-01-05 RX ORDER — METOPROLOL TARTRATE 50 MG
1 TABLET ORAL
Qty: 0 | Refills: 0 | DISCHARGE

## 2023-01-05 RX ORDER — ROSUVASTATIN CALCIUM 5 MG/1
1 TABLET ORAL
Qty: 30 | Refills: 0
Start: 2023-01-05 | End: 2023-01-01

## 2023-01-05 RX ORDER — ASPIRIN/CALCIUM CARB/MAGNESIUM 324 MG
1 TABLET ORAL
Qty: 0 | Refills: 0 | DISCHARGE
Start: 2023-01-05

## 2023-01-05 RX ORDER — CLOPIDOGREL BISULFATE 75 MG/1
1 TABLET, FILM COATED ORAL
Qty: 0 | Refills: 0 | DISCHARGE

## 2023-01-05 RX ORDER — PREDNISOLONE 5 MG
20 TABLET ORAL
Qty: 0 | Refills: 0 | DISCHARGE

## 2023-01-05 RX ORDER — HEPARIN SODIUM 5000 [USP'U]/ML
5000 INJECTION INTRAVENOUS; SUBCUTANEOUS
Qty: 0 | Refills: 0 | DISCHARGE
Start: 2023-01-05

## 2023-01-05 RX ORDER — TAMSULOSIN HYDROCHLORIDE 0.4 MG/1
2 CAPSULE ORAL
Qty: 0 | Refills: 0 | DISCHARGE

## 2023-01-05 RX ORDER — ROSUVASTATIN CALCIUM 5 MG/1
1 TABLET ORAL
Qty: 0 | Refills: 0 | DISCHARGE

## 2023-01-05 RX ORDER — ACETAMINOPHEN 500 MG
3 TABLET ORAL
Qty: 0 | Refills: 0 | DISCHARGE
Start: 2023-01-05

## 2023-01-05 RX ORDER — TAMSULOSIN HYDROCHLORIDE 0.4 MG/1
2 CAPSULE ORAL
Qty: 0 | Refills: 0 | DISCHARGE
Start: 2023-01-05

## 2023-01-05 RX ORDER — ATORVASTATIN CALCIUM 80 MG/1
1 TABLET, FILM COATED ORAL
Qty: 0 | Refills: 0 | DISCHARGE
Start: 2023-01-05

## 2023-01-05 RX ORDER — MAGNESIUM SULFATE 500 MG/ML
2 VIAL (ML) INJECTION ONCE
Refills: 0 | Status: COMPLETED | OUTPATIENT
Start: 2023-01-05 | End: 2023-01-05

## 2023-01-05 RX ADMIN — Medication 81 MILLIGRAM(S): at 11:29

## 2023-01-05 RX ADMIN — Medication 25 MICROGRAM(S): at 05:02

## 2023-01-05 RX ADMIN — Medication 20 MILLIGRAM(S): at 11:29

## 2023-01-05 RX ADMIN — Medication 50 MILLIGRAM(S): at 00:00

## 2023-01-05 RX ADMIN — HEPARIN SODIUM 5000 UNIT(S): 5000 INJECTION INTRAVENOUS; SUBCUTANEOUS at 05:03

## 2023-01-05 RX ADMIN — Medication 25 GRAM(S): at 05:49

## 2023-01-05 RX ADMIN — CLOPIDOGREL BISULFATE 75 MILLIGRAM(S): 75 TABLET, FILM COATED ORAL at 11:29

## 2023-01-05 RX ADMIN — Medication 50 MILLIGRAM(S): at 05:02

## 2023-01-05 NOTE — PROGRESS NOTE ADULT - ATTENDING COMMENTS
I agree with the detailed interval history, physical, and plan, which I have reviewed and edited where appropriate'; also agree with notes/assessment with my team on service.  I have personally examined the patient.  I was physically present for the key portions of the evaluation and management (E/M) service provided.  I reviewed all the pertinent data.  The patient is a critical care patient with life threatening hemodynamic and metabolic instability in SICU.  The SICU team has a constant risk benefit analyzes discussion and coordinating care with the primary team and all consultants.   The patient is in SICU with the chief complaint and diagnosis mentioned in the note.   The plan will be specified in the note.  67y year old Male s/p L TCAR and adrenal insufficiency in SICU  EXAM  NEUROLOGY  Exam:  no focal deficits.   HEENT  Exam: surgical incision C/D/I   RESPIRATORY  Exam: clear  CARDIOVASCULAR  Exam: RR  MUSCULOSKELETAL  Exam: All extremities moving spontaneously     PLAN:  NEUROLOGIC   Tylenol, oxy PRN  RESPIRATORY   -  room air  - Monitor SpO2 goal >92%  CARDIOVASCULAR   -- MAP >65  GASTROINTESTINAL   - Diet: DASH/TLC carb consistent diet  -/RENAL   - flomax daily   - HEMATOLOGIC  - Monitor H/H   - cont. ASA/Plavix   - DVT ppx: SQH & SCD's  INFECTIOUS DISEASE  - Monitor fever / WBC  ENDOCRINE  - Low dose ISS  - Levothyroxine 25 QD  - C/w Hydrocortisone 50mg q6h  DISPO: SICU
I agree with the detailed interval history, physical, and plan, which I have reviewed and edited where appropriate'; also agree with notes/assessment with my team on service.  I have personally examined the patient.  I was physically present for the key portions of the evaluation and management (E/M) service provided.  I reviewed all the pertinent data.  The patient is a critical care patient with life threatening hemodynamic and metabolic instability in SICU.  The SICU team has a constant risk benefit analyzes discussion and coordinating care with the primary team and all consultants.   The patient is in SICU with the chief complaint and diagnosis mentioned in the note.   The plan will be specified in the note.  67y year old Male w/ PMH diabetes, HTN, HLD, hypothyroid, ischemic cardiomyopathy, CAD s/p stents; s/p LEFT TCAR in SICU secondary to hypotension.  EXAM  NEUROLOGY  Exam: no focal deficits.   HEENT  Exam: surgical incision C/D/I   RESPIRATORY  Exam: clear  CARDIOVASCULAR  Exam: RR  MUSCULOSKELETAL  Exam: All extremities moving spontaneously  PLAN:  NEUROLOGIC   -Tylenol, oxy PRN  RESPIRATORY   -room air  CARDIOVASCULAR   - c/w Dalton, wean as tolerated  GASTROINTESTINAL   - Diet: DASH/TLC carb consistent diet  /RENAL   -Monitor electrolytes,   HEMATOLOGIC  ASA/Plavix   - DVT ppx: SQH & SCD's  INFECTIOUS DISEASE  - Monitor fever / WBC  ENDOCRINE  -- Levothyroxine 25 QD  - C/w Hydrocortisone 50mg q6h    DISPO: SICU

## 2023-01-05 NOTE — DISCHARGE NOTE PROVIDER - NSDCCPCAREPLAN_GEN_ALL_CORE_FT
PRINCIPAL DISCHARGE DIAGNOSIS  Diagnosis: Carotid artery stenosis  Assessment and Plan of Treatment: WOUND CARE:  Please keep incisions clean and dry. Please do not Scrub or rub incisions. Do not use lotion or powder on incisions.   BATHING: You may shower and/or sponge bathe. You may use warm soapy water in the shower and rinse, pat dry.   PAIN: You may take over-the-counter medications for pain. You make take Tylenol orally every 6 hours as needed. Do not excessed 4,000mg a day.   MEDICATIONS:  If you have any questions, please call your surgeon's office.  Please continue Aspirin, Plavix and cardiac medications as per your Cardiologist.   ACTIVITY: No heavy lifting or straining. Otherwise, you may return to your usual level of physical activity. If you are taking narcotic pain medication DO NOT drive a car, operate machinery or make important decisions.  DIET: Return to your usual diet.  NOTIFY YOUR SURGEON IF YOU HAVE: any bleeding that does not stop, any pus draining from your wound(s), any fever (over 100.4 F) persistent nausea/vomiting, inability to urinate, or if your pain is not controlled on your discharge pain medications.   FOLLOW UP:  1. Please follow up with your primary care physician in one week regarding your hospitalization, bring copies of your discharge paperwork.  2. Please follow up with your surgeon, Dr. Grissom 1-2 weeks after discharge. Please call the office to schedule the appointment or if you have any questions.

## 2023-01-05 NOTE — DISCHARGE NOTE NURSING/CASE MANAGEMENT/SOCIAL WORK - PATIENT PORTAL LINK FT
You can access the FollowMyHealth Patient Portal offered by [facility] by registering at the following website: [email or web address]. By joining [employer]’s FollowMyHealth portal, you will also be able to view your health information using other applications (apps) compatible with our system.

## 2023-01-05 NOTE — DISCHARGE NOTE PROVIDER - NSDCFUSCHEDAPPT_GEN_ALL_CORE_FT
David Figueroa  [facility] Physician [facility]  CARDIOLOGY [address]   Scheduled Appointment: 01/13/2023    Rafy Carranza  [facility] Physician [facility]  Scheduled Appointment: 01/13/2023    [facility]  Scheduled Appointment: 01/13/2023    [facility]  CATSCAN [address]  Scheduled Appointment: 03/06/2023

## 2023-01-05 NOTE — PROGRESS NOTE ADULT - SUBJECTIVE AND OBJECTIVE BOX
SICU Daily Progress Note  =====================================================  Interval events:   - donovan gtt held   - passed TOV    HPI: 67y year old Male PMH diabetes, HTN, HLD, hypothyroid, ischemic cardiomyopathy, CAD s/p stents x 4 (3/2019) and CABG (1/2008), PCI (06/2022), Stage IVB small cell lung cancer on chemo (last round on 6/7/22), former 30 pack year smoker (quit 15 years ago), esophagitis 2/2 chemotherapy (on steroids @ home) POD#0 s/p LEFT TCAR (1/3). SICU consulted for management of hypotension s/p L TCAR and ongoing pressor requirements.    MEDICATIONS:   --------------------------------------------------------------------------------------  Neurologic Medications  acetaminophen     Tablet .. 975 milliGRAM(s) Oral every 6 hours PRN Mild Pain (1 - 3), Moderate Pain (4 - 6)    Cardiovascular Medications  phenylephrine    Infusion 0.1 MICROgram(s)/kG/Min IV Continuous <Continuous>    Gastrointestinal Medications  dextrose 5%. 1000 milliLiter(s) IV Continuous <Continuous>  dextrose 5%. 1000 milliLiter(s) IV Continuous <Continuous>    Genitourinary Medications  tamsulosin 0.8 milliGRAM(s) Oral at bedtime    Hematologic/Oncologic Medications  aspirin enteric coated 81 milliGRAM(s) Oral daily  clopidogrel Tablet 75 milliGRAM(s) Oral daily  heparin   Injectable 5000 Unit(s) SubCutaneous every 8 hours    Endocrine/Metabolic Medications  atorvastatin 80 milliGRAM(s) Oral at bedtime  dextrose 50% Injectable 25 Gram(s) IV Push once  dextrose 50% Injectable 12.5 Gram(s) IV Push once  dextrose 50% Injectable 25 Gram(s) IV Push once  dextrose Oral Gel 15 Gram(s) Oral once PRN Blood Glucose LESS THAN 70 milliGRAM(s)/deciliter  glucagon  Injectable 1 milliGRAM(s) IntraMuscular once  hydrocortisone sodium succinate Injectable 50 milliGRAM(s) IV Push every 6 hours  insulin lispro (ADMELOG) corrective regimen sliding scale   SubCutaneous Before meals and at bedtime  levothyroxine 25 MICROGram(s) Oral daily    Topical/Other Medications  chlorhexidine 2% Cloths 1 Application(s) Topical daily    --------------------------------------------------------------------------------------    VITAL SIGNS, INS/OUTS (last 24 hours):  --------------------------------------------------------------------------------------  Vital Signs Last 24 Hrs  T(C): 36.9 (05 Jan 2023 00:00), Max: 36.9 (05 Jan 2023 00:00)  T(F): 98.5 (05 Jan 2023 00:00), Max: 98.5 (05 Jan 2023 00:00)  HR: 60 (05 Jan 2023 00:00) (43 - 98)  BP: 97/50 (05 Jan 2023 00:00) (88/49 - 141/58)  BP(mean): 65 (05 Jan 2023 00:00) (61 - 83)  RR: 14 (05 Jan 2023 00:00) (9 - 22)  SpO2: 100% (05 Jan 2023 00:00) (87% - 100%)    Parameters below as of 05 Jan 2023 00:00  Patient On (Oxygen Delivery Method): room air      03 Jan 2023 07:01  -  04 Jan 2023 07:00  --------------------------------------------------------  IN:    Lactated Ringers: 820 mL    Oral Fluid: 100 mL    Phenylephrine: 9.1 mL    Phenylephrine: 53 mL    sodium chloride 0.9%: 300 mL  Total IN: 1282.1 mL    OUT:    Indwelling Catheter - Urethral (mL): 2810 mL  Total OUT: 2810 mL    Total NET: -1527.9 mL      04 Jan 2023 07:01  -  05 Jan 2023 01:05  --------------------------------------------------------  IN:    Lactated Ringers: 500 mL    Oral Fluid: 850 mL    Phenylephrine: 295.4 mL  Total IN: 1645.4 mL    OUT:    Indwelling Catheter - Urethral (mL): 200 mL    Voided (mL): 550 mL  Total OUT: 750 mL    Total NET: 895.4 mL        --------------------------------------------------------------------------------------  EXAM  NEUROLOGY  Exam: Normal, NAD, alert, oriented x3, no focal deficits.     HEENT  Exam: Normocephalic, atraumatic, surgical incision C/D/I     RESPIRATORY  Exam: nonlabored respirations on room air     CARDIOVASCULAR  Exam: RRR, occasional bradycardia & PVCs on monitor     MUSCULOSKELETAL  Exam: All extremities moving spontaneously without limitations.          LABS:  cret                        12.5   10.42 )-----------( 179      ( 04 Jan 2023 00:57 )             38.7     01-04    140  |  105  |  15  ----------------------------<  138<H>  4.2   |  22  |  0.61    Ca    10.5      04 Jan 2023 00:57  Phos  3.1     01-04  Mg     1.90     01-04

## 2023-01-05 NOTE — CHART NOTE - NSCHARTNOTEFT_GEN_A_CORE
Discussed with Cards fellows re: Entresto and Toprol home dosing for patient.  Patient transiently required donovan post operatively.  Immediate post op note from Dr. Bowers recommend to restart Entresto and Toprol at home. Discussion with Cards fellow today re: transient donovan requirements currently recommending to restart home medications with home bp monitoring at home. If SBP <80 to have patient call Dr. Bowers office to adjust medication.     Discussed with patient wife and patient at bedside.  Agreeable to plan.  Patient to be discharged home today.

## 2023-01-05 NOTE — PROGRESS NOTE ADULT - ASSESSMENT
67y year old Male w/ PMH diabetes, HTN, HLD, hypothyroid, ischemic cardiomyopathy, CAD s/p stents x 4 (3/2019) and CABG (1/2008), PCI (06/2022), Stage IVB small cell lung cancer on chemo (last round on 6/7/22), former 30 pack year smoker (quit 15 years ago), esophagitis 2/2 chemotherapy (on steroids @ home) POD#2 s/p LEFT TCAR (1/3). SICU consulted for management of hypotension s/p L TCAR and ongoing pressor requirements.      PLAN:  NEUROLOGIC   - A&O x3  - Pain control: Tylenol, oxy PRN    RESPIRATORY   - Saturating well on room air  - Monitor SpO2 goal >92%    CARDIOVASCULAR   - Monitor hemodynamics   - MAP >65  - c/w Dalton, wean as tolerated    GASTROINTESTINAL   - Diet: DASH/TLC carb consistent diet  - c/w Zofran prn    /RENAL   - flomax daily   - Maintain mahajan catheter, strict Is/Os  - Monitor electrolytes, replete PRN    HEMATOLOGIC  - Monitor H/H   - cont. ASA/Plavix   - DVT ppx: SQH & SCD's    INFECTIOUS DISEASE  - Monitor fever / WBC    ENDOCRINE  - Low dose ISS  - Levothyroxine 25 QD  - C/w Hydrocortisone 50mg q6h  - Hold Prednisone 30 QD (home)    LINES  - A line  - Mahajan  - PIV     DISPO: SICU   67y year old Male w/ PMH diabetes, HTN, HLD, hypothyroid, ischemic cardiomyopathy, CAD s/p stents x 4 (3/2019) and CABG (1/2008), PCI (06/2022), Stage IVB small cell lung cancer on chemo (last round on 6/7/22), former 30 pack year smoker (quit 15 years ago), esophagitis 2/2 chemotherapy (on steroids @ home) POD#2 s/p LEFT TCAR (1/3). SICU consulted for management of hypotension s/p L TCAR and ongoing pressor requirements.      PLAN:  NEUROLOGIC   - A&O x3  - Pain control: Tylenol, d/c oxy    RESPIRATORY   - Saturating well on room air  - Monitor SpO2 goal >92%    CARDIOVASCULAR   - Monitor hemodynamics   - MAP >65  - no donovan since 3 AM    GASTROINTESTINAL   - Diet: DASH/TLC carb consistent diet    /RENAL   - flomax daily   - Diehl removed 1/4/2023  - strict i/o  - Monitor electrolytes, replete PRN    HEMATOLOGIC  - Monitor H/H   - cont. ASA/Plavix   - DVT ppx: SQH & SCD's    INFECTIOUS DISEASE  - Monitor fever / WBC    ENDOCRINE  - Low dose ISS  - Levothyroxine 25 QD  - Started Prednisone 20 QD (home)    LINES  - A line  - PIV     DISPO: discharge home    67y year old Male w/ PMH diabetes, HTN, HLD, hypothyroid, ischemic cardiomyopathy, CAD s/p stents x 4 (3/2019) and CABG (1/2008), PCI (06/2022), Stage IVB small cell lung cancer on chemo (last round on 6/7/22), former 30 pack year smoker (quit 15 years ago), esophagitis 2/2 chemotherapy (on steroids @ home) POD#2 s/p LEFT TCAR (1/3). SICU consulted for management of hypotension s/p L TCAR and ongoing pressor requirements. Patient is stable for discharge home.      PLAN:  NEUROLOGIC   - A&O x3  - Pain control: Tylenol, d/c oxy    RESPIRATORY   - Saturating well on room air  - Monitor SpO2 goal >92%    CARDIOVASCULAR   - Monitor hemodynamics   - MAP >65  - no donovan since 3 AM    GASTROINTESTINAL   - Diet: DASH/TLC carb consistent diet    /RENAL   - flomax daily   - Diehl removed 1/4/2023  - strict i/o  - Monitor electrolytes, replete PRN    HEMATOLOGIC  - Monitor H/H   - cont. ASA/Plavix   - DVT ppx: SQH & SCD's    INFECTIOUS DISEASE  - Monitor fever / WBC    ENDOCRINE  - Low dose ISS  - Levothyroxine 25 QD  - Started Prednisone 20 QD (home)    LINES  - A line  - PIV     DISPO: discharge home

## 2023-01-05 NOTE — PROGRESS NOTE ADULT - SUBJECTIVE AND OBJECTIVE BOX
Surgery Progress Note    INTERVAL EVENTS:   No acute events overnight.    SUBJECTIVE: Patient seen and examined at bedside with surgical team,     OBJECTIVE:    Vital Signs Last 24 Hrs  T(C): 36.9 (05 Jan 2023 00:00), Max: 36.9 (05 Jan 2023 00:00)  T(F): 98.5 (05 Jan 2023 00:00), Max: 98.5 (05 Jan 2023 00:00)  HR: 46 (05 Jan 2023 04:00) (42 - 98)  BP: 123/53 (05 Jan 2023 02:00) (88/49 - 123/53)  BP(mean): 72 (05 Jan 2023 02:00) (55 - 83)  RR: 12 (05 Jan 2023 04:00) (10 - 22)  SpO2: 100% (05 Jan 2023 04:00) (96% - 100%)    Parameters below as of 05 Jan 2023 04:00  Patient On (Oxygen Delivery Method): room air    I&O's Detail    03 Jan 2023 07:01  -  04 Jan 2023 07:00  --------------------------------------------------------  IN:    Lactated Ringers: 820 mL    Oral Fluid: 100 mL    Phenylephrine: 9.1 mL    Phenylephrine: 53 mL    sodium chloride 0.9%: 300 mL  Total IN: 1282.1 mL    OUT:    Indwelling Catheter - Urethral (mL): 2810 mL  Total OUT: 2810 mL    Total NET: -1527.9 mL      04 Jan 2023 07:01  -  05 Jan 2023 06:08  --------------------------------------------------------  IN:    Lactated Ringers: 500 mL    Oral Fluid: 850 mL    Phenylephrine: 295.4 mL  Total IN: 1645.4 mL    OUT:    Indwelling Catheter - Urethral (mL): 200 mL    Voided (mL): 550 mL  Total OUT: 750 mL    Total NET: 895.4 mL      MEDICATIONS  (STANDING):  aspirin enteric coated 81 milliGRAM(s) Oral daily  atorvastatin 80 milliGRAM(s) Oral at bedtime  chlorhexidine 2% Cloths 1 Application(s) Topical daily  clopidogrel Tablet 75 milliGRAM(s) Oral daily  dextrose 5%. 1000 milliLiter(s) (50 mL/Hr) IV Continuous <Continuous>  dextrose 5%. 1000 milliLiter(s) (100 mL/Hr) IV Continuous <Continuous>  dextrose 50% Injectable 25 Gram(s) IV Push once  dextrose 50% Injectable 12.5 Gram(s) IV Push once  dextrose 50% Injectable 25 Gram(s) IV Push once  glucagon  Injectable 1 milliGRAM(s) IntraMuscular once  heparin   Injectable 5000 Unit(s) SubCutaneous every 8 hours  hydrocortisone sodium succinate Injectable 50 milliGRAM(s) IV Push every 6 hours  insulin lispro (ADMELOG) corrective regimen sliding scale   SubCutaneous Before meals and at bedtime  levothyroxine 25 MICROGram(s) Oral daily  phenylephrine    Infusion 0.1 MICROgram(s)/kG/Min (1.77 mL/Hr) IV Continuous <Continuous>  tamsulosin 0.8 milliGRAM(s) Oral at bedtime    MEDICATIONS  (PRN):  acetaminophen     Tablet .. 975 milliGRAM(s) Oral every 6 hours PRN Mild Pain (1 - 3), Moderate Pain (4 - 6)  dextrose Oral Gel 15 Gram(s) Oral once PRN Blood Glucose LESS THAN 70 milliGRAM(s)/deciliter      PHYSICAL EXAM:  Constitutional: NAD  Respiratory: Unlabored breathing  Abdomen: Soft, nondistended, NTTP. No rebound or guarding.  Extremities: WWP, MCNULTY spontaneously    LABS:                        10.2   3.86  )-----------( 110      ( 05 Jan 2023 00:45 )             31.7     01-05    138  |  104  |  23  ----------------------------<  122<H>  3.9   |  26  |  0.56    Ca    10.0      05 Jan 2023 00:45  Phos  3.0     01-05  Mg     1.70     01-05                IMAGING:     Surgery Progress Note    INTERVAL EVENTS:   No acute events overnight.    SUBJECTIVE: Patient seen and examined at bedside with surgical team, no complains. off pressors. Voiding properly.     OBJECTIVE:    Vital Signs Last 24 Hrs  T(C): 36.9 (05 Jan 2023 00:00), Max: 36.9 (05 Jan 2023 00:00)  T(F): 98.5 (05 Jan 2023 00:00), Max: 98.5 (05 Jan 2023 00:00)  HR: 46 (05 Jan 2023 04:00) (42 - 98)  BP: 123/53 (05 Jan 2023 02:00) (88/49 - 123/53)  BP(mean): 72 (05 Jan 2023 02:00) (55 - 83)  RR: 12 (05 Jan 2023 04:00) (10 - 22)  SpO2: 100% (05 Jan 2023 04:00) (96% - 100%)    Parameters below as of 05 Jan 2023 04:00  Patient On (Oxygen Delivery Method): room air    I&O's Detail    03 Jan 2023 07:01  -  04 Jan 2023 07:00  --------------------------------------------------------  IN:    Lactated Ringers: 820 mL    Oral Fluid: 100 mL    Phenylephrine: 9.1 mL    Phenylephrine: 53 mL    sodium chloride 0.9%: 300 mL  Total IN: 1282.1 mL    OUT:    Indwelling Catheter - Urethral (mL): 2810 mL  Total OUT: 2810 mL    Total NET: -1527.9 mL      04 Jan 2023 07:01  -  05 Jan 2023 06:08  --------------------------------------------------------  IN:    Lactated Ringers: 500 mL    Oral Fluid: 850 mL    Phenylephrine: 295.4 mL  Total IN: 1645.4 mL    OUT:    Indwelling Catheter - Urethral (mL): 200 mL    Voided (mL): 550 mL  Total OUT: 750 mL    Total NET: 895.4 mL      MEDICATIONS  (STANDING):  aspirin enteric coated 81 milliGRAM(s) Oral daily  atorvastatin 80 milliGRAM(s) Oral at bedtime  chlorhexidine 2% Cloths 1 Application(s) Topical daily  clopidogrel Tablet 75 milliGRAM(s) Oral daily  dextrose 5%. 1000 milliLiter(s) (50 mL/Hr) IV Continuous <Continuous>  dextrose 5%. 1000 milliLiter(s) (100 mL/Hr) IV Continuous <Continuous>  dextrose 50% Injectable 25 Gram(s) IV Push once  dextrose 50% Injectable 12.5 Gram(s) IV Push once  dextrose 50% Injectable 25 Gram(s) IV Push once  glucagon  Injectable 1 milliGRAM(s) IntraMuscular once  heparin   Injectable 5000 Unit(s) SubCutaneous every 8 hours  hydrocortisone sodium succinate Injectable 50 milliGRAM(s) IV Push every 6 hours  insulin lispro (ADMELOG) corrective regimen sliding scale   SubCutaneous Before meals and at bedtime  levothyroxine 25 MICROGram(s) Oral daily  phenylephrine    Infusion 0.1 MICROgram(s)/kG/Min (1.77 mL/Hr) IV Continuous <Continuous>  tamsulosin 0.8 milliGRAM(s) Oral at bedtime    MEDICATIONS  (PRN):  acetaminophen     Tablet .. 975 milliGRAM(s) Oral every 6 hours PRN Mild Pain (1 - 3), Moderate Pain (4 - 6)  dextrose Oral Gel 15 Gram(s) Oral once PRN Blood Glucose LESS THAN 70 milliGRAM(s)/deciliter      PHYSICAL EXAM:  Constitutional: NAD  Respiratory: Unlabored breathing  Neck: Left surgical site is intact, small ecchymosis around incision. Dressing changed today.  Abdomen: Soft, nondistended, NTTP. No rebound or guarding.  Extremities: WWP, MCNULTY spontaneously  Left groin with c/i/d dressing, no hematoma or ecchymosis     LABS:                        10.2   3.86  )-----------( 110      ( 05 Jan 2023 00:45 )             31.7     01-05    138  |  104  |  23  ----------------------------<  122<H>  3.9   |  26  |  0.56    Ca    10.0      05 Jan 2023 00:45  Phos  3.0     01-05  Mg     1.70     01-05                IMAGING:

## 2023-01-05 NOTE — DISCHARGE NOTE PROVIDER - NSDCMRMEDTOKEN_GEN_ALL_CORE_FT
acetaminophen 325 mg oral tablet: 3 tab(s) orally every 6 hours, As needed, Mild Pain (1 - 3), Moderate Pain (4 - 6)  aspirin 81 mg oral delayed release tablet: 1 tab(s) orally once a day  atorvastatin 80 mg oral tablet: 1 tab(s) orally once a day (at bedtime)  clopidogrel 75 mg oral tablet: 1 tab(s) orally once a day  Entresto 24 mg-26 mg oral tablet: 1 tab(s) orally 2 times a day  Flomax 0.4 mg oral capsule: 2 cap(s) orally once a day am  Jardiance 10 mg oral tablet: 1 tab(s) orally once a day (in the morning)  metoprolol succinate 50 mg oral tablet, extended release: 1 tab(s) orally once a day am  prednisoLONE: 20 milligram(s) orally once a day am  Synthroid 25 mcg (0.025 mg) oral tablet: 1 tab(s) orally once a day am  tamsulosin 0.4 mg oral capsule: 2 cap(s) orally once a day (at bedtime)  Zetia 10 mg oral tablet: 1 tab(s) orally once a day am   acetaminophen 325 mg oral tablet: 3 tab(s) orally every 6 hours, As needed, Mild Pain (1 - 3), Moderate Pain (4 - 6)  aspirin 81 mg oral delayed release tablet: 1 tab(s) orally once a day  clopidogrel 75 mg oral tablet: 1 tab(s) orally once a day  Entresto 24 mg-26 mg oral tablet: 1 tab(s) orally 2 times a day  Jardiance 10 mg oral tablet: 1 tab(s) orally once a day (in the morning)  metoprolol succinate 50 mg oral tablet, extended release: 1 tab(s) orally once a day am  predniSONE 20 mg oral tablet: 1 tab(s) orally once a day  Synthroid 25 mcg (0.025 mg) oral tablet: 1 tab(s) orally once a day am  tamsulosin 0.4 mg oral capsule: 2 cap(s) orally once a day (at bedtime)  Zetia 10 mg oral tablet: 1 tab(s) orally once a day am   acetaminophen 325 mg oral tablet: 3 tab(s) orally every 6 hours, As needed, Mild Pain (1 - 3), Moderate Pain (4 - 6)  aspirin 81 mg oral delayed release tablet: 1 tab(s) orally once a day  clopidogrel 75 mg oral tablet: 1 tab(s) orally once a day  Entresto 24 mg-26 mg oral tablet: 1 tab(s) orally 2 times a day  Jardiance 10 mg oral tablet: 1 tab(s) orally once a day (in the morning)  metoprolol succinate 50 mg oral tablet, extended release: 1 tab(s) orally once a day am  predniSONE 20 mg oral tablet: 1 tab(s) orally once a day  rosuvastatin 40 mg oral tablet: 1 tab(s) orally once a day  Synthroid 25 mcg (0.025 mg) oral tablet: 1 tab(s) orally once a day am  tamsulosin 0.4 mg oral capsule: 2 cap(s) orally once a day (at bedtime)  Zetia 10 mg oral tablet: 1 tab(s) orally once a day am

## 2023-01-05 NOTE — PROGRESS NOTE ADULT - ASSESSMENT
67M w/ PMHx DM, HTN, HLD, hypothyroid, ischemic cardiomyopathy, CAD s/p stents x 4 (3/2019) and CABG (1/2008), PCI (06/2022), Stage IVB small cell lung cancer on chemo (last round on 6/7/22), former 30 pack year smoker (quit 15 years ago), esophagitis 2/2 chemotherapy (on streoids @ home) now s/p LEFT TCAR (1/3). SICU consult due to pressor requirement. 1/5 off pressors.     PLAN:   - Pain control as needed  - Neurological checks  - s/p stress dose steroids overnight; c/w q6h steroid dosing with eventual conversion to home dose.  - SBP goal 110 - 160 or MAP <65  - Monitor I/Os  - c/w ASA, plavix  - DVT prophylaxis - SQH  - Discharge home        C Team / Vascular [facility]   [id number] 67M w/ PMHx DM, HTN, HLD, hypothyroid, ischemic cardiomyopathy, CAD s/p stents x 4 (3/2019) and CABG (1/2008), PCI (06/2022), Stage IVB small cell lung cancer on chemo (last round on 6/7/22), former 30 pack year smoker (quit 15 years ago), esophagitis 2/2 chemotherapy (on streoids @ home) now s/p LEFT TCAR (1/3). SICU consult due to pressor requirement. 1/5 off pressors.     PLAN:   - Pain control as needed  - Neurological checks  - s/p stress dose steroids overnight; c/w q6h steroid dosing with eventual conversion to home dose.  - SBP goal 110 - 160 or MAP <65  - Monitor I/Os  - c/w ASA, plavix  - DVT prophylaxis - SQH  - Discharge home with plavix and aspirin         C Team / Vascular Surgery.   [id number]

## 2023-01-05 NOTE — DISCHARGE NOTE PROVIDER - HOSPITAL COURSE
67y year old Male PMH diabetes, HTN, HLD, hypothyroid, ischemic cardiomyopathy, CAD s/p stents x 4 (3/2019) and CABG (1/2008), PCI (06/2022), Stage IVB small cell lung cancer on chemo (last round on 6/7/22), former 30 pack year smoker (quit 15 years ago), esophagitis 2/2 chemotherapy (on steroids @ home) POD#0 s/p LEFT TCAR (1/3). SICU consulted for management of hypotension s/p L TCAR and ongoing pressor requirements. Patient tolerated operation well and there were no post operative complications identified.  Patient was transferred to SICU 2/2 pressor requirements.  The patient had daily wound care and was seen by physical therapy which recommended discharge to home/rehab. The patient's pain was controlled by PRN PO pain medications. The patient was advanced to a regular diet and tolerated it well. The patient was placed on home medications.     1/3/23- OR for L TCAR.  SICU consult for hypotension and pressor requirements.  Cardiology consulted for BP.      At the time of discharge, the patient was hemodynamically stable, was tolerating PO diet, was voiding urine and passing stool, was ambulating, and was comfortable with adequate pain control. The patient was instructed to follow up with  _ within _ weeks after discharge from the hospital. The patient/family felt comfortable with discharge. The patient was discharged home with a prescription for _. The patient had no other issues.      67y year old Male PMH diabetes, HTN, HLD, hypothyroid, ischemic cardiomyopathy, CAD s/p stents x 4 (3/2019) and CABG (1/2008), PCI (06/2022), Stage IVB small cell lung cancer on chemo (last round on 6/7/22), former 30 pack year smoker (quit 15 years ago), esophagitis 2/2 chemotherapy (on steroids @ home) POD#0 s/p LEFT TCAR (1/3). SICU consulted for management of hypotension s/p L TCAR and ongoing pressor requirements. Patient tolerated operation well and there were no post operative complications identified.  Patient was transferred to SICU 2/2 pressor requirements.  The patient had daily wound care and was seen by physical therapy which recommended discharge to home/rehab. The patient's pain was controlled by PRN PO pain medications. The patient was advanced to a regular diet and tolerated it well. The patient was placed on home medications.     1/3/23- OR for L TCAR.  SICU consult for hypotension and pressor requirements.        At the time of discharge, the patient was hemodynamically stable, was tolerating PO diet, was voiding urine and passing stool, was ambulating, and was comfortable with adequate pain control. The patient was instructed to follow up with  _ within _ weeks after discharge from the hospital. The patient/family felt comfortable with discharge. The patient was discharged home with a prescription for _. The patient had no other issues.      67y year old Male PMH diabetes, HTN, HLD, hypothyroid, ischemic cardiomyopathy, CAD s/p stents x 4 (3/2019) and CABG (1/2008), PCI (06/2022), Stage IVB small cell lung cancer on chemo (last round on 6/7/22), former 30 pack year smoker (quit 15 years ago), esophagitis 2/2 chemotherapy (on steroids @ home) POD#0 s/p LEFT TCAR (1/3). SICU consulted for management of hypotension s/p L TCAR and ongoing pressor requirements. Patient tolerated operation well and there were no post operative complications identified.  Patient was transferred to SICU 2/2 pressor requirements.  The patient had daily wound care and was seen by physical therapy which recommended discharge to home/rehab. The patient's pain was controlled by PRN PO pain medications. The patient was advanced to a regular diet and tolerated it well. The patient was placed on home medications.     1/3/23- OR for L TCAR.  SICU consult for hypotension and pressor requirements.        At the time of discharge, the patient was hemodynamically stable, was tolerating PO diet, was voiding urine and passing stool, was ambulating, and was comfortable with adequate pain control. The patient was instructed to follow up with Dr. Grissom within 1-2 weeks after discharge from the hospital. The patient/family felt comfortable with discharge. The patient had no other issues.      67y year old Male PMH diabetes, HTN, HLD, hypothyroid, ischemic cardiomyopathy, CAD s/p stents x 4 (3/2019) and CABG (1/2008), PCI (06/2022), Stage IVB small cell lung cancer on chemo (last round on 6/7/22), former 30 pack year smoker (quit 15 years ago), esophagitis 2/2 chemotherapy (on steroids @ home) POD#0 s/p LEFT TCAR (1/3). SICU consulted for management of hypotension s/p L TCAR and ongoing pressor requirements. Patient tolerated operation well and there were no post operative complications identified.  Patient was transferred to SICU 2/2 pressor requirements.  The patient had daily wound care and was seen by physical therapy which recommended discharge to home/rehab. The patient's pain was controlled by PRN PO pain medications. The patient was advanced to a regular diet and tolerated it well. The patient was placed on home medications.     1/3/23- OR for L TCAR.  SICU consult for hypotension and pressor requirements.   1/5/23- Off of pressors        At the time of discharge, the patient was hemodynamically stable, was tolerating PO diet, was voiding urine and passing stool, was ambulating, and was comfortable with adequate pain control. The patient was instructed to follow up with Dr. Grissom within 1-2 weeks after discharge from the hospital. The patient/family felt comfortable with discharge. The patient had no other issues.      67y year old Male PMH diabetes, HTN, HLD, hypothyroid, ischemic cardiomyopathy, CAD s/p stents x 4 (3/2019) and CABG (1/2008), PCI (06/2022), Stage IVB small cell lung cancer on chemo (last round on 6/7/22), former 30 pack year smoker (quit 15 years ago), esophagitis 2/2 chemotherapy (on steroids @ home) POD#0 s/p LEFT TCAR (1/3). SICU consulted for management of hypotension s/p L TCAR and ongoing pressor requirements. Patient tolerated operation well and there were no post operative complications identified.  Patient was transferred to SICU 2/2 pressor requirements.  The patient had daily wound care and was seen by physical therapy which recommended discharge to home/rehab. The patient's pain was controlled by PRN PO pain medications. The patient was advanced to a regular diet and tolerated it well. The patient was placed on home medications.     1/3/23- OR for L TCAR.  SICU consult for hypotension and pressor requirements.   1/5/23- Off of pressors    cardiology recommended to restart Entresto and Metoprolol outpatient.      At the time of discharge, the patient was hemodynamically stable, was tolerating PO diet, was voiding urine and passing stool, was ambulating, and was comfortable with adequate pain control. The patient was instructed to follow up with Dr. Grissom within 1-2 weeks after discharge from the hospital. The patient/family felt comfortable with discharge. The patient had no other issues.

## 2023-01-05 NOTE — DISCHARGE NOTE PROVIDER - CARE PROVIDER_API CALL
Sukhdeep Grissom)  Surgery; Vascular Surgery  [phone] [place] [address]  Phone: [phone]  Fax: [phone]  Follow Up Time: 2 weeks

## 2023-01-05 NOTE — DISCHARGE NOTE NURSING/CASE MANAGEMENT/SOCIAL WORK - NSDCPEFALRISK_GEN_ALL_CORE
For information on Fall & Injury Prevention, visit: [email or web address] OR  [email or web address] OR  https://www.cdc.gov/steadi/patient.html

## 2023-01-09 ENCOUNTER — OUTPATIENT (OUTPATIENT)
Dept: OUTPATIENT SERVICES | Facility: HOSPITAL | Age: 68
LOS: 1 days | Discharge: HOME | End: 2023-01-09
Payer: COMMERCIAL

## 2023-01-09 DIAGNOSIS — Z98.890 OTHER SPECIFIED POSTPROCEDURAL STATES: Chronic | ICD-10-CM

## 2023-01-09 DIAGNOSIS — C34.90 MALIGNANT NEOPLASM OF UNSPECIFIED PART OF UNSPECIFIED BRONCHUS OR LUNG: ICD-10-CM

## 2023-01-09 DIAGNOSIS — Z95.5 PRESENCE OF CORONARY ANGIOPLASTY IMPLANT AND GRAFT: Chronic | ICD-10-CM

## 2023-01-09 DIAGNOSIS — Z95.1 PRESENCE OF AORTOCORONARY BYPASS GRAFT: Chronic | ICD-10-CM

## 2023-01-13 NOTE — PHYSICAL EXAM
[Restricted in physically strenuous activity but ambulatory and able to carry out work of a light or sedentary nature] : Status 1- Restricted in physically strenuous activity but ambulatory and able to carry out work of a light or sedentary nature, e.g., light house work, office work [Normal] : affect appropriate [de-identified] : No icterus  [de-identified] : MMM O/P Clear, No visible mucositis or erythema [de-identified] : Supple No LAD  [de-identified] : Clear  [de-identified] : S1 S2  [de-identified] : No edema  [de-identified] : No spine/CVA tenderness [de-identified] : Mediport in place

## 2023-01-13 NOTE — HISTORY OF PRESENT ILLNESS
[Disease: _____________________] : Disease: [unfilled] [AJCC Stage: ____] : AJCC Stage: [unfilled] [de-identified] : The patient is a former smoker with a history of CAD who developed chest pain in March 2019 and had a cardiac stent placed. When his chest pain persisted, he was sent for a CT scan of his chest in late March 2019 that revealed a left upper lobe lung mass with evidence of metastatic disease. 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 CT scan revealed evidence of extensive bony and liver metastases. MRI of his brain was negative for brain metastases however there was extensive calvarial and T-spine metastases with possible evidence of epidural involvement. He was treated in [place] by an outside medical oncologist and started on carboplatin, etoposide and atezolizumab in early April 2019. He received 4 cycles of triplet combination therapy followed by immunotherapy maintenance. A restaging PET/CT scan in June 2019 revealed a dramatic response. He moved from [place] and transferred his care to [facility].  \par Treated with immunotherapy maintenance from June 2019 through May 2021 with sustained response.  \par Developed severe dysphagia and odynophagia.  He was treated for H-pylori and symptoms continued; H Pylori stool Ag was negative.  PET/CT with findings consistent with esophagitis/gastritis.  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  Restaging PET/CT March 2022 with disease progression.  Started chemotherapy with Carboplatin/Etoposide in March 2022; Durvalumab was added with C4. Patient completed C5 with Carbo (retreat)/Etoposide/Durvalumab in early June 2022.  Hospitalized with STEMI in June 2022.  Esophagitis symptoms returned in June 2022.  Upon clinical improvement with restarting steroids, durvalumab was continued. [de-identified] : -Lung, left hilar FNA 3/29/19: Positive for malignant cells. Poorly differentiated neuroendocrine carcinoma. IHC is positive for MCK, TTF-1 and synaptophysin while negative for desmoglein3, P40, NapsinA, CD20, PAX5, CD3.  Ki-67 is markedly high. [de-identified] : *Wife provided translation where needed\par Patient's most recent treatment with durvalumab was on 10/21/2022.\par Treatment was held in November 2022 following a restaging PET/CT revealing new bilateral opacities concerning for pneumonitis.\par Patient's case was reviewed at thoracic tumor board in late November 2022 and the impression was that the opacities noted on scan did not look like the typical ICI induced pneumonitis and had more of an infectious/inflammatory appearance.  The recommendation was to treat with antibiotics and perform a short-term follow-up scan.  Consolidative thoracic RT was felt to be a possibility if the ultimate impression was negative for ICI induced pneumonitis.\par Patient was treated with empiric antibiotics for pneumonia.  He has been following closely with pulmonary throughout the month of December.  He developed COVID-19 infection in late November 2022 that was medically managed by pulmonary.  He was sent for short-term follow-up CT in early December 2022 as outlined below.\par He was developing side effects to Brilinta including worsening CASH and tachycardia and his antiplatelet therapy was changed back to Plavix in December 2022.\par He was cleared by cardiology and underwent successful TCAR procedure on 1/3/23.  \par \par At today's visit, he reports overall feeling well.  He continues with periodic esophagitis symptoms.  He is currently on prednisone 20 mg daily.  He has been unable to titrate below this dose; when he tries 10 mg daily, the esophagitis symptoms worsen after 2-3 days.\par Patient has been on omeprazole for GI prophylaxis while on prolonged steroids.  They were notified by pharmacist that there could be an interaction with the Plavix and therefore this was stopped.  Patient was started on famotidine in the interim; wife is very concerned about potential side effects of famotidine including depression and arrhythmia and requests a prescription for pantoprazole.  This seems reasonable.\par No significant dyspnea.  Patient is in good spirits.\par Reviewed his clinical course, decision making and rationale thus far.\par Discussed the potential for resuming the durvalumab given the impression that he does not have ICI induced pneumonitis.  Patient wishes to resume the durvalumab I will also seeing radiation oncology to consider possible thoracic RT.\par \par CT chest from 12/5/2022 reports unchanged paramediastinal DAGO lesion since November PET/CT which is known to be decreased since May.  No new disease in the chest.  No pneumonia.

## 2023-01-13 NOTE — RESULTS/DATA
[FreeTextEntry1] : -CT chest 3/25/19: Infiltrative left hilar mass extending to the left upper lobe and left mediastinum compatible with neoplasm with encasement of the pulmonary artery and left upper lobe bronchial structures with collapse of the left upper lobe. Lytic lesions in the upper thoracic spine. Innumerable hypodense masses throughout the liver compatible with metastases. Small left pleural effusion.\par \par -CT C/A/P 4/5/19:  Large mass in the left hilum extending into the left upper lobe consistent with bronchogenic malignancy with possible metastatic lymphadenopathy. Significant compression of the left lobe pulmonary artery with obstruction of the left upper lobe bronchus with resultant atelectasis. Innumerable lesions throughout the liver. Upper abdominal lymphadenopathy. Bony metastases of the spine, pelvis and bilateral hips.\par \par -MRI brain/T-spine in 3/28/19: Negative for metastatic disease involving the brain. Of the central canal at T9-T10 concerning for epidural metastasis.\par \par -Nuclear medicine bone scan 4/5/19: Focal uptake in one of the right lower lateral ribs, possibly the 10th rib, likely represents a small fracture. Metastatic disease is less likely. No additional areas of abnormal uptake are noted.\par \par -PET/CT 6/26/19: Markedly improved positive treatment response. The previously seen a large left-sided upper lobe lung mass with hilar extension is markedly reduced in size with mild or residual ill-defined soft tissue density at the left hilar region now demonstrating only partial invasion of the left upper lobe bronchus. No separate mediastinal lymphadenopathy is noted. Markedly improved appearance of both the numerous metastatic liver lesions and hepatomegaly. Interval resolution of previously seen upper abdominal lymphadenopathy. No residual active osseous metastatic disease.\par \par -PET/CT 10/17/19:  FDG-PET/CT scan demonstrates: \par 1. Minimally FDG-avid, small, difficult to delineate left upper lobe/perihilar soft tissue, unchanged as compared to prior study dated 6/25/2019. Small focus of residual disease is not excluded. \par 2. Non-FDG-avid low-attenuation density in pretracheal cricoid region, unchanged. \par 3. Remainder of study is unremarkable, demonstrating no evidence of FDG-avid disease. \par \par -MRI Brain 10/20/19: No evidence of metastasis\par \par -PET/CT 1/15/20:  FDG-PET/CT scan demonstrates: \par 1. New, indeterminate linear focus of mild FDG activity in left suprahilar/paramediastinal region (SUV 4.1). \par 2. Minimally FDG-avid, small, difficult to delineate left upper lobe/perihilar soft tissue, unchanged as compared to prior study dated 6/25/2019. Small focus of residual disease is not excluded. \par 3. Remainder of study is unchanged, demonstrating no evidence of FDG-avid disease. \par \par -CT Abdomen/Pelvis 2/19/20:  No evidence of focal bowel wall thickening or distention.  Right inguinal hernia \par \par -CT Chest 4/8/20: Stable disease\par \par -MRI Brain 3/5/20: New opacification involving the right mastoid and middle ear region as described above, otherwise no significant change when allowing for differences in technique. \par \par -PET/CT 6/25/20:  Compared to FDG-PET/CT scan dated 1/15/2020: \par 1. Focus of mild FDG activity in left upper lobe paramediastinal region is unchanged. This may reflect posttreatment change. \par 2. Non-FDG-avid 7 mm nodular density within posterior left upper lobe, also unchanged. \par 3. Nonspecific hypermetabolism in distal esophagus and fundus/body of stomach, mildly decreased. Correlate clinically for esophagitis/gastritis and with endoscopy, as indicated. \par 4. Remainder of study is unchanged, demonstrating no evidence of FDG-avid recurrent or metastatic disease. \par \par -PET/CT 9/1/20:  Compared to FDG-PET/CT scan dated 6/25/2020: \par 1. Resolution of focus of mild FDG activity in left upper lobe paramediastinal region. \par 2. Non-FDG-avid 7 mm nodular density within posterior left upper lobe, unchanged. \par 3. Nonspecific hypermetabolism in distal esophagus and fundus/body, unchanged. \par 4. No evidence of FDG-avid recurrent or metastatic disease. \par \par -MRI Brain 12/14/20:  No evidence acute hemorrhage, mass mass effect or abnormal enhancement seen.  Improved aeration of the right mastoid and middle ear region.\par \par -PET/CT 12/14/20:  Compared to FDG-PET/CT scan on 9/1/2020:\par 1. Nonspecific minimal increased FDG avidity in the gastric fundus/body. Please correlate clinically or with endoscopy as indicated.\par 2. Non-FDG avid 7 mm nodular density within the posterior left upper lobe, unchanged.\par 3. New focal cutaneous FDG avidity along the left lower aspect of the face, probably focal inflammation. Please correlate with physical examination.\par 4. Nonspecific new focal mild hypermetabolism in the anterior aspect of the hyoid bone just to the right of the midline without CT correlate. Unchanged non-FDG avid low-attenuation density in the precricoid region. Please correlate clinically.\par \par -PET/CT 3/5/21:  Compared to FDG-PET/CT scan dated 12/14/2020:\par 1. Few new FDG avid left axillary lymph nodes are nonspecific. Suggest correlation with ultrasound and possible percutaneous FNA biopsy as indicated.\par 2. Nonspecific FDG avidity in mid to distal esophagus, gastric fundus/body, slightly more prominent, with new FDG avidity in the pylorus. Please correlate clinically or with endoscopy as indicated.\par 3. Nonspecific non-FDG avid groundglass opacity in the anterior right upper lobe, more conspicuous.\par 4. Interval resolution of focal mild hypermetabolism in the anterior aspect of the hyoid bone and focal cutaneous FDG avidity in the left lower face. Unchanged non-FDG avid low-attenuation density in the cricoarytenoid region.\par \par -Esophagram: Small hiatal hernia without demonstrated gastroesophageal reflux. There is esophagoesophageal reflux.\par \par -Pet/CT 6/20/21:  Compared to FDG-PET/CT scan dated 3/5/2021:\par 1. Interval resolution of a few mildly FDG avid left axillary lymph nodes and small mildly FDG avid foci in bilateral hilar regions.\par 2. Nonspecific FDG avidity in mid to distal esophagus, gastric boy and pylorus, unchanged. Please correlate clinically or with endoscopy as indicated.\par 3. Unchanged nonspecific non-FDG avid groundglass opacity in the anterior right upper lobe.\par 4. Non-FDG avid low-attenuation density in the pretracheal region, decreased in size.\par \par -MRI Brain 6/21/21:  There is no intraparenchymal hematoma, mass effect or midline shift.  No evidence of intracranial metastatic disease. Slight interval increase in density within the right mastoid and middle ear regions when compared most recent prior examination.\par \par -PET/CT 9/13/21:  Compared to FDG-PET/CT scan dated 6/20/2021:\par 1. New cluster of FDG-avid nodules in left upper lobe. Primary consideration is infection/mucoid impacted distal airways. Please correlate clinically and follow-up with dedicated CT of chest in 1 month.\par 2. Nonspecific FDG avidity in mid and distal esophagus, unchanged. Resolution of gastric hypermetabolism.\par 3. Nonspecific, non-FDG avid right upper lobe groundglass opacity, unchanged.\par 4. Non-FDG avid low-attenuation density in the pretracheal region, unchanged.\par \par -MRI Brain 12/1/21:  No significant change when allowing for differences in technique.\par \par -PET/CT 12/6/21:  Compared to FDG-PET/CT scan dated 6/20/2021:\par 1. Mildly FDG-avid cluster of nodules in left upper lobe, unchanged on CT, and decreased in metabolism. An infectious/inflammatory etiology is favored. Please correlate clinically. A dedicated CT of chest may be obtained for further characterization.\par 2. Nonspecific FDG avidity in mid and distal esophagus, unchanged.\par 3. Nonspecific, non-FDG avid right upper lobe groundglass opacity, unchanged.\par 4. Non-FDG avid low-attenuation density in the pretracheal region, unchanged.\par \par -PET/CT 3/7/22:  Compared to FDG-PET/CT scan dated 12/6/2021:\par 1. FDG-avid, centrally photopenic left upper lobe lung mass and adjacent FDG-avid nodules, markedly increased in size and metabolism as compared to prior study, are compatible with recurrent disease.\par 2. Nonspecific FDG avidity in mid and distal esophagus, unchanged.\par 3. Nonspecific, non-FDG avid right upper lobe groundglass opacity, unchanged.\par 4. Non-FDG avid low-attenuation density in the pretracheal region, unchanged.\par \par -Brain MRI 3/30/22: \par 1. Age appropriate involutional changes.\par 2. No abnormal intraparenchymal or leptomeningeal enhancement. No evidence of intracranial metastases or leptomeningeal carcinomatosis.\par 3. Presence of fluid again appreciated within the right-sided mastoid air cells, slightly increased compared with prior. Correlate clinically for mastoiditis.\par \par –CT Chest 5/9/22:  In comparison with 3/7/2022, interval decrease of left upper lobe mass. No new or enlarging nodule.\par \par -CT Head 6/14/22: No evidence of acute hemorrhage, mass or mass effect.\par \par –Cardiac MRI 7/26/22:  At the base and midcavity, the combination of subendocardial late gadolinium enhancement, myocardial wall thinning and wall motion abnormalities are most likely secondary to prior myocardial infarction rather than myocarditis.\par \par -PET/CT 8/15/22:  Compared with FDG PET/CT scan 3/7/2022 as well as additional correlations as above:\par 1. Since prior PET/CT, LEFT UPPER lobe lung mass shows marked decrease in activity, with serial anatomic improvement on most recent chest CT as well as the current study.\par 2. Esophageal uptake stable, likely related to patient's reported prior immunotherapy-related esophagitis.\par 3. Nonspecific, non-FDG avid right upper lobe groundglass opacity, unchanged.\par 4. Non-FDG avid low-attenuation density in the pretracheal region, unchanged.\par 5. No NEW suspicious findings otherwise noted.\par \par –CT Neck Angio 10/16/22:  \par 1. Severe stenosis/near occlusion of the left proximal cervical ICA.\par 2. A 1.3 x 1.1 x 1.1 cm soft tissue attenuation lesion in the midline anterior neck abutting the strap muscles and the inferior aspect of the hyoid bone. Recommend CT neck with contrast for further evaluation.\par 3. Redemonstration of a left upper lobe pulmonary mass.\par \par -PET/CT 11/14/22:  Since prior FDG-PET/CT scan 8/15/2022:\par 1. Multiple NEW ill-defined BILATERAL hypermetabolic pulmonary parenchymal changes. These are suspicious for inflammatory changes and may be related to immunotherapy. There is also a nonspecific, non-FDG avid right upper lobe groundglass opacity, unchanged.\par 2. Left UPPER lobe lung mass shows stable size and activity.\par 3. Esophageal uptake stable, likely related to patient's reported prior immunotherapy-related esophagitis.\par 4. Non-FDG avid low-attenuation density in the pretracheal region, unchanged. This has been seen on multiple prior PET and CT exams.\par 5. No NEW suspicious findings otherwise noted.\par \par Images Reviewed/Interpreted:\par \par –CT Chest 12/5/22:  No pneumonia.  Unchanged paramediastinal left upper lobe lesion since recent November 2022 PET/CT, and decreased since May 2022 chest CT. No new disease in the chest.  \par \par

## 2023-01-18 NOTE — PHYSICAL EXAM
[Normal] : normal conjunctiva [Normal Venous Pressure] : normal venous pressure [Normal S1, S2] : normal S1, S2 [No Murmur] : no murmur [No Rub] : no rub [No Gallop] : no gallop [Clear Lung Fields] : clear lung fields [Good Air Entry] : good air entry [No Respiratory Distress] : no respiratory distress  [Soft] : abdomen soft [Normal Gait] : normal gait [Gait - Sufficient for Exercise Testing] : gait - sufficient for exercise testing [No Edema] : no edema [No Cyanosis] : no cyanosis [No Clubbing] : no clubbing [No Rash] : no rash [Moves all extremities] : moves all extremities [No Focal Deficits] : no focal deficits [Alert and Oriented] : alert and oriented [de-identified] : left supraclavivular TECAR incision healing, c/d and free of evident infection

## 2023-01-18 NOTE — ASSESSMENT
[FreeTextEntry1] : 67 year old man with multivessel coronary disease with occluded bypass grafts, seen initially after acute inferior STEMI while on PD1- inhibitor immunotherapy. Immune-related adverse event less likely in setting of patient with prior tolerance to immunotherapy for over a year. Increased atherosclerotic plaque progression is noted in the setting of immunotherapy, and in the setting of underlying disease may contribute, but not a contraindication to therapy if metastatic SCC is responding. Cardiac MRI no evidence of myocarditis and troponin I and T both negative.\par \par Symptomatically improved on maximal medical therapy for CAD and ischemic cardiomyopathy. \par \par \par # 3 Vessel CAD:\par - continue rosuvastatin 40\par - continue ezetimibe 10\par - Continue Entresto 24/26 BID, will not increase further given borderline BP\par - aspirin and clopidogrel given recent STEMI and GORGE and carotid endarterectomy (ticagrelor caused dyspnea)\par - would continue DAPT given extent of CAD and multiple prior interventions and risk factors.\par - Toprol 25 mg daily as tolerated\par - LDL at goal\par \par # HF with reduced EF due to ischemic cardiomyopathy, LVEF improved to 49 %\par - Entresto 24/26 BID\par - empaglifozin 10 mg daily\par - Toprol 25 mg daily, will aim to increase if possible.\par \par # PVCs, due to ischemic cardiomyopathy: ECG NSR today.\par - continue Toprol\par \par # Carotid atherosclerosis: s/p TECAR 1/3/2023.\par - to follow up with Dr. Grissom as scheduled\par - Dr. Ibarra-Hortencia Intervention/peripheral vascular interventions\par \par Follow up in 6 months with me. Will call me if new symptoms, dyspnea, chest pain, in view of residual CAD.\par \par Above recommendations discussed with the patient and his wife and all questions were answered to the best of my ability and to their apparent satisfaction. Advised to call with any question or concern.\par

## 2023-01-18 NOTE — REVIEW OF SYSTEMS
[Heartburn] : heartburn [Dysphagia] : dysphagia [Negative] : Constitutional [FreeTextEntry2] :  systolic seated and standing - no postural change

## 2023-01-18 NOTE — HISTORY OF PRESENT ILLNESS
[FreeTextEntry1] : Interval History:\par Had TECAR on 1/3/2023 is recovering well post-op.\par Denies shortness of breath, chest pain. Swimming in local pool and feels well overall.\par She decreased the dose of metoprolol to half a tablet daily because of low blood pressure, otherwise medications are unchanged.\par Resumed durvalumab 1/13/23. To see Dr. Borges for consideration of local radiation therapy given immunotherapy related adverse events (esophagitis, etc).\par \par \par \par \par History:\par After second cycle of durvalumab (6/9/22), developed inferior STEMI. He presented to the ED on 6/14/2022 with acute retrosternal chest pain which began while watching TV at home.  EKG showed inferior STEMI. LHC with 100 % mCx at prior intervention site, treated with GORGE. About 2 weeks prior had similar episode of chest pain, which resolved with ibuprofen, but this time the pain persisted.\par \par Echo showed LVEF 45%, moderate-severe MR, hypokinesis of inferolateral wall and basal inferior wall, RV enlargement with decreased RVSF, and moderate pulmonary hypertension. Started on medical therapy and dismissed 6/16/2022. Troponin T peaked at 3210 ng/L and was 3123 ng/L on 6/16.\par \par Had issues with postural dizziness in the past.\par \par Since return home, he wife (who is a physician) notes pulse rate is at time irregular. The patient endorses dyspnea after a few steps, such as when using the bathroom at home. He denies any chest pain.\par \par Previously followed for many years by Dr. Misha Casas. \par \par 8/5/2022:\par cMRI no evidence of myocarditis\par L ICA with  moderate to severe calcified plaque\par Resumed duvalumab 7/22\par \par 9/30/2022:\par Dyspnea improved after Jardiance and increased metoprolol.\par Seen by Dr. Valenzuela for advice on carotid and coronary disease\par Returned from [place] and feeling overall well, denies chest pain, sometimes dizziness after standing up quickly. Dry skin on palms.\par Remains on maintenance durvalumab, with prednisone because of immune checkpoint inhibitor related esophagitis\par \par 11/18/2022:\sarbjit Had severe LICA stenosis and was referred to Dr. Grissom for TECAR because of high risk for transfemoral stenting or surgical endarterectomy. TECAR scheduled December 6th and he comes for pre-operative clearance today. The most recent PET/CT demonstrated inflammatory changes in the lungs, possibly immunotherapy related pneumonitis, so his scheduled treatment was cancelled today and he will be treated with antibiotics empirically.\par \par He complains of significant fatigue, but denies any chest pain, shortness of breath or changes in his functional capacity. He is able to walk and complete > 4 METS. He is adherent to his medications. He also remains on corticosteroids for ICI related esophagitis, which he is currently tapering.\par \par Cardiovascular Testing:\par ---------------------------------------\par ECG:\par 1/18/2023: NSR 76 bpm, LAD, inferior/posterior infarct (no change from prior)\par 9/30/2022: sinus 65 bpm, inferior/posterior infarct (no change from prior)\par 7/1/2022: sinus 75 bpm with PVCs, inferior/posterior infarct\par 9/20/2021: sinus, minimal inferior Q wave, anterior TWI\par ---------------------------------------\par Echo:\par 10/3/2022: LVEF 49 %, mild MR, GLS -15.9\par 7/11/2022: EF 42 %, moderate MR, basal inferior and inferolateral hypokinesis\par 6/14/2022: EF 45 %, Moderate-severe MR, mild AI, mild segmental LV systolic dysfunction (inferolateral and basal inferior), moderate pulmonary HTN\par 5/14/2020: LVEF 65%\par ---------------------------------------\par Stress:\par 10/7/2021: MPI no ischemia\par ---------------------------------------\par Cath/PCI\par 6/14/2022: PCI to mLCX ISR with GORGE\par 3/18/2019: GORGE x2 to LCx, GORGE to LAD, GORGE to Ramus (staged PCI)\par 1/16/2008: CABG ([facility] LIMA-LAD, DONTRELL-Ramus, SVG-RCA)\par 2007: BMS for chest pain with ISR\par ---------------------------------------\par Vascular:\par 1/3/2023: L TECAR (Dr. Grissom)\par 10/16/2022: CT-A neck: severe stenosis and occlusion of L prox cervical ICA\par 7/11/2022: moderate to severe calcified plaque in L ICA, > 70 %, R ICA 16-49 %\par 5/4/2020: moderate heterogeneous plaque left carotid 50-69 %\par --------------------------------------\par cMRI:\par 7/26/2022: base and mid-cavity subendocardial LGE compatible with prior MI

## 2023-01-19 NOTE — PHYSICAL EXAM
[Calm] : calm [de-identified] : NAD [de-identified] : well healed L TCAR incision [de-identified] : unlabored [de-identified] : dark purple ecchymosis on lower abdomen and groin, non-tender [de-identified] : Well healed L groin incision.  [de-identified] : grossly normal

## 2023-01-19 NOTE — HISTORY OF PRESENT ILLNESS
[FreeTextEntry1] : EDUIN SANCHEZ (: May 14 1955) is a 67 year old male who presents today for post-op visit. \par \par He is s/p L TCAR (1/3/2023) for asymptomatic high grade stenosis. Post op course noted for hypotension requiring IV pressor and SICU monitoring. Patient was discharged home on POD 3. \par \par No prior history of stroke or TIA. \par Compliant with plavix and aspirin (history of dyspnea 2/2 brilinta)\par Denies focal neurologic deficits including amaurosis fugax, slurred speech, and weakness in the arms/legs. \par \par Today the patient reports feeling well. Denies fever, chills, pain, swelling. C/O bruising on lower abdomen and R arm, no pain and getting better. \par \par PMH: CAD/MI s/p stents and CABG, HLD, lung CA on immunotherapy, hypothyroidism, chronic esophagitis, former smoker

## 2023-02-06 NOTE — PHYSICAL EXAM
[Sclera] : the sclera and conjunctiva were normal [Extraocular Movements] : extraocular movements were intact [Hearing Threshold Finger Rub Not [place]] : hearing was normal [Outer Ear] : the ears and nose were normal in appearance [Examination Of The Oral Cavity] : the lips and gums were normal [Normal Oral Cavity] : oral cavity was normal [Abdomen Soft] : soft [Nondistended] : nondistended [Abdomen Tenderness] : non-tender [Normal] : oriented to person, place and time, the affect was normal, the mood was normal and not anxious

## 2023-02-06 NOTE — REASON FOR VISIT
[Consideration for Non-Curative Therapy] : consideration for non-curative therapy for [Spouse] : spouse

## 2023-02-08 NOTE — HISTORY OF PRESENT ILLNESS
[FreeTextEntry1] : Mr. 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 He had dramatic response to triplet combination therapy and immunotherapy maintenance. \par \par PET-CT March 2022 revealed a DAGO mass and adjacent avid nodules increasing in size and uptake indicated of progression. He was restarted on carbo/etoposide at that time for 3 cycles with durvalumab added in cycle 4 and completed 5 total cycles of systemic therapy. Treatment was c/b immunotherapy-related esophagitis requiring break in treatment. Additionally had STEMI event in June 2022. \par \par PET-CT 11/17/22:\par IMPRESSION: Since prior FDG-PET/CT scan 8/15/2022:\par 1.  Multiple NEW ill-defined BILATERAL hypermetabolic pulmonary parenchymal changes. These are suspicious for inflammatory changes and may be related to immunotherapy. There is also a nonspecific, non-FDG avid right upper lobe groundglass opacity, unchanged.\par 2.  Left UPPER lobe lung mass shows stable size and activity.\par 3.  Esophageal uptake stable, likely related to patient's reported prior immunotherapy-related esophagitis.\par 4.  Non-FDG avid low-attenuation density in the pretracheal region, unchanged. This has been seen on multiple prior PET and CT exams.\par 5.  No NEW suspicious findings otherwise noted.\par \par His case was re-presented to thoracic tumor board with a consensus made in favor of thoracic RT.\par \par Today he states he feels very well and denies fatigue, cough, shortness of breath, pain, or any other symptoms. Reports swimming for one hour daily, having a strong appetite, and gaining weight.\par

## 2023-02-14 PROBLEM — Z51.12 ENCOUNTER FOR ANTINEOPLASTIC IMMUNOTHERAPY: Status: ACTIVE | Noted: 2022-07-28

## 2023-02-15 NOTE — RESULTS/DATA
[FreeTextEntry1] : -CT chest 3/25/19: Infiltrative left hilar mass extending to the left upper lobe and left mediastinum compatible with neoplasm with encasement of the pulmonary artery and left upper lobe bronchial structures with collapse of the left upper lobe. Lytic lesions in the upper thoracic spine. Innumerable hypodense masses throughout the liver compatible with metastases. Small left pleural effusion.\par \par -CT C/A/P 4/5/19:  Large mass in the left hilum extending into the left upper lobe consistent with bronchogenic malignancy with possible metastatic lymphadenopathy. Significant compression of the left lobe pulmonary artery with obstruction of the left upper lobe bronchus with resultant atelectasis. Innumerable lesions throughout the liver. Upper abdominal lymphadenopathy. Bony metastases of the spine, pelvis and bilateral hips.\par \par -MRI brain/T-spine in 3/28/19: Negative for metastatic disease involving the brain. Of the central canal at T9-T10 concerning for epidural metastasis.\par \par -Nuclear medicine bone scan 4/5/19: Focal uptake in one of the right lower lateral ribs, possibly the 10th rib, likely represents a small fracture. Metastatic disease is less likely. No additional areas of abnormal uptake are noted.\par \par -PET/CT 6/26/19: Markedly improved positive treatment response. The previously seen a large left-sided upper lobe lung mass with hilar extension is markedly reduced in size with mild or residual ill-defined soft tissue density at the left hilar region now demonstrating only partial invasion of the left upper lobe bronchus. No separate mediastinal lymphadenopathy is noted. Markedly improved appearance of both the numerous metastatic liver lesions and hepatomegaly. Interval resolution of previously seen upper abdominal lymphadenopathy. No residual active osseous metastatic disease.\par \par -PET/CT 10/17/19:  FDG-PET/CT scan demonstrates: \par 1. Minimally FDG-avid, small, difficult to delineate left upper lobe/perihilar soft tissue, unchanged as compared to prior study dated 6/25/2019. Small focus of residual disease is not excluded. \par 2. Non-FDG-avid low-attenuation density in pretracheal cricoid region, unchanged. \par 3. Remainder of study is unremarkable, demonstrating no evidence of FDG-avid disease. \par \par -MRI Brain 10/20/19: No evidence of metastasis\par \par -PET/CT 1/15/20:  FDG-PET/CT scan demonstrates: \par 1. New, indeterminate linear focus of mild FDG activity in left suprahilar/paramediastinal region (SUV 4.1). \par 2. Minimally FDG-avid, small, difficult to delineate left upper lobe/perihilar soft tissue, unchanged as compared to prior study dated 6/25/2019. Small focus of residual disease is not excluded. \par 3. Remainder of study is unchanged, demonstrating no evidence of FDG-avid disease. \par \par -CT Abdomen/Pelvis 2/19/20:  No evidence of focal bowel wall thickening or distention.  Right inguinal hernia \par \par -CT Chest 4/8/20: Stable disease\par \par -MRI Brain 3/5/20: New opacification involving the right mastoid and middle ear region as described above, otherwise no significant change when allowing for differences in technique. \par \par -PET/CT 6/25/20:  Compared to FDG-PET/CT scan dated 1/15/2020: \par 1. Focus of mild FDG activity in left upper lobe paramediastinal region is unchanged. This may reflect posttreatment change. \par 2. Non-FDG-avid 7 mm nodular density within posterior left upper lobe, also unchanged. \par 3. Nonspecific hypermetabolism in distal esophagus and fundus/body of stomach, mildly decreased. Correlate clinically for esophagitis/gastritis and with endoscopy, as indicated. \par 4. Remainder of study is unchanged, demonstrating no evidence of FDG-avid recurrent or metastatic disease. \par \par -PET/CT 9/1/20:  Compared to FDG-PET/CT scan dated 6/25/2020: \par 1. Resolution of focus of mild FDG activity in left upper lobe paramediastinal region. \par 2. Non-FDG-avid 7 mm nodular density within posterior left upper lobe, unchanged. \par 3. Nonspecific hypermetabolism in distal esophagus and fundus/body, unchanged. \par 4. No evidence of FDG-avid recurrent or metastatic disease. \par \par -MRI Brain 12/14/20:  No evidence acute hemorrhage, mass mass effect or abnormal enhancement seen.  Improved aeration of the right mastoid and middle ear region.\par \par -PET/CT 12/14/20:  Compared to FDG-PET/CT scan on 9/1/2020:\par 1. Nonspecific minimal increased FDG avidity in the gastric fundus/body. Please correlate clinically or with endoscopy as indicated.\par 2. Non-FDG avid 7 mm nodular density within the posterior left upper lobe, unchanged.\par 3. New focal cutaneous FDG avidity along the left lower aspect of the face, probably focal inflammation. Please correlate with physical examination.\par 4. Nonspecific new focal mild hypermetabolism in the anterior aspect of the hyoid bone just to the right of the midline without CT correlate. Unchanged non-FDG avid low-attenuation density in the precricoid region. Please correlate clinically.\par \par -PET/CT 3/5/21:  Compared to FDG-PET/CT scan dated 12/14/2020:\par 1. Few new FDG avid left axillary lymph nodes are nonspecific. Suggest correlation with ultrasound and possible percutaneous FNA biopsy as indicated.\par 2. Nonspecific FDG avidity in mid to distal esophagus, gastric fundus/body, slightly more prominent, with new FDG avidity in the pylorus. Please correlate clinically or with endoscopy as indicated.\par 3. Nonspecific non-FDG avid groundglass opacity in the anterior right upper lobe, more conspicuous.\par 4. Interval resolution of focal mild hypermetabolism in the anterior aspect of the hyoid bone and focal cutaneous FDG avidity in the left lower face. Unchanged non-FDG avid low-attenuation density in the cricoarytenoid region.\par \par -Esophagram: Small hiatal hernia without demonstrated gastroesophageal reflux. There is esophagoesophageal reflux.\par \par -Pet/CT 6/20/21:  Compared to FDG-PET/CT scan dated 3/5/2021:\par 1. Interval resolution of a few mildly FDG avid left axillary lymph nodes and small mildly FDG avid foci in bilateral hilar regions.\par 2. Nonspecific FDG avidity in mid to distal esophagus, gastric boy and pylorus, unchanged. Please correlate clinically or with endoscopy as indicated.\par 3. Unchanged nonspecific non-FDG avid groundglass opacity in the anterior right upper lobe.\par 4. Non-FDG avid low-attenuation density in the pretracheal region, decreased in size.\par \par -MRI Brain 6/21/21:  There is no intraparenchymal hematoma, mass effect or midline shift.  No evidence of intracranial metastatic disease. Slight interval increase in density within the right mastoid and middle ear regions when compared most recent prior examination.\par \par -PET/CT 9/13/21:  Compared to FDG-PET/CT scan dated 6/20/2021:\par 1. New cluster of FDG-avid nodules in left upper lobe. Primary consideration is infection/mucoid impacted distal airways. Please correlate clinically and follow-up with dedicated CT of chest in 1 month.\par 2. Nonspecific FDG avidity in mid and distal esophagus, unchanged. Resolution of gastric hypermetabolism.\par 3. Nonspecific, non-FDG avid right upper lobe groundglass opacity, unchanged.\par 4. Non-FDG avid low-attenuation density in the pretracheal region, unchanged.\par \par -MRI Brain 12/1/21:  No significant change when allowing for differences in technique.\par \par -PET/CT 12/6/21:  Compared to FDG-PET/CT scan dated 6/20/2021:\par 1. Mildly FDG-avid cluster of nodules in left upper lobe, unchanged on CT, and decreased in metabolism. An infectious/inflammatory etiology is favored. Please correlate clinically. A dedicated CT of chest may be obtained for further characterization.\par 2. Nonspecific FDG avidity in mid and distal esophagus, unchanged.\par 3. Nonspecific, non-FDG avid right upper lobe groundglass opacity, unchanged.\par 4. Non-FDG avid low-attenuation density in the pretracheal region, unchanged.\par \par -PET/CT 3/7/22:  Compared to FDG-PET/CT scan dated 12/6/2021:\par 1. FDG-avid, centrally photopenic left upper lobe lung mass and adjacent FDG-avid nodules, markedly increased in size and metabolism as compared to prior study, are compatible with recurrent disease.\par 2. Nonspecific FDG avidity in mid and distal esophagus, unchanged.\par 3. Nonspecific, non-FDG avid right upper lobe groundglass opacity, unchanged.\par 4. Non-FDG avid low-attenuation density in the pretracheal region, unchanged.\par \par -Brain MRI 3/30/22: \par 1. Age appropriate involutional changes.\par 2. No abnormal intraparenchymal or leptomeningeal enhancement. No evidence of intracranial metastases or leptomeningeal carcinomatosis.\par 3. Presence of fluid again appreciated within the right-sided mastoid air cells, slightly increased compared with prior. Correlate clinically for mastoiditis.\par \par –CT Chest 5/9/22:  In comparison with 3/7/2022, interval decrease of left upper lobe mass. No new or enlarging nodule.\par \par -CT Head 6/14/22: No evidence of acute hemorrhage, mass or mass effect.\par \par –Cardiac MRI 7/26/22:  At the base and midcavity, the combination of subendocardial late gadolinium enhancement, myocardial wall thinning and wall motion abnormalities are most likely secondary to prior myocardial infarction rather than myocarditis.\par \par -PET/CT 8/15/22:  Compared with FDG PET/CT scan 3/7/2022 as well as additional correlations as above:\par 1. Since prior PET/CT, LEFT UPPER lobe lung mass shows marked decrease in activity, with serial anatomic improvement on most recent chest CT as well as the current study.\par 2. Esophageal uptake stable, likely related to patient's reported prior immunotherapy-related esophagitis.\par 3. Nonspecific, non-FDG avid right upper lobe groundglass opacity, unchanged.\par 4. Non-FDG avid low-attenuation density in the pretracheal region, unchanged.\par 5. No NEW suspicious findings otherwise noted.\par \par –CT Neck Angio 10/16/22:  \par 1. Severe stenosis/near occlusion of the left proximal cervical ICA.\par 2. A 1.3 x 1.1 x 1.1 cm soft tissue attenuation lesion in the midline anterior neck abutting the strap muscles and the inferior aspect of the hyoid bone. Recommend CT neck with contrast for further evaluation.\par 3. Redemonstration of a left upper lobe pulmonary mass.\par \par -PET/CT 11/14/22:  Since prior FDG-PET/CT scan 8/15/2022:\par 1. Multiple NEW ill-defined BILATERAL hypermetabolic pulmonary parenchymal changes. These are suspicious for inflammatory changes and may be related to immunotherapy. There is also a nonspecific, non-FDG avid right upper lobe groundglass opacity, unchanged.\par 2. Left UPPER lobe lung mass shows stable size and activity.\par 3. Esophageal uptake stable, likely related to patient's reported prior immunotherapy-related esophagitis.\par 4. Non-FDG avid low-attenuation density in the pretracheal region, unchanged. This has been seen on multiple prior PET and CT exams.\par 5. No NEW suspicious findings otherwise noted.\par \par –CT Chest 12/5/22:  No pneumonia.  Unchanged paramediastinal left upper lobe lesion since recent November 2022 PET/CT, and decreased since May 2022 chest CT. No new disease in the chest.  \par \par

## 2023-02-15 NOTE — HISTORY OF PRESENT ILLNESS
[Disease: _____________________] : Disease: [unfilled] [AJCC Stage: ____] : AJCC Stage: [unfilled] [de-identified] : The patient is a former smoker with a history of CAD who developed chest pain in March 2019 and had a cardiac stent placed. When his chest pain persisted, he was sent for a CT scan of his chest in late March 2019 that revealed a left upper lobe lung mass with evidence of metastatic disease. 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 CT scan revealed evidence of extensive bony and liver metastases. MRI of his brain was negative for brain metastases however there was extensive calvarial and T-spine metastases with possible evidence of epidural involvement. He was treated in [place] by an outside medical oncologist and started on carboplatin, etoposide and atezolizumab in early April 2019. He received 4 cycles of triplet combination therapy followed by immunotherapy maintenance. A restaging PET/CT scan in June 2019 revealed a dramatic response. He moved from [place] and transferred his care to [facility].  \par Treated with immunotherapy maintenance from June 2019 through May 2021 with sustained response.  \par Developed severe dysphagia and odynophagia.  He was treated for H-pylori and symptoms continued; H Pylori stool Ag was negative.  PET/CT with findings consistent with esophagitis/gastritis.  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  Restaging PET/CT March 2022 with disease progression.  Started chemotherapy with Carboplatin/Etoposide in March 2022; Durvalumab was added with C4. Patient completed C5 with Carbo (retreat)/Etoposide/Durvalumab in early June 2022.  Hospitalized with STEMI in June 2022.  Esophagitis symptoms returned in June 2022.  Upon clinical improvement with restarting steroids, durvalumab was continued. [de-identified] : -Lung, left hilar FNA 3/29/19: Positive for malignant cells. Poorly differentiated neuroendocrine carcinoma. IHC is positive for MCK, TTF-1 and synaptophysin while negative for desmoglein3, P40, NapsinA, CD20, PAX5, CD3.  Ki-67 is markedly high. [de-identified] : *Wife provided translation where needed\par Patient's most recent treatment with durvalumab was on 10/21/2022.\par Treatment was held in November 2022 following a restaging PET/CT revealing new bilateral opacities concerning for pneumonitis.\par Patient's case was reviewed at thoracic tumor board in late November 2022 and the impression was that the opacities noted on scan did not look like the typical ICI induced pneumonitis and had more of an infectious/inflammatory appearance.  The recommendation was to treat with antibiotics and perform a short-term follow-up scan.  Consolidative thoracic RT was felt to be a possibility if the ultimate impression was negative for ICI induced pneumonitis.\par Patient was treated with empiric antibiotics for pneumonia.  He has been following closely with pulmonary throughout the month of December.  He developed COVID-19 infection in late November 2022 that was medically managed by pulmonary.  He was sent for short-term follow-up CT in early December 2022 as outlined below.\par He was developing side effects to Brilinta including worsening CASH and tachycardia and his antiplatelet therapy was changed back to Plavix in December 2022.\par He was cleared by cardiology and underwent successful TCAR procedure on 1/3/23. \par \par Case presented at Tumor Board on 1/25/23: Thoracic RT was felt to be reasonable given the current limited sites of disease and as a way to avoid the toxicity of systemic therapy. Can consider holding further ICI therapy. \par \par He was seen by Dr. Borges on 2/6/23 for consideration of IMRT to remaining disease in the LL now in planning stages. Patient's wife reports that RT will likely be given between cycles of immunotherapy. \par \par Patient is seen prior to continued Durvalumab. He reports feeling "great" today. He continues with Prednisone 20mg daily and has not increased this dose since last Durvalumab 4 weeks ago; no reported recurrence of esophagitis symptoms. Patient has been taking pantoprazole for GI prophylaxis. He is exercising with pool swimming for ~1 hr/day. He is now on Plavix AC. \par \par

## 2023-02-15 NOTE — ASSESSMENT
[FreeTextEntry1] : Extensive stage small cell lung cancer diagnosed late March 2019 who was treated with chemotherapy plus immunotherapy from April-June 2019; achieved CR. Treated with immunotherapy maintenance from June 2019 through May 2021 with sustained response.  Developed immunotherapy related esophagitis; started on steroids in June 2021 with clinical response.  Restaging PET/CT March 2022 with disease progression.  Started re-treatment with Carboplatin/Etoposide chemotherapy March 2022; achieved [place]. Durvalumab added with D4. Completed 5 cycles through early June 2022.  Hospitalized in June for STEMI.  Developed recurrence of esophagitis symptoms in June 2022 that responded to reintroduction of Prednisone.  Durvalumab held with eventual resumption of treatment in conjunction with steroids.  Treated through October 2022.  Restaging PET/CT Nov 2022 with new B/L lung opacities initially concerning for inflammation/pneumonitis and treatment was held that month; this was subsequently felt to be unlikely after review at tumor board.  Patient was treated for pneumonia.  Developed Covid-19 infection in late Nov 2022.  Underwent TCAR procedure in early Jan 2023. Case presented at Tumor Board on 1/25/23: Thoracic RT was felt to be reasonable given the current limited sites of disease and as a way to avoid the toxicity of systemic therapy. Can consider holding further ICI therapy.     \par Recommend: \par –Continue Durvalumab.  Patient and wife understand risks of autoimmune toxicity/pneumonitis.  \par -Esophagitis:  on Prednisone.  Currently on 20mg daily. Continue GI prophylaxis while on steroids; currently on Pantoprazole which does not interact with his Plavix\par – Radiation oncology consultation on 2/6 with recommendation for IMRT to the remaining deseasse in the LL planned. Patient wishes to continue immunotherapy as esophagitis is well controlled at this time. RT will likely occur between cycles of Durvalumab. \par -Continue Pulmonary f/u     \par -Continue Cardiology and Vascular management; he is s/p TCAR-trans carotid stent performed 1/3/23\par -Continue symptom management under care of Palliative Care Dr. Moreno\par -Thrombocytopenia:  mild and possibly secondary to immunotherapy.  Monitor.  \par -Avoided addition of bone modifying agent given the dramatic response in the bones\par -Mediport in place\par -Loose stool:  utilized Lomotil PRN.  \par -Repeat labs today; wife requests PSA testing   \par -Review case at thoracic tumor board again\par -Appointments scheduled appropriately

## 2023-02-15 NOTE — PHYSICAL EXAM
[Restricted in physically strenuous activity but ambulatory and able to carry out work of a light or sedentary nature] : Status 1- Restricted in physically strenuous activity but ambulatory and able to carry out work of a light or sedentary nature, e.g., light house work, office work [Normal] : affect appropriate [de-identified] : No icterus  [de-identified] : MMM O/P Clear, No visible mucositis or erythema [de-identified] : Supple No LAD  [de-identified] : S1 S2  [de-identified] : Clear  [de-identified] : No edema  [de-identified] : No spine/CVA tenderness [de-identified] : Mediport in place

## 2023-03-09 NOTE — REVIEW OF SYSTEMS
[Constipation: Grade 0] : Constipation: Grade 0 [Diarrhea: Grade 0] : Diarrhea: Grade 0 [Cough: Grade 0] : Cough: Grade 0 [Dyspnea: Grade 0] : Dyspnea: Grade 0

## 2023-03-09 NOTE — PHYSICAL EXAM
[Normal] : well developed, well nourished, in no acute distress [Normal Oral Cavity] : oral cavity was normal [Oropharynx] : The oropharynx was normal [de-identified] : no Thrush

## 2023-03-09 NOTE — DISEASE MANAGEMENT
[Clinical] : TNM Stage: c [IVB] : IVB [FreeTextEntry4] : ES-SCLC [TTNM] : x [NTNM] : x [MTNM] : 1 [de-identified] : 5 FX/ 1500 cGy [de-identified] : 15 FX/ 4500 cGy [de-identified] : Left Lung

## 2023-03-09 NOTE — HISTORY OF PRESENT ILLNESS
[FreeTextEntry1] : 66 yo with ES-SCLC with dramatic response to initial systemic therapy, with local recurrence successfully treated with further systemic therapy and limited volume of disease in DAGO alone on latest imaging. We recommend a course of consolidative radiation to the remaining disease focus in the left lung\par \par \par 3/8/2023: Patient presents for OTV s/p 5/15 FX of 4500 cGy. Pt has no pain but some dysphagia which is his baseline but is slightly worse although not effecting his diet. No erythema noted. No cough or SOB at this time.\par

## 2023-03-16 NOTE — HISTORY OF PRESENT ILLNESS
[FreeTextEntry1] : Mr. 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 He had dramatic response to triplet combination therapy and immunotherapy maintenance. \par \par PET-CT March 2022 revealed a DAGO mass and adjacent avid nodules increasing in size and uptake indicated of progression. He was restarted on carbo/etoposide at that time for 3 cycles with durvalumab added in cycle 4 and completed 5 total cycles of systemic therapy. Treatment was c/b immunotherapy-related esophagitis requiring break in treatment. Additionally had STEMI event in June 2022. \par \par PET-CT 11/17/22:\par IMPRESSION: Since prior FDG-PET/CT scan 8/15/2022:\par 1. Multiple NEW ill-defined BILATERAL hypermetabolic pulmonary parenchymal changes. These are suspicious for inflammatory changes and may be related to immunotherapy. There is also a nonspecific, non-FDG avid right upper lobe groundglass opacity, unchanged.\par 2. Left UPPER lobe lung mass shows stable size and activity.\par 3. Esophageal uptake stable, likely related to patient's reported prior immunotherapy-related esophagitis.\par 4. Non-FDG avid low-attenuation density in the pretracheal region, unchanged. This has been seen on multiple prior PET and CT exams.\par 5. No NEW suspicious findings otherwise noted.\par \par His case was re-presented to thoracic tumor board with a consensus made in favor of thoracic RT.\par \par Patient shown dramatic response to initial systemic therapy, with local recurrence successfully treated with further systemic therapy and limited volume of disease in DAGO alone on latest imaging. We recommend a course of consolidative radiation to the remaining disease focus in the left lung\par \par \par 3/8/2023: Patient presents for OTV s/p 5/15 FX of 4500 cGy. Pt has no pain but some dysphagia which is his baseline but is slightly worse although not effecting his diet. No erythema noted. No cough or SOB at this time.\par \par 3/16/2023: Patient presents to clinic for OTV s/p 9/15 FX of 4500 cGy to the left lung. Feeling well overall. some dysphagia which has been his baseline. Maybe a little bit worse but not very bothersome.\par

## 2023-03-16 NOTE — DISEASE MANAGEMENT
[FreeTextEntry4] : ES-SCLC [TTNM] : x [NTNM] : x [MTNM] : 1 [de-identified] : 9FX/14958 cGy [de-identified] : 15 FX/ 4500 cGy [de-identified] : Left Lung

## 2023-03-23 NOTE — REVIEW OF SYSTEMS
[Constipation: Grade 0] : Constipation: Grade 0 [Diarrhea: Grade 0] : Diarrhea: Grade 0 [Dysphagia: Grade 0] : Dysphagia: Grade 0 [Cough: Grade 0] : Cough: Grade 0 [Dyspnea: Grade 0] : Dyspnea: Grade 0 [Fatigue: Grade 0] : Fatigue: Grade 0 [Dermatitis Radiation: Grade 0] : Dermatitis Radiation: Grade 0

## 2023-03-23 NOTE — VITALS
[Maximal Pain Intensity: 2/10] : 2/10 [Pain Description/Quality: ___] : Pain description/quality: [unfilled] [Pain Duration: ___] : Pain duration: [unfilled] [Pain Location: ___] : Pain Location: [unfilled] [Pain Interferes with ADLs] : Pain interferes with activities of daily living. [80: Normal activity with effort; some signs or symptoms of disease.] : 80: Normal activity with effort; some signs or symptoms of disease.  [ECOG Performance Status: 0 - Fully active, able to carry on all pre-disease performance without restriction] : Performance Status: 0 - Fully active, able to carry on all pre-disease performance without restriction

## 2023-04-03 NOTE — HISTORY OF PRESENT ILLNESS
[Disease: _____________________] : Disease: [unfilled] [AJCC Stage: ____] : AJCC Stage: [unfilled] [de-identified] : The patient is a former smoker with a history of CAD who developed chest pain in March 2019 and had a cardiac stent placed. When his chest pain persisted, he was sent for a CT scan of his chest in late March 2019 that revealed a left upper lobe lung mass with evidence of metastatic disease. 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 CT scan revealed evidence of extensive bony and liver metastases. MRI of his brain was negative for brain metastases however there was extensive calvarial and T-spine metastases with possible evidence of epidural involvement. He was treated in [place] by an outside medical oncologist and started on carboplatin, etoposide and atezolizumab in early April 2019. He received 4 cycles of triplet combination therapy followed by immunotherapy maintenance. A restaging PET/CT scan in June 2019 revealed a dramatic response. He moved from [place] and transferred his care to [facility].  \par Treated with immunotherapy maintenance from June 2019 through May 2021 with sustained response.  \par Developed severe dysphagia and odynophagia.  He was treated for H-pylori and symptoms continued; H Pylori stool Ag was negative.  PET/CT with findings consistent with esophagitis/gastritis.  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  Restaging PET/CT March 2022 with disease progression.  Started chemotherapy with Carboplatin/Etoposide in March 2022; Durvalumab was added with C4. Patient completed C5 with Carbo (retreat)/Etoposide/Durvalumab in early June 2022.  Hospitalized with STEMI in June 2022.  Esophagitis symptoms returned in June 2022.  Upon clinical improvement with restarting steroids, durvalumab was continued.  Patient underwent successful TCAR procedure on 1/3/23.   Treated with Durvalumab through Feb 2023.  Treated with consolidative Thoracic RT to primary tumor in 15 Fx in March 2023.   [de-identified] : -Lung, left hilar FNA 3/29/19: Positive for malignant cells. Poorly differentiated neuroendocrine carcinoma. IHC is positive for MCK, TTF-1 and synaptophysin while negative for desmoglein3, P40, NapsinA, CD20, PAX5, CD3.  Ki-67 is markedly high. [de-identified] : *Wife provided translation where needed\par Patient was last treated with durvalumab on 2/10/2023.  He was treated with consolidative thoracic RT to the left lung in 15 fractions from 3/2 - 3/23/2023.\par He reports feeling "the best he has in over 2 months".  He did experience esophagitis symptoms during RT and increased his prednisone to 40 mg daily; he was previously on 10 mg daily for most of the past 2 months.  He is taking Pepcid.  He required oxycodone for the esophagitis pain at some point, but he is now off this.\par Again discussed the rationale of treating with thoracic RT in an effort to monitor him off systemic therapy which was causing autoimmune side effects.

## 2023-04-03 NOTE — ASSESSMENT
[FreeTextEntry1] : Extensive stage small cell lung cancer diagnosed late March 2019 who was treated with chemotherapy plus immunotherapy from April-June 2019; achieved CR. Treated with immunotherapy maintenance from June 2019 through May 2021 with sustained response.  Developed immunotherapy related esophagitis; started on steroids in June 2021 with clinical response.  Restaging PET/CT March 2022 with disease progression.  Started re-treatment with Carboplatin/Etoposide chemotherapy March 2022; achieved [place]. Durvalumab added with D4. Completed 5 cycles through early June 2022.  Hospitalized in June for STEMI.  Developed recurrence of esophagitis symptoms in June 2022 that responded to reintroduction of Prednisone.  Durvalumab held with eventual resumption of treatment in conjunction with steroids.  Treated through October 2022.  Restaging PET/CT Nov 2022 with new B/L lung opacities initially concerning for inflammation/pneumonitis and treatment was held that month; this was subsequently felt to be unlikely after review at tumor board.  Patient was treated for pneumonia.  Developed Covid-19 infection in late Nov 2022.  Underwent TCAR procedure in early Jan 2023. Case presented at Tumor Board on 1/25/23: Thoracic RT was felt to be reasonable given the current limited sites of disease and as a way to avoid the toxicity of systemic therapy and therefore could consider holding further ICI therapy.  Treated with consolidative Thoracic RT in 15 Fx completed late March 2023.  \par Recommend: \par –Hold further Durvalumab and monitor off systemic therapy now that he is s/p Consolidative RT to residual disease that was evident on scan.  \par -Esophagitis:  on Prednisone.  Currently on 40mg daily which he increased s/p RT. Continue GI prophylaxis while on steroids\par -Continue Pulmonary f/u     \par -Continue Cardiology and Vascular management; he is s/p TCAR-trans carotid stent performed 1/3/23\par -Continue symptom management under care of Palliative Care Dr. Moreno\par -Avoided addition of bone modifying agent given the dramatic response in the bones\par -Mediport in place.  Port flush with labs today and continue port flushes q6 weeks\par -Clinical f/u prior to port flush in May\par -Restaging CT C/A/P in June and f/u to review results prior to a planned port flush at that time

## 2023-04-03 NOTE — PHYSICAL EXAM
[Restricted in physically strenuous activity but ambulatory and able to carry out work of a light or sedentary nature] : Status 1- Restricted in physically strenuous activity but ambulatory and able to carry out work of a light or sedentary nature, e.g., light house work, office work [Normal] : affect appropriate [de-identified] : No icterus  [de-identified] : MMM O/P Clear, No visible mucositis or erythema [de-identified] : Supple No LAD  [de-identified] : Clear  [de-identified] : S1 S2  [de-identified] : No edema  [de-identified] : No spine/CVA tenderness [de-identified] : Mediport in place

## 2023-04-07 NOTE — HISTORY OF PRESENT ILLNESS
[FreeTextEntry1] : 68 yo male first presented to office 2/6/2023 with ES-SCLC with dramatic response to initial systemic therapy, with local recurrence successfully treated with further systemic therapy and limited volume of disease in DAGO alone on latest imaging. We reviewed in detail the natural history of small cell lung cancer and the level 1 data suggesting a survival benefit to consolidative chest RT among patients with extensive stage disease and a reasonable response to systemic therapy. Specifically, we recommended a course of IMRT to the remaining disease focus in the left lung. IMRT is an appropriate technique for delivery of a high biologically effective dose to the irregularly contoured target volume while sparing adjacent organs at risk, and will additionally utilize 4-dimensional planning.\par \par 3/8/2023: Patient presents for OTV s/p 5/15 FX of 4500 cGy. Pt has no pain but some dysphagia which is his baseline but is slightly worse although not effecting his diet. No erythema noted. No cough or SOB at this time.\par \par 3/16/2023: Patient presents to clinic for OTV s/p 9/15 FX of 4500 cGy to the left lung. Feeling well overall. some dysphagia which has been his baseline. Maybe a little bit worse but not very bothersome.\par \par 3/23/2023: Patient presents to clinic for OTV s/p 15/15 FX of 4500 cGy to the left lung. \par Patient provided with discharge education and folder. Patient made aware to contact office should any questions/concerns arise prior to PTE appointment. Patient verbalized understanding. Pt has some dysphagia at this time. Taking oxycodone with some relief.

## 2023-04-07 NOTE — DISEASE MANAGEMENT
[Clinical] : TNM Stage: c [IVB] : IVB [FreeTextEntry4] : ES-SCLC [TTNM] : x [NTNM] : x [MTNM] : 1 [de-identified] : 15 FX/ 4500 cGy [de-identified] : 15 FX/ 4500 cGy [de-identified] : Left Lung

## 2023-05-11 NOTE — DISEASE MANAGEMENT
[Clinical] : TNM Stage: c [FreeTextEntry4] : ES-SCLC [TTNM] : x [NTNM] : x [MTNM] : 1 [IVB] : IVB [de-identified] : 15 FX/ 4500 cGy [de-identified] : 15 FX/ 4500 cGy [de-identified] : Left Lung

## 2023-05-11 NOTE — HISTORY OF PRESENT ILLNESS
[FreeTextEntry1] : 66 yo male first presented to office 2/6/2023 with ES-SCLC with dramatic response to initial systemic therapy, with local recurrence successfully treated with further systemic therapy and limited volume of disease in DAGO alone on latest imaging. We reviewed in detail the natural history of small cell lung cancer and the level 1 data suggesting a survival benefit to consolidative chest RT among patients with extensive stage disease and a reasonable response to systemic therapy. Specifically, we recommended a course of IMRT to the remaining disease focus in the left lung. IMRT is an appropriate technique for delivery of a high biologically effective dose to the irregularly contoured target volume while sparing adjacent organs at risk, and will additionally utilize 4-dimensional planning.\par \par 5/11/2023: Patient presents to clinic for PTE. Patient received 45 Gy in 15 FX to the lung from 3/3/2023- 3/23/2023. Patient tolerated treatment well. CT scheduled for 6/12/2023\par Patient denies SOB, CASH, denies esophagitis, odynophagia, dyspesia. Cough increased due to sinusitis, bronchitis. White sputum production. Denies chest pain/palpitations.

## 2023-05-11 NOTE — HISTORY OF PRESENT ILLNESS
[FreeTextEntry1] : 68 yo male first presented to office 2/6/2023 with ES-SCLC with dramatic response to initial systemic therapy, with local recurrence successfully treated with further systemic therapy and limited volume of disease in DAGO alone on latest imaging. We reviewed in detail the natural history of small cell lung cancer and the level 1 data suggesting a survival benefit to consolidative chest RT among patients with extensive stage disease and a reasonable response to systemic therapy. Specifically, we recommended a course of IMRT to the remaining disease focus in the left lung. IMRT is an appropriate technique for delivery of a high biologically effective dose to the irregularly contoured target volume while sparing adjacent organs at risk, and will additionally utilize 4-dimensional planning.\par \par 5/11/2023: Patient presents to clinic for PTE. Patient received 45 Gy in 15 FX to the lung from 3/3/2023- 3/23/2023. Patient tolerated treatment well. CT scheduled for 6/12/2023\par Patient denies SOB, CASH, denies esophagitis, odynophagia, dyspesia. Cough increased due to sinusitis, bronchitis. White sputum production. Denies chest pain/palpitations.

## 2023-05-11 NOTE — REVIEW OF SYSTEMS
[Dyspepsia: Grade 0] : Dyspepsia: Grade 0 [Dysphagia: Grade 0] : Dysphagia: Grade 0 [Esophagitis: Grade 0] : Esophagitis: Grade 0 [Nausea: Grade 0] : Nausea: Grade 0 [Vomiting: Grade 0] : Vomiting: Grade 0 [Dizziness: Grade 0] : Dizziness: Grade 0  [Headache: Grade 0] : Headache: Grade 0 [Cough: Grade 1 - Mild symptoms; nonprescription intervention indicated] : Cough: Grade 1 - Mild symptoms; nonprescription intervention indicated [Dyspnea: Grade 0] : Dyspnea: Grade 0 [FreeTextEntry1] : white sputum [Skin Hyperpigmentation: Grade 0] : Skin Hyperpigmentation: Grade 0 [Dermatitis Radiation: Grade 0] : Dermatitis Radiation: Grade 0

## 2023-05-11 NOTE — DISEASE MANAGEMENT
[Clinical] : TNM Stage: c [FreeTextEntry4] : ES-SCLC [TTNM] : x [NTNM] : x [MTNM] : 1 [IVB] : IVB [de-identified] : 15 FX/ 4500 cGy [de-identified] : 15 FX/ 4500 cGy [de-identified] : Left Lung

## 2023-06-22 NOTE — HISTORY OF PRESENT ILLNESS
[Disease: _____________________] : Disease: [unfilled] [AJCC Stage: ____] : AJCC Stage: [unfilled] [de-identified] : The patient is a former smoker with a history of CAD who developed chest pain in March 2019 and had a cardiac stent placed. When his chest pain persisted, he was sent for a CT scan of his chest in late March 2019 that revealed a left upper lobe lung mass with evidence of metastatic disease. 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 CT scan revealed evidence of extensive bony and liver metastases. MRI of his brain was negative for brain metastases however there was extensive calvarial and T-spine metastases with possible evidence of epidural involvement. He was treated in [place] by an outside medical oncologist and started on carboplatin, etoposide and atezolizumab in early April 2019. He received 4 cycles of triplet combination therapy followed by immunotherapy maintenance. A restaging PET/CT scan in June 2019 revealed a dramatic response. He moved from [place] and transferred his care to [facility].  \par Treated with immunotherapy maintenance from June 2019 through May 2021 with sustained response.  \par Developed severe dysphagia and odynophagia.  He was treated for H-pylori and symptoms continued; H Pylori stool Ag was negative.  PET/CT with findings consistent with esophagitis/gastritis.  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  Restaging PET/CT March 2022 with disease progression.  Started chemotherapy with Carboplatin/Etoposide in March 2022; Durvalumab was added with C4. Patient completed C5 with Carbo (retreat)/Etoposide/Durvalumab in early June 2022.  Hospitalized with STEMI in June 2022.  Esophagitis symptoms returned in June 2022.  Upon clinical improvement with restarting steroids, durvalumab was continued.  Patient underwent successful TCAR procedure on 1/3/23.   Treated with Durvalumab through Feb 2023.  Treated with consolidative Thoracic RT to primary tumor in 15 Fx in March 2023.  Monitored off systemic therapy.   [de-identified] : -Lung, left hilar FNA 3/29/19: Positive for malignant cells. Poorly differentiated neuroendocrine carcinoma. IHC is positive for MCK, TTF-1 and synaptophysin while negative for desmoglein3, P40, NapsinA, CD20, PAX5, CD3.  Ki-67 is markedly high. [de-identified] : *Wife provided translation where needed\par Patient was last treated with durvalumab on 2/10/2023.  He was treated with consolidative thoracic RT to the left lung in 15 fractions completed March 2023.\par Patient continues with esophagitis symptoms.  He was titrating down the prednisone and reached 5 mg daily but then experienced increased symptoms.  He had to increase his prednisone dosing up to 30 mg daily.  He has continued a subsequent slow taper and is currently on 10 mg daily.\par \par Restaging CT with overall sustained response and evidence of RT changes.\par \par Patient and his wife and again inquired about treatment with immunotherapy.  Discussed that the purpose of administering RT was to monitor him off of any systemic therapy given the toxicity.  Discussed this rationale and recommended monitoring him off of systemic therapy until time of disease progression.  They are insisting on retreatment with immunotherapy upon recurrence of disease.

## 2023-06-22 NOTE — RESULTS/DATA
[FreeTextEntry1] : -CT chest 3/25/19: Infiltrative left hilar mass extending to the left upper lobe and left mediastinum compatible with neoplasm with encasement of the pulmonary artery and left upper lobe bronchial structures with collapse of the left upper lobe. Lytic lesions in the upper thoracic spine. Innumerable hypodense masses throughout the liver compatible with metastases. Small left pleural effusion.\par \par -CT C/A/P 4/5/19:  Large mass in the left hilum extending into the left upper lobe consistent with bronchogenic malignancy with possible metastatic lymphadenopathy. Significant compression of the left lobe pulmonary artery with obstruction of the left upper lobe bronchus with resultant atelectasis. Innumerable lesions throughout the liver. Upper abdominal lymphadenopathy. Bony metastases of the spine, pelvis and bilateral hips.\par \par -MRI brain/T-spine in 3/28/19: Negative for metastatic disease involving the brain. Of the central canal at T9-T10 concerning for epidural metastasis.\par \par -Nuclear medicine bone scan 4/5/19: Focal uptake in one of the right lower lateral ribs, possibly the 10th rib, likely represents a small fracture. Metastatic disease is less likely. No additional areas of abnormal uptake are noted.\par \par -PET/CT 6/26/19: Markedly improved positive treatment response. The previously seen a large left-sided upper lobe lung mass with hilar extension is markedly reduced in size with mild or residual ill-defined soft tissue density at the left hilar region now demonstrating only partial invasion of the left upper lobe bronchus. No separate mediastinal lymphadenopathy is noted. Markedly improved appearance of both the numerous metastatic liver lesions and hepatomegaly. Interval resolution of previously seen upper abdominal lymphadenopathy. No residual active osseous metastatic disease.\par \par -PET/CT 10/17/19:  FDG-PET/CT scan demonstrates: \par 1. Minimally FDG-avid, small, difficult to delineate left upper lobe/perihilar soft tissue, unchanged as compared to prior study dated 6/25/2019. Small focus of residual disease is not excluded. \par 2. Non-FDG-avid low-attenuation density in pretracheal cricoid region, unchanged. \par 3. Remainder of study is unremarkable, demonstrating no evidence of FDG-avid disease. \par \par -MRI Brain 10/20/19: No evidence of metastasis\par \par -PET/CT 1/15/20:  FDG-PET/CT scan demonstrates: \par 1. New, indeterminate linear focus of mild FDG activity in left suprahilar/paramediastinal region (SUV 4.1). \par 2. Minimally FDG-avid, small, difficult to delineate left upper lobe/perihilar soft tissue, unchanged as compared to prior study dated 6/25/2019. Small focus of residual disease is not excluded. \par 3. Remainder of study is unchanged, demonstrating no evidence of FDG-avid disease. \par \par -CT Abdomen/Pelvis 2/19/20:  No evidence of focal bowel wall thickening or distention.  Right inguinal hernia \par \par -CT Chest 4/8/20: Stable disease\par \par -MRI Brain 3/5/20: New opacification involving the right mastoid and middle ear region as described above, otherwise no significant change when allowing for differences in technique. \par \par -PET/CT 6/25/20:  Compared to FDG-PET/CT scan dated 1/15/2020: \par 1. Focus of mild FDG activity in left upper lobe paramediastinal region is unchanged. This may reflect posttreatment change. \par 2. Non-FDG-avid 7 mm nodular density within posterior left upper lobe, also unchanged. \par 3. Nonspecific hypermetabolism in distal esophagus and fundus/body of stomach, mildly decreased. Correlate clinically for esophagitis/gastritis and with endoscopy, as indicated. \par 4. Remainder of study is unchanged, demonstrating no evidence of FDG-avid recurrent or metastatic disease. \par \par -PET/CT 9/1/20:  Compared to FDG-PET/CT scan dated 6/25/2020: \par 1. Resolution of focus of mild FDG activity in left upper lobe paramediastinal region. \par 2. Non-FDG-avid 7 mm nodular density within posterior left upper lobe, unchanged. \par 3. Nonspecific hypermetabolism in distal esophagus and fundus/body, unchanged. \par 4. No evidence of FDG-avid recurrent or metastatic disease. \par \par -MRI Brain 12/14/20:  No evidence acute hemorrhage, mass mass effect or abnormal enhancement seen.  Improved aeration of the right mastoid and middle ear region.\par \par -PET/CT 12/14/20:  Compared to FDG-PET/CT scan on 9/1/2020:\par 1. Nonspecific minimal increased FDG avidity in the gastric fundus/body. Please correlate clinically or with endoscopy as indicated.\par 2. Non-FDG avid 7 mm nodular density within the posterior left upper lobe, unchanged.\par 3. New focal cutaneous FDG avidity along the left lower aspect of the face, probably focal inflammation. Please correlate with physical examination.\par 4. Nonspecific new focal mild hypermetabolism in the anterior aspect of the hyoid bone just to the right of the midline without CT correlate. Unchanged non-FDG avid low-attenuation density in the precricoid region. Please correlate clinically.\par \par -PET/CT 3/5/21:  Compared to FDG-PET/CT scan dated 12/14/2020:\par 1. Few new FDG avid left axillary lymph nodes are nonspecific. Suggest correlation with ultrasound and possible percutaneous FNA biopsy as indicated.\par 2. Nonspecific FDG avidity in mid to distal esophagus, gastric fundus/body, slightly more prominent, with new FDG avidity in the pylorus. Please correlate clinically or with endoscopy as indicated.\par 3. Nonspecific non-FDG avid groundglass opacity in the anterior right upper lobe, more conspicuous.\par 4. Interval resolution of focal mild hypermetabolism in the anterior aspect of the hyoid bone and focal cutaneous FDG avidity in the left lower face. Unchanged non-FDG avid low-attenuation density in the cricoarytenoid region.\par \par -Esophagram: Small hiatal hernia without demonstrated gastroesophageal reflux. There is esophagoesophageal reflux.\par \par -Pet/CT 6/20/21:  Compared to FDG-PET/CT scan dated 3/5/2021:\par 1. Interval resolution of a few mildly FDG avid left axillary lymph nodes and small mildly FDG avid foci in bilateral hilar regions.\par 2. Nonspecific FDG avidity in mid to distal esophagus, gastric boy and pylorus, unchanged. Please correlate clinically or with endoscopy as indicated.\par 3. Unchanged nonspecific non-FDG avid groundglass opacity in the anterior right upper lobe.\par 4. Non-FDG avid low-attenuation density in the pretracheal region, decreased in size.\par \par -MRI Brain 6/21/21:  There is no intraparenchymal hematoma, mass effect or midline shift.  No evidence of intracranial metastatic disease. Slight interval increase in density within the right mastoid and middle ear regions when compared most recent prior examination.\par \par -PET/CT 9/13/21:  Compared to FDG-PET/CT scan dated 6/20/2021:\par 1. New cluster of FDG-avid nodules in left upper lobe. Primary consideration is infection/mucoid impacted distal airways. Please correlate clinically and follow-up with dedicated CT of chest in 1 month.\par 2. Nonspecific FDG avidity in mid and distal esophagus, unchanged. Resolution of gastric hypermetabolism.\par 3. Nonspecific, non-FDG avid right upper lobe groundglass opacity, unchanged.\par 4. Non-FDG avid low-attenuation density in the pretracheal region, unchanged.\par \par -MRI Brain 12/1/21:  No significant change when allowing for differences in technique.\par \par -PET/CT 12/6/21:  Compared to FDG-PET/CT scan dated 6/20/2021:\par 1. Mildly FDG-avid cluster of nodules in left upper lobe, unchanged on CT, and decreased in metabolism. An infectious/inflammatory etiology is favored. Please correlate clinically. A dedicated CT of chest may be obtained for further characterization.\par 2. Nonspecific FDG avidity in mid and distal esophagus, unchanged.\par 3. Nonspecific, non-FDG avid right upper lobe groundglass opacity, unchanged.\par 4. Non-FDG avid low-attenuation density in the pretracheal region, unchanged.\par \par -PET/CT 3/7/22:  Compared to FDG-PET/CT scan dated 12/6/2021:\par 1. FDG-avid, centrally photopenic left upper lobe lung mass and adjacent FDG-avid nodules, markedly increased in size and metabolism as compared to prior study, are compatible with recurrent disease.\par 2. Nonspecific FDG avidity in mid and distal esophagus, unchanged.\par 3. Nonspecific, non-FDG avid right upper lobe groundglass opacity, unchanged.\par 4. Non-FDG avid low-attenuation density in the pretracheal region, unchanged.\par \par -Brain MRI 3/30/22: \par 1. Age appropriate involutional changes.\par 2. No abnormal intraparenchymal or leptomeningeal enhancement. No evidence of intracranial metastases or leptomeningeal carcinomatosis.\par 3. Presence of fluid again appreciated within the right-sided mastoid air cells, slightly increased compared with prior. Correlate clinically for mastoiditis.\par \par –CT Chest 5/9/22:  In comparison with 3/7/2022, interval decrease of left upper lobe mass. No new or enlarging nodule.\par \par -CT Head 6/14/22: No evidence of acute hemorrhage, mass or mass effect.\par \par –Cardiac MRI 7/26/22:  At the base and midcavity, the combination of subendocardial late gadolinium enhancement, myocardial wall thinning and wall motion abnormalities are most likely secondary to prior myocardial infarction rather than myocarditis.\par \par -PET/CT 8/15/22:  Compared with FDG PET/CT scan 3/7/2022 as well as additional correlations as above:\par 1. Since prior PET/CT, LEFT UPPER lobe lung mass shows marked decrease in activity, with serial anatomic improvement on most recent chest CT as well as the current study.\par 2. Esophageal uptake stable, likely related to patient's reported prior immunotherapy-related esophagitis.\par 3. Nonspecific, non-FDG avid right upper lobe groundglass opacity, unchanged.\par 4. Non-FDG avid low-attenuation density in the pretracheal region, unchanged.\par 5. No NEW suspicious findings otherwise noted.\par \par –CT Neck Angio 10/16/22:  \par 1. Severe stenosis/near occlusion of the left proximal cervical ICA.\par 2. A 1.3 x 1.1 x 1.1 cm soft tissue attenuation lesion in the midline anterior neck abutting the strap muscles and the inferior aspect of the hyoid bone. Recommend CT neck with contrast for further evaluation.\par 3. Redemonstration of a left upper lobe pulmonary mass.\par \par -PET/CT 11/14/22:  Since prior FDG-PET/CT scan 8/15/2022:\par 1. Multiple NEW ill-defined BILATERAL hypermetabolic pulmonary parenchymal changes. These are suspicious for inflammatory changes and may be related to immunotherapy. There is also a nonspecific, non-FDG avid right upper lobe groundglass opacity, unchanged.\par 2. Left UPPER lobe lung mass shows stable size and activity.\par 3. Esophageal uptake stable, likely related to patient's reported prior immunotherapy-related esophagitis.\par 4. Non-FDG avid low-attenuation density in the pretracheal region, unchanged. This has been seen on multiple prior PET and CT exams.\par 5. No NEW suspicious findings otherwise noted.\par \par –CT Chest 12/5/22:  No pneumonia.  Unchanged paramediastinal left upper lobe lesion since recent November 2022 PET/CT, and decreased since May 2022 chest CT. No new disease in the chest.  \par \par Images Reviewed/Interpreted:\par \par -CT CAP 6/12/23:  Unchanged size of left upper lobe lesion.  New left upper and left lower lobe radiation associated scarring.  No finding of new or progressive disease in the chest, abdomen, or pelvis.\par \par

## 2023-06-22 NOTE — ASSESSMENT
[FreeTextEntry1] : Extensive stage small cell lung cancer diagnosed late March 2019 who was treated with chemotherapy plus immunotherapy from April-June 2019; achieved CR. Treated with immunotherapy maintenance from June 2019 through May 2021 with sustained response.  Developed immunotherapy related esophagitis; started on steroids in June 2021 with clinical response.  Restaging PET/CT March 2022 with disease progression.  Started re-treatment with Carboplatin/Etoposide chemotherapy March 2022; achieved [place]. Durvalumab added with D4. Completed 5 cycles through early June 2022.  Hospitalized in June for STEMI.  Developed recurrence of esophagitis symptoms in June 2022 that responded to reintroduction of Prednisone.  Durvalumab held with eventual resumption of treatment in conjunction with steroids.  Treated through October 2022.  Restaging PET/CT Nov 2022 with new B/L lung opacities initially concerning for inflammation/pneumonitis and treatment was held that month; this was subsequently felt to be unlikely after review at tumor board.  Patient was treated for pneumonia.  Developed Covid-19 infection in late Nov 2022.  Underwent TCAR procedure in early Jan 2023. Case presented at Tumor Board on 1/25/23: Thoracic RT was felt to be reasonable given the current limited sites of disease and as a way to avoid the toxicity of systemic therapy and therefore could consider holding further ICI therapy.  Treated with consolidative Thoracic RT in 15 Fx completed late March 2023.  Restaging CT June 2023 with sustained response.  \par Recommend: \par –Monitor off systemic therapy until time of disease progression.  He is now s/p Consolidative RT to residual disease that was evident on scan.  \par -Esophagitis:  on Prednisone.  Currently on 10mg daily.  Prescribed 2.5mg pills that he can utilize for very slow taper; would decrease by 2.5mg weekly at most.  \par -Continue GI prophylaxis while on steroids\par -Continue Pulmonary f/u     \par -Continue Cardiology and Vascular management; he is s/p TCAR-trans carotid stent performed 1/3/23\par -Continue symptom management under care of Palliative Care Dr. Moreno\par -Avoided addition of bone modifying agent given the dramatic response in the bones\par -Mediport in place.  Port flush with labs today and continue port flushes q6 weeks\par -Restaging PET/CT in Sept and f/u to review results prior to a planned port flush at that time

## 2023-06-22 NOTE — PHYSICAL EXAM
[Restricted in physically strenuous activity but ambulatory and able to carry out work of a light or sedentary nature] : Status 1- Restricted in physically strenuous activity but ambulatory and able to carry out work of a light or sedentary nature, e.g., light house work, office work [Normal] : affect appropriate [de-identified] : No icterus  [de-identified] : MMM O/P Clear, No visible mucositis or erythema [de-identified] : Supple No LAD  [de-identified] : Clear  [de-identified] : S1 S2  [de-identified] : No edema  [de-identified] : No spine/CVA tenderness [de-identified] : Mediport in place

## 2023-07-19 NOTE — DISCUSSION/SUMMARY
[FreeTextEntry1] : 64M with CAD s/p CABG and subsequent PCI, mod AS, metastatic lung Ca, HLD\par \par 1. CAD:  Currently feeling well without symptoms. Preserved LV function on last echo\par \par -Continue aspirin and plavix until at least March 2020\par -Continue high dose statin, beta blocker\par -Repeat echo at some point next year. He does not appear to have taken any cardiotoxic chemo and he is asymptomatic\par \par 2. Mod AS:  No gradients given on echo report from [place]. Continue to monitor with serial echos\par \par 3. HLD: Cont high dose statin\par \par 4. Onc: Continue regular follow up, on immunotherapy.  \par \par RV 3 months Admitted

## 2023-07-28 PROBLEM — L29.9 EAR ITCHING: Status: RESOLVED | Noted: 2021-04-27 | Resolved: 2023-01-01

## 2023-07-28 PROBLEM — Z01.812 ENCOUNTER FOR PREPROCEDURE SCREENING LABORATORY TESTING FOR COVID-19: Status: RESOLVED | Noted: 2021-04-05 | Resolved: 2023-01-01

## 2023-07-28 PROBLEM — B36.9 OTOMYCOSIS OF RIGHT EAR: Status: RESOLVED | Noted: 2021-04-27 | Resolved: 2023-01-01

## 2023-07-28 PROBLEM — Z86.39 HISTORY OF HIGH CHOLESTEROL: Status: RESOLVED | Noted: 2020-07-13 | Resolved: 2023-01-01

## 2023-07-28 PROBLEM — I25.2 HISTORY OF ST ELEVATION MYOCARDIAL INFARCTION (STEMI): Status: RESOLVED | Noted: 2022-06-23 | Resolved: 2023-01-01

## 2023-07-28 PROBLEM — R63.4 UNINTENTIONAL WEIGHT LOSS: Status: RESOLVED | Noted: 2021-06-23 | Resolved: 2023-01-01

## 2023-07-28 PROBLEM — Z87.2 HISTORY OF DERMATITIS: Status: RESOLVED | Noted: 2021-04-23 | Resolved: 2023-01-01

## 2023-07-28 PROBLEM — I50.21 ACUTE SYSTOLIC CONGESTIVE HEART FAILURE: Status: RESOLVED | Noted: 2022-07-01 | Resolved: 2023-01-01

## 2023-07-28 PROBLEM — Z87.898 HISTORY OF FEVER: Status: RESOLVED | Noted: 2022-12-05 | Resolved: 2023-01-01

## 2023-08-22 NOTE — ASSESSMENT
[FreeTextEntry1] : 68 year old man with multivessel coronary disease with occluded bypass grafts, seen initially after acute inferior STEMI while on PD1- inhibitor immunotherapy. Immune-related adverse event less likely in setting of patient with prior tolerance to immunotherapy for over a year. Cardiac MRI no evidence of myocarditis and troponin I and T both negative.  Symptomatically improved on maximal medical therapy for CAD and ischemic cardiomyopathy.  S/p TECAR for carotid disease January 2023.  # 3 Vessel CAD: - continue rosuvastatin 40 - continue ezetimibe 10 - Continue Entresto 24/26 BID, will not increase further given borderline BP - aspirin and clopidogrel given recent STEMI and GORGE and carotid endarterectomy (ticagrelor caused dyspnea) - would continue DAPT given extent of CAD and multiple prior interventions and risk factors. - Toprol 25 mg daily as tolerated - LDL at goal   # HF with reduced EF due to ischemic cardiomyopathy, LVEF improved to 49 % - Entresto 24/26 BID - empaglifozin 10 mg daily - Toprol 25 mg daily, will aim to increase if possible.   # PVCs, due to ischemic cardiomyopathy: ECG NSR today. - continue Toprol   # Carotid atherosclerosis: s/p TECAR 1/3/2023. - Dr. Ibarra-Burnett Intervention/peripheral vascular interventions - Dr. Ray Grissom (Vascular Surgery)  Esophagitis and immunotherapy related adverse events: persistent high steroid requirements with rebound somewhat unusual for checkpoint inhibitor induced esophagitis. He was evaluated by GI in 2021, but never had endoscopy and biopsy due to pandemic. I recommend they return to Dr. Daigle at this time to discuss re-evaluation by EGD given persistence and possible complications of long term steroids, which may itself cause erosive/infectious gastroesophagitis.  Given high steroid requirements, I referred them to Dr. Reddy, an endocrinologist with expertise in immunotherapy related complications, for her insights.   Follow up in 6 months with me. Will call me if new symptoms, dyspnea, chest pain, in view of residual CAD.  Above recommendations discussed with the patient and his wife and all questions were answered to the best of my ability and to their apparent satisfaction. Advised to call with any question or concern.

## 2023-08-22 NOTE — HISTORY OF PRESENT ILLNESS
[FreeTextEntry1] : Interval History:\par He last received durvalumab on 2/10/2023.\par He was treated with consolidative thoracic RT to the left lung in 15 fractions completed March 2023.\par He remains dependent on prednisone due to esophagitis symptoms.\par He had a flare of symptoms after tapering and had to take up to 80 mg daily for relief.\par He has since tapered down to 50 mg daily.\par He denies chest pain. Exercise tolerance is stable. No edema. No orthopnea. No orthostatic hypotension.\par Tripped and fell and sustained large thigh hematoma, finally mostly resolved now.\par \par Not currently on active cancer treatment and on active surveillance by imaging.\par \par History:\par After second cycle of durvalumab (6/9/22), developed inferior STEMI. He presented to the ED on 6/14/2022 with acute retrosternal chest pain which began while watching TV at home.  EKG showed inferior STEMI. LHC with 100 % mCx at prior intervention site, treated with GORGE. About 2 weeks prior had similar episode of chest pain, which resolved with ibuprofen, but this time the pain persisted.\par \par Echo showed LVEF 45%, moderate-severe MR, hypokinesis of inferolateral wall and basal inferior wall, RV enlargement with decreased RVSF, and moderate pulmonary hypertension. Started on medical therapy and dismissed 6/16/2022. Troponin T peaked at 3210 ng/L and was 3123 ng/L on 6/16.\par \par Had issues with postural dizziness in the past.\par \par Since return home, he wife (who is a physician) notes pulse rate is at time irregular. The patient endorses dyspnea after a few steps, such as when using the bathroom at home. He denies any chest pain.\par \par Previously followed for many years by Dr. Misha Casas. \par \par 8/5/2022:\par cMRI no evidence of myocarditis\par L ICA with  moderate to severe calcified plaque\par Resumed duvalumab 7/22\par \par 9/30/2022:\par Dyspnea improved after Jardiance and increased metoprolol.\par Seen by Dr. Valenzuela for advice on carotid and coronary disease\par Returned from [place] and feeling overall well, denies chest pain, sometimes dizziness after standing up quickly. Dry skin on palms.\sarbjit Remains on maintenance durvalumab, with prednisone because of immune checkpoint inhibitor related esophagitis\par \par 11/18/2022:\sarbjit Had severe LICA stenosis and was referred to Dr. Grissom for TECAR because of high risk for transfemoral stenting or surgical endarterectomy. TECAR scheduled December 6th and he comes for pre-operative clearance today. The most recent PET/CT demonstrated inflammatory changes in the lungs, possibly immunotherapy related pneumonitis, so his scheduled treatment was cancelled today and he will be treated with antibiotics empirically.\par \par He complains of significant fatigue, but denies any chest pain, shortness of breath or changes in his functional capacity. He is able to walk and complete > 4 METS. He is adherent to his medications. He also remains on corticosteroids for ICI related esophagitis, which he is currently tapering.\par \par 1/18/2023:\sarbjit Had TECAR on 1/3/2023 is recovering well post-op.\sarbjit Denies shortness of breath, chest pain. Swimming in local pool and feels well overall.\par She decreased the dose of metoprolol to half a tablet daily because of low blood pressure, otherwise medications are unchanged.\par Resumed durvalumab 1/13/23. To see Dr. Borges for consideration of local radiation therapy given immunotherapy related adverse events (esophagitis, etc).\par \par \par Cardiovascular Testing:\par ---------------------------------------\par ECG:\par 7/28/2023:NSR 68 bpm, inferior/posterior infarct (no change from prior)\par 1/18/2023: NSR 76 bpm, LAD, inferior/posterior infarct (no change from prior)\par 9/30/2022: sinus 65 bpm, inferior/posterior infarct (no change from prior)\par 7/1/2022: sinus 75 bpm with PVCs, inferior/posterior infarct\par 9/20/2021: sinus, minimal inferior Q wave, anterior TWI\par ---------------------------------------\par Echo:\par 10/3/2022: LVEF 49 %, mild MR, GLS -15.9\par 7/11/2022: EF 42 %, moderate MR, basal inferior and inferolateral hypokinesis\par 6/14/2022: EF 45 %, Moderate-severe MR, mild AI, mild segmental LV systolic dysfunction (inferolateral and basal inferior), moderate pulmonary HTN\par 5/14/2020: LVEF 65%\par ---------------------------------------\par Stress:\par 10/7/2021: MPI no ischemia\par ---------------------------------------\par Cath/PCI\par 6/14/2022: PCI to mLCX ISR with GORGE\par 3/18/2019: GORGE x2 to LCx, GORGE to LAD, GORGE to Ramus (staged PCI)\par 1/16/2008: CABG ([facility] [place]), LIMA-LAD, DONTRELL-Ramus, SVG-RCA)\par 2007: BMS for chest pain with ISR\par ---------------------------------------\par Vascular:\par 1/3/2023: L ELOISE (Dr. Grissom)\par 10/16/2022: CT-A neck: severe stenosis and occlusion of L prox cervical ICA\par 7/11/2022: moderate to severe calcified plaque in L ICA, > 70 %, R ICA 16-49 %\par 5/4/2020: moderate heterogeneous plaque left carotid 50-69 %\par --------------------------------------\par cMRI:\par 7/26/2022: base and mid-cavity subendocardial LGE compatible with prior MI

## 2023-08-22 NOTE — REASON FOR VISIT
[Spouse] : spouse [FreeTextEntry3] : Dr. 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-1 inhibitor immunotherapy here for follow up of ischemic cardiomyopathy, coronary artery disease, and left internal carotid artery stenosis.\par \par Prior Cancer Treatments:\par ------------------------------------------------------------------------\par Chemo/targeted therapy:\par 4/2019: carboplatin, etoposide and atezolizumab x 4 followed by immunotherapy maintenance\par 3/2022: carboplatin/etoposide, durvalumab added May 2022\par 8/2022-2/10/2023: durvalumab\par -------------------------------------------------------------------------\par Radiation:\par 3/3/2023-3/23/2023: 4500 cGy to left lung

## 2023-08-22 NOTE — REVIEW OF SYSTEMS
[Heartburn] : heartburn [Dysphagia] : dysphagia [Negative] : Heme/Lymph [Joint Pain] : joint pain [FreeTextEntry2] : difficulty sleeping

## 2023-08-22 NOTE — PHYSICAL EXAM
[Normal] : normal conjunctiva [Normal Venous Pressure] : normal venous pressure [Normal S1, S2] : normal S1, S2 [No Murmur] : no murmur [No Rub] : no rub [No Gallop] : no gallop [Clear Lung Fields] : clear lung fields [Good Air Entry] : good air entry [No Respiratory Distress] : no respiratory distress  [Soft] : abdomen soft [Normal Gait] : normal gait [Gait - Sufficient for Exercise Testing] : gait - sufficient for exercise testing [No Edema] : no edema [No Cyanosis] : no cyanosis [No Clubbing] : no clubbing [No Rash] : no rash [Moves all extremities] : moves all extremities [No Focal Deficits] : no focal deficits [Alert and Oriented] : alert and oriented [No Carotid Bruit] : no carotid bruit [de-identified] : left supraclavivular TECAR incision healing, c/d and free of evident infection [de-identified] : fading ecchymotic lesion on right thigh

## 2023-08-24 PROBLEM — T38.0X5A STEROID SIDE EFFECTS: Status: ACTIVE | Noted: 2023-01-01

## 2023-08-24 NOTE — REVIEW OF SYSTEMS
[FreeTextEntry1] : Constitutional:Some fatigue, significant weight gain with prednisone, increased appetite Eyes: Denies any blurry vision, visual field defect, eye pain.  ENT: Denies any dysphagia, neck pain, hearing loss, nasal congestion  Resp: Denies any SOB, cough, wheezing  CV: Denies any chest pain, palpitations, edema  GI: ongoing gastritis/esophagitis symptoms  Endo: Denies any polyuria, polydipsia, heat or cold intolerance Ankush

## 2023-08-24 NOTE — HISTORY OF PRESENT ILLNESS
[FreeTextEntry1] : 68 year old gentleman with PMH of L) Lung cancer and pre diabetes. Presents for management of prednisone taper in setting of immunotherapy related esophagitis   #Lung cancer -Diagnosed March 2019 -Mets to the liver, bone, calvarium)  -Treatment Carboplatin, etoposide, atezolizumab April 2021 - sustained response -> developed ICI adverse event esophatitis/gastritis June 2021 -> commenced on prednisone 40-80mg daily  June 2021 -> was able to taper at one point to 5mg, with recurrence of GI symptoms  - -> currently back on 40mg daily (has not been off prednisone since 2021) POD March 2022 - carboplatin/etoposide  Durvalumab June 2022 -> Feb 2023 (ceased 2/6/23) Rtx March 2023   #Pre diabetes  -Dx 1 year ago  -HbA1c 6.2% July 2023 -Meds: jardiance 10mg daily  -eGR 71 -Does not check BGs -Denies hypos -Denies microvascular complications   #Hypothyroidism  -Dx  4-5 years ago  -Ons stable dose of levothyroxine 25mcg daily  -Had TFTs with every immunotherapy - no changes in dose required/worsening of disease   Medications  Aspirin 81mg daily  Clopidogrel 75mg daily  Entresto 24-26mg BID  Ezetimie 10mg daily  Jardiance 10mg daily  Levothyroxine 25mcg daily  Metoprolol  50mg daily Rosuvastini 40mg daily  Pantoprazole 40mg daily   No fragility fracture  on vitamin D 10,000IU daily - last vit D level elevated >50 (patient states was taking this as an anti-inflammatory as had read this online) No DXA in past

## 2023-08-24 NOTE — PHYSICAL EXAM
[de-identified] : General: Patient appears well nourished without any distress . Does appear cushingnoid, with central obesity, round facies thin  skin and easy bruising  HEENT: Thyroid is supple, no nodules palpated, no LAD  Cardio: RRR, no murmurs appreciated  Pulm: CTA b/l, no wheezes, no edema  Skin: No significant rashes, no striae Neuro: No focal deficits noted. No tremors GI: No pain with palpation

## 2023-09-15 PROBLEM — G47.00 INSOMNIA, UNSPECIFIED TYPE: Status: ACTIVE | Noted: 2022-07-22

## 2023-09-15 PROBLEM — C77.1 SECONDARY MALIGNANT NEOPLASM OF BRONCHOPULMONARY LYMPH NODES: Status: ACTIVE | Noted: 2019-10-15

## 2023-09-15 PROBLEM — C77.2 SECONDARY MALIGNANT NEOPLASM OF MESENTERIC LYMPH NODES: Status: ACTIVE | Noted: 2019-10-15

## 2023-09-15 PROBLEM — C78.7 SECONDARY MALIGNANT NEOPLASM OF LIVER AND INTRAHEPATIC BILE DUCT: Status: ACTIVE | Noted: 2019-10-15

## 2023-09-15 NOTE — CHART NOTE - NSCHARTNOTEFT_GEN_A_CORE
HEMATOLOGY/ONCOLOGY FELLOW NOTE     68M hx extensive-stage SCLC with mets to bone and liver s/p carbo/etopo/atezo April 2019 followed by immunotherapy maintenance was sent in to [facility] ER for acute, severe thrombocytopenia. His last treatment with durvalumab was in February 2023; had to be stopped due to presumed immunotherapy related esophagitis/gastritis. He was then treated with consolidative Thoracic RT to primary tumor in 15 Fx in March 2023. Monitored off systemic therapy since February 2023.    Platelet count was 132 K/uL on 7/28/23. Today 9/15/23 his platelet count was 10 K/uL.     # Rule out Acute Leukemia  - Patient presenting with WBC 3.02 and blast 1% noted on manual differential. Suspicion for therapy-related AML is high given history of etoposide administration.  - Peripheral smear at [facility] reviewed: Several blast-like cells that appear to be of lymphoid origin. No schistocytosis. Markedly decreased platelets without clumping; true thrombocytopenia. Platelets are normal morphologically with small forms.  - Please order:        - Serum iron w/ TIBC, ferritin, B12 and folate level        - Haptoglobin / Retic count        - Fibrinogen / PT / PTT / D-dimer for DIC labs        - LDH / Uric acid level for TLS labs        - G6PD level  - If uric acid > 8, give 3mg IV Rasburicase x1; if >12 give 6mg IV x1  - To expedite treatment, please order:        - TTE        - HIV and Acute hepatitis panel (Need to order Hep B surface Ab and Hep B core Ab total SEPARATELY)  - Please check CBC with Diff, coags, fibrinogen and d-dimer DAILY  - Trend TLS labs daily (uric acid, LDH, Mg, Phos)  - IVF hydration at 100 cc/hr  - Will send out flow cytometry on peripheral blood (req. form with green top and lavender top tube given to nurse):  Ordered STAT PML-BEBETO, BCR-ABL and FLT 3.   - Transfuse for Hgb < 7 and platelets < 10k, < 15k if febrile and < 50k if non-CNS bleeding    ***Note is incomplete. Will discuss plan with attending.  Note is not finalized until signed by attending.     Raymond West MD  Hematology/Oncology Fellow PGY-5  Pager: [facility] [phone] / PAVAN [phone]  After 5pm and on weekends please page on-call fellow HEMATOLOGY/ONCOLOGY FELLOW NOTE     68M hx extensive-stage SCLC with mets to bone and liver s/p carbo/etopo/atezo April 2019 followed by immunotherapy maintenance was sent in to [facility] ER for acute, severe thrombocytopenia. His last treatment with durvalumab was in February 2023; had to be stopped due to presumed immunotherapy related esophagitis/gastritis. He was then treated with consolidative Thoracic RT to primary tumor in 15 Fx in March 2023. Monitored off systemic therapy since February 2023.    Platelet count was 132 K/uL on 7/28/23. Today 9/15/23 his platelet count was 10 K/uL.     # Rule out Acute Leukemia  - Patient presenting with WBC 3.02 and blast 1% noted on manual differential. Suspicion for therapy-related AML is high given history of etoposide administration.  - Peripheral smear at [facility] reviewed: Several blast-like cells that appear to be of lymphoid origin. No schistocytosis. Markedly decreased platelets without clumping; true thrombocytopenia. Platelets are normal morphologically with small forms.  - Please order:        - Serum iron w/ TIBC, ferritin, B12 and folate level        - Haptoglobin / Retic count        - Fibrinogen / PT / PTT / D-dimer for DIC labs        - LDH / Uric acid level for TLS labs        - G6PD level  - If uric acid > 8, give 3mg IV Rasburicase x1; if >12 give 6mg IV x1  - To expedite treatment, please order:        - TTE        - HIV and Acute hepatitis panel (Need to order Hep B surface Ab and Hep B core Ab total SEPARATELY)  - Please check CBC with Diff, coags, fibrinogen and d-dimer DAILY  - Trend TLS labs daily (uric acid, LDH, Mg, Phos)  - Will send out flow cytometry on peripheral blood (req. form with green top and lavender top tube given to nurse):  Ordered STAT PML-BEBETO, BCR-ABL and FLT 3.   - Transfuse for Hgb < 7 and platelets < 10k, < 15k if febrile and < 50k if non-CNS bleeding    Case discussed with on-call attending Dr. Geno Barber.    Raymond West MD  Hematology/Oncology Fellow PGY-5  Pager: [facility] [phone] / LINAHUN [phone]  After 5pm and on weekends please page on-call fellow

## 2023-09-16 NOTE — H&P ADULT - NSHPPHYSICALEXAM_GEN_ALL_CORE
T(C): 36.6 (09-16-23 @ 05:11), Max: 36.6 (09-15-23 @ 17:35)  HR: 92 (09-16-23 @ 05:11) (77 - 92)  BP: 95/56 (09-16-23 @ 05:11) (95/56 - 129/79)  RR: 18 (09-16-23 @ 05:11) (17 - 20)  SpO2: 97% (09-16-23 @ 05:11) (97% - 100%)    CONSTITUTIONAL: Well groomed, no apparent distress  EYES: PERRLA and symmetric, EOMI, No conjunctival or scleral injection, non-icteric  ENMT: Oral mucosa with moist membranes. Normal dentition; no pharyngeal injection or exudates             NECK: Supple, symmetric and without tracheal deviation   RESP: No respiratory distress, no use of accessory muscles; CTA b/l, no WRR  CV: RRR, +S1S2, no MRG; no JVD; no peripheral edema  GI: Soft, NT, ND, no rebound, no guarding; no palpable masses; no hepatosplenomegaly; no hernia palpated  LYMPH: No cervical LAD or tenderness; no axillary LAD or tenderness; no inguinal LAD or tenderness  MSK: Normal gait; No digital clubbing or cyanosis; examination of the (head/neck/spine/ribs/pelvis, RUE, LUE, RLE, LLE) without misalignment,            Normal ROM without pain, no spinal tenderness, normal muscle strength/tone  SKIN: Ecchymoses of b/l UE and LEs; petichial rash of chest  NEURO: CN II-XII intact; normal reflexes in upper and lower extremities, sensation intact in upper and lower extremities b/l to light touch   PSYCH: Appropriate insight/judgment; A+O x 3, mood and affect appropriate, recent/remote memory intact

## 2023-09-16 NOTE — CONSULT NOTE ADULT - ATTENDING COMMENTS
history of SCLC s/p carbo and etoposide and atezo  p/w acute severe thrombocytopenia and some blast cells in the peripheral blood  rule out leukemia by sending peripheral blood for flow cytometry  bone marrow tomorrow

## 2023-09-16 NOTE — ED ADULT NURSE NOTE - OBJECTIVE STATEMENT
67 y/o M with PMH of extensive-stage SCLC with mets to bone and liver s/p carbo/etopo/atezo April 2019 followed by immunotherapy maintenance presents to ED complaining of low platelets. Pt had labs drawn out pt and was sent to ED due to a platelet level of 10. Pts treatment was stopped due to immunotherapy related esophagitis/gastritis. He was then treated with thoracic RT to tumor in March. Pt has noticed increased bruising, other than that asymptomatic. Denies headache, dizziness, vision changes, chest pain, shortness of breath, abdominal pain, nausea, vomiting, diarrhea, fevers, chills, dysuria, hematuria, recent travel or fall.

## 2023-09-16 NOTE — CONSULT NOTE ADULT - TIME BILLING
review of EMR , coordination of care, discussion with family and patient, and communication with primary team

## 2023-09-16 NOTE — H&P ADULT - PROBLEM SELECTOR PLAN 3
- Holding home ASA, clopidogrel iso thrombocytopenia - S/p CABGx3 (2008), DESx3 (2019, 2022), TCAR (01/2023)  - Pt continued DAPT due to h/o vasculopathy, per outpatient cardiologist  - Holding home ASA, clopidogrel iso thrombocytopenia, will consult cardiology for recommendations

## 2023-09-16 NOTE — DISCHARGE NOTE PROVIDER - NSDCCPCAREPLAN_GEN_ALL_CORE_FT
PRINCIPAL DISCHARGE DIAGNOSIS  Diagnosis: Thrombocytopenia  Assessment and Plan of Treatment: please follow up with hematology/oncology for biopsy results and treatment thereafter. Bleeding precautions avoid sharp objects and report any unusual bleeding or eccymosis      SECONDARY DISCHARGE DIAGNOSES  Diagnosis: Hypothyroidism  Assessment and Plan of Treatment: c/w synthroid    Diagnosis: Small cell lung cancer  Assessment and Plan of Treatment: please follow up with oncology    Diagnosis: CAD (coronary artery disease)  Assessment and Plan of Treatment: Please hold all bleeding potential medications , and see yor cardiologist.  Coronary artery disease is a condition where the arteries the supply the heart muscle get clogges with fatty deposits & puts you at risk for a heart attack  Call your doctor if you have any new pain, pressure, or discomfort in the center of your chest, pain, tingling or discomfort in arms, back, neck, jaw, or stomach, shortness of breath, nausea, vomiting, burping or heartburn, sweating, cold and clammy skin, racing or abnormal heartbeat for more than 10 minutes or if they keep coming & going.  Call 911 and do not tr to get to hospital by care  You can help yourself with lefestyle changes (quitting smoking if you smoke), eat lots of fruits & vegetables & low fat dairy products, not a lot of meat & fatty foods, walk or some form of physical activity most days of the week, lose weight if you are overweight  Take your cardiac medication as prescribed to lower cholesterol, to lower blood pressure, aspirin to prevent blood clots, and diabetes control  Make sure to keep appointments with doctor for cardiac follow up care      Diagnosis: HTN (hypertension)  Assessment and Plan of Treatment: Follow up with your medical doctor to establish long term blood pressure treatment goals.       PRINCIPAL DISCHARGE DIAGNOSIS  Diagnosis: Thrombocytopenia  Assessment and Plan of Treatment: evaluated by hematology; received platelets inpatient  please follow up with hematology/oncology for biopsy results and treatment thereafter. Bleeding precautions avoid sharp objects and report any unusual bleeding or eccymosis  followup Monday @ [facility] for CBC and bmp labs  Heme/onc Dr. Ethan hart. Thursday 9/28 per hematology      SECONDARY DISCHARGE DIAGNOSES  Diagnosis: CAD (coronary artery disease)  Assessment and Plan of Treatment: Please hold all bleeding potential medications , and see yor cardiologist.  Coronary artery disease is a condition where the arteries the supply the heart muscle get clogges with fatty deposits & puts you at risk for a heart attack  Call your doctor if you have any new pain, pressure, or discomfort in the center of your chest, pain, tingling or discomfort in arms, back, neck, jaw, or stomach, shortness of breath, nausea, vomiting, burping or heartburn, sweating, cold and clammy skin, racing or abnormal heartbeat for more than 10 minutes or if they keep coming & going.  Call 911 and do not tr to get to hospital by care  You can help yourself with lefestyle changes (quitting smoking if you smoke), eat lots of fruits & vegetables & low fat dairy products, not a lot of meat & fatty foods, walk or some form of physical activity most days of the week, lose weight if you are overweight  Take your cardiac medication as prescribed to lower cholesterol, to lower blood pressure, aspirin to prevent blood clots, and diabetes control  Make sure to keep appointments with doctor for cardiac follow up care      Diagnosis: HTN (hypertension)  Assessment and Plan of Treatment: Follow up with your medical doctor to establish long term blood pressure treatment goals.      Diagnosis: Small cell lung cancer  Assessment and Plan of Treatment: please follow up with oncology    Diagnosis: Hypothyroidism  Assessment and Plan of Treatment: c/w synthroid

## 2023-09-16 NOTE — PATIENT PROFILE ADULT - FALL HARM RISK - RISK INTERVENTIONS

## 2023-09-16 NOTE — PATIENT PROFILE ADULT - NS PRO AD NO ADVANCE DIRECTIVE
Electrodesiccation And Curettage Text: The wound bed was treated with electrodesiccation and curettage after the biopsy was performed. Render In Bullet Format When Appropriate: No Validate Note Data (See Information Below): Yes Curettage Text: The wound bed was treated with curettage after the biopsy was performed. Accession #: [id number] Biopsy Method: 11 blade Billing Type: Third-Party Bill Anesthesia Type: 1% lidocaine without epinephrine and a 1:10 solution of 8.4% sodium bicarbonate Biopsy Type: H and E Dressing: bandage X Size Of Lesion In Cm: 0 Detail Level: Detailed Anesthesia Volume In Cc: 0.1 Hemostasis: Aluminum Chloride Consent: Written consent was obtained and risks were reviewed including but not limited to scarring, infection, bleeding, scabbing, incomplete removal, nerve damage and allergy to anesthesia. Electrodesiccation Text: The wound bed was treated with electrodesiccation after the biopsy was performed. Notification Instructions: Patient will be notified of biopsy results. However, patient instructed to call the office if not contacted within 2 weeks. Cryotherapy Text: The wound bed was treated with cryotherapy after the biopsy was performed. Post-Care Instructions: I reviewed with the patient in detail post-care instructions. Patient is to keep the biopsy site dry overnight, and then apply vaseline twice daily until healed. Patient may apply hydrogen peroxide soaks to remove any crusting. Patient given a wound care sheet. Depth Of Biopsy: dermis Information: Selecting Yes will display possible errors in your note based on the variables you have selected. This validation is only offered as a suggestion for you. PLEASE NOTE THAT THE VALIDATION TEXT WILL BE REMOVED WHEN YOU FINALIZE YOUR NOTE. IF YOU WANT TO FAX A PRELIMINARY NOTE YOU WILL NEED TO TOGGLE THIS TO 'NO' IF YOU DO NOT WANT IT IN YOUR FAXED NOTE. Wound Care: Vaseline Type Of Destruction Used: Curettage Silver Nitrate Text: The wound bed was treated with silver nitrate after the biopsy was performed. No

## 2023-09-16 NOTE — DISCHARGE NOTE PROVIDER - NSDCMRMEDTOKEN_GEN_ALL_CORE_FT
aspirin 81 mg oral delayed release tablet: 1 tab(s) orally once a day  clopidogrel 75 mg oral tablet: 1 tab(s) orally once a day  Entresto 24 mg-26 mg oral tablet: 1 tab(s) orally 2 times a day  Jardiance 10 mg oral tablet: 1 tab(s) orally once a day (in the morning)  metoprolol succinate 50 mg oral tablet, extended release: 1 tab(s) orally once a day am  predniSONE 20 mg oral tablet: 1 tab(s) orally once a day  rosuvastatin 40 mg oral tablet: 1 tab(s) orally once a day  Synthroid 25 mcg (0.025 mg) oral tablet: 1 tab(s) orally once a day am  tamsulosin 0.4 mg oral capsule: 2 cap(s) orally once a day (at bedtime)  Zetia 10 mg oral tablet: 1 tab(s) orally once a day am   aspirin 81 mg oral delayed release tablet: 1 tab(s) orally once a day  atovaquone 750 mg/5 mL oral suspension: 10 milliliter(s) orally once a day  clopidogrel 75 mg oral tablet: 1 tab(s) orally once a day  Entresto 24 mg-26 mg oral tablet: 1 tab(s) orally 2 times a day  Jardiance 10 mg oral tablet: 1 tab(s) orally once a day (in the morning)  metoprolol succinate 50 mg oral tablet, extended release: 1 tab(s) orally once a day am  predniSONE 10 mg oral tablet: 3 tab(s) orally once a day  rosuvastatin 40 mg oral tablet: 1 tab(s) orally once a day  Synthroid 25 mcg (0.025 mg) oral tablet: 1 tab(s) orally once a day am  tamsulosin 0.4 mg oral capsule: 2 cap(s) orally once a day (at bedtime)  Zetia 10 mg oral tablet: 1 tab(s) orally once a day am   atovaquone 750 mg/5 mL oral suspension: 10 milliliter(s) orally once a day  clopidogrel 75 mg oral tablet: 1 tab(s) orally once a day  Entresto 24 mg-26 mg oral tablet: 1 tab(s) orally 2 times a day  Jardiance 10 mg oral tablet: 1 tab(s) orally once a day (in the morning)  metoprolol succinate 50 mg oral tablet, extended release: 1 tab(s) orally once a day am  polyethylene glycol 3350 oral powder for reconstitution: 17 gram(s) orally 2 times a day As needed Constipation  predniSONE 10 mg oral tablet: 3 tab(s) orally once a day  rosuvastatin 40 mg oral tablet: 1 tab(s) orally once a day  senna leaf extract oral tablet: 2 tab(s) orally once a day (at bedtime)  Synthroid 25 mcg (0.025 mg) oral tablet: 1 tab(s) orally once a day am  tamsulosin 0.4 mg oral capsule: 2 cap(s) orally once a day (at bedtime)  Zetia 10 mg oral tablet: 1 tab(s) orally once a day am   atovaquone 750 mg/5 mL oral suspension: 10 milliliter(s) orally once a day  Entresto 24 mg-26 mg oral tablet: 1 tab(s) orally 2 times a day  Jardiance 10 mg oral tablet: 1 tab(s) orally once a day (in the morning)  metoprolol succinate 50 mg oral tablet, extended release: 1 tab(s) orally once a day am  polyethylene glycol 3350 oral powder for reconstitution: 17 gram(s) orally 2 times a day As needed Constipation  predniSONE 10 mg oral tablet: 3 tab(s) orally once a day  predniSONE 10 mg oral tablet: 3 tab(s) orally once a day  rosuvastatin 40 mg oral tablet: 1 tab(s) orally once a day  senna leaf extract oral tablet: 2 tab(s) orally once a day (at bedtime)  Synthroid 25 mcg (0.025 mg) oral tablet: 1 tab(s) orally once a day am  tamsulosin 0.4 mg oral capsule: 2 cap(s) orally once a day (at bedtime)  Zetia 10 mg oral tablet: 1 tab(s) orally once a day am   atovaquone 750 mg/5 mL oral suspension: 10 milliliter(s) orally once a day  Entresto 24 mg-26 mg oral tablet: 1 tab(s) orally 2 times a day  metoprolol succinate 50 mg oral tablet, extended release: 1 tab(s) orally once a day am  polyethylene glycol 3350 oral powder for reconstitution: 17 gram(s) orally 2 times a day As needed Constipation  predniSONE 10 mg oral tablet: 3 tab(s) orally once a day  predniSONE 10 mg oral tablet: 3 tab(s) orally once a day  rosuvastatin 40 mg oral tablet: 1 tab(s) orally once a day  senna leaf extract oral tablet: 2 tab(s) orally once a day (at bedtime)  Synthroid 25 mcg (0.025 mg) oral tablet: 1 tab(s) orally once a day am  tamsulosin 0.4 mg oral capsule: 2 cap(s) orally once a day (at bedtime)  Zetia 10 mg oral tablet: 1 tab(s) orally once a day am

## 2023-09-16 NOTE — DISCHARGE NOTE PROVIDER - NSDCFUADDAPPT_GEN_ALL_CORE_FT
APPTS ARE READY TO BE MADE: [ ] YES    Best Family or Patient Contact (if needed):    Additional Information about above appointments (if needed):    1: oncology/hematology  2: PCP  3: cardiology    Other comments or requests:

## 2023-09-16 NOTE — CONSULT NOTE ADULT - ASSESSMENT
Mr. 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    Cardiology consulted for DAPT management. He's been on DAPT since PCI 06/2022.    EKG showing NSR, left axis deviation, inferior q-waves.  Last TTE from 10/3/22 showing EF 49%, mild MR, mild segmental LV systolic dysfunction, grossly normal RV.  Last LHC with Dr. Jensen 6/14/2022 - PCI to mLCx (for late presenting STEMI)    Recommendations:  - In the setting of severe thrombocytopenia, would hold DAPT completely  - When safe, would resume single-agent with ASA only as >1 year since last stent  - C/w Toprol, resovastatin, Zetia, Jardiance, Entresto    Please call with any questions.    Note incomplete until cosigned by attending.    Hayder Wilhelm, PGY-4  Cardiology Fellow    For all new consults  [email or web address]  Login: [email or web address]   Mr. 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    Cardiology consulted for DAPT management. He's been on DAPT since PCI 06/2022.    EKG showing NSR, left axis deviation, inferior q-waves.  Last TTE from 10/3/22 showing EF 49%, mild MR, mild segmental LV systolic dysfunction, grossly normal RV.  Last LHC with Dr. Jensen 6/14/2022 - PCI to mLCx (for late presenting STEMI)    Recommendations:  - In the setting of severe thrombocytopenia, would hold DAPT completely  - When safe, would resume single-agent with ASA only as >1 year since last stent  - C/w Toprol, rosuvastatin, Zetia, Jardiance, Entresto    Please call with any questions.    Note incomplete until cosigned by attending.    Hayder Wilhelm, PGY-4  Cardiology Fellow    For all new consults  [email or web address]  Login: [email or web address]

## 2023-09-16 NOTE — H&P ADULT - NSHPREVIEWOFSYSTEMS_GEN_ALL_CORE
REVIEW OF SYSTEMS:    CONSTITUTIONAL:  No weakness, fevers or chills  EYES/ENT:  No visual changes;  No vertigo or throat pain   NECK:  No pain or stiffness  RESPIRATORY:  No cough, wheezing, hemoptysis; No shortness of breath  CARDIOVASCULAR:  No chest pain or palpitations  GASTROINTESTINAL:  No abdominal or epigastric pain. No nausea, vomiting, or hematemesis; No diarrhea or constipation. No melena or hematochezia.  GENITOURINARY:  No dysuria, frequency or hematuria  NEUROLOGICAL:  No numbness or weakness  SKIN:  Ecchymoses of upper and lower extremities

## 2023-09-16 NOTE — CONSULT NOTE ADULT - SUBJECTIVE AND OBJECTIVE BOX
Patient seen and evaluated at bedside    HPI:  Mr. 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 Of note, pt last received durvalumab in February 2023; stopped due to possible immunotherapy related esophagitis/gastritis. This was then treated with consolidative RT to primary lung tumor in March 2023 and he has been monitored off systemic therapy since February 2023. Pt endorses ecchymoses on upper and lower extremities and but is otherwise asymptomatic. Denies fever, chills, chest pain, SOB, nausea, vomiting, diarrhea, constipation. Admitted to medicine for further management of acute, severe thrombocytopenia.      Cardiology consulted for DAPT management iso thrombocytopenia.     On my evaluation, patient appears comfortable, denies CP or SOB, able to lay flat without issues.     EKG showing NSR, left axis deviation, inferior q-waves.  Last TTE from 10/3/22 showing EF 49%, mild MR, mild segmental LV systolic dysfunction, grossly normal RV.  Last LHC with Dr. Jensen 6/14/2022 - PCI to mLCx (for late presenting STEMI)      PMHx:   CAD (coronary artery disease)    HTN (hypertension)    Lung cancer    Status post chemotherapy    STEMI (ST elevation myocardial infarction)    Dysphagia    H/O candidiasis of mouth    H/O Helicobacter infection    Hypothyroidism    Hyperlipidemia    Left carotid artery stenosis    Esophagitis        PSHx:   No significant past surgical history    H/O coronary angiogram    S/P CABG x 3    Stented coronary artery        Allergies:  No Known Allergies      Home Meds:    Current Medications:   atovaquone  Suspension 1500 milliGRAM(s) Oral daily  ezetimibe 10 milliGRAM(s) Oral daily  levothyroxine 25 MICROGram(s) Oral daily  metoprolol succinate ER 50 milliGRAM(s) Oral daily  predniSONE   Tablet 20 milliGRAM(s) Oral daily  rosuvastatin 40 milliGRAM(s) Oral at bedtime  tamsulosin 0.8 milliGRAM(s) Oral at bedtime    FAMILY HISTORY:  No pertinent family history in first degree relatives    REVIEW OF SYSTEMS:  All other review of systems is negative unless indicated above.    Physical Exam:  T(F): 98.4 (09-16), Max: 98.4 (09-16)  HR: 92 (09-16) (77 - 92)  BP: 108/70 (09-16) (95/56 - 108/70)  RR: 18 (09-16)  SpO2: 98% (09-16)  GENERAL: No acute distress, well-developed, obese  CHEST/LUNG: Clear to auscultation bilaterally; No wheeze, equal breath sounds bilaterally   HEART: Regular rate and rhythm; No murmurs, rubs, or gallops  ABDOMEN: Soft, Nontender, Nondistended  EXTREMITIES:  No edema  NEUROLOGY: AAOx3    CXR: Personally reviewed    Labs: Personally reviewed                        9.5    2.31  )-----------( 20       ( 16 Sep 2023 11:13 )             28.2     09-16    138  |  103  |  19  ----------------------------<  128<H>  4.3   |  27  |  0.64    Ca    9.4      16 Sep 2023 11:13  Phos  3.2     09-16  Mg     2.0     09-16    TPro  5.8<L>  /  Alb  3.8  /  TBili  0.4  /  DBili  x   /  AST  14  /  ALT  33  /  AlkPhos  57  09-16    PT/INR - ( 16 Sep 2023 01:41 )   PT: 10.5 sec;   INR: 0.95 ratio         PTT - ( 16 Sep 2023 01:41 )  PTT:51.3 sec                Thyroid Stimulating Hormone, Serum: 0.84 uIU/mL (09-15 @ 12:18)

## 2023-09-16 NOTE — H&P ADULT - PROBLEM SELECTOR PLAN 2
- Extensive disease w/ mets to bone and liver  - S/p carbo/etopo/atezo (04/2019) and maintenance immunotherapy w/ durvalumab (last received 02/2023); stopped due to possible immunotherapy related esophagitis/gastritis  - S/p consolidative thoracic RT to primary lung tumor (03/2023) - Extensive disease w/ mets to bone and liver  - S/p carbo/etopo/atezo (04/2019) and maintenance immunotherapy w/ durvalumab (last received 02/2023); stopped due to possible immunotherapy related esophagitis/gastritis  - S/p consolidative thoracic RT to primary lung tumor (03/2023)  - Off systemic therapy since 02/2023

## 2023-09-16 NOTE — H&P ADULT - ASSESSMENT
Mr. 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

## 2023-09-16 NOTE — H&P ADULT - ATTENDING COMMENTS
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  -EKG Personally reviewed: NSR, qtc 403, HR 75, no st elevation or depression  -CXR personally reviewed: no focal consolidation, no pleff, no pulmonary vascular congestion    #Thrombocytopenia  #Acute Leukemia  #CAD    - Agree with thrombocytopenia and leukemia as outlined above with assistance from Heme/onc  - outpatient notes reviewed: was on DAPT due to GORGE >1 year ago, severe triple vessel disease and carotid artery disease. unfortunately will have to hold for now given severe thrombocytopenia. will have to discuss with cardiology regarding future anti-platelet therapy when safe  - c/w prednisone 20 for esophagitis due to immunotherapy. obtaining collateral, patient may need to be on pcp ppx  - updated wife at bedside    d/w Dr. 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  -EKG Personally reviewed: NSR, qtc 403, HR 75, no st elevation or depression  -CXR personally reviewed: no focal consolidation, no pleff, no pulmonary vascular congestion    #Thrombocytopenia  #Acute Leukemia  #CAD    - Agree with thrombocytopenia and leukemia workup as outlined above with assistance from Heme/onc  - outpatient notes reviewed: was on DAPT due to GORGE >1 year ago, severe triple vessel disease and carotid artery disease. unfortunately will have to hold for now given severe thrombocytopenia. will have to discuss with cardiology regarding future anti-platelet therapy when safe  - c/w prednisone 20 for esophagitis due to immunotherapy. obtaining collateral, patient may need to be on pcp ppx  - updated wife at bedside    d/w Dr. Sheppard

## 2023-09-16 NOTE — CONSULT NOTE ADULT - ASSESSMENT
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 Hematology consulted for thrombocytopenia. Found to have blast-like cells on peripheral smear concerning for hematologic malignancy.    # Rule out Acute Leukemia  - Patient presenting with WBC 3.02 and blast 1% noted on manual differential. Suspicion for therapy-related AML is high given history of etoposide administration.  - Peripheral smear at [facility] reviewed: Several blast-like cells that appear to be of lymphoid origin. No schistocytosis. Markedly decreased platelets without clumping; true thrombocytopenia. Platelets are normal morphologically with small forms.  - Please order:        - Serum iron w/ TIBC, ferritin, B12 and folate level        - Haptoglobin / Retic count        - Fibrinogen / PT / PTT / D-dimer for DIC labs        - LDH / Uric acid level for TLS labs        - G6PD level  - If uric acid > 8, give 3mg IV Rasburicase x1; if >12 give 6mg IV x1  - To expedite treatment, please order:        - TTE        - HIV and Acute hepatitis panel (Need to order Hep B surface Ab and Hep B core Ab total SEPARATELY)  - Please check CBC with Diff, coags, fibrinogen and d-dimer DAILY  - Trend TLS labs daily (uric acid, LDH, Mg, Phos)  - IVF hydration at 100 cc/hr  - Will send out flow cytometry on peripheral blood (req. form with green top and lavender top tube given to nurse):  Ordered STAT PML-BEBETO, BCR-ABL, FLT 3, and Onkosight Myeloid TL-95 STAT.  - Transfuse for Hgb < 7 and platelets < 10k, < 15k if febrile and < 50k if non-CNS bleeding    #Extensive Stage SCLC  Follows with Dr. Carranza at the [facility]. Initially diagnosed in March 2019 with extensive metastatic disease ( liver, bones, T-spine, cavarial base, bone with suspected leptomenigeal ds, but never confirmed). He responded dramatically to Carbo, etoposide, Atezolizumab with immunotherapy maintenance where he was in CR with no evidence of metastatic disease in liver or bones. He developed gastritis/esophagitis which was thought to be secondary to immunotherapy and it was discontinued. june 2021. He was found to have relapse disease in the lungs and LN only. He was started on carbo/etoposide again with the addition of Durvalumab at cycle 4 (end of april 2022). Durvalumab has been held since February 2023 due to presumed immunotherapy-related toxicities.  - No plans for any chemo-/immuno-therapy for his SCLC while inpatient  - Further treatment options to be determined after acute issues with thrombocytopenia and potential leukemia have been addressed    Case discussed with on-call attending Dr. Geno Barber.    Raymond West MD  Hematology/Oncology Fellow PGY-5  Pager: [facility] [phone] / PAVAN [phone]  After 5pm and on weekends please page on-call fellow. 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 Hematology consulted for thrombocytopenia. Found to have blast-like cells on peripheral smear concerning for hematologic malignancy.    # Rule out Acute Leukemia  - Patient presenting with WBC 3.02 and blast 1% noted on manual differential. Suspicion for therapy-related AML is high given history of etoposide administration.  - Peripheral smear at [facility] reviewed 9/15/23: Several blast-like cells that appear to be of lymphoid origin. No schistocytosis. Markedly decreased platelets without clumping; true thrombocytopenia. Platelets are normal morphologically with small forms.  - Please order:        - Serum iron w/ TIBC, ferritin, B12 and folate level        - Haptoglobin / Retic count        - Fibrinogen / PT / PTT / D-dimer for DIC labs        - LDH / Uric acid level for TLS labs        - G6PD level  - If uric acid > 8, give 3mg IV Rasburicase x1; if >12 give 6mg IV x1  - To expedite treatment, please order:        - TTE        - HIV and Acute hepatitis panel (Need to order Hep B surface Ab and Hep B core Ab total SEPARATELY)  - Please check CBC with Diff, coags, fibrinogen and d-dimer DAILY  - Trend TLS labs daily (uric acid, LDH, Mg, Phos)  - Will send out flow cytometry on peripheral blood (req. form with green top and lavender top tube given to nurse):  Ordered STAT PML-BEBETO, BCR-ABL, FLT 3, and Onkosight Myeloid TL-95 STAT.  - Transfuse for Hgb < 7 and platelets < 10k, < 15k if febrile and < 50k if non-CNS bleeding    #Extensive Stage SCLC  Follows with Dr. Carranza at the [facility]. Initially diagnosed in March 2019 with extensive metastatic disease ( liver, bones, T-spine, cavarial base, bone with suspected leptomenigeal ds, but never confirmed). He responded dramatically to Carbo, etoposide, Atezolizumab with immunotherapy maintenance where he was in CR with no evidence of metastatic disease in liver or bones. He developed gastritis/esophagitis which was thought to be secondary to immunotherapy and it was discontinued. june 2021. He was found to have relapse disease in the lungs and LN only. He was started on carbo/etoposide again with the addition of Durvalumab at cycle 4 (end of april 2022). Durvalumab has been held since February 2023 due to presumed immunotherapy-related toxicities.  - No plans for any chemo-/immuno-therapy for his SCLC while inpatient  - Further treatment options to be determined after acute issues with thrombocytopenia and potential leukemia have been addressed    Case discussed with on-call attending Dr. Geno Barber.    Raymond West MD  Hematology/Oncology Fellow PGY-5  Pager: [facility] [phone] / PAVAN [phone]  After 5pm and on weekends please page on-call fellow.

## 2023-09-16 NOTE — DISCHARGE NOTE PROVIDER - PROVIDER TOKENS
PROVIDER:[TOKEN:[[id number]],FOLLOWUP:[1-3 days]] PROVIDER:[TOKEN:[[id number]],FOLLOWUP:[1-3 days]],PROVIDER:[TOKEN:[[id number]]]

## 2023-09-16 NOTE — ED ADULT NURSE NOTE - NSFALLUNIVINTERV_ED_ALL_ED
Bed/Stretcher in lowest position, wheels locked, appropriate side rails in place/Call bell, personal items and telephone in reach/Instruct patient to call for assistance before getting out of bed/chair/stretcher/Non-slip footwear applied when patient is off stretcher/[place] to call system/Physically safe environment - no spills, clutter or unnecessary equipment/Purposeful proactive rounding/Room/bathroom lighting operational, light cord in reach

## 2023-09-16 NOTE — PATIENT PROFILE ADULT - VISION (WITH CORRECTIVE LENSES IF THE PATIENT USUALLY WEARS THEM):
wears 1 pair of glasses/Normal vision: sees adequately in most situations; can see medication labels, newsprint

## 2023-09-16 NOTE — ED PROVIDER NOTE - OBJECTIVE STATEMENT
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 His last treatment with durvalumab was in February 2023; had to be stopped due to presumed immunotherapy related esophagitis/gastritis. He was then treated with consolidative Thoracic RT to primary tumor in 15 Fx in March 2023. Monitored off systemic therapy since February 2023. Patient was sent in for low platelet level of 10.

## 2023-09-16 NOTE — H&P ADULT - HISTORY OF PRESENT ILLNESS
Mr. 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 His last treatment with durvalumab was in February 2023; had to be stopped due to presumed immunotherapy related esophagitis/gastritis. He was then treated with consolidative Thoracic RT to primary tumor in 15 Fx in March 2023. Monitored off systemic therapy since February 2023. Patient was sent in for low platelet level of 10. Mr. 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 Of note, pt last received durvalumab in February 2023; stopped due to possible immunotherapy related esophagitis/gastritis. This was then treated with consolidative RT to primary lung tumor in March 2023 and he has been monitored off systemic therapy since February 2023. Pt endorses ecchymoses on upper and lower extremities and but is otherwise asymptomatic. Denies fever, chills, chest pain, SOB, nausea, vomiting, diarrhea, constipation. Admitted to medicine for further management of acute, severe thrombocytopenia.

## 2023-09-16 NOTE — DISCHARGE NOTE PROVIDER - NSDCFUSCHEDAPPT_GEN_ALL_CORE_FT
Tye Daigle  [facility] Physician [facility]  [facility]  Comm D  Scheduled Appointment: 10/25/2023    Rafy Carranza  [facility] Physician [facility]  [facility]  Scheduled Appointment: 11/03/2023    [facility]  [facility]  Scheduled Appointment: 11/03/2023    David Figueroa  [facility]  CARDIOLOGY [address]   Scheduled Appointment: 12/15/2023     [facility]  [facility]  Scheduled Appointment: 09/25/2023    [facility]  [facility]  Scheduled Appointment: 09/25/2023    Tye Daigle  [facility]  [facility]  Comm D  Scheduled Appointment: 10/25/2023    Rafy Carranza  [facility]  [facility]  Scheduled Appointment: 11/03/2023    [facility]  [facility]  Scheduled Appointment: 11/03/2023    David Figueroa  [facility]  CARDIOLOGY [address] [place] [address]   Scheduled Appointment: 12/15/2023

## 2023-09-16 NOTE — DISCHARGE NOTE PROVIDER - CARE PROVIDER_API CALL
Alise Headley  Medical Oncology  [address] [facility], [place] [address]  Phone: [phone]  Fax: [phone]  Follow Up Time: 1-3 days   Alise Headley  Medical Oncology  [address] [place] [address], [place] [address]  Phone: [phone]  Fax: [phone]  Follow Up Time: 1-3 days    Rafy Carranza  Medical Oncology  [address] [place], [place] [id number]  Phone: [phone]  Fax: [phone]  Follow Up Time:

## 2023-09-16 NOTE — H&P ADULT - NSHPLABSRESULTS_GEN_ALL_CORE
9.6    2.57  )-----------( 9        ( 16 Sep 2023 01:41 )             28.4       09-16    140  |  104  |  22  ----------------------------<  98  3.9   |  25  |  0.74    Ca    10.0      16 Sep 2023 01:41  Phos  3.0     09-16  Mg     2.1     09-16    TPro  5.9<L>  /  Alb  3.9  /  TBili  0.4  /  DBili  x   /  AST  13  /  ALT  34  /  AlkPhos  62  09-16              Urinalysis Basic - ( 16 Sep 2023 01:41 )    Color: x / Appearance: x / SG: x / pH: x  Gluc: 98 mg/dL / Ketone: x  / Bili: x / Urobili: x   Blood: x / Protein: x / Nitrite: x   Leuk Esterase: x / RBC: x / WBC x   Sq Epi: x / Non Sq Epi: x / Bacteria: x        PT/INR - ( 16 Sep 2023 01:41 )   PT: 10.5 sec;   INR: 0.95 ratio         PTT - ( 16 Sep 2023 01:41 )  PTT:51.3 sec          CAPILLARY BLOOD GLUCOSE

## 2023-09-16 NOTE — CONSULT NOTE ADULT - SUBJECTIVE AND OBJECTIVE BOX
Hematology Consult Note    HPI:  68M hx extensive-stage SCLC with mets to bone and liver s/p carbo/etopo/atezo April 2019 followed by immunotherapy maintenance was sent in to [facility] ER for acute, severe thrombocytopenia. His last treatment with durvalumab was in February 2023; had to be stopped due to presumed immunotherapy related esophagitis/gastritis. He was then treated with consolidative Thoracic RT to primary tumor in 15 Fx in March 2023. Monitored off systemic therapy since February 2023. Patient's wife is a physician. She noticed a few weeks prior to this admission that patient started having a petechial rash on his chest and that he bruised very easily. He followed up at [facility] with Dr. Carranza today. Platelet count was 132 K/uL on 7/28/23. Today 9/15/23 his platelet count was 10 K/uL. Other than the bruising and petechial rash, patient does not report any symptoms.      REVIEW OF SYSTEMS:  CONSTITUTIONAL: No weakness, fevers or chills  EYES/ENT: No visual changes;  No vertigo or throat pain   NECK: No pain or stiffness  RESPIRATORY: No cough, wheezing, hemoptysis; No shortness of breath  CARDIOVASCULAR: No chest pain or palpitations  GASTROINTESTINAL: No abdominal or epigastric pain. No nausea, vomiting, or hematemesis; No diarrhea or constipation. No melena or hematochezia.  GENITOURINARY: No dysuria, frequency or hematuria  NEUROLOGICAL: No numbness or weakness  SKIN: +Multiple bruises on arms and legs. +Small red rashes on his chest.  All other review of systems is negative unless indicated above.    PAST MEDICAL & SURGICAL HISTORY:  CAD (coronary artery disease)      HTN (hypertension)      Lung cancer      Status post chemotherapy      STEMI (ST elevation myocardial infarction)  06/14/2022      Dysphagia      H/O candidiasis of mouth      H/O Helicobacter infection      Hypothyroidism      Hyperlipidemia      Left carotid artery stenosis      Esophagitis      H/O coronary angiogram  2019, 2022- s/p Drug elluding stent X3      S/P CABG x 3  2008      Stented coronary artery          FAMILY HISTORY:  No pertinent family history in first degree relatives        SOCIAL HISTORY:     Allergies    No Known Allergies    Intolerances        MEDICATIONS  (STANDING):    MEDICATIONS  (PRN):      OBJECTIVE   Height (cm): 182.9 (09-15 @ 17:35)  Weight (kg): 94.3 (09-15 @ 17:35), 94.7963 (09-15 @ 12:00)  BMI (kg/m2): 28.2 (09-15 @ 17:35)  BSA (m2): 2.17 (09-15 @ 17:35)    T(F): 97.9 (09-15-23 @ 21:57), Max: 97.9 (09-15-23 @ 17:35)  HR: 82 (09-15-23 @ 21:57)  BP: 101/65 (09-15-23 @ 21:57)  RR: 18 (09-15-23 @ 21:57)  SpO2: 97% (09-15-23 @ 21:57)  Wt(kg): --    PHYSICAL EXAM   GENERAL: NAD, well-developed  HEAD:  Atraumatic, Normocephalic  EYES: EOMI, PERRLA, conjunctiva and sclera clear  CHEST/LUNG: Right chest Mediport in place without surrounding erythema or fluctuance. Petechial rash of the chest slightly to the right of the manubrium. Anterior lung fields clear to auscultation bilaterally; No wheeze  HEART: Regular rate and rhythm; No murmurs, rubs, or gallops  ABDOMEN: Soft, Nontender, Nondistended; Bowel sounds present  EXTREMITIES:  2+ Peripheral Pulses, No clubbing, cyanosis, or edema  NEUROLOGY: non-focal  SKIN: Multiple ecchymoses about 4-5 cm in diameter on bilateral arms and legs.                          10.7   3.02  )-----------( 10       ( 15 Sep 2023 13:33 )             31.2       09-15    140  |  102  |  17  ----------------------------<  141<H>  4.5   |  26  |  0.80    Ca    9.9      15 Sep 2023 09:51    TPro  6.4  /  Alb  4.3  /  TBili  0.6  /  DBili  x   /  AST  24  /  ALT  40  /  AlkPhos  64  09-15      Lactate Dehydrogenase, Serum: 436 U/L (09-15 @ 09:51)       Hematology Consult Note    HPI:  68M hx extensive-stage SCLC with mets to bone and liver s/p carbo/etopo/atezo April 2019 followed by immunotherapy maintenance was sent in to [facility] ER for acute, severe thrombocytopenia. His last treatment with durvalumab was in February 2023; had to be stopped due to presumed immunotherapy related esophagitis/gastritis. He was then treated with consolidative Thoracic RT to primary tumor in 15 Fx in March 2023. Monitored off systemic therapy since February 2023. Patient's wife is a physician. She noticed a few weeks prior to this admission that patient started having a petechial rash on his chest and that he bruised very easily. He followed up at [facility] with Dr. Carranza today. Platelet count was 132 K/uL on 7/28/23. Today 9/15/23 his platelet count was 10 K/uL. Other than the bruising and petechial rash, patient does not report any symptoms.      REVIEW OF SYSTEMS:  CONSTITUTIONAL: No weakness, fevers or chills  EYES/ENT: No visual changes;  No vertigo or throat pain   NECK: No pain or stiffness  RESPIRATORY: No cough, wheezing, hemoptysis; No shortness of breath  CARDIOVASCULAR: No chest pain or palpitations  GASTROINTESTINAL: No abdominal or epigastric pain. No nausea, vomiting, or hematemesis; No diarrhea or constipation. No melena or hematochezia.  GENITOURINARY: No dysuria, frequency or hematuria  NEUROLOGICAL: No numbness or weakness  SKIN: +Multiple bruises on arms and legs. +Small red rashes on his chest.  All other review of systems is negative unless indicated above.    PAST MEDICAL & SURGICAL HISTORY:  CAD (coronary artery disease)  HTN (hypertension)  Lung cancer  Status post chemotherapy    STEMI (ST elevation myocardial infarction)  06/14/2022    Dysphagia  H/O candidiasis of mouth  H/O Helicobacter infection  Hypothyroidism  Hyperlipidemia  Left carotid artery stenosis  Esophagitis    H/O coronary angiogram  2019, 2022- s/p Drug elluding stent X3    S/P CABG x 3  2008    Stented coronary artery      FAMILY HISTORY:  No pertinent family history in first degree relatives    SOCIAL HISTORY:     Allergies    No Known Allergies    Intolerances        MEDICATIONS  (STANDING):    MEDICATIONS  (PRN):      OBJECTIVE   Height (cm): 182.9 (09-15 @ 17:35)  Weight (kg): 94.3 (09-15 @ 17:35), 94.7963 (09-15 @ 12:00)  BMI (kg/m2): 28.2 (09-15 @ 17:35)  BSA (m2): 2.17 (09-15 @ 17:35)    T(F): 97.9 (09-15-23 @ 21:57), Max: 97.9 (09-15-23 @ 17:35)  HR: 82 (09-15-23 @ 21:57)  BP: 101/65 (09-15-23 @ 21:57)  RR: 18 (09-15-23 @ 21:57)  SpO2: 97% (09-15-23 @ 21:57)  Wt(kg): --    PHYSICAL EXAM   GENERAL: NAD, well-developed  HEAD:  Atraumatic, Normocephalic  EYES: EOMI, PERRLA, conjunctiva and sclera clear  CHEST/LUNG: Right chest Mediport in place without surrounding erythema or fluctuance. Petechial rash of the chest slightly to the right of the manubrium. Anterior lung fields clear to auscultation bilaterally; No wheeze  HEART: Regular rate and rhythm; No murmurs, rubs, or gallops  ABDOMEN: Soft, Nontender, Nondistended; Bowel sounds present  EXTREMITIES:  2+ Peripheral Pulses, No clubbing, cyanosis, or edema  NEUROLOGY: non-focal  SKIN: Multiple ecchymoses about 4-5 cm in diameter on bilateral arms and legs.                          10.7   3.02  )-----------( 10       ( 15 Sep 2023 13:33 )             31.2       09-15    140  |  102  |  17  ----------------------------<  141<H>  4.5   |  26  |  0.80    Ca    9.9      15 Sep 2023 09:51    TPro  6.4  /  Alb  4.3  /  TBili  0.6  /  DBili  x   /  AST  24  /  ALT  40  /  AlkPhos  64  09-15      Lactate Dehydrogenase, Serum: 436 U/L (09-15 @ 09:51)

## 2023-09-16 NOTE — DISCHARGE NOTE PROVIDER - HOSPITAL COURSE
HPI:  Mr. Neema Wilks is a 68-year-old male w/ PMH of SCLC with mets to bone and liver s/p carbo/etopo/atezo (04/2019) and maintenance immunotherapy, HTN, HLD, CAD s/p CABGx3 (2008) and DESx3 (2019, 2022), and hypothyroidism who was sent to [facility] ER for acute, severe thrombocytopenia with platelet count of 10. Of note, pt last received durvalumab in February 2023; stopped due to possible immunotherapy related esophagitis/gastritis. This was then treated with consolidative RT to primary lung tumor in March 2023 and he has been monitored off systemic therapy since February 2023. Pt endorses ecchymoses on upper and lower extremities and but is otherwise asymptomatic. Denies fever, chills, chest pain, SOB, nausea, vomiting, diarrhea, constipation. Admitted to medicine for further management of acute, severe thrombocytopenia. (16 Sep 2023 10:07)    Hospital Course:  Patient received 1U of platelets in ED with rise in platelet count from 10k to 20k and was admitted to medicine for further management. 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 Of note, patient was on DAPT for h/o CAD and carotid atherosclerosis so cardiology was consulted to determine the risks/benefit continuing iso thrombocytopenia.    Important Medication Changes and Reason:  -     Active or Pending Issues Requiring Follow-up:  -     Advanced Directives:   [X] Full code  [ ] DNR  [ ] Hospice    Discharge Diagnoses:  -        HPI:  Mr. Neema Wilks is a 68-year-old male w/ PMH of SCLC with mets to bone and liver s/p carbo/etopo/atezo (04/2019) and maintenance immunotherapy, HTN, HLD, CAD s/p CABGx3 (2008) and DESx3 (2019, 2022), and hypothyroidism who was sent to [facility] ER for acute, severe thrombocytopenia with platelet count of 10. Of note, pt last received durvalumab in February 2023; stopped due to possible immunotherapy related esophagitis/gastritis. This was then treated with consolidative RT to primary lung tumor in March 2023 and he has been monitored off systemic therapy since February 2023. Pt endorses ecchymoses on upper and lower extremities and but is otherwise asymptomatic. Denies fever, chills, chest pain, SOB, nausea, vomiting, diarrhea, constipation. Admitted to medicine for further management of acute, severe thrombocytopenia. (16 Sep 2023 10:07)    Hospital Course:  Patient received 1U of platelets in ED with rise in platelet count from 10k to 20k and was admitted to medicine for further management. 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 Patient had a bone marrow biopsy on 9/18-   - Continues to require transfusion with plts about every 3 days. Patient is stable for discharge but will follow up outpatient with arrangement for outpatient blood transfusion. Patient will continue to follow up his care in regards to SCLC; Small cell lung cancer.  - Extensive disease w/ mets to bone and liver  - S/p carbo/etopo/atezo (04/2019) and maintenance immunotherapy w/ durvalumab (last received 02/2023); stopped due to possible immunotherapy related esophagitis/gastritis  - On prednisone for esophagitis/gastritis; started PJP ppx w/ atovaquone S/p consolidative thoracic RT to primary lung tumor (03/2023)  - Off systemic therapy since 02/2023. Patient with extensive cardiac disease s/p stent , all antiplatelets such as aspirin and plavix are held , please follow up with   cardiolgy outpatient.    Important Medication Changes and Reason:no aspirin or plavix  -     Active or Pending Issues Requiring Follow-up:  Biopsy results ,blood transfusion , hematology and oncology  -     Advanced Directives:   [X] Full code  [ ] DNR  [ ] Hospice    Discharge Diagnoses:  - Thrombocytopenia  s/p BMBX       HPI:  Mr. Neema Wilks is a 68-year-old male w/ PMH of SCLC with mets to bone and liver s/p carbo/etopo/atezo (04/2019) and maintenance immunotherapy, HTN, HLD, CAD s/p CABGx3 (2008) and DESx3 (2019, 2022), and hypothyroidism who was sent to [facility] ER for acute, severe thrombocytopenia with platelet count of 10. Of note, pt last received durvalumab in February 2023; stopped due to possible immunotherapy related esophagitis/gastritis. This was then treated with consolidative RT to primary lung tumor in March 2023 and he has been monitored off systemic therapy since February 2023. Pt endorses ecchymoses on upper and lower extremities and but is otherwise asymptomatic. Denies fever, chills, chest pain, SOB, nausea, vomiting, diarrhea, constipation. Admitted to medicine for further management of acute, severe thrombocytopenia. (16 Sep 2023 10:07)    Hospital Course:  Patient received 1U of platelets in ED with rise in platelet count from 10k to 20k and was admitted to medicine for further management. 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 Patient had a bone marrow biopsy on 9/18-   - Continues to require transfusion with plts about every 3 days. Patient is stable for discharge but will follow up outpatient with arrangement for outpatient blood transfusion. Patient will continue to follow up his care in regards to SCLC; Small cell lung cancer.  - Extensive disease w/ mets to bone and liver  - S/p carbo/etopo/atezo (04/2019) and maintenance immunotherapy w/ durvalumab (last received 02/2023); stopped due to possible immunotherapy related esophagitis/gastritis  - On prednisone for esophagitis/gastritis; started PJP ppx w/ atovaquone S/p consolidative thoracic RT to primary lung tumor (03/2023)  - Off systemic therapy since 02/2023. Patient with extensive cardiac disease s/p stent , all antiplatelets such as aspirin and plavix are held , please follow up with   cardiolgy outpatient.    Important Medication Changes and Reason:no aspirin or plavix  -     Active or Pending Issues Requiring Follow-up:  Biopsy results ,blood transfusion , hematology and oncology  -     Advanced Directives:   [X] Full code  [ ] DNR  [ ] Hospice    Discharge Diagnoses:  #Pancytopenia  #MDS  #Immunosuppression  #Acidosis  #CAD s/p CABG s/p GORGE  #SCLC

## 2023-09-16 NOTE — DISCHARGE NOTE PROVIDER - CARE PROVIDERS DIRECT ADDRESSES
[email or web address]

## 2023-09-16 NOTE — CONSULT NOTE ADULT - ATTENDING COMMENTS
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    He has no chest pain or SOB  He appears euvolemic on exam    1) Thrombocytopenia  2) SCLC  3) CAD s/p stents and CABG  4) Carotid disease s/p TCAR  - EKG with sinus and prior infero-posterior infarct (unchanged)  - He was maintained on DAPT due to TCAR 1/3/23. Unfortunately with severe thrombocytopenia, ASA and Plavix have been held. Once safe from hematologic and platelet perspective, would resume at least ASA 81mg daily  - Discussed with outpatient cardiologist, Dr. Figueroa: Note baseline blood pressure runs low ~ 90s-110 systolic  - Continue other home CAD and HF regimen: Toprol, Entresto, Jardiance, statin, ezetimibe

## 2023-09-16 NOTE — ED ADULT NURSE REASSESSMENT NOTE - NS ED NURSE REASSESS COMMENT FT1
accessed pts mediport using sterile technique w/ 2 RNs present. Explained procedure to pt. Labs drawn and sent.

## 2023-09-16 NOTE — ED PROVIDER NOTE - CLINICAL SUMMARY MEDICAL DECISION MAKING FREE TEXT BOX
68M hx extensive-stage SCLC with mets to bone and liver s/p carbo/etopo/atezo April 2019 followed by immunotherapy maintenance was sent in to [facility] ER for acute, severe thrombocytopenia. His last treatment with durvalumab was in February 2023; had to be stopped due to presumed immunotherapy related esophagitis/gastritis. He was then treated with consolidative Thoracic RT to primary tumor in 15 Fx in March 2023. Monitored off systemic therapy since February 2023. Patient was sent in for low platelet level of 10.  Vital signs stable, afebrile, not hypoxic. Plan for labs, heme onc recs, likely transfusion and admission

## 2023-09-16 NOTE — ED PROVIDER NOTE - NSICDXPASTMEDICALHX_GEN_ALL_CORE_FT
PAST MEDICAL HISTORY:  CAD (coronary artery disease)     Dysphagia     Esophagitis     H/O candidiasis of mouth     H/O Helicobacter infection     HTN (hypertension)     Hyperlipidemia     Hypothyroidism     Left carotid artery stenosis     Lung cancer     Status post chemotherapy     STEMI (ST elevation myocardial infarction) 06/14/2022

## 2023-09-16 NOTE — H&P ADULT - PROBLEM SELECTOR PLAN 1
- P/w platelet count of 10 a/w ecchymoses and petechiae  - Heme/onc consulted - P/w platelet count of 10k a/w ecchymoses and petechiae  - Platelet count of 123K on 7/28/23 outpatient  - Heme/onc consulted, concern for therapy-related AML given blasts seen on peripheral smear and h/o etoposide  - Iron studies - P/w platelet count of 10k a/w ecchymoses and petechiae  - Platelet count of 123K on 7/28/23 outpatient  - S/p 1U platelets in ED, repeat CBC pending  - Heme/onc consulted, concern for therapy-related AML given blasts seen on peripheral smear and h/o etoposide  - Iron studies, DIC, TLS labs, TTE, HIV, acute hepatitis panel ordered, per heme/onc  - F/u CBC, DIC, TLS labs daily  - Flow cytometry pending - P/w platelet count of 10k a/w ecchymoses and petechiae  - Platelet count of 123K on 7/28/23 outpatient  - S/p 1U platelets in ED, repeat CBC pending  - Heme/onc consulted, concern for therapy-related AML given blasts seen on peripheral smear and h/o etoposide use  - Iron studies, DIC, TLS labs, TTE, HIV, acute hepatitis panel ordered, per heme/onc  - F/u CBC, DIC, TLS labs daily  - Flow cytometry pending - P/w platelet count of 10k a/w ecchymoses and petechiae  - Platelet count of 123K on 7/28/23 outpatient  - S/p 1U platelets in ED, repeat 20k post-transfusion  - Heme/onc consulted, concern for therapy-related AML given blasts seen on peripheral smear and h/o etoposide use  - Iron studies, DIC, TLS labs, TTE, HIV, acute hepatitis panel ordered, per heme/onc  - F/u CBC, DIC, TLS labs daily  - Flow cytometry pending

## 2023-09-17 NOTE — PROGRESS NOTE ADULT - SUBJECTIVE AND OBJECTIVE BOX
PROGRESS NOTE:     Patient is a 68y old  Male who presents with a chief complaint of Severe thrombocytopenia (16 Sep 2023 17:44)      SUBJECTIVE / OVERNIGHT EVENTS:    OVERNIGHT: No acute overnight events.      Patient was examined at bedside and feels well this morning. He is asymptomatic. Denies fevers, chills, chest pain, SOB, nausea, vomiting. ROS otherwise negative and pt is amenable to current treatment plan.      REVIEW OF SYSTEMS:    CONSTITUTIONAL:  No weakness, fevers or chills  EYES/ENT:  No visual changes;  No vertigo or throat pain   NECK:  No pain or stiffness  RESPIRATORY:  No cough, wheezing, hemoptysis; No shortness of breath  CARDIOVASCULAR:  No chest pain or palpitations  GASTROINTESTINAL:  No abdominal or epigastric pain. No nausea, vomiting, or hematemesis; No diarrhea or constipation. No melena or hematochezia.  GENITOURINARY:  No dysuria, frequency or hematuria  NEUROLOGICAL:  No numbness or weakness  SKIN:  Ecchymoses of b/l UE and LEs; petichial rash of chest      MEDICATIONS  (STANDING):  atovaquone  Suspension 1500 milliGRAM(s) Oral daily  chlorhexidine 2% Cloths 1 Application(s) Topical daily  ezetimibe 10 milliGRAM(s) Oral daily  levothyroxine 25 MICROGram(s) Oral daily  metoprolol succinate ER 50 milliGRAM(s) Oral daily  predniSONE   Tablet 20 milliGRAM(s) Oral daily  rosuvastatin 40 milliGRAM(s) Oral at bedtime  tamsulosin 0.8 milliGRAM(s) Oral at bedtime    MEDICATIONS  (PRN):      CAPILLARY BLOOD GLUCOSE        I&O's Summary    16 Sep 2023 07:01  -  17 Sep 2023 07:00  --------------------------------------------------------  IN: 850 mL / OUT: 0 mL / NET: 850 mL        PHYSICAL EXAM:  Vital Signs Last 24 Hrs  T(C): 36.9 (17 Sep 2023 07:45), Max: 36.9 (16 Sep 2023 17:56)  T(F): 98.4 (17 Sep 2023 07:45), Max: 98.4 (16 Sep 2023 17:56)  HR: 77 (17 Sep 2023 07:45) (61 - 82)  BP: 104/67 (17 Sep 2023 07:45) (93/60 - 116/64)  BP(mean): --  RR: 18 (17 Sep 2023 07:45) (18 - 18)  SpO2: 96% (17 Sep 2023 07:45) (94% - 96%)    Parameters below as of 17 Sep 2023 07:45  Patient On (Oxygen Delivery Method): room air        CONSTITUTIONAL: NAD; well-developed  HEENT: PERRL, clear conjunctiva  RESPIRATORY: Normal respiratory effort; lungs are clear to auscultation bilaterally; No Crackles/Rhonchi/Wheezing  CARDIOVASCULAR: Regular rate and rhythm, normal S1 and S2, no murmur/rub/gallop; No lower extremity edema; Peripheral pulses are 2+ bilaterally  ABDOMEN: Nontender to palpation, normoactive bowel sounds, no rebound/guarding; No hepatosplenomegaly  MUSCULOSKELETAL: no clubbing or cyanosis of digits; no joint swelling or tenderness to palpation  EXTREMITY: Lower extremities Non-tender to palpation; non-erythematous B/L  NEURO: A&Ox3; no focal deficits   PSYCH: normal mood; Affect appropriate    LABS:                        9.7    2.32  )-----------( 18       ( 17 Sep 2023 07:02 )             28.7     09-17    138  |  103  |  15  ----------------------------<  97  3.9   |  26  |  0.58    Ca    9.5      17 Sep 2023 07:01  Phos  3.0     09-17  Mg     2.1     09-17    TPro  6.0  /  Alb  3.9  /  TBili  0.6  /  DBili  x   /  AST  13  /  ALT  34  /  AlkPhos  58  09-17    PT/INR - ( 17 Sep 2023 07:04 )   PT: 10.8 sec;   INR: 1.03 ratio         PTT - ( 17 Sep 2023 07:04 )  PTT:27.7 sec      Urinalysis Basic - ( 17 Sep 2023 07:01 )    Color: x / Appearance: x / SG: x / pH: x  Gluc: 97 mg/dL / Ketone: x  / Bili: x / Urobili: x   Blood: x / Protein: x / Nitrite: x   Leuk Esterase: x / RBC: x / WBC x   Sq Epi: x / Non Sq Epi: x / Bacteria: x          RADIOLOGY & ADDITIONAL TESTS:    N/A

## 2023-09-17 NOTE — PROGRESS NOTE ADULT - ASSESSMENT
Mr. Neema Wilks is a 68-year-old male w/ PMH of SCLC with mets to bone and liver s/p carbo/etopo/atezo (04/2019) and maintenance immunotherapy, HTN, HLD, CAD s/p CABGx3 (2008) and DESx3 (2019, 2022), and hypothyroidism who was sent to [facility] ER for acute, severe thrombocytopenia with platelet count of 10.

## 2023-09-17 NOTE — PROGRESS NOTE ADULT - PROBLEM SELECTOR PLAN 1
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

## 2023-09-17 NOTE — PROGRESS NOTE ADULT - PROBLEM SELECTOR PLAN 3
- S/p CABGx3 (2008), DESx3 (2019, 2022), TCAR (01/2023)  - Pt continued DAPT due to h/o vasculopathy, per outpatient cardiologist  - Holding home ASA, clopidogrel iso thrombocytopenia, will consult cardiology for recommendations - S/p CABGx3 (2008), DESx3 (2019, 2022), TCAR (01/2023)  - Pt continued DAPT due to h/o vasculopathy, per outpatient cardiologist  - Holding home ASA, clopidogrel iso thrombocytopenia, cardiology consulted and agrees

## 2023-09-18 NOTE — DIETITIAN INITIAL EVALUATION ADULT - OTHER INFO
- Endo: Ordered for Deltasone.   - Ordered for oral synthroid.   - Heme/Onc: metastatic SCLC. 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

## 2023-09-18 NOTE — DIETITIAN INITIAL EVALUATION ADULT - PROBLEM SELECTOR PLAN 2
- Extensive disease w/ mets to bone and liver  - S/p carbo/etopo/atezo (04/2019) and maintenance immunotherapy w/ durvalumab (last received 02/2023); stopped due to possible immunotherapy related esophagitis/gastritis  - S/p consolidative thoracic RT to primary lung tumor (03/2023)  - Off systemic therapy since 02/2023

## 2023-09-18 NOTE — DIETITIAN INITIAL EVALUATION ADULT - PHYSCIAL ASSESSMENT
Weight Hx Per:  - Source: patient   - UBW: 208 pounds   - Reported weight changes: Pt denies changes in weight.     Weight Hx Per [facility]: no available weights to assess.     Current Admission Weights:  - Dosing weight: 207.1 pounds (09/15)  - Daily weight: none documented thus far     Weight Change:  - none     **  Will continue to monitor weight trends as available/able.     IBW: 178 pounds   %IBW: 116%

## 2023-09-18 NOTE — DIETITIAN INITIAL EVALUATION ADULT - REASON FOR ADMISSION
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

## 2023-09-18 NOTE — PROGRESS NOTE ADULT - SUBJECTIVE AND OBJECTIVE BOX
Hematology Follow-up    INTERVAL HPI/OVERNIGHT EVENTS:  Patient S&E with wife at bedside. No overnight events/complaints.     VITAL SIGNS:  T(F): 97.6 (09-18-23 @ 15:52)  HR: 73 (09-18-23 @ 15:52)  BP: 95/58 (09-18-23 @ 15:52)  RR: 18 (09-18-23 @ 15:52)  SpO2: 98% (09-18-23 @ 15:52)  Wt(kg): --    PHYSICAL EXAM:    Constitutional: AAOx3, NAD,   Eyes: PERRL, sclera non-icteric  Neck: supple, no masses, no JVD  Respiratory: CTA b/l, good air entry b/l  Cardiovascular: RRR, normal S1S2, no M/R/G  Gastrointestinal: soft, NTND, no masses palpable, BS normal in all four quadrants  Extremities:  no c/c/e    MEDICATIONS  (STANDING):  atovaquone  Suspension 1500 milliGRAM(s) Oral daily  chlorhexidine 2% Cloths 1 Application(s) Topical daily  ezetimibe 10 milliGRAM(s) Oral daily  levothyroxine 25 MICROGram(s) Oral daily  metoprolol succinate ER 50 milliGRAM(s) Oral daily  predniSONE   Tablet 20 milliGRAM(s) Oral daily  rosuvastatin 40 milliGRAM(s) Oral at bedtime  sacubitril 24 mG/valsartan 26 mG 1 Tablet(s) Oral two times a day  tamsulosin 0.8 milliGRAM(s) Oral at bedtime    MEDICATIONS  (PRN):  acetaminophen     Tablet .. 650 milliGRAM(s) Oral every 6 hours PRN Mild Pain (1 - 3)  lidocaine   4% Patch 1 Patch Transdermal daily PRN back pain      No Known Allergies      LABS:                        9.3    2.54  )-----------( 14       ( 18 Sep 2023 07:31 )             27.7     09-18    140  |  103  |  17  ----------------------------<  98  3.7   |  24  |  0.63    Ca    9.8      18 Sep 2023 07:31  Phos  3.0     09-18  Mg     1.9     09-18    TPro  5.9<L>  /  Alb  3.8  /  TBili  0.5  /  DBili  x   /  AST  13  /  ALT  33  /  AlkPhos  53  09-18    PT/INR - ( 18 Sep 2023 07:31 )   PT: 10.4 sec;   INR: 0.94 ratio         PTT - ( 18 Sep 2023 07:31 )  PTT:26.0 sec Lactate Dehydrogenase, Serum: 403 U/L (09-18 @ 07:31)    Urinalysis Basic - ( 18 Sep 2023 07:31 )    Color: x / Appearance: x / SG: x / pH: x  Gluc: 98 mg/dL / Ketone: x  / Bili: x / Urobili: x   Blood: x / Protein: x / Nitrite: x   Leuk Esterase: x / RBC: x / WBC x   Sq Epi: x / Non Sq Epi: x / Bacteria: x        RADIOLOGY & ADDITIONAL TESTS:  Studies reviewed.

## 2023-09-18 NOTE — DIETITIAN INITIAL EVALUATION ADULT - ORAL INTAKE PTA/DIET HISTORY
Pt reports having a good appetite and PO intake PTA; consuming >75% of most meals. Follows regular diet. Pt denies any known food allergies or intolerances. Pt denies any micronutrient supplementation at home. Denies any difficulty chewing/swallowing at this time.

## 2023-09-18 NOTE — DIETITIAN INITIAL EVALUATION ADULT - ADD RECOMMEND
1) Continue current diet order: regular diet   2) Pt will trial Ensure Plus vs. Ensure Clear vs. [employer]. RD to follow up for acceptability.   3) Monitor and encourage PO intake. Encourage use of daily menus. Honor dietary preferences as expressed as able.   4) Monitor weights, labs, hydration status, bowels, and skin integrity.

## 2023-09-18 NOTE — DIETITIAN INITIAL EVALUATION ADULT - PERTINENT LABORATORY DATA
09-18    140  |  103  |  17  ----------------------------<  98  3.7   |  24  |  0.63    Ca    9.8      18 Sep 2023 07:31  Phos  3.0     09-18  Mg     1.9     09-18    TPro  5.9<L>  /  Alb  3.8  /  TBili  0.5  /  DBili  x   /  AST  13  /  ALT  33  /  AlkPhos  53  09-18  A1C with Estimated Average Glucose Result: 6.2 % (07-28-23 @ 15:38)  A1C with Estimated Average Glucose Result: 6.4 % (12-16-22 @ 17:30)

## 2023-09-18 NOTE — CONSULT NOTE ADULT - SUBJECTIVE AND OBJECTIVE BOX
CARDIO-ONCOLOGY CONSULTATION NOTE                                                                                                                                                                                       HPI: Patient known to me from cardio-oncology clinic where he is followed for coronary artery disease (s/p CABG and PCI, most recent in June 2022 for acute MI), ischemic cardiomyopathy, carotid artery disease (s/p TECAR Jan 2023), and SCLC s/p chemotherapy and immunotherapy.    Cardiac wise, has been stable on maximal medical therapy. Experiencing persistent esophagitis attributed to immunotherapy for which he has been unable to taper steroids. Recently evaluated by GI with plans for EGD in October. Now found to have petechiae and with new thrombocytopenia on routine labs. Peripheral blasts concerning for treatment related AML.    He denies symptoms, but for the past two weeks noticed more fatigue than usual. Has been going to the gym and doing his exercises, but more worn out than usual. No chest pain.    Echo pending.      PAST MEDICAL & SURGICAL HISTORY:  CAD (coronary artery disease)      HTN (hypertension)      Lung cancer      Status post chemotherapy      STEMI (ST elevation myocardial infarction)  06/14/2022      Dysphagia      H/O candidiasis of mouth      H/O Helicobacter infection      Hypothyroidism      Hyperlipidemia      Left carotid artery stenosis      Esophagitis      H/O coronary angiogram  2019, 2022- s/p Drug elluding stent X3      S/P CABG x 3  2008      Stented coronary artery          FAMILY HISTORY:  No pertinent family history in first degree relatives        Home Medications:  aspirin 81 mg oral delayed release tablet: 1 tab(s) orally once a day (16 Sep 2023 10:06)  clopidogrel 75 mg oral tablet: 1 tab(s) orally once a day (16 Sep 2023 10:06)  Entresto 24 mg-26 mg oral tablet: 1 tab(s) orally 2 times a day (16 Sep 2023 10:06)  Jardiance 10 mg oral tablet: 1 tab(s) orally once a day (in the morning) (16 Sep 2023 10:06)  metoprolol succinate 50 mg oral tablet, extended release: 1 tab(s) orally once a day am (16 Sep 2023 10:06)  predniSONE 20 mg oral tablet: 1 tab(s) orally once a day (16 Sep 2023 10:06)  rosuvastatin 40 mg oral tablet: 1 tab(s) orally once a day (16 Sep 2023 10:06)  Synthroid 25 mcg (0.025 mg) oral tablet: 1 tab(s) orally once a day am (16 Sep 2023 10:06)  tamsulosin 0.4 mg oral capsule: 2 cap(s) orally once a day (at bedtime) (16 Sep 2023 10:06)  Zetia 10 mg oral tablet: 1 tab(s) orally once a day am (16 Sep 2023 10:06)      Social History:      Medications:  acetaminophen     Tablet .. 650 milliGRAM(s) Oral every 6 hours PRN  atovaquone  Suspension 1500 milliGRAM(s) Oral daily  chlorhexidine 2% Cloths 1 Application(s) Topical daily  ezetimibe 10 milliGRAM(s) Oral daily  levothyroxine 25 MICROGram(s) Oral daily  metoprolol succinate ER 50 milliGRAM(s) Oral daily  predniSONE   Tablet 20 milliGRAM(s) Oral daily  rosuvastatin 40 milliGRAM(s) Oral at bedtime  tamsulosin 0.8 milliGRAM(s) Oral at bedtime      Review of systems:  10 point review of systems completed and are negative unless noted in HPI    Vitals:  T(C): 36.6 (09-18-23 @ 00:00), Max: 37.1 (09-17-23 @ 16:02)  HR: 81 (09-18-23 @ 05:46) (75 - 82)  BP: 113/65 (09-18-23 @ 05:46) (99/51 - 113/65)  BP(mean): --  RR: 18 (09-18-23 @ 00:00) (18 - 18)  SpO2: 96% (09-18-23 @ 00:00) (96% - 98%)    Daily     Daily         I&O's Summary    17 Sep 2023 07:01  -  18 Sep 2023 07:00  --------------------------------------------------------  IN: 360 mL / OUT: 500 mL / NET: -140 mL        Physical Exam:  Appearance: No apparent distress  HEENT: moist mucosa, anicteric sclerae.   Neck: No JVD  Cardiovascular: regular rate and rhythm. No murmur. No gallop. No friction rub.  Respiratory: Clear to auscultation bilaterally  Gastrointestinal: Soft, Non-tender, benign	  Skin: + petechiae and purpura  Neurologic: No focal deficit  Extremities: warm. No edema.  Psychiatry: A & O x 3, Mood & affect appropriate  Vascular: pulse regular, equal    Labs:                        9.3    2.54  )-----------( 14       ( 18 Sep 2023 07:31 )             27.7     09-18    140  |  103  |  17  ----------------------------<  98  3.7   |  24  |  0.63    Ca    9.8      18 Sep 2023 07:31  Phos  3.0     09-18  Mg     1.9     09-18    TPro  5.9<L>  /  Alb  3.8  /  TBili  0.5  /  DBili  x   /  AST  13  /  ALT  33  /  AlkPhos  53  09-18    PT/INR - ( 18 Sep 2023 07:31 )   PT: 10.4 sec;   INR: 0.94 ratio         PTT - ( 18 Sep 2023 07:31 )  PTT:26.0 sec            TELEMETRY:    Echocardiogram:        EKG:     CXR/Radiology:

## 2023-09-18 NOTE — PROGRESS NOTE ADULT - SUBJECTIVE AND OBJECTIVE BOX
Dawood Elanni| PGY-1  Internal Medicine    OVERNIGHT EVENTS: no overnight events      SUBJECTIVE: Patient was examined at bedside this morning. Appeared comfortable. Overnight vitals and monitoring results were reviewed. Reporting no complaints. Denied chest pain, SOB, abdominal pain, vomiting, diarrhea, constipation.       MEDICATIONS  (STANDING):  atovaquone  Suspension 1500 milliGRAM(s) Oral daily  chlorhexidine 2% Cloths 1 Application(s) Topical daily  ezetimibe 10 milliGRAM(s) Oral daily  levothyroxine 25 MICROGram(s) Oral daily  metoprolol succinate ER 50 milliGRAM(s) Oral daily  predniSONE   Tablet 20 milliGRAM(s) Oral daily  rosuvastatin 40 milliGRAM(s) Oral at bedtime  tamsulosin 0.8 milliGRAM(s) Oral at bedtime    MEDICATIONS  (PRN):  acetaminophen     Tablet .. 650 milliGRAM(s) Oral every 6 hours PRN Mild Pain (1 - 3)        T(F): 97.8 (09-18-23 @ 00:00), Max: 98.7 (09-17-23 @ 16:02)  HR: 81 (09-18-23 @ 05:46) (75 - 82)  BP: 113/65 (09-18-23 @ 05:46) (99/51 - 113/65)  BP(mean): --  RR: 18 (09-18-23 @ 00:00) (18 - 18)  SpO2: 96% (09-18-23 @ 00:00) (96% - 98%)    PHYSICAL EXAM:     GENERAL: NAD, lying in bed comfortably  HEAD:  Atraumatic, Normocephalic  EYES: EOMI, PERRLA, conjunctiva and sclera clear, no nystagmus noted  ENT: Moist mucous membranes,   NECK: Supple, No JVD, trachea midline  CHEST/LUNG: Clear to auscultation bilaterally; No rales, rhonchi, wheezing, or rubs. Unlabored respirations  HEART: Regular rate and rhythm; No murmurs, rubs, or gallops, normal S1/S2  ABDOMEN: normal bowel sounds; Soft, nontender, nondistended, no organomegaly   EXTREMITIES:  2+ Peripheral Pulses, brisk capillary refill. No clubbing, cyanosis, or edema  MSK: No gross deformities noted   Neurological:  A&Ox3, no focal deficits   SKIN: No rashes or lesions  PSYCH: Normal mood, affect     TELEMETRY:    LABS:                        9.3    2.54  )-----------( 14       ( 18 Sep 2023 07:31 )             27.7     09-18    140  |  103  |  17  ----------------------------<  98  3.7   |  24  |  0.63    Ca    9.8      18 Sep 2023 07:31  Phos  3.0     09-18  Mg     1.9     09-18    TPro  5.9<L>  /  Alb  3.8  /  TBili  0.5  /  DBili  x   /  AST  13  /  ALT  33  /  AlkPhos  53  09-18        PT/INR - ( 18 Sep 2023 07:31 )   PT: 10.4 sec;   INR: 0.94 ratio         PTT - ( 18 Sep 2023 07:31 )  PTT:26.0 sec    Creatinine Trend: 0.63<--, 0.58<--, 0.64<--, 0.74<--, 0.80<--  I&O's Summary    17 Sep 2023 07:01  -  18 Sep 2023 07:00  --------------------------------------------------------  IN: 360 mL / OUT: 500 mL / NET: -140 mL      BNP    RADIOLOGY & ADDITIONAL STUDIES:             Dawood Elanni| PGY-1  Internal Medicine    OVERNIGHT EVENTS: no overnight events      SUBJECTIVE: Patient was examined at bedside this morning. Reporting low back pain worse while he lies on it, better when he lies on his side.  Denied chest pain, SOB, abdominal pain, vomiting, diarrhea, constipation.       MEDICATIONS  (STANDING):  atovaquone  Suspension 1500 milliGRAM(s) Oral daily  chlorhexidine 2% Cloths 1 Application(s) Topical daily  ezetimibe 10 milliGRAM(s) Oral daily  levothyroxine 25 MICROGram(s) Oral daily  metoprolol succinate ER 50 milliGRAM(s) Oral daily  predniSONE   Tablet 20 milliGRAM(s) Oral daily  rosuvastatin 40 milliGRAM(s) Oral at bedtime  tamsulosin 0.8 milliGRAM(s) Oral at bedtime    MEDICATIONS  (PRN):  acetaminophen     Tablet .. 650 milliGRAM(s) Oral every 6 hours PRN Mild Pain (1 - 3)        T(F): 97.8 (09-18-23 @ 00:00), Max: 98.7 (09-17-23 @ 16:02)  HR: 81 (09-18-23 @ 05:46) (75 - 82)  BP: 113/65 (09-18-23 @ 05:46) (99/51 - 113/65)  BP(mean): --  RR: 18 (09-18-23 @ 00:00) (18 - 18)  SpO2: 96% (09-18-23 @ 00:00) (96% - 98%)    PHYSICAL EXAM:     GENERAL: NAD, lying in bed comfortably  HEAD:  Atraumatic, Normocephalic  EYES: EOMI, PERRLA, conjunctiva and sclera clear, no nystagmus noted  ENT: Moist mucous membranes,   NECK: Supple, No JVD, trachea midline  CHEST/LUNG: Clear to auscultation bilaterally; No rales, rhonchi, wheezing, or rubs. Unlabored respirations  HEART: Regular rate and rhythm; No murmurs, rubs, or gallops, normal S1/S2  ABDOMEN: Soft, nontender, nondistended, no organomegaly   EXTREMITIES:  2+ Peripheral Pulses, brisk capillary refill. No clubbing, cyanosis, or edema  MSK: Lumbosacral paraspinal tenderness  Neurological:  A&Ox3, no focal deficits   SKIN: No rashes or lesions  PSYCH: Normal mood, affect     TELEMETRY:    LABS:                        9.3    2.54  )-----------( 14       ( 18 Sep 2023 07:31 )             27.7     09-18    140  |  103  |  17  ----------------------------<  98  3.7   |  24  |  0.63    Ca    9.8      18 Sep 2023 07:31  Phos  3.0     09-18  Mg     1.9     09-18    TPro  5.9<L>  /  Alb  3.8  /  TBili  0.5  /  DBili  x   /  AST  13  /  ALT  33  /  AlkPhos  53  09-18        PT/INR - ( 18 Sep 2023 07:31 )   PT: 10.4 sec;   INR: 0.94 ratio         PTT - ( 18 Sep 2023 07:31 )  PTT:26.0 sec    Creatinine Trend: 0.63<--, 0.58<--, 0.64<--, 0.74<--, 0.80<--  I&O's Summary    17 Sep 2023 07:01  -  18 Sep 2023 07:00  --------------------------------------------------------  IN: 360 mL / OUT: 500 mL / NET: -140 mL      BNP    RADIOLOGY & ADDITIONAL STUDIES:

## 2023-09-18 NOTE — DIETITIAN INITIAL EVALUATION ADULT - PERTINENT MEDS FT
MEDICATIONS  (STANDING):  atovaquone  Suspension 1500 milliGRAM(s) Oral daily  chlorhexidine 2% Cloths 1 Application(s) Topical daily  ezetimibe 10 milliGRAM(s) Oral daily  levothyroxine 25 MICROGram(s) Oral daily  metoprolol succinate ER 50 milliGRAM(s) Oral daily  predniSONE   Tablet 20 milliGRAM(s) Oral daily  rosuvastatin 40 milliGRAM(s) Oral at bedtime  sacubitril 24 mG/valsartan 26 mG 1 Tablet(s) Oral two times a day  tamsulosin 0.8 milliGRAM(s) Oral at bedtime    MEDICATIONS  (PRN):  acetaminophen     Tablet .. 650 milliGRAM(s) Oral every 6 hours PRN Mild Pain (1 - 3)

## 2023-09-18 NOTE — PROGRESS NOTE ADULT - PROBLEM SELECTOR PLAN 1
- P/w platelet count of 10k a/w ecchymoses and petechiae  - Platelet count of 123K on 7/28/23 outpatient  - S/p 1U platelets in ED, repeat 20k post-transfusion  - Heme/onc consulted, concern for therapy-related AML given blasts seen on peripheral smear and h/o etoposide use  - Iron studies, DIC, TLS labs, TTE, HIV, acute hepatitis panel ordered, per heme/onc  - F/u CBC, DIC, TLS labs daily  - Flow cytometry pending - P/w platelet count of 10k a/w ecchymoses and petechiae  - Platelet count of 123K on 7/28/23 outpatient  - S/p 1U platelets in ED, repeat 20k post-transfusion  - Heme/onc consulted, concern for therapy-related AML given blasts seen on peripheral smear and h/o etoposide use  - Iron studies, DIC, TLS labs, TTE, HIV, acute hepatitis panel ordered, per heme/onc      Plan  -heme onc following: daily CBC with Diff, coags, fibrinogen and d-dimer   - Transfuse for Hgb < 7 and platelets < 10k, < 15k if febrile and < 50k if non-CNS bleeding  -9/18: bm biopsy done, awaiting path results   -pending HIV and acte he

## 2023-09-18 NOTE — PROGRESS NOTE ADULT - ASSESSMENT
68M hx extensive-stage SCLC with mets to bone and liver s/p carbo/etopo/atezo April 2019 followed by immunotherapy maintenance was sent in to [facility] ER for acute, severe thrombocytopenia. Hematology consulted for thrombocytopenia. Found to have blast-like cells on peripheral smear concerning for hematologic malignancy.    # Rule out Acute Leukemia (concern for etoposide related AML given hx of SCLC)   - Patient presenting with WBC 3.02 and blast 1% noted on manual differential. Suspicion for therapy-related AML is high given history of etoposide administration.  - Peripheral smear at [facility] reviewed 9/15/23: Several blast-like cells that appear to be of lymphoid origin. No schistocytosis. Markedly decreased platelets without clumping; true thrombocytopenia. Platelets are normal morphologically with small forms.  - iron studies wnl, Haptoglobin / Retic count low   - Fibrinogen / PT / PTT wnl (no concern for DIC daryl)  - D-dimer 247, Uric acid normal,         - Pending G6PD level  - Pending TTE; appreciate cardio-onc eval (no contraindications to starting chemotherapy if indicated)   - pending HIV and Acute hepatitis panel (Need to order Hep B surface Ab and Hep B core Ab total SEPARATELY)  - Please check CBC with Diff, coags, fibrinogen and d-dimer DAILY  - Trend TLS labs daily (uric acid, LDH, Mg, Phos)  -  Pending flow cytometry on peripheral blood (req. form with green top and lavender top tube given to nurse):  Ordered STAT PML-BEBETO, BCR-ABL, FLT 3, and Onkosight Myeloid TL-95 STAT.  - Transfuse for Hgb < 7 and platelets < 10k, < 15k if febrile and < 50k if non-CNS bleeding    #Extensive Stage SCLC  Follows with Dr. Carranza at the [facility]. Initially diagnosed in March 2019 with extensive metastatic disease ( liver, bones, T-spine, cavarial base, bone with suspected leptomenigeal ds, but never confirmed). He responded dramatically to Carbo, etoposide, Atezolizumab with immunotherapy maintenance where he was in CR with no evidence of metastatic disease in liver or bones. He developed gastritis/esophagitis which was thought to be secondary to immunotherapy and it was discontinued. june 2021. He was found to have relapse disease in the lungs and LN only. He was started on carbo/etoposide again with the addition of Durvalumab at cycle 4 (end of april 2022). Durvalumab has been held since February 2023 due to presumed immunotherapy-related toxicities.  - No plans for any chemo-/immuno-therapy for his SCLC while inpatient  - Further treatment options to be determined after acute issues with thrombocytopenia and potential leukemia have been addressed      Brandy Shen M.D.  Hematology and Medical Oncology PGY4  Pager: [phone]  For weekends and evenings (5 pm - 8 am), please page Heme/Onc fellow on call.  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 Hematology consulted for thrombocytopenia. Found to have blast-like cells on peripheral smear concerning for hematologic malignancy.    # Rule out Acute Leukemia (concern for etoposide related AML given hx of SCLC)   - Patient presenting with WBC 3.02 and blast 1% noted on manual differential. Suspicion for therapy-related AML is high given history of etoposide administration.  - Peripheral smear at [facility] reviewed 9/15/23: Several blast-like cells that appear to be of lymphoid origin. No schistocytosis. Markedly decreased platelets without clumping; true thrombocytopenia. Platelets are normal morphologically with small forms.  - iron studies wnl, Haptoglobin / Retic count low   - Fibrinogen / PT / PTT wnl (no concern for DIC daryl)  - D-dimer 247, Uric acid normal,         - Pending G6PD level  - Pending TTE; appreciate cardio-onc eval (no contraindications to starting chemotherapy if indicated)   - pending HIV and Acute hepatitis panel (Need to order Hep B surface Ab and Hep B core Ab total SEPARATELY)  - Please check CBC with Diff, coags, fibrinogen and d-dimer DAILY  - Trend TLS labs daily (uric acid, LDH, Mg, Phos)  -  Pending flow cytometry on peripheral blood (req. form with green top and lavender top tube given to nurse):  Ordered STAT PML-BEBETO, BCR-ABL, FLT 3, and Onkosight Myeloid TL-95 STAT.  - s/p BM biopsy at bedside 9/18: awaiting path   - Transfuse for Hgb < 7 and platelets < 10k, < 15k if febrile and < 50k if non-CNS bleeding    #Extensive Stage SCLC  Follows with Dr. Carranza at the [facility]. Initially diagnosed in March 2019 with extensive metastatic disease ( liver, bones, T-spine, cavarial base, bone with suspected leptomenigeal ds, but never confirmed). He responded dramatically to Carbo, etoposide, Atezolizumab with immunotherapy maintenance where he was in CR with no evidence of metastatic disease in liver or bones. He developed gastritis/esophagitis which was thought to be secondary to immunotherapy and it was discontinued. june 2021. He was found to have relapse disease in the lungs and LN only. He was started on carbo/etoposide again with the addition of Durvalumab at cycle 4 (end of april 2022). Durvalumab has been held since February 2023 due to presumed immunotherapy-related toxicities.  - No plans for any chemo-/immuno-therapy for his SCLC while inpatient  - Further treatment options to be determined after acute issues with thrombocytopenia and potential leukemia have been addressed      Brandy Shen M.D.  Hematology and Medical Oncology PGY4  Pager: [phone]  For weekends and evenings (5 pm - 8 am), please page Heme/Onc fellow on call.

## 2023-09-18 NOTE — CHART NOTE - NSCHARTNOTEFT_GEN_A_CORE
Prescription Information      PDI Filter:    PDI	Current Rx	Drug Type	Rx Written	Rx Dispensed	Drug	Quantity	Days Supply	Prescriber Name	Prescriber ASHTYN #	Payment Method	Dispenser  A	N	O	11/18/2022	11/19/2022	diphenoxylate-atropine 2.5-0.025 mg tablet	120	30	Rafy Carranza MD	[id number]	[facility] [employer]

## 2023-09-18 NOTE — PROGRESS NOTE ADULT - PROBLEM SELECTOR PLAN 3
- S/p CABGx3 (2008), DESx3 (2019, 2022), TCAR (01/2023)  - Pt continued DAPT due to h/o vasculopathy, per outpatient cardiologist  - Holding home ASA, clopidogrel iso thrombocytopenia, cardiology consulted and agrees Medication refilled per protocol completed.           - S/p CABGx3 (2008), DESx3 (2019, 2022), TCAR (01/2023)    Plan  -cards onc following: holding aspirin and clopidogrel given thrombocytopenia, c/w entresto and metoprolol

## 2023-09-18 NOTE — DIETITIAN INITIAL EVALUATION ADULT - ENERGY INTAKE
- Pt reports good appetite/PO intake since admission, is currently ordered for a regular diet. ~% of meals consumed.   - Pt is amenable to receiving oral nutrition supplements at this time.  Will trial Ensure Plus vs. Ensure Clear vs. [employer].   - Pt made aware of menu ordering procedures in house, has no further questions at this time.  Adequate (%)

## 2023-09-18 NOTE — PROGRESS NOTE ADULT - ASSESSMENT
Mr. 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

## 2023-09-18 NOTE — DIETITIAN INITIAL EVALUATION ADULT - PROBLEM SELECTOR PLAN 3
- S/p CABGx3 (2008), DESx3 (2019, 2022), TCAR (01/2023)  - Pt continued DAPT due to h/o vasculopathy, per outpatient cardiologist  - Holding home ASA, clopidogrel iso thrombocytopenia, will consult cardiology for recommendations

## 2023-09-18 NOTE — CONSULT NOTE ADULT - ASSESSMENT
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    Recommendations:  1. Agree with holding aspirin and clopidogrel given thrombocytopenia  2. Continue other home cardiac meds including Entresto and metoprolol - his BP is at baseline and while there is a history of mild orthostatic intolerance, he has tolerated Entresto/Metoprolol and empagliflozin for a long time without issue. Holding these medications poses risk for decompensation of his chronic heart failure.  3. No cardiac contraindication to induction chemotherapy inpatient if needed. Would proceed with Entresto, metoprolol, statin, and SGLT-2 inhibitor on board  4. check baseline pro-BNP and troponin prior to initiation    Please call if I can be of assistance with his care.    TESS Figueroa MD [facility]  Cardio-Oncology

## 2023-09-18 NOTE — DIETITIAN INITIAL EVALUATION ADULT - REASON INDICATOR FOR ASSESSMENT
Nutrition consult warranted for: MST score 2 or more   Information obtained from: electronic medical record, patient and spouse at bedside  Chart reviewed, events noted.

## 2023-09-18 NOTE — DIETITIAN INITIAL EVALUATION ADULT - PERSON TAUGHT/METHOD
Emphasized the importance of adequate kcal and protein intake, provided recommendations to optimize nutritional intake in case of decreased appetite, recommended small frequent meals by consuming nutrient-dense snacks between meals, to start with protein, and sips of supplement throughout the day./verbal instruction/patient instructed/spouse instructed

## 2023-09-18 NOTE — DIETITIAN INITIAL EVALUATION ADULT - NSFNSGIIOFT_GEN_A_CORE
- Pt denies nausea, vomiting, diarrhea, or constipation.   - Last BM: 9/18; not currently ordered for bowel regimen

## 2023-09-18 NOTE — PROGRESS NOTE ADULT - PROBLEM SELECTOR PLAN 4
- C/w home metoprolol 50mg daily  - Holding home Entresto iso soft BP - C/w home metoprolol 50mg daily

## 2023-09-19 NOTE — PROGRESS NOTE ADULT - PROBLEM SELECTOR PLAN 3
- S/p CABGx3 (2008), DESx3 (2019, 2022), TCAR (01/2023)    Plan  -cards onc following: holding aspirin and clopidogrel given thrombocytopenia, c/w entresto and metoprolol

## 2023-09-19 NOTE — PROGRESS NOTE ADULT - ASSESSMENT
Mr. 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

## 2023-09-19 NOTE — PROGRESS NOTE ADULT - PROBLEM SELECTOR PLAN 1
- P/w platelet count of 10k a/w ecchymoses and petechiae  - Platelet count of 123K on 7/28/23 outpatient  - S/p 1U platelets in ED, repeat 20k post-transfusion  - Heme/onc consulted, concern for therapy-related AML given blasts seen on peripheral smear and h/o etoposide use  - Iron studies, DIC, TLS labs, TTE, HIV, acute hepatitis panel ordered, per heme/onc      Plan  -heme onc following: daily CBC with Diff, coags, fibrinogen and d-dimer   - Transfuse for Hgb < 7 and platelets < 10k, < 15k if febrile and < 50k if non-CNS bleeding  -9/18: bm biopsy done, awaiting path results   -pending HIV and acte he

## 2023-09-19 NOTE — PROGRESS NOTE ADULT - SUBJECTIVE AND OBJECTIVE BOX
CARDIO-ONCOLOGY FOLLOW UP NOTE                                                                                                                                                                                     Interval History:   Had BM biopsy yesterday. Entresto restarted. Metoprolol held this AM for low BP associated with dizziness. He complains of tachycardia and shortness of breath.    PAST MEDICAL & SURGICAL HISTORY:  CAD (coronary artery disease)      HTN (hypertension)      Lung cancer      Status post chemotherapy      STEMI (ST elevation myocardial infarction)  06/14/2022      Dysphagia      H/O candidiasis of mouth      H/O Helicobacter infection      Hypothyroidism      Hyperlipidemia      Left carotid artery stenosis      Esophagitis      H/O coronary angiogram  2019, 2022- s/p Drug eluting stent X3      S/P CABG x 3  2008      Stented coronary artery          FAMILY HISTORY:  No pertinent family history in first degree relatives        Home Medications:  aspirin 81 mg oral delayed release tablet: 1 tab(s) orally once a day (16 Sep 2023 10:06)  clopidogrel 75 mg oral tablet: 1 tab(s) orally once a day (16 Sep 2023 10:06)  Entresto 24 mg-26 mg oral tablet: 1 tab(s) orally 2 times a day (16 Sep 2023 10:06)  Jardiance 10 mg oral tablet: 1 tab(s) orally once a day (in the morning) (16 Sep 2023 10:06)  metoprolol succinate 50 mg oral tablet, extended release: 1 tab(s) orally once a day am (16 Sep 2023 10:06)  predniSONE 20 mg oral tablet: 1 tab(s) orally once a day (16 Sep 2023 10:06)  rosuvastatin 40 mg oral tablet: 1 tab(s) orally once a day (16 Sep 2023 10:06)  Synthroid 25 mcg (0.025 mg) oral tablet: 1 tab(s) orally once a day am (16 Sep 2023 10:06)  tamsulosin 0.4 mg oral capsule: 2 cap(s) orally once a day (at bedtime) (16 Sep 2023 10:06)  Zetia 10 mg oral tablet: 1 tab(s) orally once a day am (16 Sep 2023 10:06)      Social History:      Medications:  acetaminophen     Tablet .. 650 milliGRAM(s) Oral every 6 hours PRN  atovaquone  Suspension 1500 milliGRAM(s) Oral daily  chlorhexidine 2% Cloths 1 Application(s) Topical daily  ezetimibe 10 milliGRAM(s) Oral daily  levothyroxine 25 MICROGram(s) Oral daily  lidocaine   4% Patch 1 Patch Transdermal daily PRN  metoprolol succinate ER 50 milliGRAM(s) Oral daily  oxyCODONE    IR 5 milliGRAM(s) Oral once PRN  predniSONE   Tablet 20 milliGRAM(s) Oral daily  rosuvastatin 40 milliGRAM(s) Oral at bedtime  sacubitril 24 mG/valsartan 26 mG 1 Tablet(s) Oral two times a day  tamsulosin 0.8 milliGRAM(s) Oral at bedtime      Review of systems:  10 point review of systems completed and are negative unless noted in HPI    Vitals:  T(C): 36.5 (09-19-23 @ 08:09), Max: 36.5 (09-19-23 @ 08:09)  HR: 95 (09-19-23 @ 08:09) (66 - 95)  BP: 92/51 (09-19-23 @ 08:09) (91/41 - 98/54)  BP(mean): --  RR: 18 (09-19-23 @ 08:09) (18 - 18)  SpO2: 98% (09-19-23 @ 08:09) (97% - 98%)    Daily     Daily         I&O's Summary    18 Sep 2023 07:01  -  19 Sep 2023 07:00  --------------------------------------------------------  IN: 0 mL / OUT: 600 mL / NET: -600 mL        Physical Exam:  Appearance: No apparent distress  HEENT: moist mucosa, anicteric sclerae.   Neck: No JVD  Cardiovascular: irregular. No murmur. No gallop  Respiratory: Clear to auscultation bilaterally  Gastrointestinal: Soft, Non-tender, benign	  Skin: petechiae and purpura.  Extremities: warm. No edema.  Vascular: pulse regular, equal    Labs:                        8.7    2.17  )-----------( 9        ( 19 Sep 2023 06:59 )             25.7     09-19    138  |  104  |  17  ----------------------------<  104<H>  3.7   |  24  |  0.58    Ca    9.5      19 Sep 2023 06:57  Phos  3.1     09-19  Mg     1.8     09-19    TPro  5.9<L>  /  Alb  3.8  /  TBili  0.5  /  DBili  x   /  AST  13  /  ALT  33  /  AlkPhos  53  09-18    PT/INR - ( 19 Sep 2023 06:59 )   PT: 10.4 sec;   INR: 0.94 ratio         PTT - ( 19 Sep 2023 06:59 )  PTT:26.5 sec      TELEMETRY:    Echocardiogram:        EKG:     CXR/Radiology:

## 2023-09-19 NOTE — PROGRESS NOTE ADULT - SUBJECTIVE AND OBJECTIVE BOX
Patient is a 68y old  Male who presents with a chief complaint of Severe thrombocytopenia (19 Sep 2023 12:11)      SUBJECTIVE / OVERNIGHT EVENTS: C/o persistent low back pain, asking for Oxycodone.     MEDICATIONS  (STANDING):  atovaquone  Suspension 1500 milliGRAM(s) Oral daily  chlorhexidine 2% Cloths 1 Application(s) Topical daily  ezetimibe 10 milliGRAM(s) Oral daily  levothyroxine 25 MICROGram(s) Oral daily  metoprolol succinate ER 50 milliGRAM(s) Oral daily  predniSONE   Tablet 20 milliGRAM(s) Oral daily  rosuvastatin 40 milliGRAM(s) Oral at bedtime  sacubitril 24 mG/valsartan 26 mG 1 Tablet(s) Oral two times a day  tamsulosin 0.8 milliGRAM(s) Oral at bedtime    MEDICATIONS  (PRN):  acetaminophen     Tablet .. 650 milliGRAM(s) Oral every 6 hours PRN Mild Pain (1 - 3)  lidocaine   4% Patch 1 Patch Transdermal daily PRN back pain  oxyCODONE    IR 5 milliGRAM(s) Oral every 6 hours PRN Severe Pain (7 - 10)      CAPILLARY BLOOD GLUCOSE        I&O's Summary    18 Sep 2023 07:01  -  19 Sep 2023 07:00  --------------------------------------------------------  IN: 0 mL / OUT: 600 mL / NET: -600 mL    19 Sep 2023 07:01  -  19 Sep 2023 21:57  --------------------------------------------------------  IN: 535 mL / OUT: 200 mL / NET: 335 mL        PHYSICAL EXAM:  T(C): 36.7 (09-19-23 @ 12:05), Max: 36.7 (09-19-23 @ 12:05)  HR: 76 (09-19-23 @ 21:05) (66 - 99)  BP: 120/61 (09-19-23 @ 21:05) (91/41 - 120/61)  RR: 18 (09-19-23 @ 21:05) (18 - 18)  SpO2: 95% (09-19-23 @ 21:05) (95% - 99%)  CONSTITUTIONAL: NAD, well-developed, well-groomed  EYES: PERRLA; conjunctiva and sclera clear  ENMT: Moist oral mucosa, no pharyngeal injection or exudates; normal dentition  NECK: Supple, no palpable masses; no thyromegaly  RESPIRATORY: Normal respiratory effort; lungs are clear to auscultation bilaterally  CARDIOVASCULAR: Regular rate and rhythm, normal S1 and S2, no murmur/rub/gallop; No lower extremity edema; Peripheral pulses are 2+ bilaterally  ABDOMEN: Nontender to palpation, normoactive bowel sounds, no rebound/guarding; No hepatosplenomegaly  MUSCULOSKELETAL:  No clubbing or cyanosis of digits; no joint swelling or tenderness to palpation  PSYCH: A+O to person, place, and time; affect appropriate  NEUROLOGY: CN 2-12 are intact and symmetric; no gross sensory deficits   SKIN: No rashes; no palpable lesions    LABS:                        8.7    2.17  )-----------( 9        ( 19 Sep 2023 06:59 )             25.7     09-19    138  |  104  |  17  ----------------------------<  104<H>  3.7   |  24  |  0.58    Ca    9.5      19 Sep 2023 06:57  Phos  3.1     09-19  Mg     1.8     09-19    TPro  5.9<L>  /  Alb  3.8  /  TBili  0.5  /  DBili  x   /  AST  13  /  ALT  33  /  AlkPhos  53  09-18    PT/INR - ( 19 Sep 2023 06:59 )   PT: 10.4 sec;   INR: 0.94 ratio         PTT - ( 19 Sep 2023 06:59 )  PTT:26.5 sec      Urinalysis Basic - ( 19 Sep 2023 06:57 )    Color: x / Appearance: x / SG: x / pH: x  Gluc: 104 mg/dL / Ketone: x  / Bili: x / Urobili: x   Blood: x / Protein: x / Nitrite: x   Leuk Esterase: x / RBC: x / WBC x   Sq Epi: x / Non Sq Epi: x / Bacteria: x        RADIOLOGY & ADDITIONAL TESTS:    Imaging Personally Reviewed:    Consultant(s) Notes Reviewed:      Care Discussed with Consultants/Other Providers: NP

## 2023-09-19 NOTE — PROGRESS NOTE ADULT - ASSESSMENT
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    Recommendations:  1. Agree with holding aspirin and clopidogrel given thrombocytopenia  2. Continue other home cardiac meds including Entresto and metoprolol - his BP is at baseline and while there is a history of mild orthostatic intolerance, he has tolerated Entresto/Metoprolol and empagliflozin for a long time without issue. Holding these medications poses risk for decompensation of his chronic heart failure.  3. No cardiac contraindication to induction chemotherapy inpatient if needed. Would proceed with Entresto, metoprolol, statin, and SGLT-2 inhibitor on board  4. check baseline pro-BNP and troponin prior to initiation    Irregular rhythm and increased HR today - check ECG  Try to maintain outpatient cardiac med regimen while inpatient if possible    Please call if I can be of assistance with his care.    Discussed with patient and his wife at bedside.    TESS Figueroa MD [facility]  Cardio-Oncology

## 2023-09-20 NOTE — PROVIDER CONTACT NOTE (OTHER) - BACKGROUND
Pt admitted for thrombocytopenia. Pt has hx of extensive-stage SCLC with mets to bone and liver. Pt last chemo was 2/2023.
pmhx SCLC, STEMI, HLD, thrombocytopenia, CAD
Pt admitted for thrombocytopenia. Pt is s/p platelet transfusion. Hx of extensive stage SCLC with mets to bone and liver. s/p carbo/etopo/atezo April 2019 followed by immunotherapy maintenance.
61

## 2023-09-20 NOTE — PROGRESS NOTE ADULT - PROBLEM SELECTOR PLAN 3
- S/p CABGx3 (2008), DESx3 (2019, 2022), TCAR (01/2023)  -cards onc following: holding aspirin and clopidogrel given thrombocytopenia, c/w entresto and metoprolol with hold parameters

## 2023-09-20 NOTE — PROGRESS NOTE ADULT - ASSESSMENT
68M hx extensive-stage SCLC with mets to bone and liver s/p carbo/etopo/atezo April 2019 followed by immunotherapy maintenance was sent in to [facility] ER for acute, severe thrombocytopenia. Hematology consulted for thrombocytopenia. Found to have blast-like cells on peripheral smear concerning for hematologic malignancy.    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 Suspicion for therapy-related AML is high given history of etoposide administration.  - Peripheral smear at [facility] reviewed 9/15/23: Several blast-like cells that appear to be of lymphoid origin. No schistocytosis. Markedly decreased platelets without clumping; true thrombocytopenia. Platelets are normal morphologically with small forms.  - s/p BM biopsy at bedside 9/18: awaiting path   - Continues to require transfusion with plts about every 3 days. We are currently attempting to arrange a follow up with hematologist as outpatient. but because he is transfusion dependant; patient can not be discharged without ability to monitor platelets and transfuse as needed. Will update when we know appointment schedule.   - Transfuse for Hgb < 7 and platelets < 10k, < 15k if febrile and < 50k if non-CNS bleeding    #Extensive Stage SCLC  Follows with Dr. Carranza at the [facility].  - Initially diagnosed in March 2019 with extensive metastatic disease ( liver, bones, T-spine, cavarial base, bone with suspected leptomenigeal ds, but never confirmed). He responded dramatically to Carbo, etoposide, Atezolizumab with immunotherapy maintenance where he was in CR with no evidence of metastatic disease in liver or bones. He developed gastritis/esophagitis which was thought to be secondary to immunotherapy and it was discontinued. june 2021. He was found to have relapse disease in the lungs and LN only. He was started on carbo/etoposide again with the addition of Durvalumab at cycle 4 (end of april 2022). Durvalumab has been held since February 2023 due to presumed immunotherapy-related toxicities.  - No plans for any chemo-/immuno-therapy for his SCLC while inpatient  - Further treatment options to be determined after acute issues with thrombocytopenia and potential leukemia have been addressed    Celina Page MD   PGY6 heme onc fellow

## 2023-09-20 NOTE — PROVIDER CONTACT NOTE (OTHER) - ACTION/TREATMENT ORDERED:
MD Causey notified and aware. Entresto given and metoprolol rescheduled to 8AM due to no parameters noted, will endorse to day RN to follow up.
Hold BP med this evening. Lower dose of metoprolol.

## 2023-09-20 NOTE — PROGRESS NOTE ADULT - SUBJECTIVE AND OBJECTIVE BOX
Hematology Oncology Follow-up    INTERVAL HPI/OVERNIGHT EVENTS:  No o/n events, patient with wife at bedside, upset that he is still admitted. Patient reports back pain that he says started after hospital admission.     VITAL SIGNS:  T(F): 97.6 (09-20-23 @ 11:03)  HR: 57 (09-20-23 @ 11:03)  BP: 96/62 (09-20-23 @ 11:04)  RR: 18 (09-20-23 @ 11:03)  SpO2: 97% (09-20-23 @ 11:03)  Wt(kg): --    09-19-23 @ 07:01  -  09-20-23 @ 07:00  --------------------------------------------------------  IN: 535 mL / OUT: 200 mL / NET: 335 mL        PHYSICAL EXAM:    Constitutional: AAOx3, NAD  Eyes: PERRL, EOMI, sclera non-icteric  Neck: supple, no masses, no JVD, no lymphadenopathy  Respiratory: CTA b/l, no wheezing, rhonchi, rales, with normal respiratory effort  Cardiovascular: RRR, normal S1S2, no M/R/G  Gastrointestinal: soft, NTND, no masses palpable, BS normal in all four quadrants, no HSM  Extremities:  no edema  MSK: no obvious abnormalities, normal ROM, no lymphadenopathy  Back: Site of bx is clean, no erythema, no ecchymosis or hematoma  Neurological: Grossly intact  Skin: Normal temperature, no rash, no echymoses, no petichiae  Psych: normal affect    MEDICATIONS  (STANDING):  atovaquone  Suspension 1500 milliGRAM(s) Oral daily  chlorhexidine 2% Cloths 1 Application(s) Topical daily  ezetimibe 10 milliGRAM(s) Oral daily  levothyroxine 25 MICROGram(s) Oral daily  metoprolol succinate ER 50 milliGRAM(s) Oral daily  predniSONE   Tablet 20 milliGRAM(s) Oral daily  rosuvastatin 40 milliGRAM(s) Oral at bedtime  sacubitril 24 mG/valsartan 26 mG 1 Tablet(s) Oral two times a day  tamsulosin 0.8 milliGRAM(s) Oral at bedtime    MEDICATIONS  (PRN):  acetaminophen     Tablet .. 650 milliGRAM(s) Oral every 6 hours PRN Mild Pain (1 - 3)  lidocaine   4% Patch 1 Patch Transdermal daily PRN back pain  oxyCODONE    IR 5 milliGRAM(s) Oral every 6 hours PRN Severe Pain (7 - 10)      No Known Allergies      LABS:                        8.7    2.76  )-----------( 31       ( 20 Sep 2023 07:31 )             26.0     09-20    137  |  103  |  19  ----------------------------<  106<H>  3.8   |  25  |  0.57    Ca    9.6      20 Sep 2023 07:32  Phos  2.9     09-20  Mg     2.0     09-20      PT/INR - ( 20 Sep 2023 07:34 )   PT: 10.3 sec;   INR: 0.98 ratio         PTT - ( 20 Sep 2023 07:34 )  PTT:26.5 sec Lactate Dehydrogenase, Serum: 387 U/L (09-20 @ 07:32)    Urinalysis Basic - ( 20 Sep 2023 07:32 )    Color: x / Appearance: x / SG: x / pH: x  Gluc: 106 mg/dL / Ketone: x  / Bili: x / Urobili: x   Blood: x / Protein: x / Nitrite: x   Leuk Esterase: x / RBC: x / WBC x   Sq Epi: x / Non Sq Epi: x / Bacteria: x        Iron Total: 98 ug/dL (09-16-23 @ 01:41)  Unsaturated Iron Binding Capacity: 167 ug/dL (09-16-23 @ 01:41)  Iron - Total Binding Capacity.: 264 ug/dL (09-16-23 @ 01:41)  % Saturation, Iron: 37 % (09-16-23 @ 01:41)  Ferritin: 482 ng/mL *H* (09-16-23 @ 01:41)    Vitamin B12, Serum: 242 pg/mL (09-16-23 @ 01:41)  Folate, Serum: 9.8 ng/mL (09-16-23 @ 01:41)          RADIOLOGY & ADDITIONAL TESTS:  Studies reviewed.

## 2023-09-20 NOTE — PROGRESS NOTE ADULT - ASSESSMENT
Mr. 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

## 2023-09-20 NOTE — PROGRESS NOTE ADULT - PROBLEM SELECTOR PLAN 1
- P/w platelet count of 10k a/w ecchymoses and petechiae  - Platelet count of 123K on 7/28/23 outpatient  - S/p 1U platelets in ED, repeat 20k post-transfusion  - Heme/onc consulted, concern for therapy-related AML given blasts seen on peripheral smear and h/o etoposide use  - Transfuse for Hgb < 7 and platelets < 10k, < 15k if febrile and < 50k if non-CNS bleeding  - 9/18: bm biopsy done, awaiting path results

## 2023-09-20 NOTE — PROVIDER CONTACT NOTE (OTHER) - ASSESSMENT
VS documented in chart. BP running soft electronically and manually.
patients blood pressure is 98/54 prior to AM medication. patient is asymptomatic. clarifying medication administration with MD due to BP meds scheduled.
VS documented in flowsheet. BP running soft. No c/o dizziness or discomfort.

## 2023-09-20 NOTE — PROGRESS NOTE ADULT - SUBJECTIVE AND OBJECTIVE BOX
[facility] Division of Hospital Medicine  Hi Adame  MS Teams      SUBJECTIVE / OVERNIGHT EVENTS:  No events overnight  bb and entresto restarted yesterday  per wife reporting dizziness/lightheadedness on standing  denies f/chills/bleeding    ADDITIONAL REVIEW OF SYSTEMS:    MEDICATIONS  (STANDING):  atovaquone  Suspension 1500 milliGRAM(s) Oral daily  chlorhexidine 2% Cloths 1 Application(s) Topical daily  ezetimibe 10 milliGRAM(s) Oral daily  levothyroxine 25 MICROGram(s) Oral daily  metoprolol succinate ER 50 milliGRAM(s) Oral daily  predniSONE   Tablet 20 milliGRAM(s) Oral daily  rosuvastatin 40 milliGRAM(s) Oral at bedtime  sacubitril 24 mG/valsartan 26 mG 1 Tablet(s) Oral two times a day  tamsulosin 0.8 milliGRAM(s) Oral at bedtime    MEDICATIONS  (PRN):  acetaminophen     Tablet .. 650 milliGRAM(s) Oral every 6 hours PRN Mild Pain (1 - 3)  lidocaine   4% Patch 1 Patch Transdermal daily PRN back pain  oxyCODONE    IR 5 milliGRAM(s) Oral every 6 hours PRN Severe Pain (7 - 10)      I&O's Summary    19 Sep 2023 07:01  -  20 Sep 2023 07:00  --------------------------------------------------------  IN: 535 mL / OUT: 200 mL / NET: 335 mL        PHYSICAL EXAM:  Vital Signs Last 24 Hrs  T(C): 36.4 (20 Sep 2023 11:03), Max: 36.4 (20 Sep 2023 11:03)  T(F): 97.6 (20 Sep 2023 11:03), Max: 97.6 (20 Sep 2023 11:03)  HR: 57 (20 Sep 2023 11:03) (57 - 76)  BP: 96/62 (20 Sep 2023 11:04) (85/46 - 120/61)  BP(mean): --  RR: 18 (20 Sep 2023 11:03) (18 - 18)  SpO2: 97% (20 Sep 2023 11:03) (94% - 97%)    Parameters below as of 20 Sep 2023 11:03  Patient On (Oxygen Delivery Method): room air      CONSTITUTIONAL: NAD, well-developed  EYES: PERRLA; conjunctiva and sclera clear  RESPIRATORY: Normal respiratory effort; lungs are clear to auscultation bilaterally  CARDIOVASCULAR: Regular rate and rhythm, normal S1 and S2, no murmur; No lower extremity edema; Peripheral pulses are 2+ bilaterally  ABDOMEN: Nontender to palpation, normoactive bowel sounds, no rebound/guarding  MUSCULOSKELETAL:  no clubbing or cyanosis of digits; no joint swelling or tenderness to palpation  PSYCH: A+O to person, place, and time; affect appropriate  NEUROLOGY: CN 2-12 are intact and symmetric; no gross sensory deficits   SKIN: No rashes; no palpable lesions    LABS:                        8.7    2.76  )-----------( 31       ( 20 Sep 2023 07:31 )             26.0     09-20    137  |  103  |  19  ----------------------------<  106<H>  3.8   |  25  |  0.57    Ca    9.6      20 Sep 2023 07:32  Phos  2.9     09-20  Mg     2.0     09-20      PT/INR - ( 20 Sep 2023 07:34 )   PT: 10.3 sec;   INR: 0.98 ratio         PTT - ( 20 Sep 2023 07:34 )  PTT:26.5 sec      Urinalysis Basic - ( 20 Sep 2023 07:32 )    Color: x / Appearance: x / SG: x / pH: x  Gluc: 106 mg/dL / Ketone: x  / Bili: x / Urobili: x   Blood: x / Protein: x / Nitrite: x   Leuk Esterase: x / RBC: x / WBC x   Sq Epi: x / Non Sq Epi: x / Bacteria: x        Culture - Blood (collected 17 Sep 2023 14:46)  Source: .Blood Blood-Peripheral  Preliminary Report (19 Sep 2023 20:01):    No growth at 48 Hours

## 2023-09-21 NOTE — PROGRESS NOTE ADULT - PROBLEM SELECTOR PLAN 1
- P/w platelet count of 10k a/w ecchymoses and petechiae  - Platelet count of 123K on 7/28/23 outpatient  - S/p 1U platelets in ED, repeat 20k post-transfusion  - Heme/onc consulted, concern for therapy-related AML given blasts seen on peripheral smear and h/o etoposide use  - Transfuse for Hgb < 7 and platelets < 10k, < 15k if febrile and < 50k if non-CNS bleeding  - 9/18: bm biopsy done, BCR Fran Fish FLT3 neg, pending path results  - needs frequent plt transfusions, Heme attempting to arrange outpatient. If arrange can d/c. if results come back prior to d/c may start tx inpt

## 2023-09-21 NOTE — PROGRESS NOTE ADULT - SUBJECTIVE AND OBJECTIVE BOX
[facility] Division of Hospital Medicine  Hi Adame  MS Teams      SUBJECTIVE / OVERNIGHT EVENTS:  No events overnight  denies cp/sob  Mild epigastric pain c/w reflux symptoms    ADDITIONAL REVIEW OF SYSTEMS:    MEDICATIONS  (STANDING):  atovaquone  Suspension 1500 milliGRAM(s) Oral daily  chlorhexidine 2% Cloths 1 Application(s) Topical daily  ezetimibe 10 milliGRAM(s) Oral daily  levothyroxine 25 MICROGram(s) Oral daily  metoprolol succinate ER 25 milliGRAM(s) Oral daily  predniSONE   Tablet 30 milliGRAM(s) Oral daily  rosuvastatin 40 milliGRAM(s) Oral at bedtime  sacubitril 24 mG/valsartan 26 mG 1 Tablet(s) Oral two times a day  tamsulosin 0.8 milliGRAM(s) Oral at bedtime    MEDICATIONS  (PRN):  acetaminophen     Tablet .. 650 milliGRAM(s) Oral every 6 hours PRN Mild Pain (1 - 3)  lidocaine   4% Patch 1 Patch Transdermal daily PRN back pain  oxyCODONE    IR 5 milliGRAM(s) Oral every 6 hours PRN Severe Pain (7 - 10)      I&O's Summary    20 Sep 2023 07:01  -  21 Sep 2023 07:00  --------------------------------------------------------  IN: 0 mL / OUT: 400 mL / NET: -400 mL        PHYSICAL EXAM:  Vital Signs Last 24 Hrs  T(C): 36.7 (21 Sep 2023 12:05), Max: 36.7 (21 Sep 2023 00:05)  T(F): 98 (21 Sep 2023 12:05), Max: 98.1 (21 Sep 2023 00:05)  HR: 75 (21 Sep 2023 12:05) (67 - 89)  BP: 107/58 (21 Sep 2023 12:05) (101/57 - 112/62)  BP(mean): --  RR: 18 (21 Sep 2023 12:05) (18 - 18)  SpO2: 98% (21 Sep 2023 12:05) (98% - 99%)    Parameters below as of 21 Sep 2023 12:05  Patient On (Oxygen Delivery Method): room air      CONSTITUTIONAL: NAD, well-developed  EYES: PERRLA; conjunctiva and sclera clear  RESPIRATORY: Normal respiratory effort; lungs are clear to auscultation bilaterally  CARDIOVASCULAR: Regular rate and rhythm, normal S1 and S2, no murmur; No lower extremity edema; Peripheral pulses are 2+ bilaterally  ABDOMEN: Nontender to palpation, normoactive bowel sounds, no rebound/guarding  MUSCULOSKELETAL:  no clubbing or cyanosis of digits; no joint swelling or tenderness to palpation  PSYCH: A+O to person, place, and time; affect appropriate  NEUROLOGY: CN 2-12 are intact and symmetric; no gross sensory deficits   SKIN: No rashes; no palpable lesions    LABS:                        8.4    2.51  )-----------( 20       ( 21 Sep 2023 06:56 )             24.8     09-21    139  |  104  |  17  ----------------------------<  104<H>  3.9   |  24  |  0.52    Ca    9.4      21 Sep 2023 06:57  Phos  2.9     09-21  Mg     1.9     09-21    TPro  5.8<L>  /  Alb  3.9  /  TBili  0.5  /  DBili  x   /  AST  13  /  ALT  30  /  AlkPhos  51  09-21    PT/INR - ( 20 Sep 2023 07:34 )   PT: 10.3 sec;   INR: 0.98 ratio         PTT - ( 20 Sep 2023 07:34 )  PTT:26.5 sec      Urinalysis Basic - ( 21 Sep 2023 06:57 )    Color: x / Appearance: x / SG: x / pH: x  Gluc: 104 mg/dL / Ketone: x  / Bili: x / Urobili: x   Blood: x / Protein: x / Nitrite: x   Leuk Esterase: x / RBC: x / WBC x   Sq Epi: x / Non Sq Epi: x / Bacteria: x

## 2023-09-21 NOTE — PROGRESS NOTE ADULT - ASSESSMENT
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 Hematology consulted for thrombocytopenia. Found to have blast-like cells on peripheral smear concerning for hematologic malignancy.    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 Suspicion for therapy-related AML is high given history of etoposide administration.  - Peripheral smear at [facility] reviewed 9/15/23: Several blast-like cells that appear to be of lymphoid origin. No schistocytosis. Markedly decreased platelets without clumping; true thrombocytopenia. Platelets are normal morphologically with small forms.  - s/p BM biopsy at bedside 9/18: awaiting path results. So far BCL- AND Flt3 negative.   - Continues to require transfusion with plts about every 3 days. We are currently attempting to arrange a follow up with hematologist as outpatient. but because he is transfusion dependant; patient can not be discharged without ability to monitor platelets and transfuse as needed. Will update when we know appointment schedule. However, if path results come back earlier than outpatient appointment and path shows AML, will start treating him inpatient.  - Transfuse for Hgb < 7 and platelets < 10k, < 15k if febrile and < 50k if non-CNS bleeding    #Extensive Stage SCLC  Follows with Dr. Carranza at the [facility].  - Initially diagnosed in March 2019 with extensive metastatic disease ( liver, bones, T-spine, cavarial base, bone with suspected leptomenigeal ds, but never confirmed). He responded dramatically to Carbo, etoposide, Atezolizumab with immunotherapy maintenance where he was in CR with no evidence of metastatic disease in liver or bones. He developed gastritis/esophagitis which was thought to be secondary to immunotherapy and it was discontinued. june 2021. He was found to have relapse disease in the lungs and LN only. He was started on carbo/etoposide again with the addition of Durvalumab at cycle 4 (end of april 2022). Durvalumab has been held since February 2023 due to presumed immunotherapy-related toxicities.  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 Hematology consulted for thrombocytopenia. Found to have blast-like cells on peripheral smear concerning for hematologic malignancy.    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 Suspicion for therapy-related AML is high given history of etoposide administration.  - Peripheral smear at [facility] reviewed 9/15/23: Several blast-like cells that appear to be of lymphoid origin. No schistocytosis. Markedly decreased platelets without clumping; true thrombocytopenia. Platelets are normal morphologically with small forms.  - s/p BM biopsy at bedside 9/18: awaiting path results. So far negative for BCR-ABL, Flt3 mutation, JAK2 mutation. Remaining results are pending  - Continues to require transfusion with plts about every 3 days. We are currently attempting to arrange a follow up with hematologist as outpatient. but because he is transfusion dependant; patient can not be discharged without ability to monitor platelets and transfuse as needed. Will update when we know appointment schedule. However, if path results come back positive earlier than outpatient appointment schedule, in that case we will plan to start treating him inpatient.  - Transfuse for Hgb < 7 and platelets < 10k, < 15k if febrile and < 50k if non-CNS bleeding    #Extensive Stage SCLC  Follows with Dr. Carranza at the [facility].  - Initially diagnosed in March 2019 with extensive metastatic disease ( liver, bones, T-spine, cavarial base, bone with suspected leptomenigeal ds, but never confirmed). He responded dramatically to Carbo, etoposide, Atezolizumab with immunotherapy maintenance where he was in CR with no evidence of metastatic disease in liver or bones. He developed gastritis/esophagitis which was thought to be secondary to immunotherapy and it was discontinued. june 2021. He was found to have relapse disease in the lungs and LN only. He was started on carbo/etoposide again with the addition of Durvalumab at cycle 4 (end of april 2022). Durvalumab has been held since February 2023 due to presumed immunotherapy-related toxicities.  - No plans for any chemo-/immuno-therapy for his SCLC while inpatient  - Further treatment options to be determined after acute issues with thrombocytopenia and potential leukemia have been addressed    Discussed w/ Dr. Kayode Randle MD, PhD  Heme/Onc Fellow  PGY4

## 2023-09-21 NOTE — PROGRESS NOTE ADULT - PROBLEM SELECTOR PLAN 3
- S/p CABGx3 (2008), DESx3 (2019, 2022), TCAR (01/2023)  -cards onc following: holding aspirin and clopidogrel given thrombocytopenia, c/w entresto and metoprolol(decreased to 25) with hold parameters

## 2023-09-21 NOTE — PROGRESS NOTE ADULT - SUBJECTIVE AND OBJECTIVE BOX
INTERVAL HPI/OVERNIGHT EVENTS:  Patient S&E at bedside. No complaints at this time. No F/C, CP, SOB, abdominal pain, N/V, rash, or edema      VITAL SIGNS:  T(F): 98 (09-21-23 @ 12:05)  HR: 75 (09-21-23 @ 12:05)  BP: 107/58 (09-21-23 @ 12:05)  RR: 18 (09-21-23 @ 12:05)  SpO2: 98% (09-21-23 @ 12:05)  Wt(kg): --    PHYSICAL EXAM:    Constitutional: NAD  Eyes: EOMI, sclera non-icteric  Neck: supple  Respiratory: no increased WOB, CTAB/L  Cardiovascular: RRR, S1, S2, no m/g/r  Gastrointestinal: soft, NTND, no masses palpable, no HSM  Extremities: no c/c/e  Neurological: AAOx3    MEDICATIONS  (STANDING):  atovaquone  Suspension 1500 milliGRAM(s) Oral daily  chlorhexidine 2% Cloths 1 Application(s) Topical daily  ezetimibe 10 milliGRAM(s) Oral daily  levothyroxine 25 MICROGram(s) Oral daily  metoprolol succinate ER 25 milliGRAM(s) Oral daily  predniSONE   Tablet 20 milliGRAM(s) Oral daily  rosuvastatin 40 milliGRAM(s) Oral at bedtime  sacubitril 24 mG/valsartan 26 mG 1 Tablet(s) Oral two times a day  tamsulosin 0.8 milliGRAM(s) Oral at bedtime    MEDICATIONS  (PRN):  acetaminophen     Tablet .. 650 milliGRAM(s) Oral every 6 hours PRN Mild Pain (1 - 3)  lidocaine   4% Patch 1 Patch Transdermal daily PRN back pain  oxyCODONE    IR 5 milliGRAM(s) Oral every 6 hours PRN Severe Pain (7 - 10)      LABS:                        8.4    2.51  )-----------( 20       ( 21 Sep 2023 06:56 )             24.8     09-21    139  |  104  |  17  ----------------------------<  104<H>  3.9   |  24  |  0.52    Ca    9.4      21 Sep 2023 06:57  Phos  2.9     09-21  Mg     1.9     09-21    TPro  5.8<L>  /  Alb  3.9  /  TBili  0.5  /  DBili  x   /  AST  13  /  ALT  30  /  AlkPhos  51  09-21    PT/INR - ( 20 Sep 2023 07:34 )   PT: 10.3 sec;   INR: 0.98 ratio         PTT - ( 20 Sep 2023 07:34 )  PTT:26.5 sec  Urinalysis Basic - ( 21 Sep 2023 06:57 )    Color: x / Appearance: x / SG: x / pH: x  Gluc: 104 mg/dL / Ketone: x  / Bili: x / Urobili: x   Blood: x / Protein: x / Nitrite: x   Leuk Esterase: x / RBC: x / WBC x   Sq Epi: x / Non Sq Epi: x / Bacteria: x        RADIOLOGY & ADDITIONAL TESTS:

## 2023-09-21 NOTE — PROGRESS NOTE ADULT - ASSESSMENT
Mr. Neema Wilks is a 68-year-old male w/ PMH of SCLC with mets to bone and liver s/p carbo/etopo/atezo (04/2019) and maintenance immunotherapy, HTN, HLD, CAD s/p CABGx3 (2008) and DESx3 (2019, 2022), and hypothyroidism who was sent to [facility] ER for acute, severe thrombocytopenia with platelet count of 10 c/f acute leukemia.

## 2023-09-22 NOTE — PROGRESS NOTE ADULT - PROBLEM SELECTOR PLAN 1
s/p BmBx with MDS TP53, pending remainder of molecular markers  - Transfuse for Hgb < 7 and platelets < 10k, < 15k if febrile and < 50k if non-CNS bleeding  - needs frequent plt transfusions, Heme attempting to arrange outpatient. If arrange and setp up earlier appointment can d/c.

## 2023-09-22 NOTE — PROGRESS NOTE ADULT - ATTENDING COMMENTS
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 Hematology consulted for thrombocytopenia. Found to have blast-like cells on peripheral smear concerning for hematologic malignancy.    # Rule out Acute Leukemia (concern for etoposide related AML given hx of SCLC)   - Patient presenting with WBC 3.02 and blast 1% noted on manual differential. Suspicion for therapy-related AML is high given history of etoposide administration.  - Peripheral smear at [facility] reviewed 9/15/23: Several blast-like cells that appear to be of lymphoid origin. No schistocytosis. Markedly decreased platelets without clumping; true thrombocytopenia. Platelets are normal morphologically with small forms.  - Pending TTE; appreciate cardio-onc eval (no contraindications to starting chemotherapy if indicated)   - Trend TLS labs daily (uric acid, LDH, Mg, Phos)  -  Pending flow cytometry on peripheral blood (req. form with green top and lavender top tube given to nurse):  Ordered STAT PML-BEBETO, BCR-ABL, FLT 3, and Onkosight Myeloid TL-95 STAT.  - s/p BM biopsy at bedside 9/18: awaiting path
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 ER for acute, severe thrombocytopenia. Hematology consulted for thrombocytopenia. Found to have blast-like cells on peripheral smear concerning for hematologic malignancy.    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 Suspicion for therapy-related AML is high given history of etoposide administration.  - Peripheral smear at [facility] reviewed 9/15/23: Several blast-like cells that appear to be of lymphoid origin. No schistocytosis. Markedly decreased platelets without clumping; true thrombocytopenia. Platelets are normal morphologically with small forms.  - s/p BM biopsy at bedside 9/18: awaiting path results. So far negative for BCR-ABL, Flt3 mutation, JAK2 mutation.  Awaiting for BMB results.   - Transfuse for Hgb < 7 and platelets < 10k, < 15k if febrile and < 50k if non-CNS bleeding
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 Hematology consulted for thrombocytopenia. Found to have blast-like cells on peripheral smear concerning for hematologic malignancy.    # Rule out MDS vs Acute Leukemia (concern for etoposide related AML given hx of SCLC)   - s/p BM biopsy at bedside 9/18: awaiting path   - Continues to require transfusion with plts about every 3 days. We are currently attempting to arrange a follow up with hematologist as outpatient.   - Transfuse for Hgb < 7 and platelets < 10k, < 15k if febrile and < 50k if non-CNS bleeding    #Extensive Stage SCLC  Follows with Dr. Carranza at the [facility].
Patient seen and examined with Dr. Shen.    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  We have arranged PLT Tx follow-up for Monday and Heme consultation with Dr. Long on 9/28.    Over 30 minutes were spent in direct patient, non-resident teaching, care and care coordination.
68-year-old male w/ PMH of SCLC with mets to bone and liver s/p carbo/etopo/atezo (04/2019) and maintenance immunotherapy, HTN, HLD, CAD s/p CABGx3 (2008) and DESx3 (2019, 2022), and hypothyroidism who was sent to [facility] ER for acute, severe thrombocytopenia with platelet count of 10.  -EKG Personally reviewed: NSR, qtc 403, HR 75, no st elevation or depression  -CXR personally reviewed: no focal consolidation, no pleff, no pulmonary vascular congestion    #Thrombocytopenia  #Acute Leukemia  #CAD    - Agree with thrombocytopenia and leukemia workup as outlined above with assistance from Heme/onc  - outpatient notes reviewed: was on DAPT due to GORGE >1 year ago, severe triple vessel disease and carotid artery disease. unfortunately will have to hold for now given severe thrombocytopenia. appreciate cardiology recs, would also reach out to outpatient cardiologist in AM  - c/w prednisone 20 for esophagitis due to immunotherapy, started on atovaquone for pcp ppx  - f/u TTE  - updated wife at bedside    d/w Dr. Sheppard.
68-year-old male w/ PMH of SCLC with mets to bone and liver s/p carbo/etopo/atezo (04/2019) and maintenance immunotherapy, HTN, HLD, CAD s/p CABGx3 (2008) and DESx3 (2019, 2022), and hypothyroidism sent to [facility] ER for acute, severe thrombocytopenia with platelet count of 10.     Transfused plts.     Was on DAPT due to GORGE >1 year ago, severe triple vessel disease and carotid artery disease- hold for now given severe thrombocytopenia. appreciate cardiology recs.    C/w prednisone 20 for esophagitis due to immunotherapy, started on atovaquone for pcp ppx    BM bx done 9/18- f/u results.    Chronic low back pain- no mets on recent PET/CT- prn Oxy (takes it at home)
68-year-old male w/ PMH of SCLC with mets to bone and liver s/p carbo/etopo/atezo (04/2019) and maintenance immunotherapy, HTN, HLD, CAD s/p CABGx3 (2008) and DESx3 (2019, 2022), and hypothyroidism sent to [facility] ER for acute, severe thrombocytopenia with platelet count of 10.     Transfused plts.     Was on DAPT due to GORGE >1 year ago, severe triple vessel disease and carotid artery disease- hold for now given severe thrombocytopenia. appreciate cardiology recs.    C/w prednisone 20 for esophagitis due to immunotherapy, started on atovaquone for pcp ppx    BM bx done today- f/u results.

## 2023-09-22 NOTE — PROGRESS NOTE ADULT - ASSESSMENT
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 ER for acute, severe thrombocytopenia. Hematology consulted for thrombocytopenia. Found to have blast-like cells on peripheral smear concerning for hematologic malignancy.    # Rule out MDS vs Acute Leukemia (concern for etoposide related secondary malignancy given hx of SCLC)   - Patient presenting with WBC 3.02 and blast 1% noted on manual differential. Suspicion for therapy-related AML is high given history of etoposide administration.  - Peripheral smear at [facility] reviewed 9/15/23: Several blast-like cells that appear to be of lymphoid origin. No schistocytosis. Markedly decreased platelets without clumping; true thrombocytopenia. Platelets are normal morphologically with small forms.  - s/p BM biopsy at bedside 9/18: + for MDS TP53   - Patient currently has an appointment w. hematologist (Dr. Long) at [facility] on thursday 9/28, however continues to require platelet transfusion support. We are attempting to schedule an earlier appointment vs administering dobule dose platelet and TXA while inpatient and discharging home, TBD.  - Transfuse for Hgb < 7 and platelets < 10k, < 15k if febrile and < 50k if non-CNS bleeding    #Extensive Stage SCLC  Follows with Dr. Carranza at the [facility].  - Initially diagnosed in March 2019 with extensive metastatic disease ( liver, bones, T-spine, cavarial base, bone with suspected leptomenigeal ds, but never confirmed). He responded dramatically to Carbo, etoposide, Atezolizumab with immunotherapy maintenance where he was in CR with no evidence of metastatic disease in liver or bones. He developed gastritis/esophagitis which was thought to be secondary to immunotherapy and it was discontinued. june 2021. He was found to have relapse disease in the lungs and LN only. He was started on carbo/etoposide again with the addition of Durvalumab at cycle 4 (end of april 2022). Durvalumab has been held since February 2023 due to presumed immunotherapy-related toxicities.  - No plans for any chemo-/immuno-therapy for his SCLC while inpatient  - Further treatment options to be determined after acute issues with thrombocytopenia and potential leukemia have been addressed    Note not finalized until signed by attending.   Please do not hesitate to page with questions.     Brandy Shen M.D.  Hematology and Medical Oncology PGY4  Pager: [phone]  For weekends and evenings (5 pm - 8 am), please page Heme/Onc fellow on call.    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 Hematology consulted for thrombocytopenia. Found to have blast-like cells on peripheral smear concerning for hematologic malignancy.    # Rule out MDS vs Acute Leukemia (concern for etoposide related secondary malignancy given hx of SCLC)   - Patient presenting with WBC 3.02 and blast 1% noted on manual differential. Suspicion for therapy-related AML is high given history of etoposide administration.  - Peripheral smear at [facility] reviewed 9/15/23: Several blast-like cells that appear to be of lymphoid origin. No schistocytosis. Markedly decreased platelets without clumping; true thrombocytopenia. Platelets are normal morphologically with small forms.  - s/p BM biopsy at bedside 9/18: + for MDS TP53 STAG2   - Patient currently has an appointment w. hematologist (Dr. Long) at [facility] on thursday 9/28, however continues to require platelet transfusion support. We are attempting to schedule an earlier appointment vs administering dobule dose platelet and TXA while inpatient and discharging home, TBD.  - Transfuse for Hgb < 7 and platelets < 10k, < 15k if febrile and < 50k if non-CNS bleeding    #Extensive Stage SCLC  Follows with Dr. Carranza at the [facility].  - Initially diagnosed in March 2019 with extensive metastatic disease ( liver, bones, T-spine, cavarial base, bone with suspected leptomenigeal ds, but never confirmed). He responded dramatically to Carbo, etoposide, Atezolizumab with immunotherapy maintenance where he was in CR with no evidence of metastatic disease in liver or bones. He developed gastritis/esophagitis which was thought to be secondary to immunotherapy and it was discontinued. june 2021. He was found to have relapse disease in the lungs and LN only. He was started on carbo/etoposide again with the addition of Durvalumab at cycle 4 (end of april 2022). Durvalumab has been held since February 2023 due to presumed immunotherapy-related toxicities.  - No plans for any chemo-/immuno-therapy for his SCLC while inpatient  - Further treatment options to be determined after acute issues with thrombocytopenia and potential leukemia have been addressed    Note not finalized until signed by attending.   Please do not hesitate to page with questions.     Brandy Shen M.D.  Hematology and Medical Oncology PGY4  Pager: [phone]  For weekends and evenings (5 pm - 8 am), please page Heme/Onc fellow on call.    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 Hematology consulted for thrombocytopenia. Found to have blast-like cells on peripheral smear concerning for hematologic malignancy.    # Rule out MDS vs Acute Leukemia (concern for etoposide related secondary malignancy given hx of SCLC)   - Patient presenting with WBC 3.02 and blast 1% noted on manual differential. Suspicion for therapy-related AML is high given history of etoposide administration.  - Peripheral smear at [facility] reviewed 9/15/23: Several blast-like cells that appear to be of lymphoid origin. No schistocytosis. Markedly decreased platelets without clumping; true thrombocytopenia. Platelets are normal morphologically with small forms.  - Transfuse for Hgb < 7 and platelets < 10k, < 15k if febrile and < 50k if non-CNS bleeding  - s/p BM biopsy at bedside 9/18: + for MDS TP53 STAG2   - Recommend transfusing 2 units of platelets today and then can be discharged home, will set up lab appointment on Monday and following appointment  w. hematologist (Dr. Long) at [facility] on thursday 9/28,    #Extensive Stage SCLC  Follows with Dr. Carranza at the [facility].  - Initially diagnosed in March 2019 with extensive metastatic disease ( liver, bones, T-spine, cavarial base, bone with suspected leptomenigeal ds, but never confirmed). He responded dramatically to Carbo, etoposide, Atezolizumab with immunotherapy maintenance where he was in CR with no evidence of metastatic disease in liver or bones. He developed gastritis/esophagitis which was thought to be secondary to immunotherapy and it was discontinued. june 2021. He was found to have relapse disease in the lungs and LN only. He was started on carbo/etoposide again with the addition of Durvalumab at cycle 4 (end of april 2022). Durvalumab has been held since February 2023 due to presumed immunotherapy-related toxicities.  - No plans for any chemo-/immuno-therapy for his SCLC while inpatient  - Further treatment options to be determined after acute issues with thrombocytopenia and potential leukemia have been addressed    Note not finalized until signed by attending.   Please do not hesitate to page with questions.     Brandy Shen M.D.  Hematology and Medical Oncology PGY4  Pager: [phone]  For weekends and evenings (5 pm - 8 am), please page Heme/Onc fellow on call.    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 Hematology consulted for thrombocytopenia. Found to have blast-like cells on peripheral smear concerning for hematologic malignancy.    # Rule out MDS vs Acute Leukemia (concern for etoposide related secondary malignancy given hx of SCLC)   - Patient presenting with WBC 3.02 and blast 1% noted on manual differential. Suspicion for therapy-related AML is high given history of etoposide administration.  - Peripheral smear at [facility] reviewed 9/15/23: Several blast-like cells that appear to be of lymphoid origin. No schistocytosis. Markedly decreased platelets without clumping; true thrombocytopenia. Platelets are normal morphologically with small forms.  - Transfuse for Hgb < 7 and platelets < 10k, < 15k if febrile and < 50k if non-CNS bleeding  - s/p BM biopsy at bedside 9/18: + for MDS TP53 STAG2   - Recommend transfusing 2 units of platelets today and then can be discharged home, will set up lab appointment on Monday to check platelet count and following appointment  w. hematologist (Dr. Long) at [facility] on Thursday 9/28.    #Extensive Stage SCLC  Follows with Dr. Carranza at the [facility].  - Initially diagnosed in March 2019 with extensive metastatic disease ( liver, bones, T-spine, cavarial base, bone with suspected leptomenigeal ds, but never confirmed). He responded dramatically to Carbo, etoposide, Atezolizumab with immunotherapy maintenance where he was in CR with no evidence of metastatic disease in liver or bones. He developed gastritis/esophagitis which was thought to be secondary to immunotherapy and it was discontinued. june 2021. He was found to have relapse disease in the lungs and LN only. He was started on carbo/etoposide again with the addition of Durvalumab at cycle 4 (end of april 2022). Durvalumab has been held since February 2023 due to presumed immunotherapy-related toxicities.  - No plans for any chemo-/immuno-therapy for his SCLC while inpatient  - Further treatment options to be determined after acute issues with thrombocytopenia and potential leukemia have been addressed    Note not finalized until signed by attending.   Please do not hesitate to page with questions.     Brandy Shen M.D.  Hematology and Medical Oncology PGY4  Pager: [phone]  For weekends and evenings (5 pm - 8 am), please page Heme/Onc fellow on call.

## 2023-09-22 NOTE — PROGRESS NOTE ADULT - SUBJECTIVE AND OBJECTIVE BOX
[facility] of [facility] Medicine  [place]  MS Teams      SUBJECTIVE / OVERNIGHT EVENTS:  No events overnight  intermittent episodes of sob with ambulation  denies lightheadedness/cp  +weakness    ADDITIONAL REVIEW OF SYSTEMS:    MEDICATIONS  (STANDING):  atovaquone  Suspension 1500 milliGRAM(s) Oral daily  chlorhexidine 2% Cloths 1 Application(s) Topical daily  ezetimibe 10 milliGRAM(s) Oral daily  levothyroxine 25 MICROGram(s) Oral daily  metoprolol succinate ER 25 milliGRAM(s) Oral daily  predniSONE   Tablet 30 milliGRAM(s) Oral daily  rosuvastatin 40 milliGRAM(s) Oral at bedtime  sacubitril 24 mG/valsartan 26 mG 1 Tablet(s) Oral two times a day  senna 2 Tablet(s) Oral at bedtime  tamsulosin 0.8 milliGRAM(s) Oral at bedtime    MEDICATIONS  (PRN):  acetaminophen     Tablet .. 650 milliGRAM(s) Oral every 6 hours PRN Mild Pain (1 - 3)  lidocaine   4% Patch 1 Patch Transdermal daily PRN back pain  oxyCODONE    IR 5 milliGRAM(s) Oral every 6 hours PRN Severe Pain (7 - 10)  polyethylene glycol 3350 17 Gram(s) Oral two times a day PRN Constipation      I&O's Summary    21 Sep 2023 07:01  -  22 Sep 2023 07:00  --------------------------------------------------------  IN: 320 mL / OUT: 0 mL / NET: 320 mL        PHYSICAL EXAM:  Vital Signs Last 24 Hrs  T(C): 36.4 (22 Sep 2023 08:36), Max: 36.6 (21 Sep 2023 15:39)  T(F): 97.6 (22 Sep 2023 08:36), Max: 97.9 (21 Sep 2023 15:39)  HR: 82 (22 Sep 2023 08:36) (76 - 93)  BP: 92/37 (22 Sep 2023 08:36) (92/37 - 197/72)  BP(mean): --  RR: 18 (22 Sep 2023 08:36) (18 - 18)  SpO2: 98% (22 Sep 2023 08:36) (98% - 98%)    Parameters below as of 22 Sep 2023 08:36  Patient On (Oxygen Delivery Method): room air      CONSTITUTIONAL: NAD, well-developed  EYES: PERRLA; conjunctiva and sclera clear  RESPIRATORY: Normal respiratory effort; lungs are clear to auscultation bilaterally  CARDIOVASCULAR: Regular rate and rhythm, normal S1 and S2, no murmur; No lower extremity edema; Peripheral pulses are 2+ bilaterally  ABDOMEN: Nontender to palpation, normoactive bowel sounds, no rebound/guarding  MUSCULOSKELETAL:  no clubbing or cyanosis of digits; no joint swelling or tenderness to palpation  PSYCH: A+O to person, place, and time; affect appropriate  NEUROLOGY: CN 2-12 are intact and symmetric; no gross sensory deficits   SKIN: No rashes; no palpable lesions    LABS:                        8.5    2.45  )-----------( 16       ( 22 Sep 2023 07:12 )             24.8     09-22    137  |  103  |  17  ----------------------------<  116<H>  3.6   |  23  |  0.51    Ca    9.6      22 Sep 2023 07:12  Phos  2.9     09-21  Mg     1.9     09-21    TPro  6.0  /  Alb  4.0  /  TBili  0.6  /  DBili  x   /  AST  14  /  ALT  32  /  AlkPhos  51  09-22          Urinalysis Basic - ( 22 Sep 2023 07:12 )    Color: x / Appearance: x / SG: x / pH: x  Gluc: 116 mg/dL / Ketone: x  / Bili: x / Urobili: x   Blood: x / Protein: x / Nitrite: x   Leuk Esterase: x / RBC: x / WBC x   Sq Epi: x / Non Sq Epi: x / Bacteria: x

## 2023-09-22 NOTE — PROVIDER CONTACT NOTE (CRITICAL VALUE NOTIFICATION) - BACKGROUND
Pt admitted for thrombocytopenia. Pt has extensive stage SCLC.
Pt admitted with thrombocytopenia
thrombocytopenia
Pt admitted for thrombocytopenia
Pt admitted for thrombocytopenia

## 2023-09-22 NOTE — PROVIDER CONTACT NOTE (CRITICAL VALUE NOTIFICATION) - ASSESSMENT
Pt A&OX4, VSS on RA. No distress noted. No active s/s of bleeding.
Pt A&OX4, VSS. No distress noted. No active signs of bleeding noted.
no s/s of bleeding
VS documented in chart. No s/s of distress. No c/o pain or discomfort.
Pt A&OX4, VSS on RA. No s/s of distress. No signs of bleeding noted.

## 2023-09-22 NOTE — PROGRESS NOTE ADULT - ASSESSMENT
Mr. Neema Wilks is a 68-year-old male w/ PMH of SCLC with mets to bone and liver s/p carbo/etopo/atezo (04/2019) and maintenance immunotherapy, HTN, HLD, CAD s/p CABGx3 (2008) and DESx3 (2019, 2022), and hypothyroidism who was sent to [facility] ER for acute, severe thrombocytopenia with platelet count of 10, s/p BmBx with MDS.

## 2023-09-22 NOTE — PROGRESS NOTE ADULT - TIME BILLING
coordinating care
coordinating care
reviewing emr, labs, imaging, coordination of care, discussion with patient, wife, documentation
coordinating care
reviewing emr, labs, imaging, coordination of care, discussion with patient, wife, documentation
reviewing emr, labs, imaging, coordination of care, discussion with patient, wife, documentation
Chart review, exam, documentation, d/w pt, wife and team.
Chart review, exam, documentation, d/w pt, wife and team.

## 2023-09-22 NOTE — PROGRESS NOTE ADULT - PROBLEM SELECTOR PLAN 4
- currently borderline hypotensive. C/w home metoprolol 25mg daily + Entresto with strict hold parameters per cards

## 2023-09-22 NOTE — PROVIDER CONTACT NOTE (CRITICAL VALUE NOTIFICATION) - NAME OF MD/NP/PA/DO NOTIFIED:
Dr. Dawood Vang
Dr. Abel Sheppard
Arabella Francois NP on TEAMS
Dr. Dawood Vang
Arabella Francois NP

## 2023-09-22 NOTE — PROGRESS NOTE ADULT - SUBJECTIVE AND OBJECTIVE BOX
Hematology Follow-up    INTERVAL HPI/OVERNIGHT EVENTS:  Patient S&E at bedside, wife at bedside concered about biopsy results and next step. No overnight events      VITAL SIGNS:  T(F): 97.6 (09-22-23 @ 08:36)  HR: 82 (09-22-23 @ 08:36)  BP: 92/37 (09-22-23 @ 08:36)  RR: 18 (09-22-23 @ 08:36)  SpO2: 98% (09-22-23 @ 08:36)  Wt(kg): --    PHYSICAL EXAM:  Constitutional: NAD  Eyes: EOMI, sclera non-icteric  Neck: supple  Respiratory: no increased WOB, CTAB/L  Cardiovascular: RRR, S1, S2, no m/g/r  Gastrointestinal: soft, NTND, no masses palpable, no HSM  Extremities: no c/c/e  Neurological: AAOx3    MEDICATIONS  (STANDING):  atovaquone  Suspension 1500 milliGRAM(s) Oral daily  chlorhexidine 2% Cloths 1 Application(s) Topical daily  ezetimibe 10 milliGRAM(s) Oral daily  levothyroxine 25 MICROGram(s) Oral daily  metoprolol succinate ER 25 milliGRAM(s) Oral daily  predniSONE   Tablet 30 milliGRAM(s) Oral daily  rosuvastatin 40 milliGRAM(s) Oral at bedtime  sacubitril 24 mG/valsartan 26 mG 1 Tablet(s) Oral two times a day  senna 2 Tablet(s) Oral at bedtime  tamsulosin 0.8 milliGRAM(s) Oral at bedtime    MEDICATIONS  (PRN):  acetaminophen     Tablet .. 650 milliGRAM(s) Oral every 6 hours PRN Mild Pain (1 - 3)  lidocaine   4% Patch 1 Patch Transdermal daily PRN back pain  oxyCODONE    IR 5 milliGRAM(s) Oral every 6 hours PRN Severe Pain (7 - 10)  polyethylene glycol 3350 17 Gram(s) Oral two times a day PRN Constipation      No Known Allergies      LABS:                        8.5    2.45  )-----------( 16       ( 22 Sep 2023 07:12 )             24.8     09-22    137  |  103  |  17  ----------------------------<  116<H>  3.6   |  23  |  0.51    Ca    9.6      22 Sep 2023 07:12  Phos  2.9     09-21  Mg     1.9     09-21    TPro  6.0  /  Alb  4.0  /  TBili  0.6  /  DBili  x   /  AST  14  /  ALT  32  /  AlkPhos  51  09-22       Urinalysis Basic - ( 22 Sep 2023 07:12 )    Color: x / Appearance: x / SG: x / pH: x  Gluc: 116 mg/dL / Ketone: x  / Bili: x / Urobili: x   Blood: x / Protein: x / Nitrite: x   Leuk Esterase: x / RBC: x / WBC x   Sq Epi: x / Non Sq Epi: x / Bacteria: x        RADIOLOGY & ADDITIONAL TESTS:  Studies reviewed.   Hematology Follow-up    INTERVAL HPI/OVERNIGHT EVENTS:  Patient S&E at bedside, wife at bedside concerned about biopsy results and next step. No overnight events      VITAL SIGNS:  T(F): 97.6 (09-22-23 @ 08:36)  HR: 82 (09-22-23 @ 08:36)  BP: 92/37 (09-22-23 @ 08:36)  RR: 18 (09-22-23 @ 08:36)  SpO2: 98% (09-22-23 @ 08:36)  Wt(kg): --    PHYSICAL EXAM:  Constitutional: NAD  Eyes: EOMI, sclera non-icteric  Neck: supple  Respiratory: no increased WOB, CTAB/L  Cardiovascular: RRR, S1, S2, no m/g/r  Gastrointestinal: soft, NTND, no masses palpable, no HSM  Extremities: no c/c/e  Neurological: AAOx3    MEDICATIONS  (STANDING):  atovaquone  Suspension 1500 milliGRAM(s) Oral daily  chlorhexidine 2% Cloths 1 Application(s) Topical daily  ezetimibe 10 milliGRAM(s) Oral daily  levothyroxine 25 MICROGram(s) Oral daily  metoprolol succinate ER 25 milliGRAM(s) Oral daily  predniSONE   Tablet 30 milliGRAM(s) Oral daily  rosuvastatin 40 milliGRAM(s) Oral at bedtime  sacubitril 24 mG/valsartan 26 mG 1 Tablet(s) Oral two times a day  senna 2 Tablet(s) Oral at bedtime  tamsulosin 0.8 milliGRAM(s) Oral at bedtime    MEDICATIONS  (PRN):  acetaminophen     Tablet .. 650 milliGRAM(s) Oral every 6 hours PRN Mild Pain (1 - 3)  lidocaine   4% Patch 1 Patch Transdermal daily PRN back pain  oxyCODONE    IR 5 milliGRAM(s) Oral every 6 hours PRN Severe Pain (7 - 10)  polyethylene glycol 3350 17 Gram(s) Oral two times a day PRN Constipation      No Known Allergies      LABS:                        8.5    2.45  )-----------( 16       ( 22 Sep 2023 07:12 )             24.8     09-22    137  |  103  |  17  ----------------------------<  116<H>  3.6   |  23  |  0.51    Ca    9.6      22 Sep 2023 07:12  Phos  2.9     09-21  Mg     1.9     09-21    TPro  6.0  /  Alb  4.0  /  TBili  0.6  /  DBili  x   /  AST  14  /  ALT  32  /  AlkPhos  51  09-22       Urinalysis Basic - ( 22 Sep 2023 07:12 )    Color: x / Appearance: x / SG: x / pH: x  Gluc: 116 mg/dL / Ketone: x  / Bili: x / Urobili: x   Blood: x / Protein: x / Nitrite: x   Leuk Esterase: x / RBC: x / WBC x   Sq Epi: x / Non Sq Epi: x / Bacteria: x        RADIOLOGY & ADDITIONAL TESTS:  Studies reviewed.   Hematology Follow-up    INTERVAL HPI/OVERNIGHT EVENTS:  Patient S&E at bedside, wife at bedside concerned about biopsy results and next step. No overnight events      VITAL SIGNS:  T(F): 97.6 (09-22-23 @ 08:36)  HR: 82 (09-22-23 @ 08:36)  BP: 92/37 (09-22-23 @ 08:36)  RR: 18 (09-22-23 @ 08:36)  SpO2: 98% (09-22-23 @ 08:36)  Wt(kg): --    PHYSICAL EXAM:  Constitutional: NAD  Eyes: EOMI, sclera non-icteric  Neck: supple  Respiratory: no increased WOB, CTAB/L  Cardiovascular: RRR, S1, S2, no m/g/r  Gastrointestinal: soft, NTND  Extremities: no c/c/e  Neurological: AAOx3    MEDICATIONS  (STANDING):  atovaquone  Suspension 1500 milliGRAM(s) Oral daily  chlorhexidine 2% Cloths 1 Application(s) Topical daily  ezetimibe 10 milliGRAM(s) Oral daily  levothyroxine 25 MICROGram(s) Oral daily  metoprolol succinate ER 25 milliGRAM(s) Oral daily  predniSONE   Tablet 30 milliGRAM(s) Oral daily  rosuvastatin 40 milliGRAM(s) Oral at bedtime  sacubitril 24 mG/valsartan 26 mG 1 Tablet(s) Oral two times a day  senna 2 Tablet(s) Oral at bedtime  tamsulosin 0.8 milliGRAM(s) Oral at bedtime    MEDICATIONS  (PRN):  acetaminophen     Tablet .. 650 milliGRAM(s) Oral every 6 hours PRN Mild Pain (1 - 3)  lidocaine   4% Patch 1 Patch Transdermal daily PRN back pain  oxyCODONE    IR 5 milliGRAM(s) Oral every 6 hours PRN Severe Pain (7 - 10)  polyethylene glycol 3350 17 Gram(s) Oral two times a day PRN Constipation      No Known Allergies      LABS:                        8.5    2.45  )-----------( 16       ( 22 Sep 2023 07:12 )             24.8     09-22    137  |  103  |  17  ----------------------------<  116<H>  3.6   |  23  |  0.51    Ca    9.6      22 Sep 2023 07:12  Phos  2.9     09-21  Mg     1.9     09-21    TPro  6.0  /  Alb  4.0  /  TBili  0.6  /  DBili  x   /  AST  14  /  ALT  32  /  AlkPhos  51  09-22       Urinalysis Basic - ( 22 Sep 2023 07:12 )    Color: x / Appearance: x / SG: x / pH: x  Gluc: 116 mg/dL / Ketone: x  / Bili: x / Urobili: x   Blood: x / Protein: x / Nitrite: x   Leuk Esterase: x / RBC: x / WBC x   Sq Epi: x / Non Sq Epi: x / Bacteria: x        RADIOLOGY & ADDITIONAL TESTS:  Studies reviewed.

## 2023-09-22 NOTE — CHART NOTE - NSCHARTNOTEFT_GEN_A_CORE
PT rec 2 uplt per discussion with Hem Dr Page in prep for dc   Pt transfusion dependent for thrombocytopenia admitted with hx sclca with bone mets           Department of Medicine   [facility] [id number]

## 2023-09-22 NOTE — PROGRESS NOTE ADULT - PROBLEM SELECTOR PLAN 3
- S/p CABGx3 (2008), DESx3 (2019, 2022), TCAR (01/2023)  -cards onc following: holding aspirin and clopidogrel given thrombocytopenia, c/w entresto and metoprolol(decreased to 25) with hold parameters. Attempting to continue GDMT if possible.

## 2023-09-23 NOTE — PROGRESS NOTE ADULT - ASSESSMENT
Mr. Neema Wilks is a 68-year-old male w/ PMH of SCLC with mets to bone and liver s/p carbo/etopo/atezo (04/2019) and maintenance immunotherapy, HTN, HLD, CAD s/p CABGx3 (2008) and DESx3 (2019, 2022), and hypothyroidism who was sent to [facility] ER for acute, severe thrombocytopenia with platelet count of 10 c/f acute leukemia.

## 2023-09-23 NOTE — DISCHARGE NOTE NURSING/CASE MANAGEMENT/SOCIAL WORK - NSDCPEFALRISK_GEN_ALL_CORE
For information on Fall & Injury Prevention, visit: [email or web address] OR  [email or web address] OR  https://www.cdc.gov/steadi/patient.html

## 2023-09-23 NOTE — DISCHARGE NOTE NURSING/CASE MANAGEMENT/SOCIAL WORK - PATIENT PORTAL LINK FT
You can access the FollowMyHealth Patient Portal offered by [facility] by registering at the following website: [email or web address]. By joining [employer]’s FollowMyHealth portal, you will also be able to view your health information using other applications (apps) compatible with our system.

## 2023-09-23 NOTE — PROGRESS NOTE ADULT - PROBLEM SELECTOR PROBLEM 1
Severe thrombocytopenia

## 2023-09-23 NOTE — CHART NOTE - NSCHARTNOTEFT_GEN_A_CORE
HEMATOLOGY FELLOW NOTE     Patient was to get 2 units of platelets yesterday 9/22/23 to allow patient to be safely discharged. He received 1 unit of platelets as he responded appropriately, but he still needs another unit of platelets so that he can make it to an outpatient platelet transfusion appointment. Please give the second unit of platelets as ordered, then patient can be discharged home.    Raymond West MD  Hematology/Oncology Fellow PGY-5  Pager: [facility] [phone] / [place] [phone]  After 5pm and on weekends please page on-call fellow

## 2023-09-23 NOTE — PROGRESS NOTE ADULT - PROBLEM SELECTOR PLAN 6
- C/w home synthroid 25mcg daily

## 2023-09-23 NOTE — PROGRESS NOTE ADULT - REASON FOR ADMISSION
Severe thrombocytopenia

## 2023-09-23 NOTE — DISCHARGE NOTE NURSING/CASE MANAGEMENT/SOCIAL WORK - NSDCVIVACCINE_GEN_ALL_CORE_FT
influenza, high-dose, quadrivalent; 23-Sep-2022 17:02; Mitra Link (RN); [employer]; [id number] (Exp. Date: 30-Jun-2023); IntraMuscular; Deltoid Right.; 0.7 milliLiter(s); VIS (VIS Published: 06-Aug-2021, VIS Presented: 23-Sep-2022);

## 2023-09-23 NOTE — PROGRESS NOTE ADULT - PROBLEM SELECTOR PLAN 2
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
#Extensive Stage SCLC  - Initially diagnosed in March 2019 with extensive metastatic disease ( liver, bones, T-spine, cavarial base, bone with suspected leptomenigeal ds, but never confirmed). He responded dramatically to Carbo, etoposide, Atezolizumab with immunotherapy maintenance where he was in CR with no evidence of metastatic disease in liver or bones. He developed gastritis/esophagitis which was thought to be secondary to immunotherapy and it was discontinued. june 2021. He was found to have relapse disease in the lungs and LN only. He was started on carbo/etoposide again with the addition of Durvalumab at cycle 4 (end of april 2022). Durvalumab has been held since February 2023 due to presumed immunotherapy-related toxicities.    - On prednisone for esophagitis/gastritis; started PJP ppx w/ atovaquone
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

## 2023-09-23 NOTE — PROGRESS NOTE ADULT - PROBLEM SELECTOR PLAN 7
DVT Ppx: SCDs  Diet: Regular  Dispo: Pending
DVT Ppx: SCDs  Diet: Regular  Dispo: Pending
DVT Ppx: SCDs  Diet: Regular  Dispo: Pending BmBx
DVT Ppx: SCDs  Diet: Regular  Dispo: Pending remainder of molecular studies and outpatient appointments. Early next week.
DVT Ppx: SCDs  Diet: Regular  Dispo: Pending
DVT Ppx: SCDs  Diet: Regular      D/c home today w follow up. Given KCl today for hypokalemia- does not want repeat blood work. Advised follow up on Monday.     D/c time 45 mins.
DVT Ppx: SCDs  Diet: Regular  Dispo: Pending BmBx

## 2023-09-23 NOTE — PROGRESS NOTE ADULT - PROBLEM SELECTOR PROBLEM 2
Small cell lung cancer

## 2023-09-23 NOTE — PROGRESS NOTE ADULT - PROBLEM SELECTOR PLAN 5
- C/w home rosuvastatin 40mg, ezetimibe 10mg daily

## 2023-09-23 NOTE — PROGRESS NOTE ADULT - SUBJECTIVE AND OBJECTIVE BOX
Patient is a 68y old  Male who presents with a chief complaint of Severe thrombocytopenia (22 Sep 2023 13:54)      SUBJECTIVE / OVERNIGHT EVENTS: Pt up in chair, feels well.     MEDICATIONS  (STANDING):  atovaquone  Suspension 1500 milliGRAM(s) Oral daily  chlorhexidine 2% Cloths 1 Application(s) Topical daily  ezetimibe 10 milliGRAM(s) Oral daily  heparin  Lock Flush 100 Units/mL Injectable 300 Unit(s) IV Push once  levothyroxine 25 MICROGram(s) Oral daily  metoprolol succinate ER 25 milliGRAM(s) Oral daily  potassium chloride    Tablet ER 40 milliEquivalent(s) Oral every 4 hours  predniSONE   Tablet 30 milliGRAM(s) Oral daily  rosuvastatin 40 milliGRAM(s) Oral at bedtime  sacubitril 24 mG/valsartan 26 mG 1 Tablet(s) Oral two times a day  senna 2 Tablet(s) Oral at bedtime  tamsulosin 0.8 milliGRAM(s) Oral at bedtime    MEDICATIONS  (PRN):  acetaminophen     Tablet .. 650 milliGRAM(s) Oral every 6 hours PRN Mild Pain (1 - 3)  lidocaine   4% Patch 1 Patch Transdermal daily PRN back pain  oxyCODONE    IR 5 milliGRAM(s) Oral every 6 hours PRN Severe Pain (7 - 10)  polyethylene glycol 3350 17 Gram(s) Oral two times a day PRN Constipation      CAPILLARY BLOOD GLUCOSE        I&O's Summary    22 Sep 2023 07:01  -  23 Sep 2023 07:00  --------------------------------------------------------  IN: 225 mL / OUT: 0 mL / NET: 225 mL        PHYSICAL EXAM:  T(C): 36.3 (09-23-23 @ 08:53), Max: 36.9 (09-22-23 @ 18:45)  HR: 85 (09-23-23 @ 08:53) (70 - 90)  BP: 102/67 (09-23-23 @ 08:53) (92/57 - 120/50)  RR: 18 (09-23-23 @ 08:53) (18 - 18)  SpO2: 98% (09-23-23 @ 08:53) (97% - 99%)  CONSTITUTIONAL: NAD, well-developed, well-groomed  EYES: PERRLA; conjunctiva and sclera clear  ENMT: Moist oral mucosa, no pharyngeal injection or exudates; normal dentition  NECK: Supple, no palpable masses; no thyromegaly  RESPIRATORY: Normal respiratory effort; lungs are clear to auscultation bilaterally  CARDIOVASCULAR: Regular rate and rhythm, normal S1 and S2, no murmur/rub/gallop; No lower extremity edema; Peripheral pulses are 2+ bilaterally  ABDOMEN: Nontender to palpation, normoactive bowel sounds, no rebound/guarding; No hepatosplenomegaly  MUSCULOSKELETAL:  Normal gait; no clubbing or cyanosis of digits; no joint swelling or tenderness to palpation  PSYCH: A+O to person, place, and time; affect appropriate  NEUROLOGY: CN 2-12 are intact and symmetric; no gross sensory deficits   SKIN: No rashes; no palpable lesions    LABS:                        8.6    2.43  )-----------( 34       ( 23 Sep 2023 07:24 )             25.4     09-23    144  |  115<H>  |  12  ----------------------------<  77  2.9<LL>   |  19<L>  |  0.41<L>    Ca    7.2<L>      23 Sep 2023 07:11    TPro  6.0  /  Alb  4.0  /  TBili  0.6  /  DBili  x   /  AST  14  /  ALT  32  /  AlkPhos  51  09-22          Urinalysis Basic - ( 23 Sep 2023 07:11 )    Color: x / Appearance: x / SG: x / pH: x  Gluc: 77 mg/dL / Ketone: x  / Bili: x / Urobili: x   Blood: x / Protein: x / Nitrite: x   Leuk Esterase: x / RBC: x / WBC x   Sq Epi: x / Non Sq Epi: x / Bacteria: x        RADIOLOGY & ADDITIONAL TESTS:    Imaging Personally Reviewed:    Consultant(s) Notes Reviewed:      Care Discussed with Consultants/Other Providers: NP

## 2023-09-28 PROBLEM — D69.6 THROMBOCYTOPENIA: Status: ACTIVE | Noted: 2022-08-25

## 2023-10-18 PROBLEM — M79.18 MYOFASCIAL MUSCLE PAIN: Status: ACTIVE | Noted: 2023-01-01

## 2023-10-25 PROBLEM — R19.7 DIARRHEA, UNSPECIFIED TYPE: Status: ACTIVE | Noted: 2020-02-21

## 2023-10-25 PROBLEM — I95.1 AUTONOMIC POSTURAL HYPOTENSION: Status: ACTIVE | Noted: 2021-08-09

## 2023-11-15 PROBLEM — C34.90 SMALL CELL LUNG CANCER: Status: ACTIVE | Noted: 2023-01-01

## 2023-11-15 PROBLEM — N40.0 BPH (BENIGN PROSTATIC HYPERPLASIA): Status: ACTIVE | Noted: 2020-07-13

## 2023-11-16 PROBLEM — R06.02 SHORTNESS OF BREATH ON EXERTION: Status: ACTIVE | Noted: 2023-01-01

## 2023-12-15 PROBLEM — Z92.89 HISTORY OF IMMUNOTHERAPY: Status: ACTIVE | Noted: 2022-07-01

## 2023-12-15 PROBLEM — Z23 ENCOUNTER FOR IMMUNIZATION: Status: RESOLVED | Noted: 2020-09-16 | Resolved: 2023-01-01

## 2023-12-15 PROBLEM — I25.10 3-VESSEL CORONARY ARTERY DISEASE: Status: ACTIVE | Noted: 2019-10-31

## 2023-12-15 PROBLEM — C79.51 METASTASIS TO BONE: Status: ACTIVE | Noted: 2019-10-15

## 2023-12-15 PROBLEM — Z86.79 HISTORY OF HYPERTENSION: Status: RESOLVED | Noted: 2022-08-18 | Resolved: 2023-01-01

## 2023-12-15 PROBLEM — I65.22 STENOSIS OF LEFT CAROTID ARTERY GREATER THAN 50%: Status: RESOLVED | Noted: 2022-08-05 | Resolved: 2023-01-01

## 2023-12-15 PROBLEM — K20.90 ESOPHAGITIS: Status: ACTIVE | Noted: 2021-04-27

## 2023-12-15 PROBLEM — Z01.810 PREOPERATIVE CARDIOVASCULAR EXAMINATION: Status: RESOLVED | Noted: 2021-04-09 | Resolved: 2023-01-01

## 2023-12-15 PROBLEM — Z95.1 S/P CABG (CORONARY ARTERY BYPASS GRAFT): Status: ACTIVE | Noted: 2019-10-31

## 2023-12-15 PROBLEM — K29.70 HELICOBACTER PYLORI GASTRITIS: Status: RESOLVED | Noted: 2021-04-27 | Resolved: 2023-01-01

## 2023-12-15 PROBLEM — I34.0 MITRAL REGURGITATION: Status: ACTIVE | Noted: 2022-06-23

## 2023-12-15 PROBLEM — M54.50 ACUTE BILATERAL LOW BACK PAIN WITHOUT SCIATICA: Status: ACTIVE | Noted: 2023-01-01

## 2023-12-15 PROBLEM — Z86.79 HISTORY OF CORONARY ARTERY DISEASE: Status: RESOLVED | Noted: 2023-01-01 | Resolved: 2023-01-01

## 2023-12-15 PROBLEM — H92.13 OTORRHEA OF BOTH EARS: Status: RESOLVED | Noted: 2021-04-27 | Resolved: 2023-01-01

## 2023-12-15 PROBLEM — U07.1 COVID-19: Status: RESOLVED | Noted: 2022-11-30 | Resolved: 2023-01-01

## 2023-12-15 PROBLEM — K11.7 SALIVA INCREASED: Status: RESOLVED | Noted: 2021-04-27 | Resolved: 2023-01-01

## 2023-12-15 PROBLEM — Z87.01 HISTORY OF PNEUMONIA: Status: RESOLVED | Noted: 2022-11-18 | Resolved: 2023-01-01

## 2023-12-15 PROBLEM — I25.2 HISTORY OF MYOCARDIAL INFARCTION: Status: RESOLVED | Noted: 2019-10-15 | Resolved: 2023-01-01

## 2023-12-15 PROBLEM — Z86.79 HISTORY OF ISCHEMIC CARDIOMYOPATHY: Status: RESOLVED | Noted: 2022-08-18 | Resolved: 2023-01-01

## 2023-12-15 PROBLEM — Z87.898 HISTORY OF ODYNOPHAGIA: Status: RESOLVED | Noted: 2021-04-05 | Resolved: 2023-01-01

## 2023-12-15 PROBLEM — Z86.79 HISTORY OF CAROTID ARTERY STENOSIS: Status: RESOLVED | Noted: 2022-08-18 | Resolved: 2023-01-01

## 2023-12-15 PROBLEM — Z87.898 HISTORY OF DYSPHAGIA: Status: RESOLVED | Noted: 2021-04-27 | Resolved: 2023-01-01

## 2023-12-15 PROBLEM — Z86.79 HISTORY OF CORONARY ARTERY DISEASE: Status: RESOLVED | Noted: 2019-10-15 | Resolved: 2023-01-01

## 2023-12-15 PROBLEM — R73.03 PRE-DIABETES: Status: ACTIVE | Noted: 2023-01-01

## 2023-12-15 PROBLEM — Z85.118 HISTORY OF MALIGNANT NEOPLASM OF LUNG: Status: RESOLVED | Noted: 2020-07-13 | Resolved: 2023-01-01

## 2023-12-15 PROBLEM — Z12.11 COLON CANCER SCREENING: Status: RESOLVED | Noted: 2023-01-01 | Resolved: 2023-01-01

## 2023-12-15 PROBLEM — Z95.5 PRESENCE OF STENT IN CORONARY ARTERY: Status: ACTIVE | Noted: 2019-10-31

## 2023-12-15 NOTE — ASSESSMENT
[FreeTextEntry1] : Extensive stage small cell lung cancer diagnosed late March 2019 who was treated with chemotherapy plus immunotherapy from April-June 2019; achieved CR. Treated with immunotherapy maintenance from June 2019 through May 2021 with sustained response. Developed immunotherapy related esophagitis; started on steroids in June 2021 with clinical response. Restaging PET/CT March 2022 with disease progression. Started re-treatment with Carboplatin/Etoposide chemotherapy March 2022; achieved IN. Durvalumab added with D4. Completed 5 cycles through early June 2022. Hospitalized in June for STEMI. Developed recurrence of esophagitis symptoms in June 2022 that responded to reintroduction of Prednisone. Durvalumab held with eventual resumption of treatment in conjunction with steroids. Treated through October 2022. Restaging PET/CT Nov 2022 with new B/L lung opacities initially concerning for inflammation/pneumonitis and treatment was held that month; this was subsequently felt to be unlikely after review at tumor board. Patient was treated for pneumonia. Developed Covid-19 infection in late Nov 2022. Underwent TCAR procedure in early Jan 2023. Case presented at Tumor Board on 1/25/23: Thoracic RT was felt to be reasonable given the current limited sites of disease and as a way to avoid the toxicity of systemic therapy and therefore could consider holding further ICI therapy. Treated with consolidative Thoracic RT in 15 Fx completed late March 2023. Monitored off systemic therapy.   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  Patient was not achieving any hematologic response and has required repeated platelet and PRBC transfusions.  Patient's therapy was changed to oral Inqovi in late November 2023.   Restaging scans Nov/Dec 2023 with progression of his lung cancer.   Recommend: -Patient is not a cytotoxic chemotherapy candidate.  He desires further therapy for his lung cancer and understands risks, including autoimmune toxicity.  Discussed restarting the Durvalumab 1500mg IV q4 weeks.  Will attempt to incorporate this into his previously scheduled Heme treatment appts.   -Esophagitis: on Prednisone. Currently on 10mg daily. Wife plans to taper him very slowly by 1mg at at time.  Continue GI prophylaxis while on steroids.  -Continue Pulmonary f/u -Continue Cardiology and Vascular management; he is s/p TCAR-trans carotid stent performed 1/3/23.  Patient reports resolution of LBP with discontinuation of statin  -F/U with Palliative Care Dr. Moreno would be appropriate  -Mediport in place.  -F/U monthly while on Durvalumab.

## 2023-12-15 NOTE — REVIEW OF SYSTEMS
[Feeling Fatigued] : feeling fatigued [Lower Ext Edema] : no extremity edema [Heartburn] : no heartburn [Dysphagia] : no dysphagia [Muscle Cramps] : muscle cramps [Myalgia] : myalgia [Under Stress] : under stress [Easy Bleeding] : a tendency for easy bleeding [Easy Bruising] : a tendency for easy bruising

## 2023-12-15 NOTE — GOALS
[FreeTextEntry1] : Chino Cowan [FreeTextEntry2] : Spouse [FreeTextEntry3] :  [phone] [FreeTextEntry7] :  Form provided.  Copy to be provided once completed

## 2023-12-15 NOTE — RESULTS/DATA
[FreeTextEntry1] : -CT chest 3/25/19: Infiltrative left hilar mass extending to the left upper lobe and left mediastinum compatible with neoplasm with encasement of the pulmonary artery and left upper lobe bronchial structures with collapse of the left upper lobe. Lytic lesions in the upper thoracic spine. Innumerable hypodense masses throughout the liver compatible with metastases. Small left pleural effusion.  -CT C/A/P 4/5/19:  Large mass in the left hilum extending into the left upper lobe consistent with bronchogenic malignancy with possible metastatic lymphadenopathy. Significant compression of the left lobe pulmonary artery with obstruction of the left upper lobe bronchus with resultant atelectasis. Innumerable lesions throughout the liver. Upper abdominal lymphadenopathy. Bony metastases of the spine, pelvis and bilateral hips.  -MRI brain/T-spine in 3/28/19: Negative for metastatic disease involving the brain. Of the central canal at T9-T10 concerning for epidural metastasis.  -Nuclear medicine bone scan 4/5/19: Focal uptake in one of the right lower lateral ribs, possibly the 10th rib, likely represents a small fracture. Metastatic disease is less likely. No additional areas of abnormal uptake are noted.  -PET/CT 6/26/19: Markedly improved positive treatment response. The previously seen a large left-sided upper lobe lung mass with hilar extension is markedly reduced in size with mild or residual ill-defined soft tissue density at the left hilar region now demonstrating only partial invasion of the left upper lobe bronchus. No separate mediastinal lymphadenopathy is noted. Markedly improved appearance of both the numerous metastatic liver lesions and hepatomegaly. Interval resolution of previously seen upper abdominal lymphadenopathy. No residual active osseous metastatic disease.  -PET/CT 10/17/19:  FDG-PET/CT scan demonstrates:  1. Minimally FDG-avid, small, difficult to delineate left upper lobe/perihilar soft tissue, unchanged as compared to prior study dated 6/25/2019. Small focus of residual disease is not excluded.  2. Non-FDG-avid low-attenuation density in pretracheal cricoid region, unchanged.  3. Remainder of study is unremarkable, demonstrating no evidence of FDG-avid disease.   -MRI Brain 10/20/19: No evidence of metastasis  -PET/CT 1/15/20:  FDG-PET/CT scan demonstrates:  1. New, indeterminate linear focus of mild FDG activity in left suprahilar/paramediastinal region (SUV 4.1).  2. Minimally FDG-avid, small, difficult to delineate left upper lobe/perihilar soft tissue, unchanged as compared to prior study dated 6/25/2019. Small focus of residual disease is not excluded.  3. Remainder of study is unchanged, demonstrating no evidence of FDG-avid disease.   -CT Abdomen/Pelvis 2/19/20:  No evidence of focal bowel wall thickening or distention.  Right inguinal hernia   -CT Chest 4/8/20: Stable disease  -MRI Brain 3/5/20: New opacification involving the right mastoid and middle ear region as described above, otherwise no significant change when allowing for differences in technique.   -PET/CT 6/25/20:  Compared to FDG-PET/CT scan dated 1/15/2020:  1. Focus of mild FDG activity in left upper lobe paramediastinal region is unchanged. This may reflect posttreatment change.  2. Non-FDG-avid 7 mm nodular density within posterior left upper lobe, also unchanged.  3. Nonspecific hypermetabolism in distal esophagus and fundus/body of stomach, mildly decreased. Correlate clinically for esophagitis/gastritis and with endoscopy, as indicated.  4. Remainder of study is unchanged, demonstrating no evidence of FDG-avid recurrent or metastatic disease.   -PET/CT 9/1/20:  Compared to FDG-PET/CT scan dated 6/25/2020:  1. Resolution of focus of mild FDG activity in left upper lobe paramediastinal region.  2. Non-FDG-avid 7 mm nodular density within posterior left upper lobe, unchanged.  3. Nonspecific hypermetabolism in distal esophagus and fundus/body, unchanged.  4. No evidence of FDG-avid recurrent or metastatic disease.   -MRI Brain 12/14/20:  No evidence acute hemorrhage, mass mass effect or abnormal enhancement seen.  Improved aeration of the right mastoid and middle ear region.  -PET/CT 12/14/20:  Compared to FDG-PET/CT scan on 9/1/2020: 1. Nonspecific minimal increased FDG avidity in the gastric fundus/body. Please correlate clinically or with endoscopy as indicated. 2. Non-FDG avid 7 mm nodular density within the posterior left upper lobe, unchanged. 3. New focal cutaneous FDG avidity along the left lower aspect of the face, probably focal inflammation. Please correlate with physical examination. 4. Nonspecific new focal mild hypermetabolism in the anterior aspect of the hyoid bone just to the right of the midline without CT correlate. Unchanged non-FDG avid low-attenuation density in the precricoid region. Please correlate clinically.  -PET/CT 3/5/21:  Compared to FDG-PET/CT scan dated 12/14/2020: 1. Few new FDG avid left axillary lymph nodes are nonspecific. Suggest correlation with ultrasound and possible percutaneous FNA biopsy as indicated. 2. Nonspecific FDG avidity in mid to distal esophagus, gastric fundus/body, slightly more prominent, with new FDG avidity in the pylorus. Please correlate clinically or with endoscopy as indicated. 3. Nonspecific non-FDG avid groundglass opacity in the anterior right upper lobe, more conspicuous. 4. Interval resolution of focal mild hypermetabolism in the anterior aspect of the hyoid bone and focal cutaneous FDG avidity in the left lower face. Unchanged non-FDG avid low-attenuation density in the cricoarytenoid region.  -Esophagram: Small hiatal hernia without demonstrated gastroesophageal reflux. There is esophagoesophageal reflux.  -Pet/CT 6/20/21:  Compared to FDG-PET/CT scan dated 3/5/2021: 1. Interval resolution of a few mildly FDG avid left axillary lymph nodes and small mildly FDG avid foci in bilateral hilar regions. 2. Nonspecific FDG avidity in mid to distal esophagus, gastric boy and pylorus, unchanged. Please correlate clinically or with endoscopy as indicated. 3. Unchanged nonspecific non-FDG avid groundglass opacity in the anterior right upper lobe. 4. Non-FDG avid low-attenuation density in the pretracheal region, decreased in size.  -MRI Brain 6/21/21:  There is no intraparenchymal hematoma, mass effect or midline shift.  No evidence of intracranial metastatic disease. Slight interval increase in density within the right mastoid and middle ear regions when compared most recent prior examination.  -PET/CT 9/13/21:  Compared to FDG-PET/CT scan dated 6/20/2021: 1. New cluster of FDG-avid nodules in left upper lobe. Primary consideration is infection/mucoid impacted distal airways. Please correlate clinically and follow-up with dedicated CT of chest in 1 month. 2. Nonspecific FDG avidity in mid and distal esophagus, unchanged. Resolution of gastric hypermetabolism. 3. Nonspecific, non-FDG avid right upper lobe groundglass opacity, unchanged. 4. Non-FDG avid low-attenuation density in the pretracheal region, unchanged.  -MRI Brain 12/1/21:  No significant change when allowing for differences in technique.  -PET/CT 12/6/21:  Compared to FDG-PET/CT scan dated 6/20/2021: 1. Mildly FDG-avid cluster of nodules in left upper lobe, unchanged on CT, and decreased in metabolism. An infectious/inflammatory etiology is favored. Please correlate clinically. A dedicated CT of chest may be obtained for further characterization. 2. Nonspecific FDG avidity in mid and distal esophagus, unchanged. 3. Nonspecific, non-FDG avid right upper lobe groundglass opacity, unchanged. 4. Non-FDG avid low-attenuation density in the pretracheal region, unchanged.  -PET/CT 3/7/22:  Compared to FDG-PET/CT scan dated 12/6/2021: 1. FDG-avid, centrally photopenic left upper lobe lung mass and adjacent FDG-avid nodules, markedly increased in size and metabolism as compared to prior study, are compatible with recurrent disease. 2. Nonspecific FDG avidity in mid and distal esophagus, unchanged. 3. Nonspecific, non-FDG avid right upper lobe groundglass opacity, unchanged. 4. Non-FDG avid low-attenuation density in the pretracheal region, unchanged.  -Brain MRI 3/30/22:  1. Age appropriate involutional changes. 2. No abnormal intraparenchymal or leptomeningeal enhancement. No evidence of intracranial metastases or leptomeningeal carcinomatosis. 3. Presence of fluid again appreciated within the right-sided mastoid air cells, slightly increased compared with prior. Correlate clinically for mastoiditis.  -CT Chest 5/9/22:  In comparison with 3/7/2022, interval decrease of left upper lobe mass. No new or enlarging nodule.  -CT Head 6/14/22: No evidence of acute hemorrhage, mass or mass effect.  -Cardiac MRI 7/26/22:  At the base and midcavity, the combination of subendocardial late gadolinium enhancement, myocardial wall thinning and wall motion abnormalities are most likely secondary to prior myocardial infarction rather than myocarditis.  -PET/CT 8/15/22:  Compared with FDG PET/CT scan 3/7/2022 as well as additional correlations as above: 1. Since prior PET/CT, LEFT UPPER lobe lung mass shows marked decrease in activity, with serial anatomic improvement on most recent chest CT as well as the current study. 2. Esophageal uptake stable, likely related to patient's reported prior immunotherapy-related esophagitis. 3. Nonspecific, non-FDG avid right upper lobe groundglass opacity, unchanged. 4. Non-FDG avid low-attenuation density in the pretracheal region, unchanged. 5. No NEW suspicious findings otherwise noted.  -CT Neck Angio 10/16/22:   1. Severe stenosis/near occlusion of the left proximal cervical ICA. 2. A 1.3 x 1.1 x 1.1 cm soft tissue attenuation lesion in the midline anterior neck abutting the strap muscles and the inferior aspect of the hyoid bone. Recommend CT neck with contrast for further evaluation. 3. Redemonstration of a left upper lobe pulmonary mass.  -PET/CT 11/14/22:  Since prior FDG-PET/CT scan 8/15/2022: 1. Multiple NEW ill-defined BILATERAL hypermetabolic pulmonary parenchymal changes. These are suspicious for inflammatory changes and may be related to immunotherapy. There is also a nonspecific, non-FDG avid right upper lobe groundglass opacity, unchanged. 2. Left UPPER lobe lung mass shows stable size and activity. 3. Esophageal uptake stable, likely related to patient's reported prior immunotherapy-related esophagitis. 4. Non-FDG avid low-attenuation density in the pretracheal region, unchanged. This has been seen on multiple prior PET and CT exams. 5. No NEW suspicious findings otherwise noted.  -CT Chest 12/5/22:  No pneumonia.  Unchanged paramediastinal left upper lobe lesion since recent November 2022 PET/CT, and decreased since May 2022 chest CT. No new disease in the chest.    -CT CAP 6/12/23:  Unchanged size of left upper lobe lesion.  New left upper and left lower lobe radiation associated scarring.  No finding of new or progressive disease in the chest, abdomen, or pelvis.  -BMD 9/1/23:  Osteoporosis  -PET/CT 9/11/23:  Since prior FDG-PET/CT scan 11/14/2022: 1. Medial LEFT UPPER lobe pleural-based lung mass has enlarged with persistent hypermetabolic uptake. Findings elsewhere in the lung fields have changed, including development of new findings in the UPPER LEFT lung field, while previously seen LEFT LOWER lobe and RIGHT lung field findings have concurrently largely resolved. 2. Unremarkable esophageal uptake. 3. Heterogeneous marrow uptake without focally suspicious findings. 4. Nonspecific heterogeneous prostate gland uptake. Correlate clinically as warranted with PSA.  Images Reviewed/Interpreted:  -MRI L-Spine 10/3/23:  No convincing MR evidence for lumbar spinal metastasis. No high-grade lumbar spinal canal or neural canal stenosis.  -CT Chest 11/16/23:  No pulmonary embolism through the level of the lobar and segmental pulmonary arteries. Evaluation of more distal subsegmental pulmonary arteries is limited by suboptimal contrast bolus timing.  Left paramediastinal mass measuring 4.1 x 2.8 cm in left upper lobe with interval increase in size.  Small left pleural effusion.  -PET/CT 12/11/23:  Compared to FDG PET/CT dated 9/11/2023: 1. FDG-avid left upper lobe paramediastinal mass is increased in size and unchanged in metabolism, concerning for progression of disease. There is an adjacent FDG-avid left upper/lower lobe opacity, new since 11/16/2023, which likely is secondary to an infectious/inflammatory etiology. Correlate clinically for radiation therapy. 2. FDG-avid intramuscular focus adjacent to right scapula, increased in size and avidity, and not well delineated on CT, is concerning for metastasis. Further evaluation may be performed with ultrasound or MRI. 3. Mild, diffuse bone marrow hypermetabolism in the axial and appendicular skeleton, minimally increased as compared to prior study, may be related to myelodysplastic syndrome.

## 2023-12-15 NOTE — PHYSICAL EXAM
[Restricted in physically strenuous activity but ambulatory and able to carry out work of a light or sedentary nature] : Status 1- Restricted in physically strenuous activity but ambulatory and able to carry out work of a light or sedentary nature, e.g., light house work, office work [Normal] : affect appropriate [de-identified] : Scattered darkened areas on anterior chest - petechiae?  [de-identified] : No icterus  [de-identified] : MMM O/P Clear, No visible mucositis or erythema [de-identified] : Supple No LAD  [de-identified] : Clear  [de-identified] : S1 S2  [de-identified] : No edema  [de-identified] : No spine/CVA tenderness

## 2023-12-15 NOTE — HISTORY OF PRESENT ILLNESS
[Disease: _____________________] : Disease: [unfilled] [AJCC Stage: ____] : AJCC Stage: [unfilled] [de-identified] : The patient is a former smoker with a history of CAD who developed chest pain in March 2019 and had a cardiac stent placed. When his chest pain persisted, he was sent for a CT scan of his chest in late March 2019 that revealed a left upper lobe lung mass with evidence of metastatic disease. 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 CT scan revealed evidence of extensive bony and liver metastases. MRI of his brain was negative for brain metastases however there was extensive calvarial and T-spine metastases with possible evidence of epidural involvement. He was treated in [place] by an outside medical oncologist and started on carboplatin, etoposide and atezolizumab in early April 2019. He received 4 cycles of triplet combination therapy followed by immunotherapy maintenance. A restaging PET/CT scan in June 2019 revealed a dramatic response. He moved from [place] and transferred his care to [facility].   Treated with immunotherapy maintenance from June 2019 through May 2021 with sustained response.   Developed severe dysphagia and odynophagia.  He was treated for H-pylori and symptoms continued; H Pylori stool Ag was negative.  PET/CT with findings consistent with esophagitis/gastritis.  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  Restaging PET/CT March 2022 with disease progression.  Started chemotherapy with Carboplatin/Etoposide in March 2022; Durvalumab was added with C4. Patient completed C5 with Carbo (retreat)/Etoposide/Durvalumab in early June 2022.  Hospitalized with STEMI in June 2022.  Esophagitis symptoms returned in June 2022.  Upon clinical improvement with restarting steroids, durvalumab was continued.  Patient underwent successful TCAR procedure on 1/3/23.   Treated with Durvalumab through Feb 2023.  Treated with consolidative Thoracic RT to primary tumor in 15 Fx in March 2023.  Monitored off systemic therapy.   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  Patient was not achieving any hematologic response and has required repeated platelet and PRBC transfusions.  Patient's therapy was changed to oral Inqovi in late November 2023.   [de-identified] : -Lung, left hilar FNA 3/29/19: Positive for malignant cells. Poorly differentiated neuroendocrine carcinoma. IHC is positive for MCK, TTF-1 and synaptophysin while negative for desmoglein3, P40, NapsinA, CD20, PAX5, CD3.  Ki-67 is markedly high. [de-identified] : *Wife provided translation where needed Patient was last treated with durvalumab on 2/10/2023.  He was treated with consolidative thoracic RT to the left lung in 15 fractions completed March 2023.  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  Patient was not achieving any hematologic response and has required repeated platelet and PRBC transfusions.  Patient's therapy was changed to oral Inqovi in late November 2023.    Patient continues to be managed by my hematology colleagues.  He comes into the center twice weekly for blood count checks. Patient continues with esophagitis symptoms; he is currently on prednisone 10 mg daily.  The esophagitis symptoms appear to exacerbate when he attempts to lower the steroid dose. Patient has been experiencing LBP and an MRI L-spine was obtained in early October 2023 that was negative for metastatic disease.  Patient reports that he noticed resolution of the back pain when he stopped his statin; he plans to discuss this with cardiology. Patient underwent CT chest angiogram in mid November 2023 as well as PET CT scan this week revealing disease progression, mainly with increase in size of the DAGO mass. He denies dyspnea.  He does become symptomatic with dyspnea and tachycardia if his hemoglobin approaches 8 or below. Discussed that there were not great treatment options for his complicated situation.  He is not a candidate for cytotoxic chemotherapy given his blood counts.  Patient's wife, who is a physician, would like him restarted on the immunotherapy, which seems reasonable under the circumstances.

## 2023-12-15 NOTE — REASON FOR VISIT
[FreeTextEntry3] : Dr. Carranza [FreeTextEntry1] : ----------------------------------------------------------------------- EDUIN SANCHEZ is a 68 year man with a history of cigarette smoking, coronary artery disease s/p CABG and PCI, and metastatic small cell lung cancer treated with PD-1 inhibitor immunotherapy, now with myelodysplastic syndrome, for follow up of ischemic cardiomyopathy, coronary artery disease, and left internal carotid artery stenosis.  Prior Cancer Treatments: ------------------------------------------------------------------------ Chemo/targeted therapy: 4/2019: carboplatin, etoposide and atezolizumab x 4 followed by immunotherapy maintenance 3/2022: carboplatin/etoposide, durvalumab added May 2022 8/2022-2/10/2023: durvalumab 9/2023-present: azacitidine + Inqovi (decitabine + cedazuridine)  ------------------------------------------------------------------------- Radiation: 3/3/2023-3/23/2023: 4500 cGy to left lung

## 2023-12-15 NOTE — ASSESSMENT
[FreeTextEntry1] : ------------------------------------------------- 68 year old man with multivessel coronary disease with occluded bypass grafts, seen initially in June 2022 after acute inferior STEMI while on PD1- inhibitor immunotherapy. Unlikely an immune-related adverse event in setting of patient with prior tolerance to immunotherapy for over a year. Cardiac MRI no evidence of myocarditis and troponin I and T both negative.  Symptomatically improved on maximal medical therapy for CAD and ischemic cardiomyopathy. S/p TECAR for carotid disease January 2023.  Now with MDS, recurrent lung cancer pending re-initiation of immunotherapy and unable to tolerate antiplatelet therapy or statin due to current condition.  I do not think his back pain, spasm is due to rosuvastatin. However, since his symptoms improved after stopping it, and were so debilitating, they are not willing to resume appropriate intensity statin therapy at this time.  Given high risk atherosclerosis and CAD and pending immunotherapy, he is at high risk for coronary event.  Needs baseline troponin I, troponin T, CPK, Aldolase, and pro-BNP prior to resuming immunotherapy. I have ordered these to be drawn Monday from his port per patient's request.  Will Rx evolocumab (Repatha) for high risk CAD and statin intolerance. Sent to [facility] order at their request.  # 3 Vessel CAD: - start Repatha 140 q14 days - continue ezetimibe 10 - Continue Entresto 24/26 BID, will not increase further given borderline BP - off aspirin and clopidogrel given thrombocytopenia - Toprol 25 mg daily as tolerated  # HF ischemic cardiomyopathy, LVEF improved to 49 % - Entresto 24/26 BID - empagliflozin 10 mg daily - Toprol 25 mg daily  # PVCs, due to ischemic cardiomyopathy: ECG NSR today. No PVCs on ECG today. - continue Toprol  # Carotid atherosclerosis: s/p TECAR 1/3/2023. - Dr. Ibarra-Burnett Intervention/peripheral vascular interventions - Dr. Ray Grissom (Vascular Surgery) - continue maximal medical therapy  Above recommendations discussed with the patient and his wife and all questions were answered to the best of my ability and to their apparent satisfaction. Advised to call with any question or concern.

## 2023-12-15 NOTE — HISTORY OF PRESENT ILLNESS
[FreeTextEntry1] : Interval History: He was admitted to [facility] on 2023 for thrombocytopenia and peripheral blasts. Bone marrow biopsy showed myelodysplastic syndrome (5q deletion). Since then he has been managed with supportive transfusions and Vidazya + Inqovi. Antiplatelet agents were obviously discontinued.  He developed debilitating lower back pain - so terrible he was unable to get out of bed at times. He also had constipation. Imaging of the back and spine showed spondylolisthesis with moderate foraminal stenosis of the lumbar spine (L3-S1), but not severe spinal canal stenosis.  Pregabalin and back brace were not helpful. He was not able to receive any physical therapy or local corticosteroid injections on account of severe thrombocytopenia in the setting of MDS.  His primary care doctor suggested stopping rosuvastatin, which he had previously been tolerating for years without myalgia/myopathy, and upon doing so, his pain decreased, and his function improved markedly. They are not willing to resume high intensity statin therapy.  Follow up PET/CT now shows recurrent lung cancer. They met with Dr. Carranza today and the plan is to resume immune-checkpoint inhibitor therapy next week.  He denies chest pain, palpitations, or exertional dyspnea, other than when Hgb < 8. He is adherent to his other cardiovascular medications.  He remains on prednisone 10 mg daily for esophagitis symptoms, which are controlled at present.  Transfusion requirements have not caused edema or fluid overload thus far. He has not required diuretics.   History: After second cycle of durvalumab (22), developed inferior STEMI. He presented to the ED on 2022 with acute retrosternal chest pain which began while watching TV at home.  EKG showed inferior STEMI. LHC with 100 % mCx at prior intervention site, treated with GORGE. About 2 weeks prior had similar episode of chest pain, which resolved with ibuprofen, but this time the pain persisted.  Echo showed LVEF 45%, moderate-severe MR, hypokinesis of inferolateral wall and basal inferior wall, RV enlargement with decreased RVSF, and moderate pulmonary hypertension. Started on medical therapy and dismissed 2022. Troponin T peaked at 3210 ng/L and was 3123 ng/L on .  Had issues with postural dizziness in the past.  Since return home, he wife (who is a physician) notes pulse rate is at time irregular. The patient endorses dyspnea after a few steps, such as when using the bathroom at home. He denies any chest pain.  Previously followed for many years by Dr. Misha Casas.   2022: cMRI no evidence of myocarditis L ICA with  moderate to severe calcified plaque Resumed duvalumab 2022: Dyspnea improved after Jardiance and increased metoprolol. Seen by Dr. Valenzuela for advice on carotid and coronary disease Returned from [place] and feeling overall well, denies chest pain, sometimes dizziness after standing up quickly. Dry skin on palms. Remains on maintenance durvalumab, with prednisone because of immune checkpoint inhibitor related esophagitis  2022: Had severe LICA stenosis and was referred to Dr. Grissom for TECAR because of high risk for transfemoral stenting or surgical endarterectomy. TECAR scheduled  and he comes for pre-operative clearance today. 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  He complains of significant fatigue, but denies any chest pain, shortness of breath or changes in his functional capacity. He is able to walk and complete > 4 METS. He is adherent to his medications. He also remains on corticosteroids for ICI related esophagitis, which he is currently tapering.  2023: Had TECAR on 1/3/2023 is recovering well post-op. Denies shortness of breath, chest pain. Swimming in local pool and feels well overall. She decreased the dose of metoprolol to half a tablet daily because of low blood pressure, otherwise medications are unchanged. Resumed durvalumab 23. To see Dr. Borges for consideration of local radiation therapy given immunotherapy related adverse events (esophagitis, etc).  2023: He last received durvalumab on 2/10/2023. He was treated with consolidative thoracic RT to the left lung in 15 fractions completed 2023. He remains dependent on prednisone due to esophagitis symptoms. He had a flare of symptoms after tapering and had to take up to 80 mg daily for relief. He has since tapered down to 50 mg daily. He denies chest pain. Exercise tolerance is stable. No edema. No orthopnea. No orthostatic hypotension. Tripped and fell and sustained large thigh hematoma, finally mostly resolved now.  Not currently on active cancer treatment and on active surveillance by imaging.  Cardiovascular Testing: --------------------------------------- EC/15/2023: NSR 88 bpm, inferior/posterior infarct (no change from prior)  2023: NSR 68 bpm, inferior/posterior infarct (no change from prior) 2023: NSR 76 bpm, LAD, inferior/posterior infarct (no change from prior) 2022: sinus 65 bpm, inferior/posterior infarct (no change from prior) 2022: sinus 75 bpm with PVCs, inferior/posterior infarct 2021: sinus, minimal inferior Q wave, anterior TWI --------------------------------------- Echo: 10/3/2022: LVEF 49 %, mild MR, GLS -15.9 2022: EF 42 %, moderate MR, basal inferior and inferolateral hypokinesis 2022: EF 45 %, Moderate-severe MR, mild AI, mild segmental LV systolic dysfunction (inferolateral and basal inferior), moderate pulmonary HTN 2020: LVEF 65% --------------------------------------- Stress: 10/7/2021: MPI no ischemia --------------------------------------- Cath/PCI 2022: PCI to mLCX ISR with GORGE 3/18/2019: GORGE x2 to LCx, GORGE to LAD, GORGE to Ramus (staged PCI) 2008: CABG ([facility] [place]), LIMA-LAD, DONTRELL-Ramus, SVG-RCA) : BMS for chest pain with ISR --------------------------------------- Vascular: 1/3/2023: [facility] (Dr. Grissom) 10/16/2022: CT-A neck: severe stenosis and occlusion of L prox cervical ICA 2022: moderate to severe calcified plaque in L ICA, > 70 %, R ICA 16-49 % 2020: moderate heterogeneous plaque left carotid 50-69 % -------------------------------------- cMRI: 2022: base and mid-cavity subendocardial LGE compatible with prior MI

## 2024-01-01 ENCOUNTER — APPOINTMENT (OUTPATIENT)
Dept: INTERNAL MEDICINE | Facility: CLINIC | Age: 69
End: 2024-01-01
Payer: COMMERCIAL

## 2024-01-01 ENCOUNTER — APPOINTMENT (OUTPATIENT)
Dept: INFUSION THERAPY | Facility: HOSPITAL | Age: 69
End: 2024-01-01

## 2024-01-01 ENCOUNTER — LABORATORY RESULT (OUTPATIENT)
Age: 69
End: 2024-01-01

## 2024-01-01 ENCOUNTER — RESULT REVIEW (OUTPATIENT)
Age: 69
End: 2024-01-01

## 2024-01-01 ENCOUNTER — OUTPATIENT (OUTPATIENT)
Dept: OUTPATIENT SERVICES | Facility: HOSPITAL | Age: 69
LOS: 1 days | Discharge: HOME | End: 2024-01-01
Payer: COMMERCIAL

## 2024-01-01 ENCOUNTER — APPOINTMENT (OUTPATIENT)
Dept: HEMATOLOGY ONCOLOGY | Facility: CLINIC | Age: 69
End: 2024-01-01

## 2024-01-01 ENCOUNTER — APPOINTMENT (OUTPATIENT)
Dept: HEMATOLOGY ONCOLOGY | Facility: CLINIC | Age: 69
End: 2024-01-01
Payer: COMMERCIAL

## 2024-01-01 ENCOUNTER — NON-APPOINTMENT (OUTPATIENT)
Age: 69
End: 2024-01-01

## 2024-01-01 VITALS
DIASTOLIC BLOOD PRESSURE: 65 MMHG | TEMPERATURE: 98.2 F | SYSTOLIC BLOOD PRESSURE: 107 MMHG | RESPIRATION RATE: 18 BRPM | HEART RATE: 132 BPM | BODY MASS INDEX: 29.43 KG/M2 | WEIGHT: 211 LBS | OXYGEN SATURATION: 94 %

## 2024-01-01 VITALS
TEMPERATURE: 96.7 F | DIASTOLIC BLOOD PRESSURE: 82 MMHG | HEART RATE: 103 BPM | WEIGHT: 214 LBS | BODY MASS INDEX: 29.96 KG/M2 | OXYGEN SATURATION: 97 % | RESPIRATION RATE: 18 BRPM | SYSTOLIC BLOOD PRESSURE: 116 MMHG | HEIGHT: 71 IN

## 2024-01-01 DIAGNOSIS — M48.061 SPINAL STENOSIS, LUMBAR REGION WITHOUT NEUROGENIC CLAUDICATION: ICD-10-CM

## 2024-01-01 DIAGNOSIS — Z98.890 OTHER SPECIFIED POSTPROCEDURAL STATES: Chronic | ICD-10-CM

## 2024-01-01 DIAGNOSIS — G89.29 LOW BACK PAIN, UNSPECIFIED: ICD-10-CM

## 2024-01-01 DIAGNOSIS — E78.5 HYPERLIPIDEMIA, UNSPECIFIED: ICD-10-CM

## 2024-01-01 DIAGNOSIS — I77.9 DISORDER OF ARTERIES AND ARTERIOLES, UNSPECIFIED: ICD-10-CM

## 2024-01-01 DIAGNOSIS — Z51.89 ENCOUNTER FOR OTHER SPECIFIED AFTERCARE: ICD-10-CM

## 2024-01-01 DIAGNOSIS — I25.9 CHRONIC ISCHEMIC HEART DISEASE, UNSPECIFIED: ICD-10-CM

## 2024-01-01 DIAGNOSIS — C34.12 MALIGNANT NEOPLASM OF UPPER LOBE, LEFT BRONCHUS OR LUNG: ICD-10-CM

## 2024-01-01 DIAGNOSIS — E03.9 HYPOTHYROIDISM, UNSPECIFIED: ICD-10-CM

## 2024-01-01 DIAGNOSIS — Z95.5 PRESENCE OF CORONARY ANGIOPLASTY IMPLANT AND GRAFT: Chronic | ICD-10-CM

## 2024-01-01 DIAGNOSIS — M54.50 LOW BACK PAIN, UNSPECIFIED: ICD-10-CM

## 2024-01-01 DIAGNOSIS — D46.9 MYELODYSPLASTIC SYNDROME, UNSPECIFIED: ICD-10-CM

## 2024-01-01 DIAGNOSIS — C34.90 MALIGNANT NEOPLASM OF UNSPECIFIED PART OF UNSPECIFIED BRONCHUS OR LUNG: ICD-10-CM

## 2024-01-01 DIAGNOSIS — Z95.1 PRESENCE OF AORTOCORONARY BYPASS GRAFT: Chronic | ICD-10-CM

## 2024-01-01 LAB
ALBUMIN SERPL ELPH-MCNC: 3.5 G/DL
ALP BLD-CCNC: 117 U/L
ALT SERPL-CCNC: 14 U/L
ANION GAP SERPL CALC-SCNC: 19 MMOL/L
ANISOCYTOSIS BLD QL: SLIGHT — SIGNIFICANT CHANGE UP
AST SERPL-CCNC: 21 U/L
BASOPHILS # BLD AUTO: 0 K/UL — SIGNIFICANT CHANGE UP (ref 0–0.2)
BASOPHILS NFR BLD AUTO: 0 % — SIGNIFICANT CHANGE UP (ref 0–2)
BILIRUB SERPL-MCNC: 0.6 MG/DL
BLASTS # FLD: 11 % — HIGH (ref 0–0)
BLASTS # FLD: 11 % — HIGH (ref 0–0)
BLASTS # FLD: 15 % — HIGH (ref 0–0)
BLASTS # FLD: 15 % — HIGH (ref 0–0)
BLASTS # FLD: 16 % — HIGH (ref 0–0)
BLASTS # FLD: 16 % — HIGH (ref 0–0)
BUN SERPL-MCNC: 20 MG/DL
CALCIUM SERPL-MCNC: 11.1 MG/DL
CHLORIDE SERPL-SCNC: 98 MMOL/L
CO2 SERPL-SCNC: 18 MMOL/L
CREAT SERPL-MCNC: 0.74 MG/DL
DACRYOCYTES BLD QL SMEAR: SLIGHT — SIGNIFICANT CHANGE UP
EGFR: 97 ML/MIN/1.73M2
ELLIPTOCYTES BLD QL SMEAR: SLIGHT — SIGNIFICANT CHANGE UP
EOSINOPHIL # BLD AUTO: 0.03 K/UL — SIGNIFICANT CHANGE UP (ref 0–0.5)
EOSINOPHIL # BLD AUTO: 0.03 K/UL — SIGNIFICANT CHANGE UP (ref 0–0.5)
EOSINOPHIL # BLD AUTO: 0.07 K/UL — SIGNIFICANT CHANGE UP (ref 0–0.5)
EOSINOPHIL NFR BLD AUTO: 1 % — SIGNIFICANT CHANGE UP (ref 0–6)
EOSINOPHIL NFR BLD AUTO: 1 % — SIGNIFICANT CHANGE UP (ref 0–6)
EOSINOPHIL NFR BLD AUTO: 3 % — SIGNIFICANT CHANGE UP (ref 0–6)
EOSINOPHIL NFR BLD AUTO: 3 % — SIGNIFICANT CHANGE UP (ref 0–6)
EOSINOPHIL NFR BLD AUTO: 5 % — SIGNIFICANT CHANGE UP (ref 0–6)
EOSINOPHIL NFR BLD AUTO: 5 % — SIGNIFICANT CHANGE UP (ref 0–6)
GLUCOSE SERPL-MCNC: 151 MG/DL
HCT VFR BLD CALC: 26.1 % — LOW (ref 39–50)
HCT VFR BLD CALC: 26.1 % — LOW (ref 39–50)
HCT VFR BLD CALC: 26.6 % — LOW (ref 39–50)
HCT VFR BLD CALC: 26.6 % — LOW (ref 39–50)
HCT VFR BLD CALC: 27.1 % — LOW (ref 39–50)
HCT VFR BLD CALC: 27.1 % — LOW (ref 39–50)
HGB BLD-MCNC: 9.1 G/DL — LOW (ref 13–17)
HGB BLD-MCNC: 9.2 G/DL — LOW (ref 13–17)
HGB BLD-MCNC: 9.2 G/DL — LOW (ref 13–17)
HYPOCHROMIA BLD QL: SLIGHT — SIGNIFICANT CHANGE UP
HYPOCHROMIA BLD QL: SLIGHT — SIGNIFICANT CHANGE UP
LYMPHOCYTES # BLD AUTO: 0.42 K/UL — LOW (ref 1–3.3)
LYMPHOCYTES # BLD AUTO: 0.42 K/UL — LOW (ref 1–3.3)
LYMPHOCYTES # BLD AUTO: 0.56 K/UL — LOW (ref 1–3.3)
LYMPHOCYTES # BLD AUTO: 0.56 K/UL — LOW (ref 1–3.3)
LYMPHOCYTES # BLD AUTO: 1.11 K/UL — SIGNIFICANT CHANGE UP (ref 1–3.3)
LYMPHOCYTES # BLD AUTO: 1.11 K/UL — SIGNIFICANT CHANGE UP (ref 1–3.3)
LYMPHOCYTES # BLD AUTO: 25 % — SIGNIFICANT CHANGE UP (ref 13–44)
LYMPHOCYTES # BLD AUTO: 25 % — SIGNIFICANT CHANGE UP (ref 13–44)
LYMPHOCYTES # BLD AUTO: 29 % — SIGNIFICANT CHANGE UP (ref 13–44)
LYMPHOCYTES # BLD AUTO: 29 % — SIGNIFICANT CHANGE UP (ref 13–44)
LYMPHOCYTES # BLD AUTO: 33.5 % — SIGNIFICANT CHANGE UP (ref 13–44)
LYMPHOCYTES # BLD AUTO: 33.5 % — SIGNIFICANT CHANGE UP (ref 13–44)
MCHC RBC-ENTMCNC: 29.2 PG — SIGNIFICANT CHANGE UP (ref 27–34)
MCHC RBC-ENTMCNC: 29.2 PG — SIGNIFICANT CHANGE UP (ref 27–34)
MCHC RBC-ENTMCNC: 29.7 PG — SIGNIFICANT CHANGE UP (ref 27–34)
MCHC RBC-ENTMCNC: 33.6 G/DL — SIGNIFICANT CHANGE UP (ref 32–36)
MCHC RBC-ENTMCNC: 33.6 G/DL — SIGNIFICANT CHANGE UP (ref 32–36)
MCHC RBC-ENTMCNC: 34.6 G/DL — SIGNIFICANT CHANGE UP (ref 32–36)
MCHC RBC-ENTMCNC: 34.6 G/DL — SIGNIFICANT CHANGE UP (ref 32–36)
MCHC RBC-ENTMCNC: 34.9 G/DL — SIGNIFICANT CHANGE UP (ref 32–36)
MCHC RBC-ENTMCNC: 34.9 G/DL — SIGNIFICANT CHANGE UP (ref 32–36)
MCV RBC AUTO: 85.3 FL — SIGNIFICANT CHANGE UP (ref 80–100)
MCV RBC AUTO: 85.3 FL — SIGNIFICANT CHANGE UP (ref 80–100)
MCV RBC AUTO: 85.8 FL — SIGNIFICANT CHANGE UP (ref 80–100)
MCV RBC AUTO: 85.8 FL — SIGNIFICANT CHANGE UP (ref 80–100)
MCV RBC AUTO: 86.9 FL — SIGNIFICANT CHANGE UP (ref 80–100)
MCV RBC AUTO: 86.9 FL — SIGNIFICANT CHANGE UP (ref 80–100)
MONOCYTES # BLD AUTO: 0.1 K/UL — SIGNIFICANT CHANGE UP (ref 0–0.9)
MONOCYTES # BLD AUTO: 0.1 K/UL — SIGNIFICANT CHANGE UP (ref 0–0.9)
MONOCYTES # BLD AUTO: 0.24 K/UL — SIGNIFICANT CHANGE UP (ref 0–0.9)
MONOCYTES # BLD AUTO: 0.24 K/UL — SIGNIFICANT CHANGE UP (ref 0–0.9)
MONOCYTES # BLD AUTO: 0.28 K/UL — SIGNIFICANT CHANGE UP (ref 0–0.9)
MONOCYTES # BLD AUTO: 0.28 K/UL — SIGNIFICANT CHANGE UP (ref 0–0.9)
MONOCYTES NFR BLD AUTO: 11 % — SIGNIFICANT CHANGE UP (ref 2–14)
MONOCYTES NFR BLD AUTO: 11 % — SIGNIFICANT CHANGE UP (ref 2–14)
MONOCYTES NFR BLD AUTO: 7 % — SIGNIFICANT CHANGE UP (ref 2–14)
MONOCYTES NFR BLD AUTO: 7 % — SIGNIFICANT CHANGE UP (ref 2–14)
MONOCYTES NFR BLD AUTO: 8.5 % — SIGNIFICANT CHANGE UP (ref 2–14)
MONOCYTES NFR BLD AUTO: 8.5 % — SIGNIFICANT CHANGE UP (ref 2–14)
MYELOCYTES NFR BLD: 1 % — HIGH (ref 0–0)
MYELOCYTES NFR BLD: 1 % — HIGH (ref 0–0)
NEUTROPHILS # BLD AUTO: 0.69 K/UL — LOW (ref 1.8–7.4)
NEUTROPHILS # BLD AUTO: 0.69 K/UL — LOW (ref 1.8–7.4)
NEUTROPHILS # BLD AUTO: 1 K/UL — LOW (ref 1.8–7.4)
NEUTROPHILS # BLD AUTO: 1 K/UL — LOW (ref 1.8–7.4)
NEUTROPHILS # BLD AUTO: 1.36 K/UL — LOW (ref 1.8–7.4)
NEUTROPHILS # BLD AUTO: 1.36 K/UL — LOW (ref 1.8–7.4)
NEUTROPHILS NFR BLD AUTO: 41 % — LOW (ref 43–77)
NEUTROPHILS NFR BLD AUTO: 41 % — LOW (ref 43–77)
NEUTROPHILS NFR BLD AUTO: 45 % — SIGNIFICANT CHANGE UP (ref 43–77)
NEUTROPHILS NFR BLD AUTO: 45 % — SIGNIFICANT CHANGE UP (ref 43–77)
NEUTROPHILS NFR BLD AUTO: 48 % — SIGNIFICANT CHANGE UP (ref 43–77)
NEUTROPHILS NFR BLD AUTO: 48 % — SIGNIFICANT CHANGE UP (ref 43–77)
NRBC # BLD: 1 /100 WBCS — HIGH (ref 0–0)
NRBC # BLD: 1 /100 WBCS — HIGH (ref 0–0)
NRBC # BLD: 15 /100 WBCS — HIGH (ref 0–0)
NRBC # BLD: 15 /100 WBCS — HIGH (ref 0–0)
NRBC # BLD: 5 /100 WBCS — HIGH (ref 0–0)
NRBC # BLD: 5 /100 WBCS — HIGH (ref 0–0)
NRBC # BLD: SIGNIFICANT CHANGE UP /100 WBCS (ref 0–0)
PLAT MORPH BLD: NORMAL — SIGNIFICANT CHANGE UP
PLATELET # BLD AUTO: 12 K/UL — CRITICAL LOW (ref 150–400)
PLATELET # BLD AUTO: 12 K/UL — CRITICAL LOW (ref 150–400)
PLATELET # BLD AUTO: 8 K/UL — CRITICAL LOW (ref 150–400)
POIKILOCYTOSIS BLD QL AUTO: SLIGHT — SIGNIFICANT CHANGE UP
POTASSIUM SERPL-SCNC: 4.3 MMOL/L
PROT SERPL-MCNC: 6.4 G/DL
RBC # BLD: 3.06 M/UL — LOW (ref 4.2–5.8)
RBC # BLD: 3.06 M/UL — LOW (ref 4.2–5.8)
RBC # BLD: 3.1 M/UL — LOW (ref 4.2–5.8)
RBC # BLD: 3.1 M/UL — LOW (ref 4.2–5.8)
RBC # BLD: 3.12 M/UL — LOW (ref 4.2–5.8)
RBC # BLD: 3.12 M/UL — LOW (ref 4.2–5.8)
RBC # FLD: 15.4 % — HIGH (ref 10.3–14.5)
RBC # FLD: 15.4 % — HIGH (ref 10.3–14.5)
RBC # FLD: 15.6 % — HIGH (ref 10.3–14.5)
RBC BLD AUTO: ABNORMAL
SODIUM SERPL-SCNC: 135 MMOL/L
WBC # BLD: 1.44 K/UL — LOW (ref 3.8–10.5)
WBC # BLD: 1.44 K/UL — LOW (ref 3.8–10.5)
WBC # BLD: 2.22 K/UL — LOW (ref 3.8–10.5)
WBC # BLD: 2.22 K/UL — LOW (ref 3.8–10.5)
WBC # BLD: 3.31 K/UL — LOW (ref 3.8–10.5)
WBC # BLD: 3.31 K/UL — LOW (ref 3.8–10.5)
WBC # FLD AUTO: 1.44 K/UL — LOW (ref 3.8–10.5)
WBC # FLD AUTO: 1.44 K/UL — LOW (ref 3.8–10.5)
WBC # FLD AUTO: 2.22 K/UL — LOW (ref 3.8–10.5)
WBC # FLD AUTO: 2.22 K/UL — LOW (ref 3.8–10.5)
WBC # FLD AUTO: 3.31 K/UL — LOW (ref 3.8–10.5)
WBC # FLD AUTO: 3.31 K/UL — LOW (ref 3.8–10.5)

## 2024-01-01 PROCEDURE — G2211 COMPLEX E/M VISIT ADD ON: CPT

## 2024-01-01 PROCEDURE — 93010 ELECTROCARDIOGRAM REPORT: CPT

## 2024-01-01 PROCEDURE — 86850 RBC ANTIBODY SCREEN: CPT

## 2024-01-01 PROCEDURE — 99215 OFFICE O/P EST HI 40 MIN: CPT

## 2024-01-01 PROCEDURE — 86900 BLOOD TYPING SEROLOGIC ABO: CPT

## 2024-01-01 PROCEDURE — 86901 BLOOD TYPING SEROLOGIC RH(D): CPT

## 2024-01-01 PROCEDURE — 86923 COMPATIBILITY TEST ELECTRIC: CPT

## 2024-01-01 RX ORDER — EZETIMIBE 10 MG/1
1 TABLET ORAL
Qty: 0 | Refills: 0 | DISCHARGE

## 2024-01-01 RX ORDER — CYCLOBENZAPRINE HYDROCHLORIDE 5 MG/1
5 TABLET, FILM COATED ORAL
Qty: 30 | Refills: 1 | Status: ACTIVE | COMMUNITY
Start: 2024-01-01 | End: 1900-01-01

## 2024-01-01 RX ORDER — MIRTAZAPINE 15 MG/1
15 TABLET, FILM COATED ORAL
Qty: 30 | Refills: 0 | Status: ACTIVE | COMMUNITY
Start: 2024-01-01 | End: 1900-01-01

## 2024-01-01 RX ORDER — ROSUVASTATIN CALCIUM 5 MG/1
1 TABLET ORAL
Qty: 0 | Refills: 0 | DISCHARGE

## 2024-01-01 RX ORDER — METOPROLOL TARTRATE 50 MG
1 TABLET ORAL
Qty: 0 | Refills: 0 | DISCHARGE

## 2024-01-01 RX ORDER — SACUBITRIL AND VALSARTAN 24; 26 MG/1; MG/1
1 TABLET, FILM COATED ORAL
Qty: 0 | Refills: 0 | DISCHARGE

## 2024-01-06 PROBLEM — E78.5 HYPERLIPIDEMIA, UNSPECIFIED HYPERLIPIDEMIA TYPE: Status: ACTIVE | Noted: 2019-10-15

## 2024-01-06 PROBLEM — I77.9 CAROTID ARTERY DISEASE: Status: ACTIVE | Noted: 2022-08-18

## 2024-01-06 PROBLEM — C34.12 MALIGNANT NEOPLASM OF UPPER LOBE OF LEFT LUNG: Status: ACTIVE | Noted: 2019-10-15

## 2024-01-06 PROBLEM — M48.061 LUMBAR FORAMINAL STENOSIS: Status: ACTIVE | Noted: 2023-01-01

## 2024-01-06 PROBLEM — E03.9 HYPOTHYROIDISM, UNSPECIFIED TYPE: Status: ACTIVE | Noted: 2019-10-15

## 2024-01-06 PROBLEM — M48.061 LUMBAR SPINAL STENOSIS: Status: ACTIVE | Noted: 2023-01-01

## 2024-01-06 PROBLEM — I25.9 CHRONIC ISCHEMIC HEART DISEASE: Status: ACTIVE | Noted: 2022-08-18

## 2024-01-06 NOTE — PHYSICAL EXAM
[No Acute Distress] : no acute distress [Well Nourished] : well nourished [Well Developed] : well developed [Well-Appearing] : well-appearing [Normal Sclera/Conjunctiva] : normal sclera/conjunctiva [PERRL] : pupils equal round and reactive to light [EOMI] : extraocular movements intact [Normal Outer Ear/Nose] : the outer ears and nose were normal in appearance [Normal Oropharynx] : the oropharynx was normal [No JVD] : no jugular venous distention [No Lymphadenopathy] : no lymphadenopathy [Supple] : supple [Thyroid Normal, No Nodules] : the thyroid was normal and there were no nodules present [No Respiratory Distress] : no respiratory distress  [No Accessory Muscle Use] : no accessory muscle use [Clear to Auscultation] : lungs were clear to auscultation bilaterally [Normal Rate] : normal rate  [Regular Rhythm] : with a regular rhythm [Normal S1, S2] : normal S1 and S2 [No Murmur] : no murmur heard [No Carotid Bruits] : no carotid bruits [No Abdominal Bruit] : a ~M bruit was not heard ~T in the abdomen [No Varicosities] : no varicosities [Pedal Pulses Present] : the pedal pulses are present [No Edema] : there was no peripheral edema [No Palpable Aorta] : no palpable aorta [No Extremity Clubbing/Cyanosis] : no extremity clubbing/cyanosis [Soft] : abdomen soft [Non Tender] : non-tender [Non-distended] : non-distended [No Masses] : no abdominal mass palpated [No HSM] : no HSM [Normal Bowel Sounds] : normal bowel sounds [Normal Posterior Cervical Nodes] : no posterior cervical lymphadenopathy [Normal Anterior Cervical Nodes] : no anterior cervical lymphadenopathy [No CVA Tenderness] : no CVA  tenderness [No Spinal Tenderness] : no spinal tenderness [No Joint Swelling] : no joint swelling [Grossly Normal Strength/Tone] : grossly normal strength/tone [No Rash] : no rash [Coordination Grossly Intact] : coordination grossly intact [No Focal Deficits] : no focal deficits [Normal Gait] : normal gait [Deep Tendon Reflexes (DTR)] : deep tendon reflexes were 2+ and symmetric [Normal Affect] : the affect was normal [Normal Insight/Judgement] : insight and judgment were intact [de-identified] : no palp spasm [de-identified] : ble m=5/5 all muscle groups, pain with arising from reclining position- mod/severe.

## 2024-01-06 NOTE — HISTORY OF PRESENT ILLNESS
[FreeTextEntry1] : back pain [de-identified] : chronic back pain off crestor and on repatha- some improvement complex medical history- metastatic SmCC lung- DAGO enlarging mass on pet/ct 11/23, s/p tx with chemo,immune tx, RTx. see heme/onc note. course complicated by esophagitis now on pred 10mg qd tapering slowly, also complicated by MDS, plt recently 8k, getting txn plt. prn. Recently started on durvalumab MRI back in october showed some- mild/mod- stenosis- saw pmr- note appreciated- thought to be combo of myofascial pain and stenosis. MRI did not show any lumbar mets or path fxs.  Pt says he doesnt have much pain when sitting but has significant pain in low back when trying to stand. nonradiating.

## 2024-01-08 PROBLEM — M54.50 CHRONIC BILATERAL LOW BACK PAIN WITHOUT SCIATICA: Status: ACTIVE | Noted: 2024-01-01

## 2024-01-09 PROBLEM — D46.9 MDS (MYELODYSPLASTIC SYNDROME): Status: ACTIVE | Noted: 2023-01-01

## 2024-01-16 ENCOUNTER — APPOINTMENT (OUTPATIENT)
Dept: INFUSION THERAPY | Facility: HOSPITAL | Age: 69
End: 2024-01-16

## 2024-01-16 ENCOUNTER — APPOINTMENT (OUTPATIENT)
Dept: HEMATOLOGY ONCOLOGY | Facility: CLINIC | Age: 69
End: 2024-01-16

## 2024-01-17 DIAGNOSIS — Z51.89 ENCOUNTER FOR OTHER SPECIFIED AFTERCARE: ICD-10-CM

## 2024-01-18 ENCOUNTER — APPOINTMENT (OUTPATIENT)
Dept: HEMATOLOGY ONCOLOGY | Facility: CLINIC | Age: 69
End: 2024-01-18

## 2024-01-18 ENCOUNTER — APPOINTMENT (OUTPATIENT)
Dept: INFUSION THERAPY | Facility: HOSPITAL | Age: 69
End: 2024-01-18

## 2024-01-19 ENCOUNTER — APPOINTMENT (OUTPATIENT)
Dept: HEMATOLOGY ONCOLOGY | Facility: CLINIC | Age: 69
End: 2024-01-19

## 2024-01-19 ENCOUNTER — APPOINTMENT (OUTPATIENT)
Dept: INFUSION THERAPY | Facility: HOSPITAL | Age: 69
End: 2024-01-19

## 2024-01-22 ENCOUNTER — APPOINTMENT (OUTPATIENT)
Dept: INFUSION THERAPY | Facility: HOSPITAL | Age: 69
End: 2024-01-22

## 2024-01-22 ENCOUNTER — APPOINTMENT (OUTPATIENT)
Dept: HEMATOLOGY ONCOLOGY | Facility: CLINIC | Age: 69
End: 2024-01-22

## 2024-01-25 ENCOUNTER — APPOINTMENT (OUTPATIENT)
Dept: HEMATOLOGY ONCOLOGY | Facility: CLINIC | Age: 69
End: 2024-01-25

## 2024-01-25 ENCOUNTER — APPOINTMENT (OUTPATIENT)
Dept: INFUSION THERAPY | Facility: HOSPITAL | Age: 69
End: 2024-01-25

## 2024-01-30 ENCOUNTER — APPOINTMENT (OUTPATIENT)
Dept: HEMATOLOGY ONCOLOGY | Facility: CLINIC | Age: 69
End: 2024-01-30

## 2024-01-30 ENCOUNTER — APPOINTMENT (OUTPATIENT)
Dept: INFUSION THERAPY | Facility: HOSPITAL | Age: 69
End: 2024-01-30

## 2024-02-02 ENCOUNTER — APPOINTMENT (OUTPATIENT)
Dept: INFUSION THERAPY | Facility: HOSPITAL | Age: 69
End: 2024-02-02

## 2024-02-02 ENCOUNTER — APPOINTMENT (OUTPATIENT)
Dept: HEMATOLOGY ONCOLOGY | Facility: CLINIC | Age: 69
End: 2024-02-02

## 2024-02-05 ENCOUNTER — APPOINTMENT (OUTPATIENT)
Dept: HEMATOLOGY ONCOLOGY | Facility: CLINIC | Age: 69
End: 2024-02-05

## 2024-02-05 ENCOUNTER — APPOINTMENT (OUTPATIENT)
Dept: INFUSION THERAPY | Facility: HOSPITAL | Age: 69
End: 2024-02-05

## 2024-02-08 ENCOUNTER — APPOINTMENT (OUTPATIENT)
Dept: HEMATOLOGY ONCOLOGY | Facility: CLINIC | Age: 69
End: 2024-02-08

## 2024-02-08 ENCOUNTER — APPOINTMENT (OUTPATIENT)
Dept: INFUSION THERAPY | Facility: HOSPITAL | Age: 69
End: 2024-02-08

## 2024-02-09 ENCOUNTER — APPOINTMENT (OUTPATIENT)
Dept: RHEUMATOLOGY | Facility: CLINIC | Age: 69
End: 2024-02-09

## 2024-02-13 ENCOUNTER — APPOINTMENT (OUTPATIENT)
Dept: HEMATOLOGY ONCOLOGY | Facility: CLINIC | Age: 69
End: 2024-02-13

## 2024-02-13 ENCOUNTER — APPOINTMENT (OUTPATIENT)
Dept: INFUSION THERAPY | Facility: HOSPITAL | Age: 69
End: 2024-02-13

## 2024-02-15 ENCOUNTER — APPOINTMENT (OUTPATIENT)
Dept: ENDOCRINOLOGY | Facility: CLINIC | Age: 69
End: 2024-02-15

## 2024-02-15 ENCOUNTER — APPOINTMENT (OUTPATIENT)
Dept: HEMATOLOGY ONCOLOGY | Facility: CLINIC | Age: 69
End: 2024-02-15

## 2024-02-15 ENCOUNTER — APPOINTMENT (OUTPATIENT)
Dept: INFUSION THERAPY | Facility: HOSPITAL | Age: 69
End: 2024-02-15

## 2024-02-16 ENCOUNTER — APPOINTMENT (OUTPATIENT)
Dept: INFUSION THERAPY | Facility: HOSPITAL | Age: 69
End: 2024-02-16

## 2024-02-16 ENCOUNTER — APPOINTMENT (OUTPATIENT)
Dept: HEMATOLOGY ONCOLOGY | Facility: CLINIC | Age: 69
End: 2024-02-16

## 2024-03-04 ENCOUNTER — APPOINTMENT (OUTPATIENT)
Dept: HEMATOLOGY ONCOLOGY | Facility: CLINIC | Age: 69
End: 2024-03-04

## 2024-03-08 ENCOUNTER — APPOINTMENT (OUTPATIENT)
Dept: CARDIOLOGY | Facility: CLINIC | Age: 69
End: 2024-03-08

## 2024-04-01 ENCOUNTER — APPOINTMENT (OUTPATIENT)
Dept: HEMATOLOGY ONCOLOGY | Facility: CLINIC | Age: 69
End: 2024-04-01

## 2024-04-29 ENCOUNTER — APPOINTMENT (OUTPATIENT)
Dept: HEMATOLOGY ONCOLOGY | Facility: CLINIC | Age: 69
End: 2024-04-29

## 2024-05-22 ENCOUNTER — APPOINTMENT (OUTPATIENT)
Dept: GASTROENTEROLOGY | Facility: HOSPITAL | Age: 69
End: 2024-05-22

## 2024-05-30 ENCOUNTER — APPOINTMENT (OUTPATIENT)
Dept: HEMATOLOGY ONCOLOGY | Facility: CLINIC | Age: 69
End: 2024-05-30

## 2024-06-20 ENCOUNTER — APPOINTMENT (OUTPATIENT)
Dept: HEMATOLOGY ONCOLOGY | Facility: CLINIC | Age: 69
End: 2024-06-20

## 2024-06-21 NOTE — ASU PATIENT PROFILE, ADULT - PRESSURE ULCER(S)
DISCHARGE INSTRUCTIONS FOR INJECTIONS     You underwent bilateral sacroiliac joint injections today    Aftermost injections, it is recommended that you relax and limit your activity for the remainder of the day unless you have been told otherwise by your pain physician.  You should not drive a car, operate machinery, or make important legal decisions unless otherwise directed by your pain physician.  You may resume your normal activity, including exercise, tomorrow.      Keep a written pain diary of how much pain relief you experienced following the injection procedure and the length of time of pain relief you experienced pain relief. Following diagnostic injections like medial branch nerve blocks, sacroiliac joint blocks, stellate ganglion injections and other blocks, it is very important you record the specific amount of pain relief you experienced immediately after the injectionand how long it lasted. Your doctor will ask you for this information at your follow up visit.     For all injections, please keep the injection site dry and inspect the site for a couple of days. You may remove the Band-Aid the day of the injection at any time.     Some discomfort, bruising or slight swelling may occur at the injection site. This is not abnormal if it occurs.  If needed you may:    -Take over the counter medication such as Tylenol or Motrin.   -Apply an ice pack for 30 minutes, 2 to 3 times a day for the first 24 hours.     You may shower today; no soaking baths, hot tubs, whirlpools or swimming pools for two days.      If you are given steroids in your injection, it may take 3-5 days for the steroid medication to take effect. You may notice a worsening of your symptoms for 1-2 days after the injection. This is not abnormal.  You may use acetaminophen, ibuprofen, or prescription medication that your doctor may have prescribed for you if you need to do so.     A few common side effects of steroids include facial flushing,  sweating, restlessness, irritability,difficulty sleeping, increase in blood sugar, and increased blood pressure. If you have diabetes, please monitor your blood sugar at least once a day for at least 5 days. If you have poorly controlled high blood pressure, monitoryour blood pressure for at least 2 days and contact your primary care physician if these numbers are unusually high for you.      If you take aspirin or non-steroidal anti-inflammatory drugs (examples are Motrin, Advil, ibuprofen, Naprosyn, Voltaren, Relafen, etc.) you may restart these this evening, but stop taking it 3 days before your next appointment, unless instructed otherwiseby your physician.      You do not need to discontinue non-aspirin-containing pain medications prior to an injection (examples: Celebrex, tramadol, hydrocodone and acetaminophen).      If you take a blood thinning medication (Coumadin, Lovenox, Fragmin,Ticlid, Plavix, Pradaxa, etc.), please discuss this with your primary care physician/cardiologist and your pain physician. These medications MUST be discontinued before you can have an injection safely, without the risk of uncontrolled bleeding. If these medications are not discontinued for an appropriate period of time, you will not be able to receivean injection.      If you are taking Coumadin, please have your INR checked the morning of your procedure and bringthe result to your appointment unless otherwise instructed. If your INR is over 1.2, your injection may need to be rescheduled to avoid uncontrolled bleeding from the needle placement.     Call [facility] at [phone] between 8am-4pm Monday - Friday if you are experiencing the following:    If you received an epidural or spinal injection:    -Headache that doesnot go away with medicine, is worse when sitting or standing up, and is greatly relieved upon lying down.   -Severe pain worse than or different than your baseline pain.   -Chills or fever  (101º F or greater).   -Drainage or signs of infection at the injection site     Go directly to the Emergency Department if you are experiencing the following and received an epidural or spinal injection:   -Abrupt weakness or progressive weakness in your legs that starts after you leave the clinic.   -Abrupt severe or worsening numbness in your legs.   -Inability to urinate after the injection or loss of bowel or bladder control without the urge to defecate or urinate.     If you have a clinical question that cannot wait until your next appointment, please call [phone] between 8am-4pm Monday - Friday or send a [employer] message. We do our best to return all non-emergency messages within 24 hours, Monday - Friday. A nurse or physician will return your message.      If you need to cancel an appointment, please call the scheduling staff at [phone] during normal business hours or leave a message at least 24 hours in advance.     If you are going to be sedated for your next procedure, you MUST have responsible adult who can legally drive accompany you home. You cannot eat or drink for eight hours prior to the planned procedure if you are going to receive sedation. You may take your non-blood thinning medications with a small sip of water.      no

## 2024-07-22 ENCOUNTER — APPOINTMENT (OUTPATIENT)
Dept: HEMATOLOGY ONCOLOGY | Facility: CLINIC | Age: 69
End: 2024-07-22

## 2024-08-19 ENCOUNTER — APPOINTMENT (OUTPATIENT)
Dept: HEMATOLOGY ONCOLOGY | Facility: CLINIC | Age: 69
End: 2024-08-19

## 2025-03-03 NOTE — CHART NOTE - NSCHARTNOTEFT_GEN_A_CORE
Vascular Surgery Post Op Note     STATUS POST:  LEFT TCAR    SUBJECTIVE: Pt seen s/p the above procedure. Patient feels wells. No complaints of pain. Was unable to urinate postop but felt urge, mahajan placed- now comfortable. Denies chest pain, SOB or nausea.       Vital Signs Last 24 Hrs  T(C): 36.7 (03 Jan 2023 16:00), Max: 36.7 (03 Jan 2023 15:00)  T(F): 98 (03 Jan 2023 16:00), Max: 98 (03 Jan 2023 15:00)  HR: 55 (03 Jan 2023 16:15) (55 - 84)  BP: 106/48 (03 Jan 2023 16:15) (84/44 - 137/61)  BP(mean): 63 (03 Jan 2023 16:15) (53 - 97)  RR: 12 (03 Jan 2023 16:15) (11 - 20)  SpO2: 92% (03 Jan 2023 16:15) (92% - 99%)    Parameters below as of 03 Jan 2023 14:30  Patient On (Oxygen Delivery Method): room air      Mahajan:  NGT:  I&O's Summary    03 Jan 2023 07:01  -  03 Jan 2023 16:40  --------------------------------------------------------  IN: 354.6 mL / OUT: 800 mL / NET: -445.4 mL      I&O's Detail    03 Jan 2023 07:01  -  03 Jan 2023 16:40  --------------------------------------------------------  IN:    Oral Fluid: 50 mL    Phenylephrine: 4.6 mL    sodium chloride 0.9%: 300 mL  Total IN: 354.6 mL    OUT:    Indwelling Catheter - Urethral (mL): 800 mL  Total OUT: 800 mL    Total NET: -445.4 mL          PHYSICAL EXAM:  General: alert and oriented, NAD  Neuro: Follows commands well and able to answer questions appropriately. Positive movement of all 4 extremities. 5/5 strength. CN intact. No asymmetry is seen.    Resp: airway patent, respirations unlabored  CVS: regular rate and rhythm  Abdomen: soft, nontender, nondistended  Extremities: no edema  Skin: warm, dry, appropriate color            LABS:                        12.1   8.81  )-----------( 136      ( 03 Jan 2023 15:49 )             37.4     01-03    137  |  100  |  16  ----------------------------<  149<H>  4.1   |  22  |  0.58    Ca    9.7      03 Jan 2023 15:49  Phos  3.7     01-03  Mg     1.60     01-03      67y year old Male PMH diabetes, HTN, HLD, hypothyroid, ischemic cardiomyopathy, CAD s/p stents x 4 (3/2019) and CABG (1/2008), PCI (06/2022), Stage IVB small cell lung cancer on chemo (last round on 6/7/22), former 30 pack year smoker (quit 15 years ago), esophagitis 2/2 chemotherapy (on streoids @ home) s/p LEFT TCAR (1/3). SICU consult due to pressor requirement.       - Admit to Vascular Surgery  - Neurological checks  - Pain control as needed  - s/p stress dose steroids intraop; c/w home steroids  - Wean Dalton as tolerated; BP goal 110 - 160  - Monitor I/Os; c/w mahajan while on pressors/ resuscitation  - f/u STAT labs  - SICU consult  - c/w ASA, plavix  - DVT prophylaxis      Vascular  17919 no edema,  no murmurs,  regular rate and rhythm Vascular Surgery Post Op Note     STATUS POST:  LEFT TCAR    SUBJECTIVE: Pt seen s/p the above procedure. Patient feels wells. No complaints of pain. Was unable to urinate postop but felt urge, bladder scan (660cc)--mahajan placed- now comfortable. Denies chest pain, SOB or nausea.       Vital Signs Last 24 Hrs  T(C): 36.7 (03 Jan 2023 16:00), Max: 36.7 (03 Jan 2023 15:00)  T(F): 98 (03 Jan 2023 16:00), Max: 98 (03 Jan 2023 15:00)  HR: 55 (03 Jan 2023 16:15) (55 - 84)  BP: 106/48 (03 Jan 2023 16:15) (84/44 - 137/61)  BP(mean): 63 (03 Jan 2023 16:15) (53 - 97)  RR: 12 (03 Jan 2023 16:15) (11 - 20)  SpO2: 92% (03 Jan 2023 16:15) (92% - 99%)    Parameters below as of 03 Jan 2023 14:30  Patient On (Oxygen Delivery Method): room air      Mahajan:  NGT:  I&O's Summary    03 Jan 2023 07:01  -  03 Jan 2023 16:40  --------------------------------------------------------  IN: 354.6 mL / OUT: 800 mL / NET: -445.4 mL      I&O's Detail    03 Jan 2023 07:01  -  03 Jan 2023 16:40  --------------------------------------------------------  IN:    Oral Fluid: 50 mL    Phenylephrine: 4.6 mL    sodium chloride 0.9%: 300 mL  Total IN: 354.6 mL    OUT:    Indwelling Catheter - Urethral (mL): 800 mL  Total OUT: 800 mL    Total NET: -445.4 mL          PHYSICAL EXAM:  General: alert and oriented, NAD  Neuro: Follows commands well and able to answer questions appropriately. Positive movement of all 4 extremities. 5/5 strength. CN intact. No asymmetry is seen.    Resp: airway patent, respirations unlabored  CVS: regular rate and rhythm  Abdomen: soft, nontender, nondistended  Extremities: no edema  Skin: warm, dry, appropriate color            LABS:                        12.1   8.81  )-----------( 136      ( 03 Jan 2023 15:49 )             37.4     01-03    137  |  100  |  16  ----------------------------<  149<H>  4.1   |  22  |  0.58    Ca    9.7      03 Jan 2023 15:49  Phos  3.7     01-03  Mg     1.60     01-03      67y year old Male PMH diabetes, HTN, HLD, hypothyroid, ischemic cardiomyopathy, CAD s/p stents x 4 (3/2019) and CABG (1/2008), PCI (06/2022), Stage IVB small cell lung cancer on chemo (last round on 6/7/22), former 30 pack year smoker (quit 15 years ago), esophagitis 2/2 chemotherapy (on streoids @ home) s/p LEFT TCAR (1/3). SICU consult due to pressor requirement.       - Admit to Vascular Surgery  - Neurological checks  - Pain control as needed  - s/p stress dose steroids intraop; c/w home steroids  - Wean Dalton as tolerated; BP goal 110 - 160  - Monitor I/Os; c/w mahajan while on pressors/ resuscitation  - f/u STAT labs  - SICU consult  - c/w ASA, plavix  - DVT prophylaxis      Vascular  [id number]

## (undated) DEVICE — SPONGE PEANUT AUTO COUNT

## (undated) DEVICE — FOLEY TRAY 16FR 5CC LF UMETER CLOSED

## (undated) DEVICE — DRAPE MINOR PROCEDURE

## (undated) DEVICE — SUT VICRYL 3-0 18" SH UNDYED (POP-OFF)

## (undated) DEVICE — URETERAL CATH RED RUBBER 16FR (ORANGE)

## (undated) DEVICE — PROTECTOR HEEL / ELBOW FLUFFY

## (undated) DEVICE — WARMING BLANKET FULL ADULT

## (undated) DEVICE — SUT PROLENE 6-0 18" C-1

## (undated) DEVICE — PACK PERIPHERAL VASC

## (undated) DEVICE — NDL HYPO SAFE 25G X 5/8" (ORANGE)

## (undated) DEVICE — DRAPE ULTRASOUND PROBE COVER W ULTRA GEL 18X120CM

## (undated) DEVICE — SUCTION YANKAUER NO CONTROL VENT

## (undated) DEVICE — GLV 7.5 PROTEXIS (CREAM) MICRO

## (undated) DEVICE — INFLATOR ENCORE 26

## (undated) DEVICE — DRAPE 3/4 SHEET 52X76"

## (undated) DEVICE — Device

## (undated) DEVICE — STAPLER SKIN MULTI DIRECTION W35

## (undated) DEVICE — DRSG CURITY GAUZE SPONGE 4 X 4" 12-PLY

## (undated) DEVICE — POSITIONER FOAM EGG CRATE ULNAR 2PCS (PINK)

## (undated) DEVICE — DRAPE TOWEL BLUE 17" X 24"

## (undated) DEVICE — SUT PROLENE 6-0 30" BV-1

## (undated) DEVICE — TAPE SILK 3"

## (undated) DEVICE — DRSG TAPE UMBILICAL COTTON 2" X 30 X 1/8"

## (undated) DEVICE — SYR LUER LOK 3CC

## (undated) DEVICE — ELCTR GROUNDING PAD ADULT COVIDIEN

## (undated) DEVICE — SOL IRR POUR NS 0.9% 500ML

## (undated) DEVICE — SUT MONOCRYL 4-0 27" PS-2 UNDYED

## (undated) DEVICE — POSITIONER STRAP ARMBOARD VELCRO TS-30

## (undated) DEVICE — VENODYNE/SCD SLEEVE CALF MEDIUM